# Patient Record
Sex: MALE | Race: WHITE | URBAN - METROPOLITAN AREA
[De-identification: names, ages, dates, MRNs, and addresses within clinical notes are randomized per-mention and may not be internally consistent; named-entity substitution may affect disease eponyms.]

---

## 2017-02-20 ENCOUNTER — EMERGENCY (EMERGENCY)
Facility: HOSPITAL | Age: 82
LOS: 1 days | Discharge: HOME | End: 2017-02-20
Attending: EMERGENCY MEDICINE | Admitting: EMERGENCY MEDICINE
Payer: MEDICARE

## 2017-02-20 VITALS
SYSTOLIC BLOOD PRESSURE: 138 MMHG | DIASTOLIC BLOOD PRESSURE: 84 MMHG | TEMPERATURE: 98 F | OXYGEN SATURATION: 98 % | HEART RATE: 66 BPM | RESPIRATION RATE: 18 BRPM

## 2017-02-20 VITALS
HEIGHT: 66 IN | DIASTOLIC BLOOD PRESSURE: 82 MMHG | WEIGHT: 197.98 LBS | SYSTOLIC BLOOD PRESSURE: 144 MMHG | HEART RATE: 64 BPM | OXYGEN SATURATION: 97 % | TEMPERATURE: 97 F | RESPIRATION RATE: 16 BRPM

## 2017-02-20 DIAGNOSIS — S89.91XA UNSPECIFIED INJURY OF RIGHT LOWER LEG, INITIAL ENCOUNTER: ICD-10-CM

## 2017-02-20 DIAGNOSIS — S01.81XA LACERATION WITHOUT FOREIGN BODY OF OTHER PART OF HEAD, INITIAL ENCOUNTER: ICD-10-CM
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
## 2017-02-20 DIAGNOSIS — I50.9 HEART FAILURE, UNSPECIFIED: ICD-10-CM

## 2017-02-20 DIAGNOSIS — S09.90XA UNSPECIFIED INJURY OF HEAD, INITIAL ENCOUNTER: ICD-10-CM
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
## 2017-02-20 DIAGNOSIS — Z88.5 ALLERGY STATUS TO NARCOTIC AGENT: ICD-10-CM

## 2017-02-20 DIAGNOSIS — Z87.891 PERSONAL HISTORY OF NICOTINE DEPENDENCE: ICD-10-CM

## 2017-02-20 DIAGNOSIS — Z79.02 LONG TERM (CURRENT) USE OF ANTITHROMBOTICS/ANTIPLATELETS: ICD-10-CM

## 2017-02-20 DIAGNOSIS — I10 ESSENTIAL (PRIMARY) HYPERTENSION: ICD-10-CM

## 2017-02-20 PROCEDURE — 99284 EMERGENCY DEPT VISIT MOD MDM: CPT | Mod: 25

## 2017-02-20 PROCEDURE — 70450 CT HEAD/BRAIN W/O DYE: CPT | Mod: 26

## 2017-02-20 PROCEDURE — 73590 X-RAY EXAM OF LOWER LEG: CPT | Mod: 26,RT

## 2017-02-20 PROCEDURE — 12013 RPR F/E/E/N/L/M 2.6-5.0 CM: CPT

## 2017-02-20 PROCEDURE — 70450 CT HEAD/BRAIN W/O DYE: CPT

## 2017-02-20 PROCEDURE — 99284 EMERGENCY DEPT VISIT MOD MDM: CPT

## 2017-02-20 PROCEDURE — 73590 X-RAY EXAM OF LOWER LEG: CPT

## 2017-02-20 RX ORDER — ACETAMINOPHEN 500 MG
650 TABLET ORAL ONCE
Qty: 0 | Refills: 0 | Status: COMPLETED | OUTPATIENT
Start: 2017-02-20 | End: 2017-02-20

## 2017-02-20 RX ADMIN — Medication 650 MILLIGRAM(S): at 16:23

## 2017-02-20 RX ADMIN — Medication 650 MILLIGRAM(S): at 15:53

## 2017-02-20 NOTE — ED ADULT NURSE NOTE - OBJECTIVE STATEMENT
Pt presents to the ED  missed two steps fell left above the eyebrow laceration and right shin area swelling, mild bruising noted, pt currently on Plavix, denies LOC, nausea.

## 2017-02-20 NOTE — ED PROVIDER NOTE - OBJECTIVE STATEMENT
pt c/o tineo, forehead laceration and right leg pain s/p trip and fall on step. no loc, d/n/v, weakness, numbness, neck or back pain, arm pain, cp, sob, abd pain, visual or speech changes  pmd - herbetr

## 2017-02-20 NOTE — ED PROVIDER NOTE - CARE PLAN
Principal Discharge DX:	Injury of head, initial encounter  Secondary Diagnosis:	Forehead laceration, initial encounter  Secondary Diagnosis:	Leg injury, right, initial encounter

## 2017-02-20 NOTE — ED PROVIDER NOTE - CHPI ED SYMPTOMS NEG
no nausea/no dizziness/no weakness/no change in level of consciousness/no loss of consciousness/no vomiting/no syncope

## 2017-02-20 NOTE — ED PROVIDER NOTE - PROGRESS NOTE DETAILS
Reevaluated patient at bedside.  Patient feeling well, ambulatory without assistance.  Discussed the results of all diagnostic testing in ED and copies of all reports given.   An opportunity to ask questions was given.  Discussed the importance of prompt, close medical follow-up.  Patient will return with any changes, concerns or persistent / worsening symptoms.  Understanding of all instructions verbalized.

## 2017-02-20 NOTE — ED PROVIDER NOTE - EYES, MLM
Clear bilaterally, pupils equal, round and reactive to light. Clear bilaterally, pupils equal, round and reactive to light. left eye ptosis (chronic per pt and family)

## 2017-02-25 ENCOUNTER — EMERGENCY (EMERGENCY)
Facility: HOSPITAL | Age: 82
LOS: 1 days | Discharge: HOME | End: 2017-02-25
Attending: EMERGENCY MEDICINE | Admitting: EMERGENCY MEDICINE
Payer: MEDICARE

## 2017-02-25 VITALS
HEIGHT: 66 IN | WEIGHT: 197.98 LBS | TEMPERATURE: 98 F | HEART RATE: 70 BPM | RESPIRATION RATE: 14 BRPM | SYSTOLIC BLOOD PRESSURE: 161 MMHG | OXYGEN SATURATION: 95 % | DIASTOLIC BLOOD PRESSURE: 87 MMHG

## 2017-02-25 VITALS
RESPIRATION RATE: 16 BRPM | HEART RATE: 59 BPM | SYSTOLIC BLOOD PRESSURE: 136 MMHG | OXYGEN SATURATION: 95 % | TEMPERATURE: 98 F | DIASTOLIC BLOOD PRESSURE: 78 MMHG

## 2017-02-25 DIAGNOSIS — Z98.890 OTHER SPECIFIED POSTPROCEDURAL STATES: Chronic | ICD-10-CM

## 2017-02-25 DIAGNOSIS — I10 ESSENTIAL (PRIMARY) HYPERTENSION: ICD-10-CM

## 2017-02-25 DIAGNOSIS — Z48.02 ENCOUNTER FOR REMOVAL OF SUTURES: ICD-10-CM

## 2017-02-25 DIAGNOSIS — Z88.5 ALLERGY STATUS TO NARCOTIC AGENT: ICD-10-CM

## 2017-02-25 DIAGNOSIS — I50.9 HEART FAILURE, UNSPECIFIED: ICD-10-CM

## 2017-02-25 DIAGNOSIS — Z79.02 LONG TERM (CURRENT) USE OF ANTITHROMBOTICS/ANTIPLATELETS: ICD-10-CM

## 2017-02-25 PROCEDURE — G0463: CPT

## 2017-02-25 NOTE — ED ADULT NURSE NOTE - OBJECTIVE STATEMENT
pt arrived for suture removal on left side of face. 8 sutures removed. no redness or swelling noted. no odor or drainage from the site. perrl. no visual changes. denies fever.

## 2017-02-25 NOTE — ED PROVIDER NOTE - OBJECTIVE STATEMENT
80 y/o M presents for suture removal after a left forehead laceration repair 6 days ago.  pt denies signs of infection, headache, discharge, redness, pain.

## 2017-02-25 NOTE — ED PROVIDER NOTE - CHPI ED SYMPTOMS NEG
no bleeding/no purulent drainage/no fever/no inflammation/no chills/no redness/no red streaks/no bleeding at site/no pain/no drainage

## 2017-02-25 NOTE — ED ADULT NURSE NOTE - CHPI ED SYMPTOMS NEG
no purulent drainage/no bleeding/no fever/no inflammation/no chills/no redness/no red streaks/no bleeding at site/no drainage/no pain

## 2021-01-01 ENCOUNTER — APPOINTMENT (OUTPATIENT)
Dept: ELECTROPHYSIOLOGY | Facility: CLINIC | Age: 86
End: 2021-01-01
Payer: MEDICARE

## 2021-01-01 VITALS
WEIGHT: 179 LBS | HEART RATE: 117 BPM | DIASTOLIC BLOOD PRESSURE: 62 MMHG | SYSTOLIC BLOOD PRESSURE: 130 MMHG | BODY MASS INDEX: 28.09 KG/M2 | HEIGHT: 67 IN

## 2021-01-01 DIAGNOSIS — I48.91 UNSPECIFIED ATRIAL FIBRILLATION: ICD-10-CM

## 2021-01-01 DIAGNOSIS — E03.9 HYPOTHYROIDISM, UNSPECIFIED: ICD-10-CM

## 2021-01-01 DIAGNOSIS — I73.9 PERIPHERAL VASCULAR DISEASE, UNSPECIFIED: ICD-10-CM

## 2021-01-01 DIAGNOSIS — Z78.9 OTHER SPECIFIED HEALTH STATUS: ICD-10-CM

## 2021-01-01 DIAGNOSIS — I50.9 HEART FAILURE, UNSPECIFIED: ICD-10-CM

## 2021-01-01 DIAGNOSIS — E78.5 HYPERLIPIDEMIA, UNSPECIFIED: ICD-10-CM

## 2021-01-01 DIAGNOSIS — Z86.79 PERSONAL HISTORY OF OTHER DISEASES OF THE CIRCULATORY SYSTEM: ICD-10-CM

## 2021-01-01 DIAGNOSIS — Z82.49 FAMILY HISTORY OF ISCHEMIC HEART DISEASE AND OTHER DISEASES OF THE CIRCULATORY SYSTEM: ICD-10-CM

## 2021-01-01 DIAGNOSIS — Z87.19 PERSONAL HISTORY OF OTHER DISEASES OF THE DIGESTIVE SYSTEM: ICD-10-CM

## 2021-01-01 PROCEDURE — 93000 ELECTROCARDIOGRAM COMPLETE: CPT

## 2021-01-01 PROCEDURE — 99205 OFFICE O/P NEW HI 60 MIN: CPT

## 2021-01-01 RX ORDER — TORSEMIDE 100 MG/1
100 TABLET ORAL DAILY
Refills: 0 | Status: ACTIVE | COMMUNITY
Start: 2021-01-01

## 2021-01-01 RX ORDER — DILTIAZEM HYDROCHLORIDE 120 MG/1
120 CAPSULE, COATED, EXTENDED RELEASE ORAL
Refills: 0 | Status: ACTIVE | COMMUNITY
Start: 2021-01-01

## 2021-01-01 RX ORDER — CLOPIDOGREL BISULFATE 75 MG/1
75 TABLET, FILM COATED ORAL
Qty: 30 | Refills: 3 | Status: ACTIVE | COMMUNITY
Start: 2021-01-01

## 2021-01-01 RX ORDER — ALBUTEROL SULFATE 90 UG/1
108 (90 BASE) AEROSOL, METERED RESPIRATORY (INHALATION) EVERY 4 HOURS
Refills: 0 | Status: ACTIVE | COMMUNITY
Start: 2021-01-01

## 2021-01-01 RX ORDER — LEVOTHYROXINE SODIUM 0.11 MG/1
112 TABLET ORAL DAILY
Refills: 0 | Status: ACTIVE | COMMUNITY
Start: 2021-01-01

## 2021-01-01 RX ORDER — LOVASTATIN 40 MG/1
40 TABLET ORAL
Refills: 0 | Status: ACTIVE | COMMUNITY
Start: 2021-01-01

## 2021-01-01 RX ORDER — ALBUTEROL SULFATE 90 UG/1
108 (90 BASE) INHALANT RESPIRATORY (INHALATION) EVERY 4 HOURS
Refills: 0 | Status: ACTIVE | COMMUNITY
Start: 2021-01-01

## 2021-01-01 RX ORDER — PANTOPRAZOLE 40 MG/1
40 TABLET, DELAYED RELEASE ORAL DAILY
Qty: 30 | Refills: 0 | Status: ACTIVE | COMMUNITY
Start: 2021-01-01

## 2021-01-01 RX ORDER — APIXABAN 2.5 MG/1
2.5 TABLET, FILM COATED ORAL
Qty: 60 | Refills: 5 | Status: ACTIVE | COMMUNITY
Start: 2021-01-01

## 2021-12-01 PROBLEM — Z00.00 ENCOUNTER FOR PREVENTIVE HEALTH EXAMINATION: Status: ACTIVE | Noted: 2021-01-01

## 2021-12-01 PROBLEM — I10 ESSENTIAL (PRIMARY) HYPERTENSION: Chronic | Status: ACTIVE | Noted: 2017-02-20

## 2021-12-06 PROBLEM — I73.9 PVD (PERIPHERAL VASCULAR DISEASE): Status: ACTIVE | Noted: 2021-01-01

## 2021-12-06 PROBLEM — Z82.49 FAMILY HISTORY OF HEART DISEASE: Status: ACTIVE | Noted: 2021-01-01

## 2021-12-06 PROBLEM — E03.9 HYPOTHYROID: Status: ACTIVE | Noted: 2021-01-01

## 2021-12-06 PROBLEM — Z86.79 HISTORY OF VENOUS DISEASE: Status: RESOLVED | Noted: 2021-01-01 | Resolved: 2021-01-01

## 2021-12-06 PROBLEM — I50.9 CHF (CONGESTIVE HEART FAILURE): Status: ACTIVE | Noted: 2021-01-01

## 2021-12-06 PROBLEM — E78.5 HYPERLIPIDEMIA: Status: ACTIVE | Noted: 2021-01-01

## 2021-12-06 PROBLEM — Z87.19 HISTORY OF GASTROESOPHAGEAL REFLUX (GERD): Status: RESOLVED | Noted: 2021-01-01 | Resolved: 2021-01-01

## 2021-12-08 PROBLEM — Z78.9 NON-SMOKER: Status: ACTIVE | Noted: 2021-01-01

## 2021-12-08 PROBLEM — I48.91 ATRIAL FIBRILLATION WITH RVR: Status: ACTIVE | Noted: 2021-01-01

## 2021-12-08 NOTE — REVIEW OF SYSTEMS
[Fever] : no fever [Chills] : no chills [Feeling Fatigued] : feeling fatigued [SOB] : shortness of breath [Dyspnea on exertion] : dyspnea during exertion [Chest Discomfort] : no chest discomfort [Lower Ext Edema] : no extremity edema [Leg Claudication] : no intermittent leg claudication [Palpitations] : no palpitations [Orthopnea] : no orthopnea [Syncope] : no syncope [Dizziness] : no dizziness [Convulsions] : no convulsions [Confusion] : no confusion was observed [Easy Bleeding] : a tendency for easy bleeding [Negative] : Integumentary [de-identified] : bleeding noted during dialysis only

## 2021-12-08 NOTE — DISCUSSION/SUMMARY
[FreeTextEntry1] : 86 year old gentleman with history of CHF, COPD, PAD with prior LE revascularization and claudication, hypothyroidism, ESRD on HD (since 9/2021), aortic stenosis, presenting for evaluation of persistent atrial fibrillation. He now has atrial fibrillation with rapid rates, and does not ongoing symptoms of dyspnea. However, his symptoms are multifactorial, given concomitant ESRD on HD, COPD, and severe AS. He may require TAVR for his aortic stenosis, however, this has been felt difficult to assess given his AF and RVR. We discussed AF and management strategies in detail. He will certainly need better rate control. After discussion with Dr. Lopez, will plan SHU/DCCV to restore sinus rhythm. This will allow reassessment of the severity of his AS and associated symptoms. If the AS is not severe and symptomatic, and his symptoms do improve with rhythm control, will plan long-term rhythm control strategies as needed.  \par \par -SHU/DCCV. SHU can also assess AV stenosis.  \par \par -increase Cardizem for now to 240mg qd.  \par \par -after DCCV will consider initiation of antiarrhythmic medication, though will avoid amiodarone/sotalol given COPD and ESRD. Perhaps Multaq will be best option.  \par \par -continue anticoagulation. Will need to continue Eliquis after DCCV for at least 1 month without interruption \par \par -further workup of AV disease pending above.  \par \par -EP followup after above, t/c further AF management pending course.

## 2021-12-08 NOTE — REASON FOR VISIT
[Arrhythmia/ECG Abnorrmalities] : arrhythmia/ECG abnormalities [Family Member] : family member [FreeTextEntry3] : Dr Lopez

## 2021-12-08 NOTE — CARDIOLOGY SUMMARY
[de-identified] : 12/8/21 atrial fibrillation, RBBB, average rate 117 bpm [de-identified] : Stress 2016 mild-mod abnormal with inferior and posterolateral ischemia with infarct, nml LVEF  [de-identified] : TTE 9/4/21 LVEF 61%, LA 4.6cm, trace AI, severe AS (mean gradient 33mHg, WAQAS 0.84cm2), RVSP 30 \par \par

## 2021-12-08 NOTE — HISTORY OF PRESENT ILLNESS
[FreeTextEntry1] : 86 year old gentleman with history of CHF, COPD, PAD with prior LE revascularization and claudication, hypothyroidism, ESRD on HD (since 9/2021), aortic stenosis, presenting for evaluation of persistent atrial fibrillation.  \par \par He has been noted to have atrial fibrillation for the last several months. In 9/2021 he was hospitalized at [facility] for progressive dyspnea and CHF. At that time he was started on dialysis, and treated for AF with rapid rates with rate control.  \par \par On ECG today, he is in AF with average rate 117 bpm with RBBB.  \par \par Since hospital discharge he has been feeling much better, but continues to have dyspnea with ambulation (<1 flight of stairs). He denies palpitation, chest pain, lightheadedness or recent syncope.  \par \par Recent TTE revealed severe AS (WAQAS 0.84, mean gradient 33) and preserved LV function, but the AS and symptoms were felt to be difficult to assess given his AF.  \par \par HE has been on anticoagulation with Eliquis 2.5mg bid + Plavix, and rate control with Cardizem 120mg qd. He previously was on beta blockade but this was changed due to his hx of COPD.  \par \par Current meds include Eliquis 2.5mg bid, Plavix 75, Cardizem 120, losartan 40, torsemide

## 2021-12-08 NOTE — PHYSICAL EXAM
[Well Developed] : well developed [Well Nourished] : well nourished [No Acute Distress] : no acute distress [Normal Conjunctiva] : normal conjunctiva [Normal Venous Pressure] : normal venous pressure [Clear Lung Fields] : clear lung fields [Good Air Entry] : good air entry [Soft] : abdomen soft [No Edema] : no edema [No Rash] : no rash [Moves all extremities] : moves all extremities [No Focal Deficits] : no focal deficits [Normal Speech] : normal speech [Alert and Oriented] : alert and oriented [de-identified] : irregularly irregular, rapid. 3/6 CHARLA, intact S2.

## 2022-01-01 ENCOUNTER — TRANSCRIPTION ENCOUNTER (OUTPATIENT)
Age: 87
End: 2022-01-01

## 2022-01-01 ENCOUNTER — INPATIENT (INPATIENT)
Facility: HOSPITAL | Age: 87
LOS: 0 days | Discharge: HOME | DRG: 306 | End: 2022-07-21
Attending: STUDENT IN AN ORGANIZED HEALTH CARE EDUCATION/TRAINING PROGRAM | Admitting: STUDENT IN AN ORGANIZED HEALTH CARE EDUCATION/TRAINING PROGRAM
Payer: MEDICARE

## 2022-01-01 ENCOUNTER — INPATIENT (INPATIENT)
Facility: HOSPITAL | Age: 87
LOS: 15 days | Discharge: NURSING, REHAB OR LONG-TERM CARE | End: 2022-05-21
Attending: STUDENT IN AN ORGANIZED HEALTH CARE EDUCATION/TRAINING PROGRAM | Admitting: STUDENT IN AN ORGANIZED HEALTH CARE EDUCATION/TRAINING PROGRAM
Payer: MEDICARE

## 2022-01-01 ENCOUNTER — INPATIENT (INPATIENT)
Facility: HOSPITAL | Age: 87
LOS: 3 days | DRG: 291 | End: 2022-10-17
Attending: HOSPITALIST | Admitting: INTERNAL MEDICINE
Payer: MEDICARE

## 2022-01-01 ENCOUNTER — INPATIENT (INPATIENT)
Facility: HOSPITAL | Age: 87
LOS: 3 days | Discharge: ANOTHER HOSPITAL | DRG: 291 | End: 2022-08-15
Attending: INTERNAL MEDICINE | Admitting: INTERNAL MEDICINE
Payer: MEDICARE

## 2022-01-01 ENCOUNTER — OUTPATIENT (OUTPATIENT)
Dept: OUTPATIENT SERVICES | Facility: HOSPITAL | Age: 87
LOS: 1 days | End: 2022-01-01
Payer: MEDICARE

## 2022-01-01 VITALS
OXYGEN SATURATION: 100 % | DIASTOLIC BLOOD PRESSURE: 55 MMHG | HEART RATE: 69 BPM | SYSTOLIC BLOOD PRESSURE: 91 MMHG | RESPIRATION RATE: 16 BRPM

## 2022-01-01 VITALS
SYSTOLIC BLOOD PRESSURE: 125 MMHG | HEART RATE: 67 BPM | OXYGEN SATURATION: 96 % | DIASTOLIC BLOOD PRESSURE: 61 MMHG | RESPIRATION RATE: 18 BRPM | TEMPERATURE: 98 F

## 2022-01-01 VITALS
WEIGHT: 179.02 LBS | DIASTOLIC BLOOD PRESSURE: 88 MMHG | SYSTOLIC BLOOD PRESSURE: 125 MMHG | OXYGEN SATURATION: 96 % | HEART RATE: 82 BPM | HEIGHT: 66 IN | TEMPERATURE: 98 F | RESPIRATION RATE: 26 BRPM

## 2022-01-01 VITALS
SYSTOLIC BLOOD PRESSURE: 127 MMHG | OXYGEN SATURATION: 97 % | DIASTOLIC BLOOD PRESSURE: 61 MMHG | HEART RATE: 70 BPM | RESPIRATION RATE: 18 BRPM | TEMPERATURE: 98 F

## 2022-01-01 VITALS
RESPIRATION RATE: 17 BRPM | TEMPERATURE: 98 F | OXYGEN SATURATION: 97 % | SYSTOLIC BLOOD PRESSURE: 118 MMHG | HEART RATE: 99 BPM | DIASTOLIC BLOOD PRESSURE: 64 MMHG

## 2022-01-01 VITALS
WEIGHT: 167.99 LBS | RESPIRATION RATE: 18 BRPM | OXYGEN SATURATION: 98 % | TEMPERATURE: 98 F | SYSTOLIC BLOOD PRESSURE: 92 MMHG | HEART RATE: 72 BPM | DIASTOLIC BLOOD PRESSURE: 68 MMHG | HEIGHT: 66 IN

## 2022-01-01 VITALS
DIASTOLIC BLOOD PRESSURE: 70 MMHG | SYSTOLIC BLOOD PRESSURE: 132 MMHG | OXYGEN SATURATION: 97 % | RESPIRATION RATE: 16 BRPM | HEART RATE: 80 BPM

## 2022-01-01 VITALS
RESPIRATION RATE: 20 BRPM | DIASTOLIC BLOOD PRESSURE: 82 MMHG | HEIGHT: 66 IN | SYSTOLIC BLOOD PRESSURE: 112 MMHG | HEART RATE: 117 BPM | OXYGEN SATURATION: 100 %

## 2022-01-01 VITALS — TEMPERATURE: 97 F

## 2022-01-01 VITALS — SYSTOLIC BLOOD PRESSURE: 71 MMHG | DIASTOLIC BLOOD PRESSURE: 26 MMHG

## 2022-01-01 DIAGNOSIS — N18.6 END STAGE RENAL DISEASE: ICD-10-CM

## 2022-01-01 DIAGNOSIS — J44.9 CHRONIC OBSTRUCTIVE PULMONARY DISEASE, UNSPECIFIED: ICD-10-CM

## 2022-01-01 DIAGNOSIS — K92.2 GASTROINTESTINAL HEMORRHAGE, UNSPECIFIED: ICD-10-CM

## 2022-01-01 DIAGNOSIS — Z98.890 OTHER SPECIFIED POSTPROCEDURAL STATES: Chronic | ICD-10-CM

## 2022-01-01 DIAGNOSIS — E78.5 HYPERLIPIDEMIA, UNSPECIFIED: ICD-10-CM

## 2022-01-01 DIAGNOSIS — D69.6 THROMBOCYTOPENIA, UNSPECIFIED: ICD-10-CM

## 2022-01-01 DIAGNOSIS — I48.91 UNSPECIFIED ATRIAL FIBRILLATION: ICD-10-CM

## 2022-01-01 DIAGNOSIS — I35.0 NONRHEUMATIC AORTIC (VALVE) STENOSIS: ICD-10-CM

## 2022-01-01 DIAGNOSIS — E03.9 HYPOTHYROIDISM, UNSPECIFIED: ICD-10-CM

## 2022-01-01 DIAGNOSIS — R78.81 BACTEREMIA: ICD-10-CM

## 2022-01-01 DIAGNOSIS — N17.9 ACUTE KIDNEY FAILURE, UNSPECIFIED: ICD-10-CM

## 2022-01-01 DIAGNOSIS — Z95.0 PRESENCE OF CARDIAC PACEMAKER: Chronic | ICD-10-CM

## 2022-01-01 DIAGNOSIS — I48.20 CHRONIC ATRIAL FIBRILLATION, UNSPECIFIED: ICD-10-CM

## 2022-01-01 DIAGNOSIS — I95.9 HYPOTENSION, UNSPECIFIED: ICD-10-CM

## 2022-01-01 DIAGNOSIS — R77.8 OTHER SPECIFIED ABNORMALITIES OF PLASMA PROTEINS: ICD-10-CM

## 2022-01-01 DIAGNOSIS — Z29.9 ENCOUNTER FOR PROPHYLACTIC MEASURES, UNSPECIFIED: ICD-10-CM

## 2022-01-01 DIAGNOSIS — R94.31 ABNORMAL ELECTROCARDIOGRAM [ECG] [EKG]: ICD-10-CM

## 2022-01-01 DIAGNOSIS — D63.8 ANEMIA IN OTHER CHRONIC DISEASES CLASSIFIED ELSEWHERE: ICD-10-CM

## 2022-01-01 DIAGNOSIS — E87.1 HYPO-OSMOLALITY AND HYPONATREMIA: ICD-10-CM

## 2022-01-01 DIAGNOSIS — J90 PLEURAL EFFUSION, NOT ELSEWHERE CLASSIFIED: ICD-10-CM

## 2022-01-01 DIAGNOSIS — I50.32 CHRONIC DIASTOLIC (CONGESTIVE) HEART FAILURE: ICD-10-CM

## 2022-01-01 DIAGNOSIS — I25.10 ATHEROSCLEROTIC HEART DISEASE OF NATIVE CORONARY ARTERY WITHOUT ANGINA PECTORIS: ICD-10-CM

## 2022-01-01 DIAGNOSIS — I50.9 HEART FAILURE, UNSPECIFIED: ICD-10-CM

## 2022-01-01 DIAGNOSIS — D64.9 ANEMIA, UNSPECIFIED: ICD-10-CM

## 2022-01-01 DIAGNOSIS — U07.1 COVID-19: ICD-10-CM

## 2022-01-01 DIAGNOSIS — J96.01 ACUTE RESPIRATORY FAILURE WITH HYPOXIA: ICD-10-CM

## 2022-01-01 LAB
ABO RH CONFIRMATION: SIGNIFICANT CHANGE UP
ALBUMIN FLD-MCNC: 1.1 G/DL — SIGNIFICANT CHANGE UP
ALBUMIN SERPL ELPH-MCNC: 2.3 G/DL — LOW (ref 3.3–5)
ALBUMIN SERPL ELPH-MCNC: 2.5 G/DL — LOW (ref 3.3–5)
ALBUMIN SERPL ELPH-MCNC: 2.6 G/DL — LOW (ref 3.3–5)
ALBUMIN SERPL ELPH-MCNC: 2.7 G/DL — LOW (ref 3.3–5)
ALBUMIN SERPL ELPH-MCNC: 2.7 G/DL — LOW (ref 3.3–5)
ALBUMIN SERPL ELPH-MCNC: 2.8 G/DL — LOW (ref 3.3–5)
ALBUMIN SERPL ELPH-MCNC: 2.8 G/DL — LOW (ref 3.3–5)
ALBUMIN SERPL ELPH-MCNC: 2.9 G/DL — LOW (ref 3.3–5)
ALBUMIN SERPL ELPH-MCNC: 3 G/DL — LOW (ref 3.3–5)
ALBUMIN SERPL ELPH-MCNC: 3 G/DL — LOW (ref 3.3–5)
ALBUMIN SERPL ELPH-MCNC: 3.2 G/DL — LOW (ref 3.3–5)
ALBUMIN SERPL ELPH-MCNC: SIGNIFICANT CHANGE UP G/DL (ref 3.3–5)
ALP SERPL-CCNC: 104 U/L — SIGNIFICANT CHANGE UP (ref 40–120)
ALP SERPL-CCNC: 110 U/L — SIGNIFICANT CHANGE UP (ref 40–120)
ALP SERPL-CCNC: 112 U/L — SIGNIFICANT CHANGE UP (ref 40–120)
ALP SERPL-CCNC: 114 U/L — SIGNIFICANT CHANGE UP (ref 40–120)
ALP SERPL-CCNC: 116 U/L — SIGNIFICANT CHANGE UP (ref 40–120)
ALP SERPL-CCNC: 120 U/L — SIGNIFICANT CHANGE UP (ref 40–120)
ALP SERPL-CCNC: 121 U/L — HIGH (ref 40–120)
ALP SERPL-CCNC: 122 U/L — HIGH (ref 40–120)
ALP SERPL-CCNC: 134 U/L — HIGH (ref 40–120)
ALP SERPL-CCNC: 145 U/L — HIGH (ref 40–120)
ALP SERPL-CCNC: 157 U/L — HIGH (ref 40–120)
ALP SERPL-CCNC: 173 U/L — HIGH (ref 40–120)
ALP SERPL-CCNC: 205 U/L — HIGH (ref 40–120)
ALP SERPL-CCNC: 243 U/L — HIGH (ref 40–120)
ALP SERPL-CCNC: 312 U/L — HIGH (ref 40–120)
ALP SERPL-CCNC: SIGNIFICANT CHANGE UP U/L (ref 40–120)
ALT FLD-CCNC: 13 U/L — SIGNIFICANT CHANGE UP (ref 4–41)
ALT FLD-CCNC: 14 U/L — SIGNIFICANT CHANGE UP (ref 4–41)
ALT FLD-CCNC: 15 U/L — SIGNIFICANT CHANGE UP (ref 4–41)
ALT FLD-CCNC: 16 U/L — SIGNIFICANT CHANGE UP (ref 4–41)
ALT FLD-CCNC: 210 U/L — HIGH (ref 12–78)
ALT FLD-CCNC: 28 U/L — SIGNIFICANT CHANGE UP (ref 12–78)
ALT FLD-CCNC: 33 U/L — SIGNIFICANT CHANGE UP (ref 12–78)
ALT FLD-CCNC: 36 U/L — SIGNIFICANT CHANGE UP (ref 12–78)
ALT FLD-CCNC: 38 U/L — SIGNIFICANT CHANGE UP (ref 12–78)
ALT FLD-CCNC: 42 U/L — SIGNIFICANT CHANGE UP (ref 12–78)
ALT FLD-CCNC: 45 U/L — SIGNIFICANT CHANGE UP (ref 12–78)
ALT FLD-CCNC: SIGNIFICANT CHANGE UP U/L (ref 4–41)
ANION GAP SERPL CALC-SCNC: 10 MMOL/L — SIGNIFICANT CHANGE UP (ref 5–17)
ANION GAP SERPL CALC-SCNC: 13 MMOL/L — SIGNIFICANT CHANGE UP (ref 5–17)
ANION GAP SERPL CALC-SCNC: 13 MMOL/L — SIGNIFICANT CHANGE UP (ref 5–17)
ANION GAP SERPL CALC-SCNC: 14 MMOL/L — SIGNIFICANT CHANGE UP (ref 7–14)
ANION GAP SERPL CALC-SCNC: 15 MMOL/L — HIGH (ref 7–14)
ANION GAP SERPL CALC-SCNC: 16 MMOL/L — HIGH (ref 7–14)
ANION GAP SERPL CALC-SCNC: 17 MMOL/L — HIGH (ref 7–14)
ANION GAP SERPL CALC-SCNC: 19 MMOL/L — HIGH (ref 5–17)
ANION GAP SERPL CALC-SCNC: 19 MMOL/L — HIGH (ref 7–14)
ANION GAP SERPL CALC-SCNC: 19 MMOL/L — HIGH (ref 7–14)
ANION GAP SERPL CALC-SCNC: 20 MMOL/L — HIGH (ref 7–14)
ANION GAP SERPL CALC-SCNC: 20 MMOL/L — HIGH (ref 7–14)
ANION GAP SERPL CALC-SCNC: 21 MMOL/L — HIGH (ref 7–14)
ANION GAP SERPL CALC-SCNC: 3 MMOL/L — LOW (ref 5–17)
ANION GAP SERPL CALC-SCNC: 7 MMOL/L — SIGNIFICANT CHANGE UP (ref 5–17)
ANION GAP SERPL CALC-SCNC: 8 MMOL/L — SIGNIFICANT CHANGE UP (ref 5–17)
ANION GAP SERPL CALC-SCNC: 9 MMOL/L — SIGNIFICANT CHANGE UP (ref 5–17)
ANISOCYTOSIS BLD QL: SIGNIFICANT CHANGE UP
APTT BLD: 24.8 SEC — LOW (ref 27–36.3)
APTT BLD: 31.2 SEC — SIGNIFICANT CHANGE UP (ref 27.5–35.5)
APTT BLD: 33 SEC — SIGNIFICANT CHANGE UP (ref 27–36.3)
APTT BLD: 35.5 SEC — SIGNIFICANT CHANGE UP (ref 27–36.3)
AST SERPL-CCNC: 15 U/L — SIGNIFICANT CHANGE UP (ref 4–40)
AST SERPL-CCNC: 16 U/L — SIGNIFICANT CHANGE UP (ref 4–40)
AST SERPL-CCNC: 17 U/L — SIGNIFICANT CHANGE UP (ref 4–40)
AST SERPL-CCNC: 18 U/L — SIGNIFICANT CHANGE UP (ref 4–40)
AST SERPL-CCNC: 19 U/L — SIGNIFICANT CHANGE UP (ref 4–40)
AST SERPL-CCNC: 20 U/L — SIGNIFICANT CHANGE UP (ref 4–40)
AST SERPL-CCNC: 27 U/L — SIGNIFICANT CHANGE UP (ref 15–37)
AST SERPL-CCNC: 29 U/L — SIGNIFICANT CHANGE UP (ref 15–37)
AST SERPL-CCNC: 292 U/L — HIGH (ref 15–37)
AST SERPL-CCNC: 32 U/L — SIGNIFICANT CHANGE UP (ref 15–37)
AST SERPL-CCNC: 32 U/L — SIGNIFICANT CHANGE UP (ref 15–37)
AST SERPL-CCNC: 41 U/L — HIGH (ref 15–37)
AST SERPL-CCNC: 70 U/L — HIGH (ref 15–37)
AST SERPL-CCNC: SIGNIFICANT CHANGE UP U/L (ref 4–40)
B PERT DNA SPEC QL NAA+PROBE: SIGNIFICANT CHANGE UP
B PERT IGG+IGM PNL SER: CLEAR — SIGNIFICANT CHANGE UP
B PERT+PARAPERT DNA PNL SPEC NAA+PROBE: SIGNIFICANT CHANGE UP
BASE EXCESS BLDV CALC-SCNC: 4.3 MMOL/L — HIGH (ref -2–3)
BASOPHILS # BLD AUTO: 0 K/UL — SIGNIFICANT CHANGE UP (ref 0–0.2)
BASOPHILS # BLD AUTO: 0.02 K/UL — SIGNIFICANT CHANGE UP (ref 0–0.2)
BASOPHILS # BLD AUTO: 0.02 K/UL — SIGNIFICANT CHANGE UP (ref 0–0.2)
BASOPHILS # BLD AUTO: 0.03 K/UL — SIGNIFICANT CHANGE UP (ref 0–0.2)
BASOPHILS # BLD AUTO: 0.04 K/UL — SIGNIFICANT CHANGE UP (ref 0–0.2)
BASOPHILS # BLD AUTO: 0.04 K/UL — SIGNIFICANT CHANGE UP (ref 0–0.2)
BASOPHILS # BLD AUTO: 0.05 K/UL — SIGNIFICANT CHANGE UP (ref 0–0.2)
BASOPHILS # BLD AUTO: 0.06 K/UL — SIGNIFICANT CHANGE UP (ref 0–0.2)
BASOPHILS # BLD AUTO: 0.06 K/UL — SIGNIFICANT CHANGE UP (ref 0–0.2)
BASOPHILS # BLD AUTO: 0.07 K/UL — SIGNIFICANT CHANGE UP (ref 0–0.2)
BASOPHILS # BLD AUTO: 0.08 K/UL — SIGNIFICANT CHANGE UP (ref 0–0.2)
BASOPHILS # BLD AUTO: 0.09 K/UL — SIGNIFICANT CHANGE UP (ref 0–0.2)
BASOPHILS # BLD AUTO: 0.14 K/UL — SIGNIFICANT CHANGE UP (ref 0–0.2)
BASOPHILS NFR BLD AUTO: 0 % — SIGNIFICANT CHANGE UP (ref 0–2)
BASOPHILS NFR BLD AUTO: 0.2 % — SIGNIFICANT CHANGE UP (ref 0–2)
BASOPHILS NFR BLD AUTO: 0.2 % — SIGNIFICANT CHANGE UP (ref 0–2)
BASOPHILS NFR BLD AUTO: 0.3 % — SIGNIFICANT CHANGE UP (ref 0–2)
BASOPHILS NFR BLD AUTO: 0.7 % — SIGNIFICANT CHANGE UP (ref 0–2)
BASOPHILS NFR BLD AUTO: 0.8 % — SIGNIFICANT CHANGE UP (ref 0–2)
BASOPHILS NFR BLD AUTO: 0.9 % — SIGNIFICANT CHANGE UP (ref 0–2)
BASOPHILS NFR BLD AUTO: 1 % — SIGNIFICANT CHANGE UP (ref 0–2)
BASOPHILS NFR BLD AUTO: 1 % — SIGNIFICANT CHANGE UP (ref 0–2)
BASOPHILS NFR BLD AUTO: 1.1 % — SIGNIFICANT CHANGE UP (ref 0–2)
BASOPHILS NFR BLD AUTO: 1.2 % — SIGNIFICANT CHANGE UP (ref 0–2)
BASOPHILS NFR BLD AUTO: 1.5 % — SIGNIFICANT CHANGE UP (ref 0–2)
BASOPHILS NFR BLD AUTO: 1.5 % — SIGNIFICANT CHANGE UP (ref 0–2)
BASOPHILS NFR BLD AUTO: 1.6 % — SIGNIFICANT CHANGE UP (ref 0–2)
BASOPHILS NFR BLD AUTO: 4.3 % — HIGH (ref 0–2)
BILIRUB SERPL-MCNC: 0.2 MG/DL — SIGNIFICANT CHANGE UP (ref 0.2–1.2)
BILIRUB SERPL-MCNC: 0.3 MG/DL — SIGNIFICANT CHANGE UP (ref 0.2–1.2)
BILIRUB SERPL-MCNC: 0.4 MG/DL — SIGNIFICANT CHANGE UP (ref 0.2–1.2)
BILIRUB SERPL-MCNC: 0.6 MG/DL — SIGNIFICANT CHANGE UP (ref 0.2–1.2)
BILIRUB SERPL-MCNC: 0.8 MG/DL — SIGNIFICANT CHANGE UP (ref 0.2–1.2)
BILIRUB SERPL-MCNC: 0.9 MG/DL — SIGNIFICANT CHANGE UP (ref 0.2–1.2)
BILIRUB SERPL-MCNC: 1.1 MG/DL — SIGNIFICANT CHANGE UP (ref 0.2–1.2)
BILIRUB SERPL-MCNC: 3.1 MG/DL — HIGH (ref 0.2–1.2)
BILIRUB SERPL-MCNC: SIGNIFICANT CHANGE UP MG/DL (ref 0.2–1.2)
BLD GP AB SCN SERPL QL: NEGATIVE — SIGNIFICANT CHANGE UP
BLD GP AB SCN SERPL QL: NEGATIVE — SIGNIFICANT CHANGE UP
BLOOD GAS VENOUS COMPREHENSIVE RESULT: SIGNIFICANT CHANGE UP
BLOOD GAS VENOUS COMPREHENSIVE RESULT: SIGNIFICANT CHANGE UP
BORDETELLA PARAPERTUSSIS (RAPRVP): SIGNIFICANT CHANGE UP
BUN SERPL-MCNC: 19 MG/DL — SIGNIFICANT CHANGE UP (ref 7–23)
BUN SERPL-MCNC: 23 MG/DL — SIGNIFICANT CHANGE UP (ref 7–23)
BUN SERPL-MCNC: 25 MG/DL — HIGH (ref 7–23)
BUN SERPL-MCNC: 26 MG/DL — HIGH (ref 7–23)
BUN SERPL-MCNC: 27 MG/DL — HIGH (ref 7–23)
BUN SERPL-MCNC: 28 MG/DL — HIGH (ref 7–23)
BUN SERPL-MCNC: 29 MG/DL — HIGH (ref 7–23)
BUN SERPL-MCNC: 30 MG/DL — HIGH (ref 7–23)
BUN SERPL-MCNC: 32 MG/DL — HIGH (ref 7–23)
BUN SERPL-MCNC: 33 MG/DL — HIGH (ref 7–23)
BUN SERPL-MCNC: 35 MG/DL — HIGH (ref 7–23)
BUN SERPL-MCNC: 35 MG/DL — HIGH (ref 7–23)
BUN SERPL-MCNC: 36 MG/DL — HIGH (ref 7–23)
BUN SERPL-MCNC: 37.5 MG/DL — HIGH (ref 8–20)
BUN SERPL-MCNC: 38 MG/DL — HIGH (ref 7–23)
BUN SERPL-MCNC: 39 MG/DL — HIGH (ref 7–23)
BUN SERPL-MCNC: 40 MG/DL — HIGH (ref 7–23)
BUN SERPL-MCNC: 42 MG/DL — HIGH (ref 7–23)
BUN SERPL-MCNC: 43 MG/DL — HIGH (ref 7–23)
BUN SERPL-MCNC: 43 MG/DL — HIGH (ref 7–23)
BUN SERPL-MCNC: 44 MG/DL — HIGH (ref 7–23)
BUN SERPL-MCNC: 47 MG/DL — HIGH (ref 7–23)
BUN SERPL-MCNC: 48 MG/DL — HIGH (ref 7–23)
BUN SERPL-MCNC: 51 MG/DL — HIGH (ref 7–23)
BUN SERPL-MCNC: 58 MG/DL — HIGH (ref 7–23)
BUN SERPL-MCNC: 66 MG/DL — HIGH (ref 7–23)
BUN SERPL-MCNC: 68 MG/DL — HIGH (ref 7–23)
BUN SERPL-MCNC: 73 MG/DL — HIGH (ref 7–23)
BUN SERPL-MCNC: SIGNIFICANT CHANGE UP MG/DL (ref 7–23)
C PNEUM DNA SPEC QL NAA+PROBE: SIGNIFICANT CHANGE UP
CA-I SERPL-SCNC: 1.19 MMOL/L — SIGNIFICANT CHANGE UP (ref 1.15–1.33)
CALCIUM SERPL-MCNC: 8.3 MG/DL — LOW (ref 8.5–10.1)
CALCIUM SERPL-MCNC: 8.5 MG/DL — SIGNIFICANT CHANGE UP (ref 8.5–10.1)
CALCIUM SERPL-MCNC: 8.7 MG/DL — SIGNIFICANT CHANGE UP (ref 8.5–10.1)
CALCIUM SERPL-MCNC: 8.7 MG/DL — SIGNIFICANT CHANGE UP (ref 8.5–10.1)
CALCIUM SERPL-MCNC: 8.8 MG/DL — SIGNIFICANT CHANGE UP (ref 8.4–10.5)
CALCIUM SERPL-MCNC: 8.8 MG/DL — SIGNIFICANT CHANGE UP (ref 8.5–10.1)
CALCIUM SERPL-MCNC: 8.9 MG/DL — SIGNIFICANT CHANGE UP (ref 8.4–10.5)
CALCIUM SERPL-MCNC: 8.9 MG/DL — SIGNIFICANT CHANGE UP (ref 8.4–10.5)
CALCIUM SERPL-MCNC: 9 MG/DL — SIGNIFICANT CHANGE UP (ref 8.4–10.5)
CALCIUM SERPL-MCNC: 9 MG/DL — SIGNIFICANT CHANGE UP (ref 8.4–10.5)
CALCIUM SERPL-MCNC: 9 MG/DL — SIGNIFICANT CHANGE UP (ref 8.5–10.1)
CALCIUM SERPL-MCNC: 9.1 MG/DL — SIGNIFICANT CHANGE UP (ref 8.4–10.5)
CALCIUM SERPL-MCNC: 9.1 MG/DL — SIGNIFICANT CHANGE UP (ref 8.5–10.1)
CALCIUM SERPL-MCNC: 9.2 MG/DL — SIGNIFICANT CHANGE UP (ref 8.4–10.5)
CALCIUM SERPL-MCNC: 9.2 MG/DL — SIGNIFICANT CHANGE UP (ref 8.4–10.5)
CALCIUM SERPL-MCNC: 9.3 MG/DL — SIGNIFICANT CHANGE UP (ref 8.4–10.5)
CALCIUM SERPL-MCNC: 9.4 MG/DL — SIGNIFICANT CHANGE UP (ref 8.4–10.5)
CALCIUM SERPL-MCNC: 9.4 MG/DL — SIGNIFICANT CHANGE UP (ref 8.6–10.2)
CALCIUM SERPL-MCNC: 9.5 MG/DL — SIGNIFICANT CHANGE UP (ref 8.4–10.5)
CALCIUM SERPL-MCNC: 9.5 MG/DL — SIGNIFICANT CHANGE UP (ref 8.5–10.1)
CALCIUM SERPL-MCNC: 9.6 MG/DL — SIGNIFICANT CHANGE UP (ref 8.5–10.1)
CALCIUM SERPL-MCNC: 9.7 MG/DL — SIGNIFICANT CHANGE UP (ref 8.5–10.1)
CALCIUM SERPL-MCNC: SIGNIFICANT CHANGE UP MG/DL (ref 8.4–10.5)
CHLORIDE BLDV-SCNC: 98 MMOL/L — SIGNIFICANT CHANGE UP (ref 96–108)
CHLORIDE SERPL-SCNC: 89 MMOL/L — LOW (ref 96–108)
CHLORIDE SERPL-SCNC: 89 MMOL/L — LOW (ref 98–107)
CHLORIDE SERPL-SCNC: 91 MMOL/L — LOW (ref 96–108)
CHLORIDE SERPL-SCNC: 91 MMOL/L — LOW (ref 98–107)
CHLORIDE SERPL-SCNC: 92 MMOL/L — LOW (ref 98–107)
CHLORIDE SERPL-SCNC: 93 MMOL/L — LOW (ref 98–107)
CHLORIDE SERPL-SCNC: 94 MMOL/L — LOW (ref 96–108)
CHLORIDE SERPL-SCNC: 94 MMOL/L — LOW (ref 98–107)
CHLORIDE SERPL-SCNC: 94 MMOL/L — LOW (ref 98–107)
CHLORIDE SERPL-SCNC: 95 MMOL/L — LOW (ref 96–108)
CHLORIDE SERPL-SCNC: 95 MMOL/L — LOW (ref 98–107)
CHLORIDE SERPL-SCNC: 96 MMOL/L — LOW (ref 98–107)
CHLORIDE SERPL-SCNC: 96 MMOL/L — SIGNIFICANT CHANGE UP (ref 96–108)
CHLORIDE SERPL-SCNC: 97 MMOL/L — LOW (ref 98–107)
CHLORIDE SERPL-SCNC: 97 MMOL/L — LOW (ref 98–107)
CHLORIDE SERPL-SCNC: 97 MMOL/L — SIGNIFICANT CHANGE UP (ref 96–108)
CHLORIDE SERPL-SCNC: 97 MMOL/L — SIGNIFICANT CHANGE UP (ref 96–108)
CHLORIDE SERPL-SCNC: 98 MMOL/L — SIGNIFICANT CHANGE UP (ref 98–107)
CK MB BLD-MCNC: 16.9 % — HIGH (ref 0–3.5)
CK MB CFR SERPL CALC: 4.9 NG/ML — HIGH (ref 0–3.6)
CK MB CFR SERPL CALC: 6.3 NG/ML — HIGH (ref 0–3.6)
CK SERPL-CCNC: 29 U/L — SIGNIFICANT CHANGE UP (ref 26–308)
CO2 BLDV-SCNC: 31.8 MMOL/L — HIGH (ref 22–26)
CO2 SERPL-SCNC: 19 MMOL/L — LOW (ref 22–31)
CO2 SERPL-SCNC: 22 MMOL/L — SIGNIFICANT CHANGE UP (ref 22–31)
CO2 SERPL-SCNC: 23 MMOL/L — SIGNIFICANT CHANGE UP (ref 22–31)
CO2 SERPL-SCNC: 24 MMOL/L — SIGNIFICANT CHANGE UP (ref 22–31)
CO2 SERPL-SCNC: 25 MMOL/L — SIGNIFICANT CHANGE UP (ref 22–29)
CO2 SERPL-SCNC: 25 MMOL/L — SIGNIFICANT CHANGE UP (ref 22–31)
CO2 SERPL-SCNC: 26 MMOL/L — SIGNIFICANT CHANGE UP (ref 22–31)
CO2 SERPL-SCNC: 26 MMOL/L — SIGNIFICANT CHANGE UP (ref 22–31)
CO2 SERPL-SCNC: 27 MMOL/L — SIGNIFICANT CHANGE UP (ref 22–31)
CO2 SERPL-SCNC: 27 MMOL/L — SIGNIFICANT CHANGE UP (ref 22–31)
CO2 SERPL-SCNC: 28 MMOL/L — SIGNIFICANT CHANGE UP (ref 22–31)
CO2 SERPL-SCNC: 29 MMOL/L — SIGNIFICANT CHANGE UP (ref 22–31)
CO2 SERPL-SCNC: 30 MMOL/L — SIGNIFICANT CHANGE UP (ref 22–31)
CO2 SERPL-SCNC: 31 MMOL/L — SIGNIFICANT CHANGE UP (ref 22–31)
CO2 SERPL-SCNC: 31 MMOL/L — SIGNIFICANT CHANGE UP (ref 22–31)
CO2 SERPL-SCNC: 33 MMOL/L — HIGH (ref 22–31)
CO2 SERPL-SCNC: 36 MMOL/L — HIGH (ref 22–31)
CO2 SERPL-SCNC: SIGNIFICANT CHANGE UP MMOL/L (ref 22–31)
COLOR FLD: YELLOW — SIGNIFICANT CHANGE UP
CREAT SERPL-MCNC: 2.28 MG/DL — HIGH (ref 0.5–1.3)
CREAT SERPL-MCNC: 2.6 MG/DL — HIGH (ref 0.5–1.3)
CREAT SERPL-MCNC: 3.1 MG/DL — HIGH (ref 0.5–1.3)
CREAT SERPL-MCNC: 3.3 MG/DL — HIGH (ref 0.5–1.3)
CREAT SERPL-MCNC: 3.39 MG/DL — HIGH (ref 0.5–1.3)
CREAT SERPL-MCNC: 3.71 MG/DL — HIGH (ref 0.5–1.3)
CREAT SERPL-MCNC: 3.85 MG/DL — HIGH (ref 0.5–1.3)
CREAT SERPL-MCNC: 4.09 MG/DL — HIGH (ref 0.5–1.3)
CREAT SERPL-MCNC: 4.2 MG/DL — HIGH (ref 0.5–1.3)
CREAT SERPL-MCNC: 4.74 MG/DL — HIGH (ref 0.5–1.3)
CREAT SERPL-MCNC: 4.74 MG/DL — HIGH (ref 0.5–1.3)
CREAT SERPL-MCNC: 4.76 MG/DL — HIGH (ref 0.5–1.3)
CREAT SERPL-MCNC: 4.89 MG/DL — HIGH (ref 0.5–1.3)
CREAT SERPL-MCNC: 4.9 MG/DL — HIGH (ref 0.5–1.3)
CREAT SERPL-MCNC: 5.12 MG/DL — HIGH (ref 0.5–1.3)
CREAT SERPL-MCNC: 5.23 MG/DL — HIGH (ref 0.5–1.3)
CREAT SERPL-MCNC: 5.3 MG/DL — HIGH (ref 0.5–1.3)
CREAT SERPL-MCNC: 5.4 MG/DL — HIGH (ref 0.5–1.3)
CREAT SERPL-MCNC: 5.82 MG/DL — HIGH (ref 0.5–1.3)
CREAT SERPL-MCNC: 5.86 MG/DL — HIGH (ref 0.5–1.3)
CREAT SERPL-MCNC: 5.95 MG/DL — HIGH (ref 0.5–1.3)
CREAT SERPL-MCNC: 6.02 MG/DL — HIGH (ref 0.5–1.3)
CREAT SERPL-MCNC: 6.15 MG/DL — HIGH (ref 0.5–1.3)
CREAT SERPL-MCNC: 6.2 MG/DL — HIGH (ref 0.5–1.3)
CREAT SERPL-MCNC: 6.31 MG/DL — HIGH (ref 0.5–1.3)
CREAT SERPL-MCNC: 6.67 MG/DL — HIGH (ref 0.5–1.3)
CREAT SERPL-MCNC: 6.68 MG/DL — HIGH (ref 0.5–1.3)
CREAT SERPL-MCNC: 6.81 MG/DL — HIGH (ref 0.5–1.3)
CREAT SERPL-MCNC: SIGNIFICANT CHANGE UP MG/DL (ref 0.5–1.3)
CRP SERPL-MCNC: 8 MG/L — HIGH
CULTURE RESULTS: SIGNIFICANT CHANGE UP
DACRYOCYTES BLD QL SMEAR: SLIGHT — SIGNIFICANT CHANGE UP
DIALYSIS INSTRUMENT RESULT - HEPATITIS B SURFACE ANTIGEN: NEGATIVE — SIGNIFICANT CHANGE UP
EGFR: 10 ML/MIN/1.73M2 — LOW
EGFR: 11 ML/MIN/1.73M2 — LOW
EGFR: 13 ML/MIN/1.73M2 — LOW
EGFR: 14 ML/MIN/1.73M2 — LOW
EGFR: 14 ML/MIN/1.73M2 — LOW
EGFR: 15 ML/MIN/1.73M2 — LOW
EGFR: 17 ML/MIN/1.73M2 — LOW
EGFR: 17 ML/MIN/1.73M2 — LOW
EGFR: 19 ML/MIN/1.73M2 — LOW
EGFR: 23 ML/MIN/1.73M2 — LOW
EGFR: 27 ML/MIN/1.73M2 — LOW
EGFR: 7 ML/MIN/1.73M2 — LOW
EGFR: 8 ML/MIN/1.73M2 — LOW
EGFR: 9 ML/MIN/1.73M2 — LOW
EOSINOPHIL # BLD AUTO: 0 K/UL — SIGNIFICANT CHANGE UP (ref 0–0.5)
EOSINOPHIL # BLD AUTO: 0.02 K/UL — SIGNIFICANT CHANGE UP (ref 0–0.5)
EOSINOPHIL # BLD AUTO: 0.03 K/UL — SIGNIFICANT CHANGE UP (ref 0–0.5)
EOSINOPHIL # BLD AUTO: 0.03 K/UL — SIGNIFICANT CHANGE UP (ref 0–0.5)
EOSINOPHIL # BLD AUTO: 0.04 K/UL — SIGNIFICANT CHANGE UP (ref 0–0.5)
EOSINOPHIL # BLD AUTO: 0.07 K/UL — SIGNIFICANT CHANGE UP (ref 0–0.5)
EOSINOPHIL # BLD AUTO: 0.1 K/UL — SIGNIFICANT CHANGE UP (ref 0–0.5)
EOSINOPHIL # BLD AUTO: 0.11 K/UL — SIGNIFICANT CHANGE UP (ref 0–0.5)
EOSINOPHIL # BLD AUTO: 0.13 K/UL — SIGNIFICANT CHANGE UP (ref 0–0.5)
EOSINOPHIL # BLD AUTO: 0.15 K/UL — SIGNIFICANT CHANGE UP (ref 0–0.5)
EOSINOPHIL # BLD AUTO: 0.17 K/UL — SIGNIFICANT CHANGE UP (ref 0–0.5)
EOSINOPHIL # BLD AUTO: 0.2 K/UL — SIGNIFICANT CHANGE UP (ref 0–0.5)
EOSINOPHIL # BLD AUTO: 0.2 K/UL — SIGNIFICANT CHANGE UP (ref 0–0.5)
EOSINOPHIL # BLD AUTO: 0.21 K/UL — SIGNIFICANT CHANGE UP (ref 0–0.5)
EOSINOPHIL # BLD AUTO: 0.21 K/UL — SIGNIFICANT CHANGE UP (ref 0–0.5)
EOSINOPHIL # BLD AUTO: 0.22 K/UL — SIGNIFICANT CHANGE UP (ref 0–0.5)
EOSINOPHIL # BLD AUTO: 0.22 K/UL — SIGNIFICANT CHANGE UP (ref 0–0.5)
EOSINOPHIL # BLD AUTO: 0.25 K/UL — SIGNIFICANT CHANGE UP (ref 0–0.5)
EOSINOPHIL # BLD AUTO: 0.25 K/UL — SIGNIFICANT CHANGE UP (ref 0–0.5)
EOSINOPHIL # BLD AUTO: 0.26 K/UL — SIGNIFICANT CHANGE UP (ref 0–0.5)
EOSINOPHIL NFR BLD AUTO: 0 % — SIGNIFICANT CHANGE UP (ref 0–6)
EOSINOPHIL NFR BLD AUTO: 0.3 % — SIGNIFICANT CHANGE UP (ref 0–6)
EOSINOPHIL NFR BLD AUTO: 0.7 % — SIGNIFICANT CHANGE UP (ref 0–6)
EOSINOPHIL NFR BLD AUTO: 0.8 % — SIGNIFICANT CHANGE UP (ref 0–6)
EOSINOPHIL NFR BLD AUTO: 2.3 % — SIGNIFICANT CHANGE UP (ref 0–6)
EOSINOPHIL NFR BLD AUTO: 2.4 % — SIGNIFICANT CHANGE UP (ref 0–6)
EOSINOPHIL NFR BLD AUTO: 2.6 % — SIGNIFICANT CHANGE UP (ref 0–6)
EOSINOPHIL NFR BLD AUTO: 2.7 % — SIGNIFICANT CHANGE UP (ref 0–6)
EOSINOPHIL NFR BLD AUTO: 3 % — SIGNIFICANT CHANGE UP (ref 0–6)
EOSINOPHIL NFR BLD AUTO: 3.3 % — SIGNIFICANT CHANGE UP (ref 0–6)
EOSINOPHIL NFR BLD AUTO: 3.6 % — SIGNIFICANT CHANGE UP (ref 0–6)
EOSINOPHIL NFR BLD AUTO: 3.6 % — SIGNIFICANT CHANGE UP (ref 0–6)
EOSINOPHIL NFR BLD AUTO: 4.2 % — SIGNIFICANT CHANGE UP (ref 0–6)
EOSINOPHIL NFR BLD AUTO: 4.4 % — SIGNIFICANT CHANGE UP (ref 0–6)
EOSINOPHIL NFR BLD AUTO: 4.5 % — SIGNIFICANT CHANGE UP (ref 0–6)
EOSINOPHIL NFR BLD AUTO: 4.6 % — SIGNIFICANT CHANGE UP (ref 0–6)
EOSINOPHIL NFR BLD AUTO: 6 % — SIGNIFICANT CHANGE UP (ref 0–6)
ERYTHROCYTE [SEDIMENTATION RATE] IN BLOOD: 52 MM/HR — HIGH (ref 0–20)
FERRITIN SERPL-MCNC: 386 NG/ML — SIGNIFICANT CHANGE UP (ref 30–400)
FLUAV SUBTYP SPEC NAA+PROBE: SIGNIFICANT CHANGE UP
FLUBV RNA SPEC QL NAA+PROBE: SIGNIFICANT CHANGE UP
FLUID INTAKE SUBSTANCE CLASS: SIGNIFICANT CHANGE UP
FOLATE SERPL-MCNC: 6.3 NG/ML — SIGNIFICANT CHANGE UP
GAS PNL BLDV: 133 MMOL/L — LOW (ref 136–145)
GAS PNL BLDV: SIGNIFICANT CHANGE UP
GLUCOSE BLDC GLUCOMTR-MCNC: 112 MG/DL — HIGH (ref 70–99)
GLUCOSE BLDC GLUCOMTR-MCNC: 142 MG/DL — HIGH (ref 70–99)
GLUCOSE BLDC GLUCOMTR-MCNC: 58 MG/DL — LOW (ref 70–99)
GLUCOSE BLDV-MCNC: 113 MG/DL — HIGH (ref 70–99)
GLUCOSE FLD-MCNC: 95 MG/DL — SIGNIFICANT CHANGE UP
GLUCOSE SERPL-MCNC: 109 MG/DL — HIGH (ref 70–99)
GLUCOSE SERPL-MCNC: 111 MG/DL — HIGH (ref 70–99)
GLUCOSE SERPL-MCNC: 54 MG/DL — CRITICAL LOW (ref 70–99)
GLUCOSE SERPL-MCNC: 68 MG/DL — LOW (ref 70–99)
GLUCOSE SERPL-MCNC: 70 MG/DL — SIGNIFICANT CHANGE UP (ref 70–99)
GLUCOSE SERPL-MCNC: 74 MG/DL — SIGNIFICANT CHANGE UP (ref 70–99)
GLUCOSE SERPL-MCNC: 74 MG/DL — SIGNIFICANT CHANGE UP (ref 70–99)
GLUCOSE SERPL-MCNC: 76 MG/DL — SIGNIFICANT CHANGE UP (ref 70–99)
GLUCOSE SERPL-MCNC: 82 MG/DL — SIGNIFICANT CHANGE UP (ref 70–99)
GLUCOSE SERPL-MCNC: 83 MG/DL — SIGNIFICANT CHANGE UP (ref 70–99)
GLUCOSE SERPL-MCNC: 84 MG/DL — SIGNIFICANT CHANGE UP (ref 70–99)
GLUCOSE SERPL-MCNC: 85 MG/DL — SIGNIFICANT CHANGE UP (ref 70–99)
GLUCOSE SERPL-MCNC: 85 MG/DL — SIGNIFICANT CHANGE UP (ref 70–99)
GLUCOSE SERPL-MCNC: 86 MG/DL — SIGNIFICANT CHANGE UP (ref 70–99)
GLUCOSE SERPL-MCNC: 87 MG/DL — SIGNIFICANT CHANGE UP (ref 70–99)
GLUCOSE SERPL-MCNC: 89 MG/DL — SIGNIFICANT CHANGE UP (ref 70–99)
GLUCOSE SERPL-MCNC: 89 MG/DL — SIGNIFICANT CHANGE UP (ref 70–99)
GLUCOSE SERPL-MCNC: 90 MG/DL — SIGNIFICANT CHANGE UP (ref 70–99)
GLUCOSE SERPL-MCNC: 90 MG/DL — SIGNIFICANT CHANGE UP (ref 70–99)
GLUCOSE SERPL-MCNC: 91 MG/DL — SIGNIFICANT CHANGE UP (ref 70–99)
GLUCOSE SERPL-MCNC: 95 MG/DL — SIGNIFICANT CHANGE UP (ref 70–99)
GLUCOSE SERPL-MCNC: 95 MG/DL — SIGNIFICANT CHANGE UP (ref 70–99)
GLUCOSE SERPL-MCNC: 96 MG/DL — SIGNIFICANT CHANGE UP (ref 70–99)
GLUCOSE SERPL-MCNC: 97 MG/DL — SIGNIFICANT CHANGE UP (ref 70–99)
GLUCOSE SERPL-MCNC: 99 MG/DL — SIGNIFICANT CHANGE UP (ref 70–99)
GLUCOSE SERPL-MCNC: SIGNIFICANT CHANGE UP MG/DL (ref 70–99)
GRAM STN FLD: SIGNIFICANT CHANGE UP
HADV DNA SPEC QL NAA+PROBE: SIGNIFICANT CHANGE UP
HAV IGM SER-ACNC: SIGNIFICANT CHANGE UP
HAV IGM SER-ACNC: SIGNIFICANT CHANGE UP
HBV CORE AB SER-ACNC: SIGNIFICANT CHANGE UP
HBV CORE IGM SER-ACNC: SIGNIFICANT CHANGE UP
HBV CORE IGM SER-ACNC: SIGNIFICANT CHANGE UP
HBV SURFACE AB SER-ACNC: <3 MIU/ML — LOW
HBV SURFACE AG SER-ACNC: SIGNIFICANT CHANGE UP
HCO3 BLDV-SCNC: 30 MMOL/L — HIGH (ref 22–29)
HCOV 229E RNA SPEC QL NAA+PROBE: SIGNIFICANT CHANGE UP
HCOV HKU1 RNA SPEC QL NAA+PROBE: SIGNIFICANT CHANGE UP
HCOV NL63 RNA SPEC QL NAA+PROBE: SIGNIFICANT CHANGE UP
HCOV OC43 RNA SPEC QL NAA+PROBE: SIGNIFICANT CHANGE UP
HCT VFR BLD CALC: 18.9 % — CRITICAL LOW (ref 39–50)
HCT VFR BLD CALC: 19.2 % — CRITICAL LOW (ref 39–50)
HCT VFR BLD CALC: 19.9 % — CRITICAL LOW (ref 39–50)
HCT VFR BLD CALC: 20.7 % — CRITICAL LOW (ref 39–50)
HCT VFR BLD CALC: 20.9 % — CRITICAL LOW (ref 39–50)
HCT VFR BLD CALC: 21.8 % — LOW (ref 39–50)
HCT VFR BLD CALC: 22.4 % — LOW (ref 39–50)
HCT VFR BLD CALC: 22.4 % — LOW (ref 39–50)
HCT VFR BLD CALC: 26.2 % — LOW (ref 39–50)
HCT VFR BLD CALC: 26.5 % — LOW (ref 39–50)
HCT VFR BLD CALC: 27 % — LOW (ref 39–50)
HCT VFR BLD CALC: 27.7 % — LOW (ref 39–50)
HCT VFR BLD CALC: 27.9 % — LOW (ref 39–50)
HCT VFR BLD CALC: 27.9 % — LOW (ref 39–50)
HCT VFR BLD CALC: 28.4 % — LOW (ref 39–50)
HCT VFR BLD CALC: 28.5 % — LOW (ref 39–50)
HCT VFR BLD CALC: 28.7 % — LOW (ref 39–50)
HCT VFR BLD CALC: 28.9 % — LOW (ref 39–50)
HCT VFR BLD CALC: 28.9 % — LOW (ref 39–50)
HCT VFR BLD CALC: 29.1 % — LOW (ref 39–50)
HCT VFR BLD CALC: 29.2 % — LOW (ref 39–50)
HCT VFR BLD CALC: 29.3 % — LOW (ref 39–50)
HCT VFR BLD CALC: 29.9 % — LOW (ref 39–50)
HCT VFR BLD CALC: 30 % — LOW (ref 39–50)
HCT VFR BLD CALC: 30.3 % — LOW (ref 39–50)
HCT VFR BLD CALC: 30.4 % — LOW (ref 39–50)
HCT VFR BLD CALC: 30.7 % — LOW (ref 39–50)
HCT VFR BLD CALC: 31 % — LOW (ref 39–50)
HCT VFR BLD CALC: 31 % — LOW (ref 39–50)
HCT VFR BLD CALC: 31.2 % — LOW (ref 39–50)
HCT VFR BLD CALC: 31.5 % — LOW (ref 39–50)
HCT VFR BLD CALC: 31.7 % — LOW (ref 39–50)
HCT VFR BLD CALC: 31.7 % — LOW (ref 39–50)
HCT VFR BLD CALC: 31.8 % — LOW (ref 39–50)
HCT VFR BLD CALC: 31.9 % — LOW (ref 39–50)
HCT VFR BLD CALC: 32.6 % — LOW (ref 39–50)
HCT VFR BLD CALC: 33.1 % — LOW (ref 39–50)
HCT VFR BLD CALC: 34.2 % — LOW (ref 39–50)
HCT VFR BLDA CALC: 29 % — LOW (ref 39–51)
HCV AB S/CO SERPL IA: 0.11 S/CO — SIGNIFICANT CHANGE UP (ref 0–0.99)
HCV AB S/CO SERPL IA: 0.12 S/CO — SIGNIFICANT CHANGE UP (ref 0–0.99)
HCV AB S/CO SERPL IA: 0.18 S/CO — SIGNIFICANT CHANGE UP (ref 0–0.99)
HCV AB SERPL-IMP: SIGNIFICANT CHANGE UP
HGB BLD CALC-MCNC: 9.8 G/DL — LOW (ref 13–17)
HGB BLD-MCNC: 10.1 G/DL — LOW (ref 13–17)
HGB BLD-MCNC: 10.3 G/DL — LOW (ref 13–17)
HGB BLD-MCNC: 10.5 G/DL — LOW (ref 13–17)
HGB BLD-MCNC: 10.5 G/DL — LOW (ref 13–17)
HGB BLD-MCNC: 10.6 G/DL — LOW (ref 13–17)
HGB BLD-MCNC: 10.8 G/DL — LOW (ref 13–17)
HGB BLD-MCNC: 6 G/DL — CRITICAL LOW (ref 13–17)
HGB BLD-MCNC: 6 G/DL — CRITICAL LOW (ref 13–17)
HGB BLD-MCNC: 6.2 G/DL — CRITICAL LOW (ref 13–17)
HGB BLD-MCNC: 6.7 G/DL — CRITICAL LOW (ref 13–17)
HGB BLD-MCNC: 6.8 G/DL — CRITICAL LOW (ref 13–17)
HGB BLD-MCNC: 6.9 G/DL — CRITICAL LOW (ref 13–17)
HGB BLD-MCNC: 7.1 G/DL — LOW (ref 13–17)
HGB BLD-MCNC: 7.3 G/DL — LOW (ref 13–17)
HGB BLD-MCNC: 8.1 G/DL — LOW (ref 13–17)
HGB BLD-MCNC: 8.3 G/DL — LOW (ref 13–17)
HGB BLD-MCNC: 8.5 G/DL — LOW (ref 13–17)
HGB BLD-MCNC: 8.7 G/DL — LOW (ref 13–17)
HGB BLD-MCNC: 9 G/DL — LOW (ref 13–17)
HGB BLD-MCNC: 9.2 G/DL — LOW (ref 13–17)
HGB BLD-MCNC: 9.2 G/DL — LOW (ref 13–17)
HGB BLD-MCNC: 9.4 G/DL — LOW (ref 13–17)
HGB BLD-MCNC: 9.5 G/DL — LOW (ref 13–17)
HGB BLD-MCNC: 9.6 G/DL — LOW (ref 13–17)
HGB BLD-MCNC: 9.7 G/DL — LOW (ref 13–17)
HGB BLD-MCNC: 9.7 G/DL — LOW (ref 13–17)
HGB BLD-MCNC: 9.8 G/DL — LOW (ref 13–17)
HGB BLD-MCNC: 9.8 G/DL — LOW (ref 13–17)
HGB BLD-MCNC: 9.9 G/DL — LOW (ref 13–17)
HMPV RNA SPEC QL NAA+PROBE: SIGNIFICANT CHANGE UP
HPIV1 RNA SPEC QL NAA+PROBE: SIGNIFICANT CHANGE UP
HPIV2 RNA SPEC QL NAA+PROBE: SIGNIFICANT CHANGE UP
HPIV3 RNA SPEC QL NAA+PROBE: SIGNIFICANT CHANGE UP
HPIV4 RNA SPEC QL NAA+PROBE: SIGNIFICANT CHANGE UP
IANC: 1.91 K/UL — SIGNIFICANT CHANGE UP (ref 1.8–7.4)
IANC: 2.04 K/UL — SIGNIFICANT CHANGE UP (ref 1.8–7.4)
IANC: 2.04 K/UL — SIGNIFICANT CHANGE UP (ref 1.8–7.4)
IANC: 3.09 K/UL — SIGNIFICANT CHANGE UP (ref 1.8–7.4)
IANC: 3.26 K/UL — SIGNIFICANT CHANGE UP (ref 1.8–7.4)
IANC: 3.91 K/UL — SIGNIFICANT CHANGE UP (ref 1.8–7.4)
IANC: 3.92 K/UL — SIGNIFICANT CHANGE UP (ref 1.8–7.4)
IANC: 4.19 K/UL — SIGNIFICANT CHANGE UP (ref 1.8–7.4)
IANC: 4.23 K/UL — SIGNIFICANT CHANGE UP (ref 1.8–7.4)
IANC: 4.31 K/UL — SIGNIFICANT CHANGE UP (ref 1.8–7.4)
IANC: 4.75 K/UL — SIGNIFICANT CHANGE UP (ref 1.8–7.4)
IANC: 4.9 K/UL — SIGNIFICANT CHANGE UP (ref 1.8–7.4)
IANC: 4.9 K/UL — SIGNIFICANT CHANGE UP (ref 1.8–7.4)
IANC: 5.13 K/UL — SIGNIFICANT CHANGE UP (ref 1.8–7.4)
IANC: 5.22 K/UL — SIGNIFICANT CHANGE UP (ref 1.8–7.4)
IANC: 5.42 K/UL — SIGNIFICANT CHANGE UP (ref 1.8–7.4)
IANC: 5.74 K/UL — SIGNIFICANT CHANGE UP (ref 1.8–7.4)
IMM GRANULOCYTES NFR BLD AUTO: 0.6 % — SIGNIFICANT CHANGE UP (ref 0–1.5)
IMM GRANULOCYTES NFR BLD AUTO: 0.9 % — SIGNIFICANT CHANGE UP (ref 0–1.5)
IMM GRANULOCYTES NFR BLD AUTO: 0.9 % — SIGNIFICANT CHANGE UP (ref 0–1.5)
IMM GRANULOCYTES NFR BLD AUTO: 1 % — SIGNIFICANT CHANGE UP (ref 0–1.5)
IMM GRANULOCYTES NFR BLD AUTO: 1.1 % — SIGNIFICANT CHANGE UP (ref 0–1.5)
IMM GRANULOCYTES NFR BLD AUTO: 1.2 % — SIGNIFICANT CHANGE UP (ref 0–1.5)
IMM GRANULOCYTES NFR BLD AUTO: 1.4 % — SIGNIFICANT CHANGE UP (ref 0–1.5)
IMM GRANULOCYTES NFR BLD AUTO: 1.4 % — SIGNIFICANT CHANGE UP (ref 0–1.5)
IMM GRANULOCYTES NFR BLD AUTO: 1.6 % — HIGH (ref 0–1.5)
IMM GRANULOCYTES NFR BLD AUTO: 1.8 % — HIGH (ref 0–1.5)
IMM GRANULOCYTES NFR BLD AUTO: 2 % — HIGH (ref 0–1.5)
IMM GRANULOCYTES NFR BLD AUTO: 2 % — HIGH (ref 0–1.5)
IMM GRANULOCYTES NFR BLD AUTO: 2.2 % — HIGH (ref 0–1.5)
IMM GRANULOCYTES NFR BLD AUTO: 2.2 % — HIGH (ref 0–1.5)
IMM GRANULOCYTES NFR BLD AUTO: 2.4 % — HIGH (ref 0–1.5)
IMM GRANULOCYTES NFR BLD AUTO: 2.5 % — HIGH (ref 0–1.5)
IMM GRANULOCYTES NFR BLD AUTO: 2.5 % — HIGH (ref 0–1.5)
IMM GRANULOCYTES NFR BLD AUTO: 2.6 % — HIGH (ref 0–1.5)
IMM GRANULOCYTES NFR BLD AUTO: 3.9 % — HIGH (ref 0–1.5)
INR BLD: 1.09 RATIO — SIGNIFICANT CHANGE UP (ref 0.88–1.16)
INR BLD: 1.17 RATIO — HIGH (ref 0.88–1.16)
INR BLD: 1.42 RATIO — HIGH (ref 0.88–1.16)
INR BLD: 1.48 RATIO — HIGH (ref 0.88–1.16)
INR BLD: 1.54 RATIO — HIGH (ref 0.88–1.16)
IRON SATN MFR SERPL: 116 UG/DL — SIGNIFICANT CHANGE UP (ref 45–165)
IRON SATN MFR SERPL: 37 % — SIGNIFICANT CHANGE UP (ref 16–55)
LACTATE BLDV-MCNC: 1.8 MMOL/L — SIGNIFICANT CHANGE UP (ref 0.5–2)
LACTATE BLDV-MCNC: 2.1 MMOL/L — HIGH (ref 0.5–2)
LACTATE SERPL-SCNC: 1.6 MMOL/L — SIGNIFICANT CHANGE UP (ref 0.7–2)
LACTATE SERPL-SCNC: 2.2 MMOL/L — HIGH (ref 0.7–2)
LACTATE SERPL-SCNC: 2.3 MMOL/L — HIGH (ref 0.7–2)
LACTATE SERPL-SCNC: 2.5 MMOL/L — HIGH (ref 0.7–2)
LACTATE SERPL-SCNC: 4.5 MMOL/L — CRITICAL HIGH (ref 0.7–2)
LDH SERPL L TO P-CCNC: 337 U/L — HIGH (ref 84–241)
LDH SERPL L TO P-CCNC: 72 U/L — SIGNIFICANT CHANGE UP
LYMPHOCYTES # BLD AUTO: 0.38 K/UL — LOW (ref 1–3.3)
LYMPHOCYTES # BLD AUTO: 0.44 K/UL — LOW (ref 1–3.3)
LYMPHOCYTES # BLD AUTO: 0.45 K/UL — LOW (ref 1–3.3)
LYMPHOCYTES # BLD AUTO: 0.46 K/UL — LOW (ref 1–3.3)
LYMPHOCYTES # BLD AUTO: 0.49 K/UL — LOW (ref 1–3.3)
LYMPHOCYTES # BLD AUTO: 0.5 K/UL — LOW (ref 1–3.3)
LYMPHOCYTES # BLD AUTO: 0.51 K/UL — LOW (ref 1–3.3)
LYMPHOCYTES # BLD AUTO: 0.51 K/UL — LOW (ref 1–3.3)
LYMPHOCYTES # BLD AUTO: 0.52 K/UL — LOW (ref 1–3.3)
LYMPHOCYTES # BLD AUTO: 0.55 K/UL — LOW (ref 1–3.3)
LYMPHOCYTES # BLD AUTO: 0.58 K/UL — LOW (ref 1–3.3)
LYMPHOCYTES # BLD AUTO: 0.6 K/UL — LOW (ref 1–3.3)
LYMPHOCYTES # BLD AUTO: 0.62 K/UL — LOW (ref 1–3.3)
LYMPHOCYTES # BLD AUTO: 0.62 K/UL — LOW (ref 1–3.3)
LYMPHOCYTES # BLD AUTO: 0.64 K/UL — LOW (ref 1–3.3)
LYMPHOCYTES # BLD AUTO: 0.65 K/UL — LOW (ref 1–3.3)
LYMPHOCYTES # BLD AUTO: 0.67 K/UL — LOW (ref 1–3.3)
LYMPHOCYTES # BLD AUTO: 0.68 K/UL — LOW (ref 1–3.3)
LYMPHOCYTES # BLD AUTO: 0.7 K/UL — LOW (ref 1–3.3)
LYMPHOCYTES # BLD AUTO: 10 % — LOW (ref 13–44)
LYMPHOCYTES # BLD AUTO: 12.3 % — LOW (ref 13–44)
LYMPHOCYTES # BLD AUTO: 13.9 % — SIGNIFICANT CHANGE UP (ref 13–44)
LYMPHOCYTES # BLD AUTO: 17.2 % — SIGNIFICANT CHANGE UP (ref 13–44)
LYMPHOCYTES # BLD AUTO: 18.3 % — SIGNIFICANT CHANGE UP (ref 13–44)
LYMPHOCYTES # BLD AUTO: 20.5 % — SIGNIFICANT CHANGE UP (ref 13–44)
LYMPHOCYTES # BLD AUTO: 4.5 % — LOW (ref 13–44)
LYMPHOCYTES # BLD AUTO: 4.5 % — LOW (ref 13–44)
LYMPHOCYTES # BLD AUTO: 5 % — LOW (ref 13–44)
LYMPHOCYTES # BLD AUTO: 5.9 % — LOW (ref 13–44)
LYMPHOCYTES # BLD AUTO: 6.4 % — LOW (ref 13–44)
LYMPHOCYTES # BLD AUTO: 7 % — LOW (ref 13–44)
LYMPHOCYTES # BLD AUTO: 7.8 % — LOW (ref 13–44)
LYMPHOCYTES # BLD AUTO: 8.2 % — LOW (ref 13–44)
LYMPHOCYTES # BLD AUTO: 8.2 % — LOW (ref 13–44)
LYMPHOCYTES # BLD AUTO: 8.3 % — LOW (ref 13–44)
LYMPHOCYTES # BLD AUTO: 8.6 % — LOW (ref 13–44)
LYMPHOCYTES # BLD AUTO: 9 % — LOW (ref 13–44)
LYMPHOCYTES # BLD AUTO: 9.1 % — LOW (ref 13–44)
LYMPHOCYTES # BLD AUTO: 9.2 % — LOW (ref 13–44)
LYMPHOCYTES # BLD AUTO: 9.4 % — LOW (ref 13–44)
LYMPHOCYTES # FLD: 15 % — SIGNIFICANT CHANGE UP
M PNEUMO DNA SPEC QL NAA+PROBE: SIGNIFICANT CHANGE UP
MACROCYTES BLD QL: SIGNIFICANT CHANGE UP
MAGNESIUM SERPL-MCNC: 2.2 MG/DL — SIGNIFICANT CHANGE UP (ref 1.6–2.6)
MAGNESIUM SERPL-MCNC: 2.3 MG/DL — SIGNIFICANT CHANGE UP (ref 1.6–2.6)
MAGNESIUM SERPL-MCNC: 2.4 MG/DL — SIGNIFICANT CHANGE UP (ref 1.6–2.6)
MAGNESIUM SERPL-MCNC: 2.5 MG/DL — SIGNIFICANT CHANGE UP (ref 1.6–2.6)
MAGNESIUM SERPL-MCNC: 2.6 MG/DL — SIGNIFICANT CHANGE UP (ref 1.6–2.6)
MAGNESIUM SERPL-MCNC: 2.7 MG/DL — HIGH (ref 1.6–2.6)
MANUAL SMEAR VERIFICATION: YES — SIGNIFICANT CHANGE UP
MCHC RBC-ENTMCNC: 29.6 GM/DL — LOW (ref 32–36)
MCHC RBC-ENTMCNC: 29.7 GM/DL — LOW (ref 32–36)
MCHC RBC-ENTMCNC: 30.8 GM/DL — LOW (ref 32–36)
MCHC RBC-ENTMCNC: 30.9 GM/DL — LOW (ref 32–36)
MCHC RBC-ENTMCNC: 31 GM/DL — LOW (ref 32–36)
MCHC RBC-ENTMCNC: 31 GM/DL — LOW (ref 32–36)
MCHC RBC-ENTMCNC: 31.2 GM/DL — LOW (ref 32–36)
MCHC RBC-ENTMCNC: 31.3 GM/DL — LOW (ref 32–36)
MCHC RBC-ENTMCNC: 31.3 GM/DL — LOW (ref 32–36)
MCHC RBC-ENTMCNC: 31.3 PG — SIGNIFICANT CHANGE UP (ref 27–34)
MCHC RBC-ENTMCNC: 31.4 GM/DL — LOW (ref 32–36)
MCHC RBC-ENTMCNC: 31.5 PG — SIGNIFICANT CHANGE UP (ref 27–34)
MCHC RBC-ENTMCNC: 31.6 GM/DL — LOW (ref 32–36)
MCHC RBC-ENTMCNC: 31.7 GM/DL — LOW (ref 32–36)
MCHC RBC-ENTMCNC: 31.9 GM/DL — LOW (ref 32–36)
MCHC RBC-ENTMCNC: 32 GM/DL — SIGNIFICANT CHANGE UP (ref 32–36)
MCHC RBC-ENTMCNC: 32.1 GM/DL — SIGNIFICANT CHANGE UP (ref 32–36)
MCHC RBC-ENTMCNC: 32.1 GM/DL — SIGNIFICANT CHANGE UP (ref 32–36)
MCHC RBC-ENTMCNC: 32.2 GM/DL — SIGNIFICANT CHANGE UP (ref 32–36)
MCHC RBC-ENTMCNC: 32.2 GM/DL — SIGNIFICANT CHANGE UP (ref 32–36)
MCHC RBC-ENTMCNC: 32.3 GM/DL — SIGNIFICANT CHANGE UP (ref 32–36)
MCHC RBC-ENTMCNC: 32.3 PG — SIGNIFICANT CHANGE UP (ref 27–34)
MCHC RBC-ENTMCNC: 32.4 GM/DL — SIGNIFICANT CHANGE UP (ref 32–36)
MCHC RBC-ENTMCNC: 32.4 GM/DL — SIGNIFICANT CHANGE UP (ref 32–36)
MCHC RBC-ENTMCNC: 32.5 GM/DL — SIGNIFICANT CHANGE UP (ref 32–36)
MCHC RBC-ENTMCNC: 32.6 GM/DL — SIGNIFICANT CHANGE UP (ref 32–36)
MCHC RBC-ENTMCNC: 32.6 GM/DL — SIGNIFICANT CHANGE UP (ref 32–36)
MCHC RBC-ENTMCNC: 32.8 PG — SIGNIFICANT CHANGE UP (ref 27–34)
MCHC RBC-ENTMCNC: 32.8 PG — SIGNIFICANT CHANGE UP (ref 27–34)
MCHC RBC-ENTMCNC: 32.9 GM/DL — SIGNIFICANT CHANGE UP (ref 32–36)
MCHC RBC-ENTMCNC: 32.9 PG — SIGNIFICANT CHANGE UP (ref 27–34)
MCHC RBC-ENTMCNC: 32.9 PG — SIGNIFICANT CHANGE UP (ref 27–34)
MCHC RBC-ENTMCNC: 33 PG — SIGNIFICANT CHANGE UP (ref 27–34)
MCHC RBC-ENTMCNC: 33 PG — SIGNIFICANT CHANGE UP (ref 27–34)
MCHC RBC-ENTMCNC: 33.1 GM/DL — SIGNIFICANT CHANGE UP (ref 32–36)
MCHC RBC-ENTMCNC: 33.2 GM/DL — SIGNIFICANT CHANGE UP (ref 32–36)
MCHC RBC-ENTMCNC: 33.2 GM/DL — SIGNIFICANT CHANGE UP (ref 32–36)
MCHC RBC-ENTMCNC: 33.2 PG — SIGNIFICANT CHANGE UP (ref 27–34)
MCHC RBC-ENTMCNC: 33.3 GM/DL — SIGNIFICANT CHANGE UP (ref 32–36)
MCHC RBC-ENTMCNC: 33.3 PG — SIGNIFICANT CHANGE UP (ref 27–34)
MCHC RBC-ENTMCNC: 33.4 PG — SIGNIFICANT CHANGE UP (ref 27–34)
MCHC RBC-ENTMCNC: 33.5 GM/DL — SIGNIFICANT CHANGE UP (ref 32–36)
MCHC RBC-ENTMCNC: 33.6 GM/DL — SIGNIFICANT CHANGE UP (ref 32–36)
MCHC RBC-ENTMCNC: 33.6 GM/DL — SIGNIFICANT CHANGE UP (ref 32–36)
MCHC RBC-ENTMCNC: 33.6 PG — SIGNIFICANT CHANGE UP (ref 27–34)
MCHC RBC-ENTMCNC: 33.7 GM/DL — SIGNIFICANT CHANGE UP (ref 32–36)
MCHC RBC-ENTMCNC: 33.7 PG — SIGNIFICANT CHANGE UP (ref 27–34)
MCHC RBC-ENTMCNC: 33.7 PG — SIGNIFICANT CHANGE UP (ref 27–34)
MCHC RBC-ENTMCNC: 33.8 GM/DL — SIGNIFICANT CHANGE UP (ref 32–36)
MCHC RBC-ENTMCNC: 33.8 PG — SIGNIFICANT CHANGE UP (ref 27–34)
MCHC RBC-ENTMCNC: 33.9 PG — SIGNIFICANT CHANGE UP (ref 27–34)
MCHC RBC-ENTMCNC: 33.9 PG — SIGNIFICANT CHANGE UP (ref 27–34)
MCHC RBC-ENTMCNC: 34.2 GM/DL — SIGNIFICANT CHANGE UP (ref 32–36)
MCHC RBC-ENTMCNC: 34.2 PG — HIGH (ref 27–34)
MCHC RBC-ENTMCNC: 35.1 PG — HIGH (ref 27–34)
MCHC RBC-ENTMCNC: 35.1 PG — HIGH (ref 27–34)
MCHC RBC-ENTMCNC: 35.3 PG — HIGH (ref 27–34)
MCHC RBC-ENTMCNC: 36 PG — HIGH (ref 27–34)
MCHC RBC-ENTMCNC: 36.1 PG — HIGH (ref 27–34)
MCHC RBC-ENTMCNC: 36.6 PG — HIGH (ref 27–34)
MCHC RBC-ENTMCNC: 37.1 PG — HIGH (ref 27–34)
MCHC RBC-ENTMCNC: 37.7 PG — HIGH (ref 27–34)
MCHC RBC-ENTMCNC: 37.8 PG — HIGH (ref 27–34)
MCHC RBC-ENTMCNC: 38.2 PG — HIGH (ref 27–34)
MCV RBC AUTO: 100 FL — SIGNIFICANT CHANGE UP (ref 80–100)
MCV RBC AUTO: 100.3 FL — HIGH (ref 80–100)
MCV RBC AUTO: 100.7 FL — HIGH (ref 80–100)
MCV RBC AUTO: 100.7 FL — HIGH (ref 80–100)
MCV RBC AUTO: 101.3 FL — HIGH (ref 80–100)
MCV RBC AUTO: 102.7 FL — HIGH (ref 80–100)
MCV RBC AUTO: 104.1 FL — HIGH (ref 80–100)
MCV RBC AUTO: 104.2 FL — HIGH (ref 80–100)
MCV RBC AUTO: 104.2 FL — HIGH (ref 80–100)
MCV RBC AUTO: 104.3 FL — HIGH (ref 80–100)
MCV RBC AUTO: 104.6 FL — HIGH (ref 80–100)
MCV RBC AUTO: 104.7 FL — HIGH (ref 80–100)
MCV RBC AUTO: 105.1 FL — HIGH (ref 80–100)
MCV RBC AUTO: 105.3 FL — HIGH (ref 80–100)
MCV RBC AUTO: 106.2 FL — HIGH (ref 80–100)
MCV RBC AUTO: 106.3 FL — HIGH (ref 80–100)
MCV RBC AUTO: 107.5 FL — HIGH (ref 80–100)
MCV RBC AUTO: 107.5 FL — HIGH (ref 80–100)
MCV RBC AUTO: 107.6 FL — HIGH (ref 80–100)
MCV RBC AUTO: 108.4 FL — HIGH (ref 80–100)
MCV RBC AUTO: 108.7 FL — HIGH (ref 80–100)
MCV RBC AUTO: 109.3 FL — HIGH (ref 80–100)
MCV RBC AUTO: 109.5 FL — HIGH (ref 80–100)
MCV RBC AUTO: 111.4 FL — HIGH (ref 80–100)
MCV RBC AUTO: 114.8 FL — HIGH (ref 80–100)
MCV RBC AUTO: 115.2 FL — HIGH (ref 80–100)
MCV RBC AUTO: 115.7 FL — HIGH (ref 80–100)
MCV RBC AUTO: 115.7 FL — HIGH (ref 80–100)
MCV RBC AUTO: 116.1 FL — HIGH (ref 80–100)
MCV RBC AUTO: 117.4 FL — HIGH (ref 80–100)
MCV RBC AUTO: 119.1 FL — HIGH (ref 80–100)
MCV RBC AUTO: 98.1 FL — SIGNIFICANT CHANGE UP (ref 80–100)
MCV RBC AUTO: 98.6 FL — SIGNIFICANT CHANGE UP (ref 80–100)
MCV RBC AUTO: 98.6 FL — SIGNIFICANT CHANGE UP (ref 80–100)
MCV RBC AUTO: 98.7 FL — SIGNIFICANT CHANGE UP (ref 80–100)
MCV RBC AUTO: 99 FL — SIGNIFICANT CHANGE UP (ref 80–100)
MCV RBC AUTO: 99.6 FL — SIGNIFICANT CHANGE UP (ref 80–100)
MCV RBC AUTO: 99.7 FL — SIGNIFICANT CHANGE UP (ref 80–100)
MICROCYTES BLD QL: SLIGHT — SIGNIFICANT CHANGE UP
MONOCYTES # BLD AUTO: 0.34 K/UL — SIGNIFICANT CHANGE UP (ref 0–0.9)
MONOCYTES # BLD AUTO: 0.49 K/UL — SIGNIFICANT CHANGE UP (ref 0–0.9)
MONOCYTES # BLD AUTO: 0.58 K/UL — SIGNIFICANT CHANGE UP (ref 0–0.9)
MONOCYTES # BLD AUTO: 0.6 K/UL — SIGNIFICANT CHANGE UP (ref 0–0.9)
MONOCYTES # BLD AUTO: 0.64 K/UL — SIGNIFICANT CHANGE UP (ref 0–0.9)
MONOCYTES # BLD AUTO: 0.65 K/UL — SIGNIFICANT CHANGE UP (ref 0–0.9)
MONOCYTES # BLD AUTO: 0.66 K/UL — SIGNIFICANT CHANGE UP (ref 0–0.9)
MONOCYTES # BLD AUTO: 0.67 K/UL — SIGNIFICANT CHANGE UP (ref 0–0.9)
MONOCYTES # BLD AUTO: 0.68 K/UL — SIGNIFICANT CHANGE UP (ref 0–0.9)
MONOCYTES # BLD AUTO: 0.69 K/UL — SIGNIFICANT CHANGE UP (ref 0–0.9)
MONOCYTES # BLD AUTO: 0.7 K/UL — SIGNIFICANT CHANGE UP (ref 0–0.9)
MONOCYTES # BLD AUTO: 0.77 K/UL — SIGNIFICANT CHANGE UP (ref 0–0.9)
MONOCYTES # BLD AUTO: 0.8 K/UL — SIGNIFICANT CHANGE UP (ref 0–0.9)
MONOCYTES # BLD AUTO: 0.81 K/UL — SIGNIFICANT CHANGE UP (ref 0–0.9)
MONOCYTES # BLD AUTO: 0.81 K/UL — SIGNIFICANT CHANGE UP (ref 0–0.9)
MONOCYTES # BLD AUTO: 0.82 K/UL — SIGNIFICANT CHANGE UP (ref 0–0.9)
MONOCYTES # BLD AUTO: 0.89 K/UL — SIGNIFICANT CHANGE UP (ref 0–0.9)
MONOCYTES # BLD AUTO: 0.98 K/UL — HIGH (ref 0–0.9)
MONOCYTES # BLD AUTO: 0.98 K/UL — HIGH (ref 0–0.9)
MONOCYTES # BLD AUTO: 1 K/UL — HIGH (ref 0–0.9)
MONOCYTES # BLD AUTO: 1.02 K/UL — HIGH (ref 0–0.9)
MONOCYTES # BLD AUTO: 1.09 K/UL — HIGH (ref 0–0.9)
MONOCYTES # BLD AUTO: 1.11 K/UL — HIGH (ref 0–0.9)
MONOCYTES # BLD AUTO: 1.17 K/UL — HIGH (ref 0–0.9)
MONOCYTES # BLD AUTO: 1.28 K/UL — HIGH (ref 0–0.9)
MONOCYTES NFR BLD AUTO: 10.1 % — SIGNIFICANT CHANGE UP (ref 2–14)
MONOCYTES NFR BLD AUTO: 10.2 % — SIGNIFICANT CHANGE UP (ref 2–14)
MONOCYTES NFR BLD AUTO: 10.6 % — SIGNIFICANT CHANGE UP (ref 2–14)
MONOCYTES NFR BLD AUTO: 10.7 % — SIGNIFICANT CHANGE UP (ref 2–14)
MONOCYTES NFR BLD AUTO: 10.9 % — SIGNIFICANT CHANGE UP (ref 2–14)
MONOCYTES NFR BLD AUTO: 11 % — SIGNIFICANT CHANGE UP (ref 2–14)
MONOCYTES NFR BLD AUTO: 11.4 % — SIGNIFICANT CHANGE UP (ref 2–14)
MONOCYTES NFR BLD AUTO: 11.5 % — SIGNIFICANT CHANGE UP (ref 2–14)
MONOCYTES NFR BLD AUTO: 11.5 % — SIGNIFICANT CHANGE UP (ref 2–14)
MONOCYTES NFR BLD AUTO: 11.8 % — SIGNIFICANT CHANGE UP (ref 2–14)
MONOCYTES NFR BLD AUTO: 12.2 % — SIGNIFICANT CHANGE UP (ref 2–14)
MONOCYTES NFR BLD AUTO: 13.5 % — SIGNIFICANT CHANGE UP (ref 2–14)
MONOCYTES NFR BLD AUTO: 13.6 % — SIGNIFICANT CHANGE UP (ref 2–14)
MONOCYTES NFR BLD AUTO: 14.1 % — HIGH (ref 2–14)
MONOCYTES NFR BLD AUTO: 14.2 % — HIGH (ref 2–14)
MONOCYTES NFR BLD AUTO: 14.5 % — HIGH (ref 2–14)
MONOCYTES NFR BLD AUTO: 14.8 % — HIGH (ref 2–14)
MONOCYTES NFR BLD AUTO: 17.2 % — HIGH (ref 2–14)
MONOCYTES NFR BLD AUTO: 18.1 % — HIGH (ref 2–14)
MONOCYTES NFR BLD AUTO: 18.9 % — HIGH (ref 2–14)
MONOCYTES NFR BLD AUTO: 7 % — SIGNIFICANT CHANGE UP (ref 2–14)
MONOCYTES NFR BLD AUTO: 8 % — SIGNIFICANT CHANGE UP (ref 2–14)
MONOCYTES NFR BLD AUTO: 9.6 % — SIGNIFICANT CHANGE UP (ref 2–14)
MONOS+MACROS # FLD: 77 % — SIGNIFICANT CHANGE UP
MRSA PCR RESULT.: SIGNIFICANT CHANGE UP
MRSA PCR RESULT.: SIGNIFICANT CHANGE UP
NEUTROPHILS # BLD AUTO: 1.91 K/UL — SIGNIFICANT CHANGE UP (ref 1.8–7.4)
NEUTROPHILS # BLD AUTO: 1.96 K/UL — SIGNIFICANT CHANGE UP (ref 1.8–7.4)
NEUTROPHILS # BLD AUTO: 2.04 K/UL — SIGNIFICANT CHANGE UP (ref 1.8–7.4)
NEUTROPHILS # BLD AUTO: 3.09 K/UL — SIGNIFICANT CHANGE UP (ref 1.8–7.4)
NEUTROPHILS # BLD AUTO: 3.26 K/UL — SIGNIFICANT CHANGE UP (ref 1.8–7.4)
NEUTROPHILS # BLD AUTO: 3.91 K/UL — SIGNIFICANT CHANGE UP (ref 1.8–7.4)
NEUTROPHILS # BLD AUTO: 3.92 K/UL — SIGNIFICANT CHANGE UP (ref 1.8–7.4)
NEUTROPHILS # BLD AUTO: 4.05 K/UL — SIGNIFICANT CHANGE UP (ref 1.8–7.4)
NEUTROPHILS # BLD AUTO: 4.19 K/UL — SIGNIFICANT CHANGE UP (ref 1.8–7.4)
NEUTROPHILS # BLD AUTO: 4.23 K/UL — SIGNIFICANT CHANGE UP (ref 1.8–7.4)
NEUTROPHILS # BLD AUTO: 4.31 K/UL — SIGNIFICANT CHANGE UP (ref 1.8–7.4)
NEUTROPHILS # BLD AUTO: 4.54 K/UL — SIGNIFICANT CHANGE UP (ref 1.8–7.4)
NEUTROPHILS # BLD AUTO: 4.75 K/UL — SIGNIFICANT CHANGE UP (ref 1.8–7.4)
NEUTROPHILS # BLD AUTO: 4.9 K/UL — SIGNIFICANT CHANGE UP (ref 1.8–7.4)
NEUTROPHILS # BLD AUTO: 4.9 K/UL — SIGNIFICANT CHANGE UP (ref 1.8–7.4)
NEUTROPHILS # BLD AUTO: 5.13 K/UL — SIGNIFICANT CHANGE UP (ref 1.8–7.4)
NEUTROPHILS # BLD AUTO: 5.22 K/UL — SIGNIFICANT CHANGE UP (ref 1.8–7.4)
NEUTROPHILS # BLD AUTO: 5.42 K/UL — SIGNIFICANT CHANGE UP (ref 1.8–7.4)
NEUTROPHILS # BLD AUTO: 5.74 K/UL — SIGNIFICANT CHANGE UP (ref 1.8–7.4)
NEUTROPHILS # BLD AUTO: 6.58 K/UL — SIGNIFICANT CHANGE UP (ref 1.8–7.4)
NEUTROPHILS # BLD AUTO: 6.84 K/UL — SIGNIFICANT CHANGE UP (ref 1.8–7.4)
NEUTROPHILS # BLD AUTO: 8.32 K/UL — HIGH (ref 1.8–7.4)
NEUTROPHILS # BLD AUTO: 8.33 K/UL — HIGH (ref 1.8–7.4)
NEUTROPHILS # BLD AUTO: 8.85 K/UL — HIGH (ref 1.8–7.4)
NEUTROPHILS # BLD AUTO: 9.65 K/UL — HIGH (ref 1.8–7.4)
NEUTROPHILS NFR BLD AUTO: 56.3 % — SIGNIFICANT CHANGE UP (ref 43–77)
NEUTROPHILS NFR BLD AUTO: 59 % — SIGNIFICANT CHANGE UP (ref 43–77)
NEUTROPHILS NFR BLD AUTO: 60.5 % — SIGNIFICANT CHANGE UP (ref 43–77)
NEUTROPHILS NFR BLD AUTO: 62.8 % — SIGNIFICANT CHANGE UP (ref 43–77)
NEUTROPHILS NFR BLD AUTO: 69.2 % — SIGNIFICANT CHANGE UP (ref 43–77)
NEUTROPHILS NFR BLD AUTO: 70.5 % — SIGNIFICANT CHANGE UP (ref 43–77)
NEUTROPHILS NFR BLD AUTO: 70.5 % — SIGNIFICANT CHANGE UP (ref 43–77)
NEUTROPHILS NFR BLD AUTO: 70.9 % — SIGNIFICANT CHANGE UP (ref 43–77)
NEUTROPHILS NFR BLD AUTO: 70.9 % — SIGNIFICANT CHANGE UP (ref 43–77)
NEUTROPHILS NFR BLD AUTO: 71.2 % — SIGNIFICANT CHANGE UP (ref 43–77)
NEUTROPHILS NFR BLD AUTO: 71.7 % — SIGNIFICANT CHANGE UP (ref 43–77)
NEUTROPHILS NFR BLD AUTO: 72.6 % — SIGNIFICANT CHANGE UP (ref 43–77)
NEUTROPHILS NFR BLD AUTO: 73.8 % — SIGNIFICANT CHANGE UP (ref 43–77)
NEUTROPHILS NFR BLD AUTO: 74 % — SIGNIFICANT CHANGE UP (ref 43–77)
NEUTROPHILS NFR BLD AUTO: 74.2 % — SIGNIFICANT CHANGE UP (ref 43–77)
NEUTROPHILS NFR BLD AUTO: 74.7 % — SIGNIFICANT CHANGE UP (ref 43–77)
NEUTROPHILS NFR BLD AUTO: 74.9 % — SIGNIFICANT CHANGE UP (ref 43–77)
NEUTROPHILS NFR BLD AUTO: 76 % — SIGNIFICANT CHANGE UP (ref 43–77)
NEUTROPHILS NFR BLD AUTO: 76.8 % — SIGNIFICANT CHANGE UP (ref 43–77)
NEUTROPHILS NFR BLD AUTO: 79.4 % — HIGH (ref 43–77)
NEUTROPHILS NFR BLD AUTO: 81.6 % — HIGH (ref 43–77)
NEUTROPHILS NFR BLD AUTO: 81.7 % — HIGH (ref 43–77)
NEUTROPHILS NFR BLD AUTO: 83 % — HIGH (ref 43–77)
NEUTROPHILS NFR BLD AUTO: 87 % — HIGH (ref 43–77)
NEUTROPHILS NFR BLD AUTO: 88 % — HIGH (ref 43–77)
NEUTROPHILS-BODY FLUID: 8 % — SIGNIFICANT CHANGE UP
NRBC # BLD: 0 /100 WBCS — SIGNIFICANT CHANGE UP
NRBC # BLD: 1 /100 WBCS — HIGH (ref 0–0)
NRBC # BLD: 1 /100 WBCS — SIGNIFICANT CHANGE UP
NRBC # BLD: 2 /100 WBCS — HIGH (ref 0–0)
NRBC # BLD: 2 /100 WBCS — HIGH (ref 0–0)
NRBC # BLD: 2 /100 WBCS — SIGNIFICANT CHANGE UP
NRBC # BLD: 3 /100 WBCS — HIGH (ref 0–0)
NRBC # BLD: 3 /100 WBCS — HIGH (ref 0–0)
NRBC # BLD: 3 /100 WBCS — SIGNIFICANT CHANGE UP
NRBC # BLD: 4 /100 WBCS — HIGH (ref 0–0)
NRBC # BLD: 5 /100 WBCS — HIGH (ref 0–0)
NRBC # BLD: SIGNIFICANT CHANGE UP /100 WBCS (ref 0–0)
NRBC # FLD: 0 K/UL — SIGNIFICANT CHANGE UP
NRBC # FLD: 0.02 K/UL — HIGH
NRBC # FLD: 0.03 K/UL — HIGH
NRBC # FLD: 0.04 K/UL — HIGH
NRBC # FLD: 0.05 K/UL — HIGH
NRBC # FLD: 0.08 K/UL — HIGH
NRBC # FLD: 0.08 K/UL — HIGH
NRBC # FLD: 0.09 K/UL — HIGH
NRBC # FLD: 0.09 K/UL — HIGH
NRBC # FLD: 0.1 K/UL — HIGH
NRBC # FLD: 0.11 K/UL — HIGH
NRBC # FLD: 0.14 K/UL — HIGH
NT-PROBNP SERPL-SCNC: HIGH PG/ML (ref 0–450)
PCO2 BLDV: 51 MMHG — SIGNIFICANT CHANGE UP (ref 42–55)
PH BLDV: 7.38 — SIGNIFICANT CHANGE UP (ref 7.32–7.43)
PH FLD: 7.6 — SIGNIFICANT CHANGE UP
PHOSPHATE SERPL-MCNC: 2.8 MG/DL — SIGNIFICANT CHANGE UP (ref 2.5–4.5)
PHOSPHATE SERPL-MCNC: 4 MG/DL — SIGNIFICANT CHANGE UP (ref 2.5–4.5)
PHOSPHATE SERPL-MCNC: 4.1 MG/DL — SIGNIFICANT CHANGE UP (ref 2.5–4.5)
PHOSPHATE SERPL-MCNC: 4.6 MG/DL — HIGH (ref 2.5–4.5)
PHOSPHATE SERPL-MCNC: 4.7 MG/DL — HIGH (ref 2.5–4.5)
PHOSPHATE SERPL-MCNC: 4.8 MG/DL — HIGH (ref 2.5–4.5)
PHOSPHATE SERPL-MCNC: 4.8 MG/DL — HIGH (ref 2.5–4.5)
PHOSPHATE SERPL-MCNC: 5.2 MG/DL — HIGH (ref 2.5–4.5)
PHOSPHATE SERPL-MCNC: 5.3 MG/DL — HIGH (ref 2.5–4.5)
PHOSPHATE SERPL-MCNC: 5.3 MG/DL — HIGH (ref 2.5–4.5)
PHOSPHATE SERPL-MCNC: 5.4 MG/DL — HIGH (ref 2.5–4.5)
PHOSPHATE SERPL-MCNC: 5.4 MG/DL — HIGH (ref 2.5–4.5)
PHOSPHATE SERPL-MCNC: 5.7 MG/DL — HIGH (ref 2.5–4.5)
PHOSPHATE SERPL-MCNC: 5.9 MG/DL — HIGH (ref 2.5–4.5)
PHOSPHATE SERPL-MCNC: 5.9 MG/DL — HIGH (ref 2.5–4.5)
PHOSPHATE SERPL-MCNC: 6.1 MG/DL — HIGH (ref 2.5–4.5)
PHOSPHATE SERPL-MCNC: 6.1 MG/DL — HIGH (ref 2.5–4.5)
PHOSPHATE SERPL-MCNC: 6.2 MG/DL — HIGH (ref 2.5–4.5)
PHOSPHATE SERPL-MCNC: 6.4 MG/DL — HIGH (ref 2.5–4.5)
PHOSPHATE SERPL-MCNC: 6.6 MG/DL — HIGH (ref 2.5–4.5)
PHOSPHATE SERPL-MCNC: 6.9 MG/DL — HIGH (ref 2.5–4.5)
PLAT MORPH BLD: NORMAL — SIGNIFICANT CHANGE UP
PLATELET # BLD AUTO: 108 K/UL — LOW (ref 150–400)
PLATELET # BLD AUTO: 109 K/UL — LOW (ref 150–400)
PLATELET # BLD AUTO: 109 K/UL — LOW (ref 150–400)
PLATELET # BLD AUTO: 115 K/UL — LOW (ref 150–400)
PLATELET # BLD AUTO: 116 K/UL — LOW (ref 150–400)
PLATELET # BLD AUTO: 118 K/UL — LOW (ref 150–400)
PLATELET # BLD AUTO: 121 K/UL — LOW (ref 150–400)
PLATELET # BLD AUTO: 122 K/UL — LOW (ref 150–400)
PLATELET # BLD AUTO: 124 K/UL — LOW (ref 150–400)
PLATELET # BLD AUTO: 126 K/UL — LOW (ref 150–400)
PLATELET # BLD AUTO: 127 K/UL — LOW (ref 150–400)
PLATELET # BLD AUTO: 128 K/UL — LOW (ref 150–400)
PLATELET # BLD AUTO: 128 K/UL — LOW (ref 150–400)
PLATELET # BLD AUTO: 129 K/UL — LOW (ref 150–400)
PLATELET # BLD AUTO: 130 K/UL — LOW (ref 150–400)
PLATELET # BLD AUTO: 138 K/UL — LOW (ref 150–400)
PLATELET # BLD AUTO: 140 K/UL — LOW (ref 150–400)
PLATELET # BLD AUTO: 140 K/UL — LOW (ref 150–400)
PLATELET # BLD AUTO: 143 K/UL — LOW (ref 150–400)
PLATELET # BLD AUTO: 145 K/UL — LOW (ref 150–400)
PLATELET # BLD AUTO: 145 K/UL — LOW (ref 150–400)
PLATELET # BLD AUTO: 146 K/UL — LOW (ref 150–400)
PLATELET # BLD AUTO: 147 K/UL — LOW (ref 150–400)
PLATELET # BLD AUTO: 147 K/UL — LOW (ref 150–400)
PLATELET # BLD AUTO: 149 K/UL — LOW (ref 150–400)
PLATELET # BLD AUTO: 150 K/UL — SIGNIFICANT CHANGE UP (ref 150–400)
PLATELET # BLD AUTO: 154 K/UL — SIGNIFICANT CHANGE UP (ref 150–400)
PLATELET # BLD AUTO: 160 K/UL — SIGNIFICANT CHANGE UP (ref 150–400)
PLATELET # BLD AUTO: 168 K/UL — SIGNIFICANT CHANGE UP (ref 150–400)
PLATELET # BLD AUTO: 170 K/UL — SIGNIFICANT CHANGE UP (ref 150–400)
PLATELET # BLD AUTO: 174 K/UL — SIGNIFICANT CHANGE UP (ref 150–400)
PLATELET # BLD AUTO: 174 K/UL — SIGNIFICANT CHANGE UP (ref 150–400)
PLATELET # BLD AUTO: 175 K/UL — SIGNIFICANT CHANGE UP (ref 150–400)
PLATELET # BLD AUTO: 180 K/UL — SIGNIFICANT CHANGE UP (ref 150–400)
PLATELET # BLD AUTO: 184 K/UL — SIGNIFICANT CHANGE UP (ref 150–400)
PLATELET # BLD AUTO: 216 K/UL — SIGNIFICANT CHANGE UP (ref 150–400)
PO2 BLDV: 47 MMHG — SIGNIFICANT CHANGE UP
POIKILOCYTOSIS BLD QL AUTO: SLIGHT — SIGNIFICANT CHANGE UP
POLYCHROMASIA BLD QL SMEAR: SLIGHT — SIGNIFICANT CHANGE UP
POTASSIUM BLDV-SCNC: 3.5 MMOL/L — SIGNIFICANT CHANGE UP (ref 3.5–5.1)
POTASSIUM SERPL-MCNC: 3.4 MMOL/L — LOW (ref 3.5–5.3)
POTASSIUM SERPL-MCNC: 3.5 MMOL/L — SIGNIFICANT CHANGE UP (ref 3.5–5.3)
POTASSIUM SERPL-MCNC: 3.7 MMOL/L — SIGNIFICANT CHANGE UP (ref 3.5–5.3)
POTASSIUM SERPL-MCNC: 3.7 MMOL/L — SIGNIFICANT CHANGE UP (ref 3.5–5.3)
POTASSIUM SERPL-MCNC: 3.9 MMOL/L — SIGNIFICANT CHANGE UP (ref 3.5–5.3)
POTASSIUM SERPL-MCNC: 4 MMOL/L — SIGNIFICANT CHANGE UP (ref 3.5–5.3)
POTASSIUM SERPL-MCNC: 4.1 MMOL/L — SIGNIFICANT CHANGE UP (ref 3.5–5.3)
POTASSIUM SERPL-MCNC: 4.2 MMOL/L — SIGNIFICANT CHANGE UP (ref 3.5–5.3)
POTASSIUM SERPL-MCNC: 4.3 MMOL/L — SIGNIFICANT CHANGE UP (ref 3.5–5.3)
POTASSIUM SERPL-MCNC: 4.5 MMOL/L — SIGNIFICANT CHANGE UP (ref 3.5–5.3)
POTASSIUM SERPL-MCNC: 4.5 MMOL/L — SIGNIFICANT CHANGE UP (ref 3.5–5.3)
POTASSIUM SERPL-MCNC: 4.6 MMOL/L — SIGNIFICANT CHANGE UP (ref 3.5–5.3)
POTASSIUM SERPL-MCNC: 4.6 MMOL/L — SIGNIFICANT CHANGE UP (ref 3.5–5.3)
POTASSIUM SERPL-MCNC: 4.9 MMOL/L — SIGNIFICANT CHANGE UP (ref 3.5–5.3)
POTASSIUM SERPL-MCNC: 5 MMOL/L — SIGNIFICANT CHANGE UP (ref 3.5–5.3)
POTASSIUM SERPL-MCNC: 5.1 MMOL/L — SIGNIFICANT CHANGE UP (ref 3.5–5.3)
POTASSIUM SERPL-MCNC: 5.1 MMOL/L — SIGNIFICANT CHANGE UP (ref 3.5–5.3)
POTASSIUM SERPL-MCNC: 5.4 MMOL/L — HIGH (ref 3.5–5.3)
POTASSIUM SERPL-MCNC: 5.5 MMOL/L — HIGH (ref 3.5–5.3)
POTASSIUM SERPL-SCNC: 3.4 MMOL/L — LOW (ref 3.5–5.3)
POTASSIUM SERPL-SCNC: 3.5 MMOL/L — SIGNIFICANT CHANGE UP (ref 3.5–5.3)
POTASSIUM SERPL-SCNC: 3.7 MMOL/L — SIGNIFICANT CHANGE UP (ref 3.5–5.3)
POTASSIUM SERPL-SCNC: 3.7 MMOL/L — SIGNIFICANT CHANGE UP (ref 3.5–5.3)
POTASSIUM SERPL-SCNC: 3.9 MMOL/L — SIGNIFICANT CHANGE UP (ref 3.5–5.3)
POTASSIUM SERPL-SCNC: 4 MMOL/L — SIGNIFICANT CHANGE UP (ref 3.5–5.3)
POTASSIUM SERPL-SCNC: 4.1 MMOL/L — SIGNIFICANT CHANGE UP (ref 3.5–5.3)
POTASSIUM SERPL-SCNC: 4.2 MMOL/L — SIGNIFICANT CHANGE UP (ref 3.5–5.3)
POTASSIUM SERPL-SCNC: 4.3 MMOL/L — SIGNIFICANT CHANGE UP (ref 3.5–5.3)
POTASSIUM SERPL-SCNC: 4.5 MMOL/L — SIGNIFICANT CHANGE UP (ref 3.5–5.3)
POTASSIUM SERPL-SCNC: 4.5 MMOL/L — SIGNIFICANT CHANGE UP (ref 3.5–5.3)
POTASSIUM SERPL-SCNC: 4.6 MMOL/L — SIGNIFICANT CHANGE UP (ref 3.5–5.3)
POTASSIUM SERPL-SCNC: 4.6 MMOL/L — SIGNIFICANT CHANGE UP (ref 3.5–5.3)
POTASSIUM SERPL-SCNC: 4.9 MMOL/L — SIGNIFICANT CHANGE UP (ref 3.5–5.3)
POTASSIUM SERPL-SCNC: 5 MMOL/L — SIGNIFICANT CHANGE UP (ref 3.5–5.3)
POTASSIUM SERPL-SCNC: 5.1 MMOL/L — SIGNIFICANT CHANGE UP (ref 3.5–5.3)
POTASSIUM SERPL-SCNC: 5.1 MMOL/L — SIGNIFICANT CHANGE UP (ref 3.5–5.3)
POTASSIUM SERPL-SCNC: 5.4 MMOL/L — HIGH (ref 3.5–5.3)
POTASSIUM SERPL-SCNC: 5.5 MMOL/L — HIGH (ref 3.5–5.3)
PROCALCITONIN SERPL-MCNC: 0.41 NG/ML — HIGH
PROT FLD-MCNC: 2.2 G/DL — SIGNIFICANT CHANGE UP
PROT SERPL-MCNC: 5.3 G/DL — LOW (ref 6–8.3)
PROT SERPL-MCNC: 5.4 G/DL — LOW (ref 6–8.3)
PROT SERPL-MCNC: 5.5 G/DL — LOW (ref 6–8.3)
PROT SERPL-MCNC: 5.6 G/DL — LOW (ref 6–8.3)
PROT SERPL-MCNC: 5.7 G/DL — LOW (ref 6–8.3)
PROT SERPL-MCNC: 5.8 G/DL — LOW (ref 6–8.3)
PROT SERPL-MCNC: 5.8 G/DL — LOW (ref 6–8.3)
PROT SERPL-MCNC: 5.9 G/DL — LOW (ref 6–8.3)
PROT SERPL-MCNC: 6 G/DL — SIGNIFICANT CHANGE UP (ref 6–8.3)
PROT SERPL-MCNC: 6 GM/DL — SIGNIFICANT CHANGE UP (ref 6–8.3)
PROT SERPL-MCNC: 6.1 GM/DL — SIGNIFICANT CHANGE UP (ref 6–8.3)
PROT SERPL-MCNC: 6.2 G/DL — SIGNIFICANT CHANGE UP (ref 6–8.3)
PROT SERPL-MCNC: 6.3 G/DL — SIGNIFICANT CHANGE UP (ref 6–8.3)
PROT SERPL-MCNC: 6.5 G/DL — SIGNIFICANT CHANGE UP (ref 6–8.3)
PROT SERPL-MCNC: 6.7 GM/DL — SIGNIFICANT CHANGE UP (ref 6–8.3)
PROT SERPL-MCNC: SIGNIFICANT CHANGE UP G/DL (ref 6–8.3)
PROTHROM AB SERPL-ACNC: 12.7 SEC — SIGNIFICANT CHANGE UP (ref 10.5–13.4)
PROTHROM AB SERPL-ACNC: 13.6 SEC — HIGH (ref 10.5–13.4)
PROTHROM AB SERPL-ACNC: 16.7 SEC — HIGH (ref 10.5–13.4)
PROTHROM AB SERPL-ACNC: 17.4 SEC — HIGH (ref 10.5–13.4)
PROTHROM AB SERPL-ACNC: 18 SEC — HIGH (ref 10.5–13.4)
RAPID RVP RESULT: DETECTED
RAPID RVP RESULT: SIGNIFICANT CHANGE UP
RBC # BLD: 1.64 M/UL — LOW (ref 4.2–5.8)
RBC # BLD: 1.66 M/UL — LOW (ref 4.2–5.8)
RBC # BLD: 1.72 M/UL — LOW (ref 4.2–5.8)
RBC # BLD: 1.78 M/UL — LOW (ref 4.2–5.8)
RBC # BLD: 1.83 M/UL — LOW (ref 4.2–5.8)
RBC # BLD: 1.91 M/UL — LOW (ref 4.2–5.8)
RBC # BLD: 1.93 M/UL — LOW (ref 4.2–5.8)
RBC # BLD: 2.01 M/UL — LOW (ref 4.2–5.8)
RBC # BLD: 2.41 M/UL — LOW (ref 4.2–5.8)
RBC # BLD: 2.42 M/UL — LOW (ref 4.2–5.8)
RBC # BLD: 2.59 M/UL — LOW (ref 4.2–5.8)
RBC # BLD: 2.64 M/UL — LOW (ref 4.2–5.8)
RBC # BLD: 2.65 M/UL — LOW (ref 4.2–5.8)
RBC # BLD: 2.77 M/UL — LOW (ref 4.2–5.8)
RBC # BLD: 2.79 M/UL — LOW (ref 4.2–5.8)
RBC # BLD: 2.8 M/UL — LOW (ref 4.2–5.8)
RBC # BLD: 2.82 M/UL — LOW (ref 4.2–5.8)
RBC # BLD: 2.85 M/UL — LOW (ref 4.2–5.8)
RBC # BLD: 2.86 M/UL — LOW (ref 4.2–5.8)
RBC # BLD: 2.87 M/UL — LOW (ref 4.2–5.8)
RBC # BLD: 2.88 M/UL — LOW (ref 4.2–5.8)
RBC # BLD: 2.89 M/UL — LOW (ref 4.2–5.8)
RBC # BLD: 2.92 M/UL — LOW (ref 4.2–5.8)
RBC # BLD: 2.92 M/UL — LOW (ref 4.2–5.8)
RBC # BLD: 2.93 M/UL — LOW (ref 4.2–5.8)
RBC # BLD: 2.93 M/UL — LOW (ref 4.2–5.8)
RBC # BLD: 2.95 M/UL — LOW (ref 4.2–5.8)
RBC # BLD: 2.97 M/UL — LOW (ref 4.2–5.8)
RBC # BLD: 2.97 M/UL — LOW (ref 4.2–5.8)
RBC # BLD: 3.03 M/UL — LOW (ref 4.2–5.8)
RBC # BLD: 3.04 M/UL — LOW (ref 4.2–5.8)
RBC # BLD: 3.06 M/UL — LOW (ref 4.2–5.8)
RBC # BLD: 3.13 M/UL — LOW (ref 4.2–5.8)
RBC # BLD: 3.16 M/UL — LOW (ref 4.2–5.8)
RBC # BLD: 3.2 M/UL — LOW (ref 4.2–5.8)
RBC # BLD: 3.21 M/UL — LOW (ref 4.2–5.8)
RBC # FLD: 14.7 % — HIGH (ref 10.3–14.5)
RBC # FLD: 14.7 % — HIGH (ref 10.3–14.5)
RBC # FLD: 18.6 % — HIGH (ref 10.3–14.5)
RBC # FLD: 18.6 % — HIGH (ref 10.3–14.5)
RBC # FLD: 18.7 % — HIGH (ref 10.3–14.5)
RBC # FLD: 18.8 % — HIGH (ref 10.3–14.5)
RBC # FLD: 18.9 % — HIGH (ref 10.3–14.5)
RBC # FLD: 19.1 % — HIGH (ref 10.3–14.5)
RBC # FLD: 20.2 % — HIGH (ref 10.3–14.5)
RBC # FLD: 20.4 % — HIGH (ref 10.3–14.5)
RBC # FLD: 20.6 % — HIGH (ref 10.3–14.5)
RBC # FLD: 21.1 % — HIGH (ref 10.3–14.5)
RBC # FLD: 21.2 % — HIGH (ref 10.3–14.5)
RBC # FLD: 21.3 % — HIGH (ref 10.3–14.5)
RBC # FLD: 21.6 % — HIGH (ref 10.3–14.5)
RBC # FLD: 21.7 % — HIGH (ref 10.3–14.5)
RBC # FLD: 21.8 % — HIGH (ref 10.3–14.5)
RBC # FLD: 21.8 % — HIGH (ref 10.3–14.5)
RBC # FLD: 22 % — HIGH (ref 10.3–14.5)
RBC # FLD: 22.2 % — HIGH (ref 10.3–14.5)
RBC # FLD: 22.4 % — HIGH (ref 10.3–14.5)
RBC # FLD: 22.7 % — HIGH (ref 10.3–14.5)
RBC # FLD: 23 % — HIGH (ref 10.3–14.5)
RBC # FLD: 23 % — HIGH (ref 10.3–14.5)
RBC # FLD: 23.1 % — HIGH (ref 10.3–14.5)
RBC # FLD: 23.2 % — HIGH (ref 10.3–14.5)
RBC # FLD: 23.2 % — HIGH (ref 10.3–14.5)
RBC # FLD: 23.4 % — HIGH (ref 10.3–14.5)
RBC # FLD: 23.5 % — HIGH (ref 10.3–14.5)
RBC # FLD: 23.6 % — HIGH (ref 10.3–14.5)
RBC BLD AUTO: ABNORMAL
RCV VOL RI: 76 /UL — HIGH (ref 0–0)
RH IG SCN BLD-IMP: POSITIVE — SIGNIFICANT CHANGE UP
RSV RNA SPEC QL NAA+PROBE: SIGNIFICANT CHANGE UP
RV+EV RNA SPEC QL NAA+PROBE: SIGNIFICANT CHANGE UP
S AUREUS DNA NOSE QL NAA+PROBE: SIGNIFICANT CHANGE UP
S AUREUS DNA NOSE QL NAA+PROBE: SIGNIFICANT CHANGE UP
SAO2 % BLDV: 80.3 % — SIGNIFICANT CHANGE UP
SARS-COV-2 RNA SPEC QL NAA+PROBE: DETECTED
SARS-COV-2 RNA SPEC QL NAA+PROBE: DETECTED
SARS-COV-2 RNA SPEC QL NAA+PROBE: SIGNIFICANT CHANGE UP
SARS-COV-2 RNA SPEC QL NAA+PROBE: SIGNIFICANT CHANGE UP
SODIUM SERPL-SCNC: 130 MMOL/L — LOW (ref 135–145)
SODIUM SERPL-SCNC: 130 MMOL/L — LOW (ref 135–145)
SODIUM SERPL-SCNC: 131 MMOL/L — LOW (ref 135–145)
SODIUM SERPL-SCNC: 131 MMOL/L — LOW (ref 135–145)
SODIUM SERPL-SCNC: 132 MMOL/L — LOW (ref 135–145)
SODIUM SERPL-SCNC: 133 MMOL/L — LOW (ref 135–145)
SODIUM SERPL-SCNC: 134 MMOL/L — LOW (ref 135–145)
SODIUM SERPL-SCNC: 135 MMOL/L — SIGNIFICANT CHANGE UP (ref 135–145)
SODIUM SERPL-SCNC: 136 MMOL/L — SIGNIFICANT CHANGE UP (ref 135–145)
SODIUM SERPL-SCNC: 137 MMOL/L — SIGNIFICANT CHANGE UP (ref 135–145)
SODIUM SERPL-SCNC: 137 MMOL/L — SIGNIFICANT CHANGE UP (ref 135–145)
SODIUM SERPL-SCNC: 139 MMOL/L — SIGNIFICANT CHANGE UP (ref 135–145)
SPECIMEN SOURCE: SIGNIFICANT CHANGE UP
T3 SERPL-MCNC: 39 NG/DL — LOW (ref 80–200)
T4 AB SER-ACNC: 3.6 UG/DL — LOW (ref 4.6–12)
TIBC SERPL-MCNC: 315 UG/DL — SIGNIFICANT CHANGE UP (ref 220–430)
TOTAL NUCLEATED CELL COUNT, BODY FLUID: 69 /UL — SIGNIFICANT CHANGE UP
TRANSFERRIN SERPL-MCNC: 248 MG/DL — SIGNIFICANT CHANGE UP (ref 200–360)
TROPONIN I, HIGH SENSITIVITY RESULT: 118.03 NG/L — HIGH
TROPONIN I, HIGH SENSITIVITY RESULT: 407.4 NG/L — HIGH
TROPONIN I, HIGH SENSITIVITY RESULT: 424.7 NG/L — HIGH
TROPONIN I, HIGH SENSITIVITY RESULT: 50.3 NG/L — SIGNIFICANT CHANGE UP
TROPONIN I, HIGH SENSITIVITY RESULT: 545.3 NG/L — HIGH
TROPONIN I, HIGH SENSITIVITY RESULT: 895.5 NG/L — HIGH
TROPONIN T, HIGH SENSITIVITY RESULT: 120 NG/L — CRITICAL HIGH
TSH SERPL-MCNC: 10.16 UIU/ML — HIGH (ref 0.27–4.2)
TSH SERPL-MCNC: 13.7 UU/ML — HIGH (ref 0.34–4.82)
TSH SERPL-MCNC: 37.4 UIU/ML — HIGH (ref 0.36–3.74)
TUBE TYPE: SIGNIFICANT CHANGE UP
UIBC SERPL-MCNC: 199 UG/DL — SIGNIFICANT CHANGE UP (ref 110–370)
VIT B12 SERPL-MCNC: 600 PG/ML — SIGNIFICANT CHANGE UP (ref 232–1245)
WBC # BLD: 10.06 K/UL — SIGNIFICANT CHANGE UP (ref 3.8–10.5)
WBC # BLD: 10.19 K/UL — SIGNIFICANT CHANGE UP (ref 3.8–10.5)
WBC # BLD: 10.19 K/UL — SIGNIFICANT CHANGE UP (ref 3.8–10.5)
WBC # BLD: 11.41 K/UL — HIGH (ref 3.8–10.5)
WBC # BLD: 11.63 K/UL — HIGH (ref 3.8–10.5)
WBC # BLD: 12.18 K/UL — HIGH (ref 3.8–10.5)
WBC # BLD: 3.32 K/UL — LOW (ref 3.8–10.5)
WBC # BLD: 3.37 K/UL — LOW (ref 3.8–10.5)
WBC # BLD: 3.39 K/UL — LOW (ref 3.8–10.5)
WBC # BLD: 4.62 K/UL — SIGNIFICANT CHANGE UP (ref 3.8–10.5)
WBC # BLD: 4.92 K/UL — SIGNIFICANT CHANGE UP (ref 3.8–10.5)
WBC # BLD: 4.98 K/UL — SIGNIFICANT CHANGE UP (ref 3.8–10.5)
WBC # BLD: 5.53 K/UL — SIGNIFICANT CHANGE UP (ref 3.8–10.5)
WBC # BLD: 5.65 K/UL — SIGNIFICANT CHANGE UP (ref 3.8–10.5)
WBC # BLD: 5.66 K/UL — SIGNIFICANT CHANGE UP (ref 3.8–10.5)
WBC # BLD: 5.68 K/UL — SIGNIFICANT CHANGE UP (ref 3.8–10.5)
WBC # BLD: 5.69 K/UL — SIGNIFICANT CHANGE UP (ref 3.8–10.5)
WBC # BLD: 5.98 K/UL — SIGNIFICANT CHANGE UP (ref 3.8–10.5)
WBC # BLD: 6 K/UL — SIGNIFICANT CHANGE UP (ref 3.8–10.5)
WBC # BLD: 6.19 K/UL — SIGNIFICANT CHANGE UP (ref 3.8–10.5)
WBC # BLD: 6.21 K/UL — SIGNIFICANT CHANGE UP (ref 3.8–10.5)
WBC # BLD: 6.41 K/UL — SIGNIFICANT CHANGE UP (ref 3.8–10.5)
WBC # BLD: 6.5 K/UL — SIGNIFICANT CHANGE UP (ref 3.8–10.5)
WBC # BLD: 6.54 K/UL — SIGNIFICANT CHANGE UP (ref 3.8–10.5)
WBC # BLD: 6.54 K/UL — SIGNIFICANT CHANGE UP (ref 3.8–10.5)
WBC # BLD: 6.57 K/UL — SIGNIFICANT CHANGE UP (ref 3.8–10.5)
WBC # BLD: 6.7 K/UL — SIGNIFICANT CHANGE UP (ref 3.8–10.5)
WBC # BLD: 6.74 K/UL — SIGNIFICANT CHANGE UP (ref 3.8–10.5)
WBC # BLD: 6.83 K/UL — SIGNIFICANT CHANGE UP (ref 3.8–10.5)
WBC # BLD: 6.91 K/UL — SIGNIFICANT CHANGE UP (ref 3.8–10.5)
WBC # BLD: 6.95 K/UL — SIGNIFICANT CHANGE UP (ref 3.8–10.5)
WBC # BLD: 7.05 K/UL — SIGNIFICANT CHANGE UP (ref 3.8–10.5)
WBC # BLD: 7.06 K/UL — SIGNIFICANT CHANGE UP (ref 3.8–10.5)
WBC # BLD: 7.56 K/UL — SIGNIFICANT CHANGE UP (ref 3.8–10.5)
WBC # BLD: 7.91 K/UL — SIGNIFICANT CHANGE UP (ref 3.8–10.5)
WBC # BLD: 8.62 K/UL — SIGNIFICANT CHANGE UP (ref 3.8–10.5)
WBC # FLD AUTO: 10.06 K/UL — SIGNIFICANT CHANGE UP (ref 3.8–10.5)
WBC # FLD AUTO: 10.19 K/UL — SIGNIFICANT CHANGE UP (ref 3.8–10.5)
WBC # FLD AUTO: 10.19 K/UL — SIGNIFICANT CHANGE UP (ref 3.8–10.5)
WBC # FLD AUTO: 11.41 K/UL — HIGH (ref 3.8–10.5)
WBC # FLD AUTO: 11.63 K/UL — HIGH (ref 3.8–10.5)
WBC # FLD AUTO: 12.18 K/UL — HIGH (ref 3.8–10.5)
WBC # FLD AUTO: 3.32 K/UL — LOW (ref 3.8–10.5)
WBC # FLD AUTO: 3.37 K/UL — LOW (ref 3.8–10.5)
WBC # FLD AUTO: 3.39 K/UL — LOW (ref 3.8–10.5)
WBC # FLD AUTO: 4.62 K/UL — SIGNIFICANT CHANGE UP (ref 3.8–10.5)
WBC # FLD AUTO: 4.92 K/UL — SIGNIFICANT CHANGE UP (ref 3.8–10.5)
WBC # FLD AUTO: 4.98 K/UL — SIGNIFICANT CHANGE UP (ref 3.8–10.5)
WBC # FLD AUTO: 5.53 K/UL — SIGNIFICANT CHANGE UP (ref 3.8–10.5)
WBC # FLD AUTO: 5.65 K/UL — SIGNIFICANT CHANGE UP (ref 3.8–10.5)
WBC # FLD AUTO: 5.66 K/UL — SIGNIFICANT CHANGE UP (ref 3.8–10.5)
WBC # FLD AUTO: 5.68 K/UL — SIGNIFICANT CHANGE UP (ref 3.8–10.5)
WBC # FLD AUTO: 5.69 K/UL — SIGNIFICANT CHANGE UP (ref 3.8–10.5)
WBC # FLD AUTO: 5.98 K/UL — SIGNIFICANT CHANGE UP (ref 3.8–10.5)
WBC # FLD AUTO: 6 K/UL — SIGNIFICANT CHANGE UP (ref 3.8–10.5)
WBC # FLD AUTO: 6.19 K/UL — SIGNIFICANT CHANGE UP (ref 3.8–10.5)
WBC # FLD AUTO: 6.21 K/UL — SIGNIFICANT CHANGE UP (ref 3.8–10.5)
WBC # FLD AUTO: 6.41 K/UL — SIGNIFICANT CHANGE UP (ref 3.8–10.5)
WBC # FLD AUTO: 6.5 K/UL — SIGNIFICANT CHANGE UP (ref 3.8–10.5)
WBC # FLD AUTO: 6.54 K/UL — SIGNIFICANT CHANGE UP (ref 3.8–10.5)
WBC # FLD AUTO: 6.54 K/UL — SIGNIFICANT CHANGE UP (ref 3.8–10.5)
WBC # FLD AUTO: 6.57 K/UL — SIGNIFICANT CHANGE UP (ref 3.8–10.5)
WBC # FLD AUTO: 6.7 K/UL — SIGNIFICANT CHANGE UP (ref 3.8–10.5)
WBC # FLD AUTO: 6.74 K/UL — SIGNIFICANT CHANGE UP (ref 3.8–10.5)
WBC # FLD AUTO: 6.83 K/UL — SIGNIFICANT CHANGE UP (ref 3.8–10.5)
WBC # FLD AUTO: 6.91 K/UL — SIGNIFICANT CHANGE UP (ref 3.8–10.5)
WBC # FLD AUTO: 6.95 K/UL — SIGNIFICANT CHANGE UP (ref 3.8–10.5)
WBC # FLD AUTO: 7.05 K/UL — SIGNIFICANT CHANGE UP (ref 3.8–10.5)
WBC # FLD AUTO: 7.06 K/UL — SIGNIFICANT CHANGE UP (ref 3.8–10.5)
WBC # FLD AUTO: 7.56 K/UL — SIGNIFICANT CHANGE UP (ref 3.8–10.5)
WBC # FLD AUTO: 7.91 K/UL — SIGNIFICANT CHANGE UP (ref 3.8–10.5)
WBC # FLD AUTO: 8.62 K/UL — SIGNIFICANT CHANGE UP (ref 3.8–10.5)

## 2022-01-01 PROCEDURE — 90935 HEMODIALYSIS ONE EVALUATION: CPT

## 2022-01-01 PROCEDURE — 93312 ECHO TRANSESOPHAGEAL: CPT

## 2022-01-01 PROCEDURE — 99232 SBSQ HOSP IP/OBS MODERATE 35: CPT

## 2022-01-01 PROCEDURE — 99222 1ST HOSP IP/OBS MODERATE 55: CPT

## 2022-01-01 PROCEDURE — 84145 PROCALCITONIN (PCT): CPT

## 2022-01-01 PROCEDURE — 83735 ASSAY OF MAGNESIUM: CPT

## 2022-01-01 PROCEDURE — 71045 X-RAY EXAM CHEST 1 VIEW: CPT

## 2022-01-01 PROCEDURE — 99233 SBSQ HOSP IP/OBS HIGH 50: CPT | Mod: GC

## 2022-01-01 PROCEDURE — 93005 ELECTROCARDIOGRAM TRACING: CPT

## 2022-01-01 PROCEDURE — 85027 COMPLETE CBC AUTOMATED: CPT

## 2022-01-01 PROCEDURE — 93308 TTE F-UP OR LMTD: CPT | Mod: 26

## 2022-01-01 PROCEDURE — P9045: CPT

## 2022-01-01 PROCEDURE — 71045 X-RAY EXAM CHEST 1 VIEW: CPT | Mod: 26,77

## 2022-01-01 PROCEDURE — 84100 ASSAY OF PHOSPHORUS: CPT

## 2022-01-01 PROCEDURE — 84443 ASSAY THYROID STIM HORMONE: CPT

## 2022-01-01 PROCEDURE — 80053 COMPREHEN METABOLIC PANEL: CPT

## 2022-01-01 PROCEDURE — 83605 ASSAY OF LACTIC ACID: CPT

## 2022-01-01 PROCEDURE — 93320 DOPPLER ECHO COMPLETE: CPT

## 2022-01-01 PROCEDURE — 84466 ASSAY OF TRANSFERRIN: CPT

## 2022-01-01 PROCEDURE — 82962 GLUCOSE BLOOD TEST: CPT

## 2022-01-01 PROCEDURE — 87070 CULTURE OTHR SPECIMN AEROBIC: CPT

## 2022-01-01 PROCEDURE — 99497 ADVNCD CARE PLAN 30 MIN: CPT | Mod: 25

## 2022-01-01 PROCEDURE — 71045 X-RAY EXAM CHEST 1 VIEW: CPT | Mod: 26

## 2022-01-01 PROCEDURE — 85025 COMPLETE CBC W/AUTO DIFF WBC: CPT

## 2022-01-01 PROCEDURE — 99233 SBSQ HOSP IP/OBS HIGH 50: CPT

## 2022-01-01 PROCEDURE — 93320 DOPPLER ECHO COMPLETE: CPT | Mod: 26

## 2022-01-01 PROCEDURE — 87641 MR-STAPH DNA AMP PROBE: CPT

## 2022-01-01 PROCEDURE — 82550 ASSAY OF CK (CPK): CPT

## 2022-01-01 PROCEDURE — 85610 PROTHROMBIN TIME: CPT

## 2022-01-01 PROCEDURE — 84484 ASSAY OF TROPONIN QUANT: CPT

## 2022-01-01 PROCEDURE — U0003: CPT

## 2022-01-01 PROCEDURE — 93325 DOPPLER ECHO COLOR FLOW MAPG: CPT

## 2022-01-01 PROCEDURE — 99223 1ST HOSP IP/OBS HIGH 75: CPT | Mod: GC

## 2022-01-01 PROCEDURE — 99291 CRITICAL CARE FIRST HOUR: CPT

## 2022-01-01 PROCEDURE — 83550 IRON BINDING TEST: CPT

## 2022-01-01 PROCEDURE — 36415 COLL VENOUS BLD VENIPUNCTURE: CPT

## 2022-01-01 PROCEDURE — 86850 RBC ANTIBODY SCREEN: CPT

## 2022-01-01 PROCEDURE — 93010 ELECTROCARDIOGRAM REPORT: CPT

## 2022-01-01 PROCEDURE — 92960 CARDIOVERSION ELECTRIC EXT: CPT

## 2022-01-01 PROCEDURE — 71250 CT THORAX DX C-: CPT | Mod: 26

## 2022-01-01 PROCEDURE — 87040 BLOOD CULTURE FOR BACTERIA: CPT

## 2022-01-01 PROCEDURE — 86923 COMPATIBILITY TEST ELECTRIC: CPT

## 2022-01-01 PROCEDURE — 80048 BASIC METABOLIC PNL TOTAL CA: CPT

## 2022-01-01 PROCEDURE — 99261: CPT

## 2022-01-01 PROCEDURE — 84436 ASSAY OF TOTAL THYROXINE: CPT

## 2022-01-01 PROCEDURE — 82945 GLUCOSE OTHER FLUID: CPT

## 2022-01-01 PROCEDURE — 93306 TTE W/DOPPLER COMPLETE: CPT

## 2022-01-01 PROCEDURE — 99285 EMERGENCY DEPT VISIT HI MDM: CPT

## 2022-01-01 PROCEDURE — 99232 SBSQ HOSP IP/OBS MODERATE 35: CPT | Mod: GC

## 2022-01-01 PROCEDURE — 97162 PT EVAL MOD COMPLEX 30 MIN: CPT

## 2022-01-01 PROCEDURE — 76604 US EXAM CHEST: CPT | Mod: 26

## 2022-01-01 PROCEDURE — 99239 HOSP IP/OBS DSCHRG MGMT >30: CPT

## 2022-01-01 PROCEDURE — 99223 1ST HOSP IP/OBS HIGH 75: CPT

## 2022-01-01 PROCEDURE — 94640 AIRWAY INHALATION TREATMENT: CPT

## 2022-01-01 PROCEDURE — P9016: CPT

## 2022-01-01 PROCEDURE — 84480 ASSAY TRIIODOTHYRONINE (T3): CPT

## 2022-01-01 PROCEDURE — 86900 BLOOD TYPING SEROLOGIC ABO: CPT

## 2022-01-01 PROCEDURE — 0225U NFCT DS DNA&RNA 21 SARSCOV2: CPT

## 2022-01-01 PROCEDURE — 93312 ECHO TRANSESOPHAGEAL: CPT | Mod: 26

## 2022-01-01 PROCEDURE — 99223 1ST HOSP IP/OBS HIGH 75: CPT | Mod: GC,AI

## 2022-01-01 PROCEDURE — 93325 DOPPLER ECHO COLOR FLOW MAPG: CPT | Mod: 26

## 2022-01-01 PROCEDURE — 86140 C-REACTIVE PROTEIN: CPT

## 2022-01-01 PROCEDURE — 82728 ASSAY OF FERRITIN: CPT

## 2022-01-01 PROCEDURE — 71250 CT THORAX DX C-: CPT

## 2022-01-01 PROCEDURE — 85730 THROMBOPLASTIN TIME PARTIAL: CPT

## 2022-01-01 PROCEDURE — 99223 1ST HOSP IP/OBS HIGH 75: CPT | Mod: AI

## 2022-01-01 PROCEDURE — 82607 VITAMIN B-12: CPT

## 2022-01-01 PROCEDURE — 82553 CREATINE MB FRACTION: CPT

## 2022-01-01 PROCEDURE — 83880 ASSAY OF NATRIURETIC PEPTIDE: CPT

## 2022-01-01 PROCEDURE — 86901 BLOOD TYPING SEROLOGIC RH(D): CPT

## 2022-01-01 PROCEDURE — 89051 BODY FLUID CELL COUNT: CPT

## 2022-01-01 PROCEDURE — 82042 OTHER SOURCE ALBUMIN QUAN EA: CPT

## 2022-01-01 PROCEDURE — 83540 ASSAY OF IRON: CPT

## 2022-01-01 PROCEDURE — 93306 TTE W/DOPPLER COMPLETE: CPT | Mod: 26

## 2022-01-01 PROCEDURE — 87640 STAPH A DNA AMP PROBE: CPT

## 2022-01-01 PROCEDURE — 87075 CULTR BACTERIA EXCEPT BLOOD: CPT

## 2022-01-01 PROCEDURE — 99285 EMERGENCY DEPT VISIT HI MDM: CPT | Mod: CS

## 2022-01-01 PROCEDURE — 83986 ASSAY PH BODY FLUID NOS: CPT

## 2022-01-01 PROCEDURE — 99291 CRITICAL CARE FIRST HOUR: CPT | Mod: CS

## 2022-01-01 PROCEDURE — 74174 CTA ABD&PLVS W/CONTRAST: CPT | Mod: 26

## 2022-01-01 PROCEDURE — 82272 OCCULT BLD FECES 1-3 TESTS: CPT

## 2022-01-01 PROCEDURE — 96374 THER/PROPH/DIAG INJ IV PUSH: CPT

## 2022-01-01 PROCEDURE — C9113: CPT

## 2022-01-01 PROCEDURE — 83615 LACTATE (LD) (LDH) ENZYME: CPT

## 2022-01-01 PROCEDURE — 84157 ASSAY OF PROTEIN OTHER: CPT

## 2022-01-01 PROCEDURE — P9047: CPT

## 2022-01-01 PROCEDURE — 12345: CPT | Mod: NC,GC

## 2022-01-01 PROCEDURE — 82746 ASSAY OF FOLIC ACID SERUM: CPT

## 2022-01-01 PROCEDURE — 84132 ASSAY OF SERUM POTASSIUM: CPT

## 2022-01-01 PROCEDURE — 99291 CRITICAL CARE FIRST HOUR: CPT | Mod: 25

## 2022-01-01 PROCEDURE — 36430 TRANSFUSION BLD/BLD COMPNT: CPT

## 2022-01-01 PROCEDURE — 85652 RBC SED RATE AUTOMATED: CPT

## 2022-01-01 PROCEDURE — U0005: CPT

## 2022-01-01 RX ORDER — METOPROLOL TARTRATE 50 MG
5 TABLET ORAL EVERY 6 HOURS
Refills: 0 | Status: DISCONTINUED | OUTPATIENT
Start: 2022-01-01 | End: 2022-01-01

## 2022-01-01 RX ORDER — FLUTICASONE PROPIONATE AND SALMETEROL 50; 250 UG/1; UG/1
1 POWDER ORAL; RESPIRATORY (INHALATION)
Qty: 0 | Refills: 0 | DISCHARGE

## 2022-01-01 RX ORDER — PANTOPRAZOLE SODIUM 20 MG/1
40 TABLET, DELAYED RELEASE ORAL
Refills: 0 | Status: DISCONTINUED | OUTPATIENT
Start: 2022-01-01 | End: 2022-01-01

## 2022-01-01 RX ORDER — SEVELAMER CARBONATE 2400 MG/1
2 POWDER, FOR SUSPENSION ORAL
Qty: 0 | Refills: 0 | DISCHARGE

## 2022-01-01 RX ORDER — METOPROLOL TARTRATE 50 MG
5 TABLET ORAL
Refills: 0 | Status: DISCONTINUED | OUTPATIENT
Start: 2022-01-01 | End: 2022-01-01

## 2022-01-01 RX ORDER — LANOLIN ALCOHOL/MO/W.PET/CERES
3 CREAM (GRAM) TOPICAL AT BEDTIME
Refills: 0 | Status: DISCONTINUED | OUTPATIENT
Start: 2022-01-01 | End: 2022-01-01

## 2022-01-01 RX ORDER — FOLIC ACID 0.8 MG
1 TABLET ORAL
Qty: 0 | Refills: 0 | DISCHARGE

## 2022-01-01 RX ORDER — PANTOPRAZOLE SODIUM 20 MG/1
1 TABLET, DELAYED RELEASE ORAL
Qty: 0 | Refills: 0 | DISCHARGE

## 2022-01-01 RX ORDER — ALBUMIN HUMAN 25 %
50 VIAL (ML) INTRAVENOUS
Refills: 0 | Status: DISCONTINUED | OUTPATIENT
Start: 2022-01-01 | End: 2022-01-01

## 2022-01-01 RX ORDER — LIDOCAINE AND PRILOCAINE CREAM 25; 25 MG/G; MG/G
1 CREAM TOPICAL
Qty: 0 | Refills: 0 | DISCHARGE
Start: 2022-01-01

## 2022-01-01 RX ORDER — ERYTHROPOIETIN 10000 [IU]/ML
10000 INJECTION, SOLUTION INTRAVENOUS; SUBCUTANEOUS
Refills: 0 | Status: DISCONTINUED | OUTPATIENT
Start: 2022-01-01 | End: 2022-01-01

## 2022-01-01 RX ORDER — DEXTROSE 50 % IN WATER 50 %
50 SYRINGE (ML) INTRAVENOUS ONCE
Refills: 0 | Status: COMPLETED | OUTPATIENT
Start: 2022-01-01 | End: 2022-01-01

## 2022-01-01 RX ORDER — AMIODARONE HYDROCHLORIDE 400 MG/1
1 TABLET ORAL
Qty: 900 | Refills: 0 | Status: DISCONTINUED | OUTPATIENT
Start: 2022-01-01 | End: 2022-01-01

## 2022-01-01 RX ORDER — LIDOCAINE 4 G/100G
1 CREAM TOPICAL DAILY
Refills: 0 | Status: COMPLETED | OUTPATIENT
Start: 2022-01-01 | End: 2022-01-01

## 2022-01-01 RX ORDER — MIDODRINE HYDROCHLORIDE 2.5 MG/1
10 TABLET ORAL
Refills: 0 | Status: DISCONTINUED | OUTPATIENT
Start: 2022-01-01 | End: 2022-01-01

## 2022-01-01 RX ORDER — CHLORHEXIDINE GLUCONATE 213 G/1000ML
1 SOLUTION TOPICAL DAILY
Refills: 0 | Status: DISCONTINUED | OUTPATIENT
Start: 2022-01-01 | End: 2022-01-01

## 2022-01-01 RX ORDER — PANTOPRAZOLE SODIUM 20 MG/1
40 TABLET, DELAYED RELEASE ORAL DAILY
Refills: 0 | Status: DISCONTINUED | OUTPATIENT
Start: 2022-01-01 | End: 2022-01-01

## 2022-01-01 RX ORDER — FLUTICASONE FUROATE AND VILANTEROL TRIFENATATE 100; 25 UG/1; UG/1
1 POWDER RESPIRATORY (INHALATION)
Qty: 0 | Refills: 0 | DISCHARGE

## 2022-01-01 RX ORDER — METOPROLOL TARTRATE 50 MG
0 TABLET ORAL
Qty: 0 | Refills: 0 | DISCHARGE

## 2022-01-01 RX ORDER — PHENYLEPHRINE HYDROCHLORIDE 10 MG/ML
0 INJECTION INTRAVENOUS
Qty: 0 | Refills: 0 | DISCHARGE
Start: 2022-01-01

## 2022-01-01 RX ORDER — APIXABAN 2.5 MG/1
2.5 TABLET, FILM COATED ORAL EVERY 12 HOURS
Refills: 0 | Status: DISCONTINUED | OUTPATIENT
Start: 2022-01-01 | End: 2022-01-01

## 2022-01-01 RX ORDER — METOPROLOL TARTRATE 50 MG
25 TABLET ORAL
Refills: 0 | Status: DISCONTINUED | OUTPATIENT
Start: 2022-01-01 | End: 2022-01-01

## 2022-01-01 RX ORDER — PHENYLEPHRINE HYDROCHLORIDE 10 MG/ML
0.71 INJECTION INTRAVENOUS
Qty: 0 | Refills: 0 | DISCHARGE

## 2022-01-01 RX ORDER — MIDODRINE HYDROCHLORIDE 2.5 MG/1
5 TABLET ORAL THREE TIMES A DAY
Refills: 0 | Status: DISCONTINUED | OUTPATIENT
Start: 2022-01-01 | End: 2022-01-01

## 2022-01-01 RX ORDER — ALBUTEROL 90 UG/1
2 AEROSOL, METERED ORAL
Qty: 0 | Refills: 0 | DISCHARGE

## 2022-01-01 RX ORDER — METOPROLOL TARTRATE 50 MG
2.5 TABLET ORAL
Refills: 0 | Status: DISCONTINUED | OUTPATIENT
Start: 2022-01-01 | End: 2022-01-01

## 2022-01-01 RX ORDER — BUDESONIDE AND FORMOTEROL FUMARATE DIHYDRATE 160; 4.5 UG/1; UG/1
2 AEROSOL RESPIRATORY (INHALATION)
Refills: 0 | Status: DISCONTINUED | OUTPATIENT
Start: 2022-01-01 | End: 2022-01-01

## 2022-01-01 RX ORDER — CHLORHEXIDINE GLUCONATE 213 G/1000ML
1 SOLUTION TOPICAL
Refills: 0 | Status: DISCONTINUED | OUTPATIENT
Start: 2022-01-01 | End: 2022-01-01

## 2022-01-01 RX ORDER — ASPIRIN/CALCIUM CARB/MAGNESIUM 324 MG
81 TABLET ORAL DAILY
Refills: 0 | Status: DISCONTINUED | OUTPATIENT
Start: 2022-01-01 | End: 2022-01-01

## 2022-01-01 RX ORDER — CLOPIDOGREL BISULFATE 75 MG/1
1 TABLET, FILM COATED ORAL
Qty: 0 | Refills: 0 | DISCHARGE

## 2022-01-01 RX ORDER — IPRATROPIUM/ALBUTEROL SULFATE 18-103MCG
3 AEROSOL WITH ADAPTER (GRAM) INHALATION EVERY 6 HOURS
Refills: 0 | Status: DISCONTINUED | OUTPATIENT
Start: 2022-01-01 | End: 2022-01-01

## 2022-01-01 RX ORDER — MIDODRINE HYDROCHLORIDE 2.5 MG/1
1 TABLET ORAL
Qty: 0 | Refills: 0 | DISCHARGE
Start: 2022-01-01

## 2022-01-01 RX ORDER — DILTIAZEM HCL 120 MG
30 CAPSULE, EXT RELEASE 24 HR ORAL EVERY 6 HOURS
Refills: 0 | Status: DISCONTINUED | OUTPATIENT
Start: 2022-01-01 | End: 2022-01-01

## 2022-01-01 RX ORDER — ATORVASTATIN CALCIUM 80 MG/1
10 TABLET, FILM COATED ORAL AT BEDTIME
Refills: 0 | Status: DISCONTINUED | OUTPATIENT
Start: 2022-01-01 | End: 2022-01-01

## 2022-01-01 RX ORDER — AMPICILLIN TRIHYDRATE 250 MG
2 CAPSULE ORAL EVERY 12 HOURS
Refills: 0 | Status: DISCONTINUED | OUTPATIENT
Start: 2022-01-01 | End: 2022-01-01

## 2022-01-01 RX ORDER — SEVELAMER CARBONATE 2400 MG/1
1600 POWDER, FOR SUSPENSION ORAL THREE TIMES A DAY
Refills: 0 | Status: DISCONTINUED | OUTPATIENT
Start: 2022-01-01 | End: 2022-01-01

## 2022-01-01 RX ORDER — DILTIAZEM HCL 120 MG
1 CAPSULE, EXT RELEASE 24 HR ORAL
Qty: 0 | Refills: 0 | DISCHARGE
Start: 2022-01-01

## 2022-01-01 RX ORDER — METOPROLOL TARTRATE 50 MG
25 TABLET ORAL DAILY
Refills: 0 | Status: DISCONTINUED | OUTPATIENT
Start: 2022-01-01 | End: 2022-01-01

## 2022-01-01 RX ORDER — ASPIRIN/CALCIUM CARB/MAGNESIUM 324 MG
324 TABLET ORAL ONCE
Refills: 0 | Status: COMPLETED | OUTPATIENT
Start: 2022-01-01 | End: 2022-01-01

## 2022-01-01 RX ORDER — POLYETHYLENE GLYCOL 3350 17 G/17G
1 POWDER, FOR SOLUTION ORAL
Qty: 0 | Refills: 0 | DISCHARGE

## 2022-01-01 RX ORDER — ACETAMINOPHEN 500 MG
650 TABLET ORAL EVERY 6 HOURS
Refills: 0 | Status: DISCONTINUED | OUTPATIENT
Start: 2022-01-01 | End: 2022-01-01

## 2022-01-01 RX ORDER — DILTIAZEM HCL 120 MG
120 CAPSULE, EXT RELEASE 24 HR ORAL DAILY
Refills: 0 | Status: DISCONTINUED | OUTPATIENT
Start: 2022-01-01 | End: 2022-01-01

## 2022-01-01 RX ORDER — PANTOPRAZOLE SODIUM 20 MG/1
40 TABLET, DELAYED RELEASE ORAL
Qty: 0 | Refills: 0 | DISCHARGE
Start: 2022-01-01

## 2022-01-01 RX ORDER — APIXABAN 2.5 MG/1
2.5 TABLET, FILM COATED ORAL
Refills: 0 | Status: DISCONTINUED | OUTPATIENT
Start: 2022-01-01 | End: 2022-01-01

## 2022-01-01 RX ORDER — FUROSEMIDE 40 MG
1 TABLET ORAL
Qty: 0 | Refills: 0 | DISCHARGE

## 2022-01-01 RX ORDER — AMIODARONE HYDROCHLORIDE 400 MG/1
1 TABLET ORAL
Qty: 0 | Refills: 0 | DISCHARGE
Start: 2022-01-01

## 2022-01-01 RX ORDER — FOLIC ACID 0.8 MG
1 TABLET ORAL DAILY
Refills: 0 | Status: DISCONTINUED | OUTPATIENT
Start: 2022-01-01 | End: 2022-01-01

## 2022-01-01 RX ORDER — CEFTRIAXONE 500 MG/1
2 INJECTION, POWDER, FOR SOLUTION INTRAMUSCULAR; INTRAVENOUS
Qty: 28 | Refills: 0
Start: 2022-01-01 | End: 2022-01-01

## 2022-01-01 RX ORDER — AMIODARONE HYDROCHLORIDE 400 MG/1
150 TABLET ORAL ONCE
Refills: 0 | Status: COMPLETED | OUTPATIENT
Start: 2022-01-01 | End: 2022-01-01

## 2022-01-01 RX ORDER — LOVASTATIN 20 MG
1 TABLET ORAL
Qty: 0 | Refills: 0 | DISCHARGE

## 2022-01-01 RX ORDER — ASCORBIC ACID 60 MG
500 TABLET,CHEWABLE ORAL DAILY
Refills: 0 | Status: DISCONTINUED | OUTPATIENT
Start: 2022-01-01 | End: 2022-01-01

## 2022-01-01 RX ORDER — LANOLIN ALCOHOL/MO/W.PET/CERES
1 CREAM (GRAM) TOPICAL
Qty: 0 | Refills: 0 | DISCHARGE
Start: 2022-01-01

## 2022-01-01 RX ORDER — PROTHROMBIN COMPLEX CONCENTRATE (HUMAN) 25.5; 16.5; 24; 22; 22; 26 [IU]/ML; [IU]/ML; [IU]/ML; [IU]/ML; [IU]/ML; [IU]/ML
1500 POWDER, FOR SOLUTION INTRAVENOUS ONCE
Refills: 0 | Status: DISCONTINUED | OUTPATIENT
Start: 2022-01-01 | End: 2022-01-01

## 2022-01-01 RX ORDER — AMPICILLIN TRIHYDRATE 250 MG
2 CAPSULE ORAL
Qty: 0 | Refills: 0 | DISCHARGE
Start: 2022-01-01

## 2022-01-01 RX ORDER — SEVELAMER CARBONATE 2400 MG/1
1600 POWDER, FOR SUSPENSION ORAL
Refills: 0 | Status: DISCONTINUED | OUTPATIENT
Start: 2022-01-01 | End: 2022-01-01

## 2022-01-01 RX ORDER — MIDODRINE HYDROCHLORIDE 2.5 MG/1
1 TABLET ORAL
Qty: 0 | Refills: 0 | DISCHARGE

## 2022-01-01 RX ORDER — LEVOTHYROXINE SODIUM 125 MCG
1 TABLET ORAL
Qty: 0 | Refills: 0 | DISCHARGE

## 2022-01-01 RX ORDER — ALBUTEROL 90 UG/1
2 AEROSOL, METERED ORAL EVERY 6 HOURS
Refills: 0 | Status: DISCONTINUED | OUTPATIENT
Start: 2022-01-01 | End: 2022-01-01

## 2022-01-01 RX ORDER — LEVOTHYROXINE SODIUM 125 MCG
1 TABLET ORAL
Qty: 0 | Refills: 0 | DISCHARGE
Start: 2022-01-01

## 2022-01-01 RX ORDER — DILTIAZEM HCL 120 MG
60 CAPSULE, EXT RELEASE 24 HR ORAL DAILY
Refills: 0 | Status: DISCONTINUED | OUTPATIENT
Start: 2022-01-01 | End: 2022-01-01

## 2022-01-01 RX ORDER — LEVOTHYROXINE SODIUM 125 MCG
112 TABLET ORAL DAILY
Refills: 0 | Status: DISCONTINUED | OUTPATIENT
Start: 2022-01-01 | End: 2022-01-01

## 2022-01-01 RX ORDER — DESMOPRESSIN ACETATE 0.1 MG/1
20 TABLET ORAL ONCE
Refills: 0 | Status: DISCONTINUED | OUTPATIENT
Start: 2022-01-01 | End: 2022-01-01

## 2022-01-01 RX ORDER — ALBUMIN HUMAN 25 %
250 VIAL (ML) INTRAVENOUS ONCE
Refills: 0 | Status: COMPLETED | OUTPATIENT
Start: 2022-01-01 | End: 2022-01-01

## 2022-01-01 RX ORDER — ACETAMINOPHEN 500 MG
1 TABLET ORAL
Qty: 0 | Refills: 0 | DISCHARGE

## 2022-01-01 RX ORDER — HEPARIN SODIUM 5000 [USP'U]/ML
5000 INJECTION INTRAVENOUS; SUBCUTANEOUS EVERY 12 HOURS
Refills: 0 | Status: DISCONTINUED | OUTPATIENT
Start: 2022-01-01 | End: 2022-01-01

## 2022-01-01 RX ORDER — MORPHINE SULFATE 50 MG/1
2 CAPSULE, EXTENDED RELEASE ORAL
Refills: 0 | Status: DISCONTINUED | OUTPATIENT
Start: 2022-01-01 | End: 2022-01-01

## 2022-01-01 RX ORDER — SODIUM CHLORIDE 9 MG/ML
500 INJECTION INTRAMUSCULAR; INTRAVENOUS; SUBCUTANEOUS ONCE
Refills: 0 | Status: COMPLETED | OUTPATIENT
Start: 2022-01-01 | End: 2022-01-01

## 2022-01-01 RX ORDER — FUROSEMIDE 40 MG
20 TABLET ORAL ONCE
Refills: 0 | Status: COMPLETED | OUTPATIENT
Start: 2022-01-01 | End: 2022-01-01

## 2022-01-01 RX ORDER — LIDOCAINE AND PRILOCAINE CREAM 25; 25 MG/G; MG/G
1 CREAM TOPICAL ONCE
Refills: 0 | Status: COMPLETED | OUTPATIENT
Start: 2022-01-01 | End: 2022-01-01

## 2022-01-01 RX ORDER — LEVOTHYROXINE SODIUM 125 MCG
84 TABLET ORAL AT BEDTIME
Refills: 0 | Status: DISCONTINUED | OUTPATIENT
Start: 2022-01-01 | End: 2022-01-01

## 2022-01-01 RX ORDER — MOMETASONE FUROATE 220 UG/1
0 INHALANT RESPIRATORY (INHALATION)
Qty: 0 | Refills: 0 | DISCHARGE
Start: 2022-01-01

## 2022-01-01 RX ORDER — LEVOTHYROXINE SODIUM 125 MCG
125 TABLET ORAL DAILY
Refills: 0 | Status: DISCONTINUED | OUTPATIENT
Start: 2022-01-01 | End: 2022-01-01

## 2022-01-01 RX ORDER — DILTIAZEM HCL 120 MG
1 CAPSULE, EXT RELEASE 24 HR ORAL
Qty: 0 | Refills: 0 | DISCHARGE

## 2022-01-01 RX ORDER — ZINC OXIDE 200 MG/G
1 OINTMENT TOPICAL
Qty: 0 | Refills: 0 | DISCHARGE

## 2022-01-01 RX ORDER — METOPROLOL TARTRATE 50 MG
1 TABLET ORAL
Qty: 0 | Refills: 0 | DISCHARGE
Start: 2022-01-01

## 2022-01-01 RX ORDER — ERGOCALCIFEROL 1.25 MG/1
50000 CAPSULE ORAL
Refills: 0 | Status: DISCONTINUED | OUTPATIENT
Start: 2022-01-01 | End: 2022-01-01

## 2022-01-01 RX ORDER — PROTHROMBIN COMPLEX CONCENTRATE (HUMAN) 25.5; 16.5; 24; 22; 22; 26 [IU]/ML; [IU]/ML; [IU]/ML; [IU]/ML; [IU]/ML; [IU]/ML
3000 POWDER, FOR SOLUTION INTRAVENOUS ONCE
Refills: 0 | Status: COMPLETED | OUTPATIENT
Start: 2022-01-01 | End: 2022-01-01

## 2022-01-01 RX ORDER — SEVELAMER CARBONATE 2400 MG/1
800 POWDER, FOR SUSPENSION ORAL
Refills: 0 | Status: DISCONTINUED | OUTPATIENT
Start: 2022-01-01 | End: 2022-01-01

## 2022-01-01 RX ORDER — ZINC OXIDE 200 MG/G
1 OINTMENT TOPICAL
Qty: 0 | Refills: 0 | DISCHARGE
Start: 2022-01-01

## 2022-01-01 RX ORDER — FLUTICASONE PROPIONATE 220 MCG
1 AEROSOL WITH ADAPTER (GRAM) INHALATION
Qty: 0 | Refills: 0 | DISCHARGE

## 2022-01-01 RX ORDER — CEFTRIAXONE 500 MG/1
2 INJECTION, POWDER, FOR SOLUTION INTRAMUSCULAR; INTRAVENOUS
Qty: 0 | Refills: 0 | DISCHARGE

## 2022-01-01 RX ORDER — SEVELAMER CARBONATE 2400 MG/1
2 POWDER, FOR SUSPENSION ORAL
Qty: 0 | Refills: 0 | DISCHARGE
Start: 2022-01-01

## 2022-01-01 RX ORDER — ACETAMINOPHEN 500 MG
2 TABLET ORAL
Qty: 0 | Refills: 0 | DISCHARGE

## 2022-01-01 RX ORDER — ZINC OXIDE 200 MG/G
1 OINTMENT TOPICAL DAILY
Refills: 0 | Status: DISCONTINUED | OUTPATIENT
Start: 2022-01-01 | End: 2022-01-01

## 2022-01-01 RX ORDER — DIGOXIN 250 MCG
125 TABLET ORAL ONCE
Refills: 0 | Status: COMPLETED | OUTPATIENT
Start: 2022-01-01 | End: 2022-01-01

## 2022-01-01 RX ORDER — NYSTATIN CREAM 100000 [USP'U]/G
1 CREAM TOPICAL ONCE
Refills: 0 | Status: COMPLETED | OUTPATIENT
Start: 2022-01-01 | End: 2022-01-01

## 2022-01-01 RX ORDER — ERGOCALCIFEROL 1.25 MG/1
1250 CAPSULE ORAL
Qty: 0 | Refills: 0 | DISCHARGE

## 2022-01-01 RX ORDER — ZINC SULFATE TAB 220 MG (50 MG ZINC EQUIVALENT) 220 (50 ZN) MG
1 TAB ORAL
Qty: 0 | Refills: 0 | DISCHARGE

## 2022-01-01 RX ORDER — CEFTRIAXONE 500 MG/1
2000 INJECTION, POWDER, FOR SOLUTION INTRAMUSCULAR; INTRAVENOUS EVERY 12 HOURS
Refills: 0 | Status: DISCONTINUED | OUTPATIENT
Start: 2022-01-01 | End: 2022-01-01

## 2022-01-01 RX ORDER — ONDANSETRON 8 MG/1
4 TABLET, FILM COATED ORAL EVERY 8 HOURS
Refills: 0 | Status: DISCONTINUED | OUTPATIENT
Start: 2022-01-01 | End: 2022-01-01

## 2022-01-01 RX ORDER — HEPARIN SODIUM 5000 [USP'U]/ML
0 INJECTION INTRAVENOUS; SUBCUTANEOUS
Qty: 0 | Refills: 0 | DISCHARGE
Start: 2022-01-01

## 2022-01-01 RX ORDER — SODIUM CHLORIDE 9 MG/ML
4 INJECTION INTRAMUSCULAR; INTRAVENOUS; SUBCUTANEOUS EVERY 12 HOURS
Refills: 0 | Status: DISCONTINUED | OUTPATIENT
Start: 2022-01-01 | End: 2022-01-01

## 2022-01-01 RX ORDER — METOPROLOL TARTRATE 50 MG
12.5 TABLET ORAL
Refills: 0 | Status: DISCONTINUED | OUTPATIENT
Start: 2022-01-01 | End: 2022-01-01

## 2022-01-01 RX ORDER — SENNA PLUS 8.6 MG/1
2 TABLET ORAL AT BEDTIME
Refills: 0 | Status: DISCONTINUED | OUTPATIENT
Start: 2022-01-01 | End: 2022-01-01

## 2022-01-01 RX ORDER — ATORVASTATIN CALCIUM 80 MG/1
10 TABLET, FILM COATED ORAL DAILY
Refills: 0 | Status: DISCONTINUED | OUTPATIENT
Start: 2022-01-01 | End: 2022-01-01

## 2022-01-01 RX ORDER — DIGOXIN 250 MCG
1 TABLET ORAL
Qty: 0 | Refills: 0 | DISCHARGE
Start: 2022-01-01

## 2022-01-01 RX ORDER — ALBUTEROL 90 UG/1
0 AEROSOL, METERED ORAL
Qty: 0 | Refills: 0 | DISCHARGE

## 2022-01-01 RX ORDER — FUROSEMIDE 40 MG
120 TABLET ORAL ONCE
Refills: 0 | Status: COMPLETED | OUTPATIENT
Start: 2022-01-01 | End: 2022-01-01

## 2022-01-01 RX ORDER — ASCORBIC ACID 60 MG
1 TABLET,CHEWABLE ORAL
Qty: 0 | Refills: 0 | DISCHARGE

## 2022-01-01 RX ORDER — LIDOCAINE 4 G/100G
1 CREAM TOPICAL
Qty: 0 | Refills: 0 | DISCHARGE

## 2022-01-01 RX ORDER — MIDODRINE HYDROCHLORIDE 2.5 MG/1
10 TABLET ORAL THREE TIMES A DAY
Refills: 0 | Status: DISCONTINUED | OUTPATIENT
Start: 2022-01-01 | End: 2022-01-01

## 2022-01-01 RX ORDER — AMPICILLIN TRIHYDRATE 250 MG
2 CAPSULE ORAL
Qty: 0 | Refills: 0 | DISCHARGE

## 2022-01-01 RX ORDER — LIDOCAINE AND PRILOCAINE CREAM 25; 25 MG/G; MG/G
1 CREAM TOPICAL
Refills: 0 | Status: DISCONTINUED | OUTPATIENT
Start: 2022-01-01 | End: 2022-01-01

## 2022-01-01 RX ORDER — ACETAMINOPHEN 500 MG
2 TABLET ORAL
Qty: 0 | Refills: 0 | DISCHARGE
Start: 2022-01-01

## 2022-01-01 RX ORDER — ZINC SULFATE TAB 220 MG (50 MG ZINC EQUIVALENT) 220 (50 ZN) MG
220 TAB ORAL DAILY
Refills: 0 | Status: DISCONTINUED | OUTPATIENT
Start: 2022-01-01 | End: 2022-01-01

## 2022-01-01 RX ORDER — PHENYLEPHRINE HYDROCHLORIDE 10 MG/ML
0.05 INJECTION INTRAVENOUS
Qty: 160 | Refills: 0 | Status: DISCONTINUED | OUTPATIENT
Start: 2022-01-01 | End: 2022-01-01

## 2022-01-01 RX ORDER — APIXABAN 2.5 MG/1
1 TABLET, FILM COATED ORAL
Qty: 0 | Refills: 0 | DISCHARGE

## 2022-01-01 RX ORDER — MOMETASONE FUROATE 220 UG/1
1 INHALANT RESPIRATORY (INHALATION)
Refills: 0 | Status: DISCONTINUED | OUTPATIENT
Start: 2022-01-01 | End: 2022-01-01

## 2022-01-01 RX ORDER — DILTIAZEM HCL 120 MG
10 CAPSULE, EXT RELEASE 24 HR ORAL ONCE
Refills: 0 | Status: COMPLETED | OUTPATIENT
Start: 2022-01-01 | End: 2022-01-01

## 2022-01-01 RX ORDER — AMIODARONE HYDROCHLORIDE 400 MG/1
0.5 TABLET ORAL
Qty: 900 | Refills: 0 | Status: DISCONTINUED | OUTPATIENT
Start: 2022-01-01 | End: 2022-01-01

## 2022-01-01 RX ORDER — ATORVASTATIN CALCIUM 80 MG/1
1 TABLET, FILM COATED ORAL
Qty: 0 | Refills: 0 | DISCHARGE

## 2022-01-01 RX ORDER — SENNA PLUS 8.6 MG/1
2 TABLET ORAL
Qty: 0 | Refills: 0 | DISCHARGE

## 2022-01-01 RX ORDER — BUDESONIDE AND FORMOTEROL FUMARATE DIHYDRATE 160; 4.5 UG/1; UG/1
2 AEROSOL RESPIRATORY (INHALATION)
Qty: 0 | Refills: 0 | DISCHARGE
Start: 2022-01-01

## 2022-01-01 RX ORDER — LEVOTHYROXINE SODIUM 125 MCG
75 TABLET ORAL AT BEDTIME
Refills: 0 | Status: DISCONTINUED | OUTPATIENT
Start: 2022-01-01 | End: 2022-01-01

## 2022-01-01 RX ORDER — AMPICILLIN SODIUM AND SULBACTAM SODIUM 250; 125 MG/ML; MG/ML
2 INJECTION, POWDER, FOR SUSPENSION INTRAMUSCULAR; INTRAVENOUS
Qty: 0 | Refills: 0 | DISCHARGE

## 2022-01-01 RX ADMIN — Medication 100 MILLIGRAM(S): at 05:43

## 2022-01-01 RX ADMIN — Medication 112 MICROGRAM(S): at 06:10

## 2022-01-01 RX ADMIN — CEFTRIAXONE 100 MILLIGRAM(S): 500 INJECTION, POWDER, FOR SOLUTION INTRAMUSCULAR; INTRAVENOUS at 05:09

## 2022-01-01 RX ADMIN — SEVELAMER CARBONATE 1600 MILLIGRAM(S): 2400 POWDER, FOR SUSPENSION ORAL at 12:19

## 2022-01-01 RX ADMIN — CHLORHEXIDINE GLUCONATE 1 APPLICATION(S): 213 SOLUTION TOPICAL at 11:17

## 2022-01-01 RX ADMIN — ATORVASTATIN CALCIUM 10 MILLIGRAM(S): 80 TABLET, FILM COATED ORAL at 21:22

## 2022-01-01 RX ADMIN — Medication 100 MILLIGRAM(S): at 05:31

## 2022-01-01 RX ADMIN — APIXABAN 2.5 MILLIGRAM(S): 2.5 TABLET, FILM COATED ORAL at 05:04

## 2022-01-01 RX ADMIN — ALBUTEROL 2 PUFF(S): 90 AEROSOL, METERED ORAL at 17:15

## 2022-01-01 RX ADMIN — Medication 84 MICROGRAM(S): at 21:40

## 2022-01-01 RX ADMIN — ATORVASTATIN CALCIUM 10 MILLIGRAM(S): 80 TABLET, FILM COATED ORAL at 22:40

## 2022-01-01 RX ADMIN — LIDOCAINE AND PRILOCAINE CREAM 1 APPLICATION(S): 25; 25 CREAM TOPICAL at 05:33

## 2022-01-01 RX ADMIN — MIDODRINE HYDROCHLORIDE 10 MILLIGRAM(S): 2.5 TABLET ORAL at 17:12

## 2022-01-01 RX ADMIN — ZINC OXIDE 1 APPLICATION(S): 200 OINTMENT TOPICAL at 12:03

## 2022-01-01 RX ADMIN — CEFTRIAXONE 100 MILLIGRAM(S): 500 INJECTION, POWDER, FOR SOLUTION INTRAMUSCULAR; INTRAVENOUS at 21:47

## 2022-01-01 RX ADMIN — Medication 120 MILLIGRAM(S): at 08:51

## 2022-01-01 RX ADMIN — Medication 84 MICROGRAM(S): at 22:25

## 2022-01-01 RX ADMIN — APIXABAN 2.5 MILLIGRAM(S): 2.5 TABLET, FILM COATED ORAL at 05:14

## 2022-01-01 RX ADMIN — Medication 30 MILLIGRAM(S): at 10:40

## 2022-01-01 RX ADMIN — SEVELAMER CARBONATE 1600 MILLIGRAM(S): 2400 POWDER, FOR SUSPENSION ORAL at 12:30

## 2022-01-01 RX ADMIN — ATORVASTATIN CALCIUM 10 MILLIGRAM(S): 80 TABLET, FILM COATED ORAL at 21:47

## 2022-01-01 RX ADMIN — CEFTRIAXONE 100 MILLIGRAM(S): 500 INJECTION, POWDER, FOR SOLUTION INTRAMUSCULAR; INTRAVENOUS at 16:45

## 2022-01-01 RX ADMIN — CEFTRIAXONE 100 MILLIGRAM(S): 500 INJECTION, POWDER, FOR SOLUTION INTRAMUSCULAR; INTRAVENOUS at 17:55

## 2022-01-01 RX ADMIN — SEVELAMER CARBONATE 1600 MILLIGRAM(S): 2400 POWDER, FOR SUSPENSION ORAL at 08:33

## 2022-01-01 RX ADMIN — ALBUTEROL 2 PUFF(S): 90 AEROSOL, METERED ORAL at 05:58

## 2022-01-01 RX ADMIN — APIXABAN 2.5 MILLIGRAM(S): 2.5 TABLET, FILM COATED ORAL at 18:00

## 2022-01-01 RX ADMIN — ZINC SULFATE TAB 220 MG (50 MG ZINC EQUIVALENT) 220 MILLIGRAM(S): 220 (50 ZN) TAB at 12:46

## 2022-01-01 RX ADMIN — Medication 500 MILLIGRAM(S): at 12:25

## 2022-01-01 RX ADMIN — APIXABAN 2.5 MILLIGRAM(S): 2.5 TABLET, FILM COATED ORAL at 05:01

## 2022-01-01 RX ADMIN — ATORVASTATIN CALCIUM 10 MILLIGRAM(S): 80 TABLET, FILM COATED ORAL at 22:13

## 2022-01-01 RX ADMIN — MORPHINE SULFATE 2 MILLIGRAM(S): 50 CAPSULE, EXTENDED RELEASE ORAL at 14:15

## 2022-01-01 RX ADMIN — BUDESONIDE AND FORMOTEROL FUMARATE DIHYDRATE 2 PUFF(S): 160; 4.5 AEROSOL RESPIRATORY (INHALATION) at 19:57

## 2022-01-01 RX ADMIN — CEFTRIAXONE 100 MILLIGRAM(S): 500 INJECTION, POWDER, FOR SOLUTION INTRAMUSCULAR; INTRAVENOUS at 06:21

## 2022-01-01 RX ADMIN — SEVELAMER CARBONATE 1600 MILLIGRAM(S): 2400 POWDER, FOR SUSPENSION ORAL at 12:03

## 2022-01-01 RX ADMIN — ZINC OXIDE 1 APPLICATION(S): 200 OINTMENT TOPICAL at 08:59

## 2022-01-01 RX ADMIN — SEVELAMER CARBONATE 1600 MILLIGRAM(S): 2400 POWDER, FOR SUSPENSION ORAL at 20:32

## 2022-01-01 RX ADMIN — MIDODRINE HYDROCHLORIDE 5 MILLIGRAM(S): 2.5 TABLET ORAL at 05:30

## 2022-01-01 RX ADMIN — Medication 100 MILLIGRAM(S): at 05:35

## 2022-01-01 RX ADMIN — LIDOCAINE AND PRILOCAINE CREAM 1 APPLICATION(S): 25; 25 CREAM TOPICAL at 14:58

## 2022-01-01 RX ADMIN — APIXABAN 2.5 MILLIGRAM(S): 2.5 TABLET, FILM COATED ORAL at 17:48

## 2022-01-01 RX ADMIN — Medication 500 MILLIGRAM(S): at 11:35

## 2022-01-01 RX ADMIN — MORPHINE SULFATE 2 MILLIGRAM(S): 50 CAPSULE, EXTENDED RELEASE ORAL at 00:39

## 2022-01-01 RX ADMIN — Medication 216 GRAM(S): at 17:24

## 2022-01-01 RX ADMIN — Medication 216 GRAM(S): at 17:15

## 2022-01-01 RX ADMIN — SEVELAMER CARBONATE 1600 MILLIGRAM(S): 2400 POWDER, FOR SUSPENSION ORAL at 17:30

## 2022-01-01 RX ADMIN — MORPHINE SULFATE 2 MILLIGRAM(S): 50 CAPSULE, EXTENDED RELEASE ORAL at 14:30

## 2022-01-01 RX ADMIN — ZINC OXIDE 1 APPLICATION(S): 200 OINTMENT TOPICAL at 11:32

## 2022-01-01 RX ADMIN — ZINC OXIDE 1 APPLICATION(S): 200 OINTMENT TOPICAL at 08:33

## 2022-01-01 RX ADMIN — SEVELAMER CARBONATE 1600 MILLIGRAM(S): 2400 POWDER, FOR SUSPENSION ORAL at 12:39

## 2022-01-01 RX ADMIN — Medication 100 MILLIGRAM(S): at 05:32

## 2022-01-01 RX ADMIN — SEVELAMER CARBONATE 1600 MILLIGRAM(S): 2400 POWDER, FOR SUSPENSION ORAL at 12:05

## 2022-01-01 RX ADMIN — SEVELAMER CARBONATE 1600 MILLIGRAM(S): 2400 POWDER, FOR SUSPENSION ORAL at 09:02

## 2022-01-01 RX ADMIN — Medication 100 MILLIGRAM(S): at 04:32

## 2022-01-01 RX ADMIN — Medication 25 MILLIGRAM(S): at 18:05

## 2022-01-01 RX ADMIN — SEVELAMER CARBONATE 1600 MILLIGRAM(S): 2400 POWDER, FOR SUSPENSION ORAL at 17:54

## 2022-01-01 RX ADMIN — Medication 650 MILLIGRAM(S): at 13:46

## 2022-01-01 RX ADMIN — APIXABAN 2.5 MILLIGRAM(S): 2.5 TABLET, FILM COATED ORAL at 05:28

## 2022-01-01 RX ADMIN — APIXABAN 2.5 MILLIGRAM(S): 2.5 TABLET, FILM COATED ORAL at 18:08

## 2022-01-01 RX ADMIN — MIDODRINE HYDROCHLORIDE 5 MILLIGRAM(S): 2.5 TABLET ORAL at 18:50

## 2022-01-01 RX ADMIN — APIXABAN 2.5 MILLIGRAM(S): 2.5 TABLET, FILM COATED ORAL at 17:22

## 2022-01-01 RX ADMIN — ATORVASTATIN CALCIUM 10 MILLIGRAM(S): 80 TABLET, FILM COATED ORAL at 21:37

## 2022-01-01 RX ADMIN — Medication 10 MILLIGRAM(S): at 01:31

## 2022-01-01 RX ADMIN — PANTOPRAZOLE SODIUM 40 MILLIGRAM(S): 20 TABLET, DELAYED RELEASE ORAL at 11:19

## 2022-01-01 RX ADMIN — MORPHINE SULFATE 2 MILLIGRAM(S): 50 CAPSULE, EXTENDED RELEASE ORAL at 23:39

## 2022-01-01 RX ADMIN — CEFTRIAXONE 100 MILLIGRAM(S): 500 INJECTION, POWDER, FOR SOLUTION INTRAMUSCULAR; INTRAVENOUS at 10:44

## 2022-01-01 RX ADMIN — APIXABAN 2.5 MILLIGRAM(S): 2.5 TABLET, FILM COATED ORAL at 05:44

## 2022-01-01 RX ADMIN — SEVELAMER CARBONATE 1600 MILLIGRAM(S): 2400 POWDER, FOR SUSPENSION ORAL at 17:47

## 2022-01-01 RX ADMIN — CHLORHEXIDINE GLUCONATE 1 APPLICATION(S): 213 SOLUTION TOPICAL at 12:58

## 2022-01-01 RX ADMIN — BUDESONIDE AND FORMOTEROL FUMARATE DIHYDRATE 2 PUFF(S): 160; 4.5 AEROSOL RESPIRATORY (INHALATION) at 06:28

## 2022-01-01 RX ADMIN — Medication 3 MILLILITER(S): at 22:19

## 2022-01-01 RX ADMIN — Medication 1 MILLIGRAM(S): at 08:33

## 2022-01-01 RX ADMIN — Medication 3 MILLIGRAM(S): at 21:23

## 2022-01-01 RX ADMIN — Medication 500 MILLIGRAM(S): at 11:29

## 2022-01-01 RX ADMIN — MIDODRINE HYDROCHLORIDE 5 MILLIGRAM(S): 2.5 TABLET ORAL at 12:22

## 2022-01-01 RX ADMIN — Medication 25 MILLIGRAM(S): at 05:32

## 2022-01-01 RX ADMIN — Medication 100 MILLIGRAM(S): at 05:14

## 2022-01-01 RX ADMIN — Medication 1 MILLIGRAM(S): at 11:19

## 2022-01-01 RX ADMIN — Medication 216 GRAM(S): at 05:35

## 2022-01-01 RX ADMIN — Medication 216 GRAM(S): at 17:45

## 2022-01-01 RX ADMIN — PANTOPRAZOLE SODIUM 40 MILLIGRAM(S): 20 TABLET, DELAYED RELEASE ORAL at 12:31

## 2022-01-01 RX ADMIN — ATORVASTATIN CALCIUM 10 MILLIGRAM(S): 80 TABLET, FILM COATED ORAL at 21:26

## 2022-01-01 RX ADMIN — Medication 84 MICROGRAM(S): at 22:40

## 2022-01-01 RX ADMIN — Medication 500 MILLIGRAM(S): at 12:46

## 2022-01-01 RX ADMIN — PROTHROMBIN COMPLEX CONCENTRATE (HUMAN) 400 INTERNATIONAL UNIT(S): 25.5; 16.5; 24; 22; 22; 26 POWDER, FOR SOLUTION INTRAVENOUS at 09:35

## 2022-01-01 RX ADMIN — Medication 125 MILLILITER(S): at 04:12

## 2022-01-01 RX ADMIN — Medication 3 MILLILITER(S): at 04:16

## 2022-01-01 RX ADMIN — ZINC OXIDE 1 APPLICATION(S): 200 OINTMENT TOPICAL at 08:28

## 2022-01-01 RX ADMIN — BUDESONIDE AND FORMOTEROL FUMARATE DIHYDRATE 2 PUFF(S): 160; 4.5 AEROSOL RESPIRATORY (INHALATION) at 08:33

## 2022-01-01 RX ADMIN — Medication 120 MILLIGRAM(S): at 05:27

## 2022-01-01 RX ADMIN — BUDESONIDE AND FORMOTEROL FUMARATE DIHYDRATE 2 PUFF(S): 160; 4.5 AEROSOL RESPIRATORY (INHALATION) at 22:28

## 2022-01-01 RX ADMIN — MIDODRINE HYDROCHLORIDE 5 MILLIGRAM(S): 2.5 TABLET ORAL at 15:33

## 2022-01-01 RX ADMIN — Medication 216 GRAM(S): at 05:53

## 2022-01-01 RX ADMIN — MIDODRINE HYDROCHLORIDE 10 MILLIGRAM(S): 2.5 TABLET ORAL at 09:49

## 2022-01-01 RX ADMIN — CEFTRIAXONE 100 MILLIGRAM(S): 500 INJECTION, POWDER, FOR SOLUTION INTRAMUSCULAR; INTRAVENOUS at 17:23

## 2022-01-01 RX ADMIN — Medication 1 TABLET(S): at 11:18

## 2022-01-01 RX ADMIN — PANTOPRAZOLE SODIUM 40 MILLIGRAM(S): 20 TABLET, DELAYED RELEASE ORAL at 11:29

## 2022-01-01 RX ADMIN — CEFTRIAXONE 100 MILLIGRAM(S): 500 INJECTION, POWDER, FOR SOLUTION INTRAMUSCULAR; INTRAVENOUS at 17:15

## 2022-01-01 RX ADMIN — BUDESONIDE AND FORMOTEROL FUMARATE DIHYDRATE 2 PUFF(S): 160; 4.5 AEROSOL RESPIRATORY (INHALATION) at 22:39

## 2022-01-01 RX ADMIN — CEFTRIAXONE 100 MILLIGRAM(S): 500 INJECTION, POWDER, FOR SOLUTION INTRAMUSCULAR; INTRAVENOUS at 04:27

## 2022-01-01 RX ADMIN — BUDESONIDE AND FORMOTEROL FUMARATE DIHYDRATE 2 PUFF(S): 160; 4.5 AEROSOL RESPIRATORY (INHALATION) at 21:41

## 2022-01-01 RX ADMIN — Medication 84 MICROGRAM(S): at 22:59

## 2022-01-01 RX ADMIN — BUDESONIDE AND FORMOTEROL FUMARATE DIHYDRATE 2 PUFF(S): 160; 4.5 AEROSOL RESPIRATORY (INHALATION) at 08:58

## 2022-01-01 RX ADMIN — Medication 216 GRAM(S): at 08:45

## 2022-01-01 RX ADMIN — MIDODRINE HYDROCHLORIDE 5 MILLIGRAM(S): 2.5 TABLET ORAL at 18:07

## 2022-01-01 RX ADMIN — CEFTRIAXONE 100 MILLIGRAM(S): 500 INJECTION, POWDER, FOR SOLUTION INTRAMUSCULAR; INTRAVENOUS at 23:04

## 2022-01-01 RX ADMIN — SEVELAMER CARBONATE 1600 MILLIGRAM(S): 2400 POWDER, FOR SUSPENSION ORAL at 13:00

## 2022-01-01 RX ADMIN — Medication 25 MILLIGRAM(S): at 05:36

## 2022-01-01 RX ADMIN — SEVELAMER CARBONATE 1600 MILLIGRAM(S): 2400 POWDER, FOR SUSPENSION ORAL at 18:08

## 2022-01-01 RX ADMIN — Medication 650 MILLIGRAM(S): at 21:23

## 2022-01-01 RX ADMIN — BUDESONIDE AND FORMOTEROL FUMARATE DIHYDRATE 2 PUFF(S): 160; 4.5 AEROSOL RESPIRATORY (INHALATION) at 21:32

## 2022-01-01 RX ADMIN — CHLORHEXIDINE GLUCONATE 1 APPLICATION(S): 213 SOLUTION TOPICAL at 10:43

## 2022-01-01 RX ADMIN — ATORVASTATIN CALCIUM 10 MILLIGRAM(S): 80 TABLET, FILM COATED ORAL at 21:56

## 2022-01-01 RX ADMIN — Medication 216 GRAM(S): at 11:15

## 2022-01-01 RX ADMIN — APIXABAN 2.5 MILLIGRAM(S): 2.5 TABLET, FILM COATED ORAL at 09:48

## 2022-01-01 RX ADMIN — Medication 100 MILLIGRAM(S): at 05:30

## 2022-01-01 RX ADMIN — Medication 100 MILLIGRAM(S): at 06:10

## 2022-01-01 RX ADMIN — ALBUTEROL 2 PUFF(S): 90 AEROSOL, METERED ORAL at 12:38

## 2022-01-01 RX ADMIN — BUDESONIDE AND FORMOTEROL FUMARATE DIHYDRATE 2 PUFF(S): 160; 4.5 AEROSOL RESPIRATORY (INHALATION) at 21:37

## 2022-01-01 RX ADMIN — CEFTRIAXONE 100 MILLIGRAM(S): 500 INJECTION, POWDER, FOR SOLUTION INTRAMUSCULAR; INTRAVENOUS at 05:53

## 2022-01-01 RX ADMIN — SEVELAMER CARBONATE 1600 MILLIGRAM(S): 2400 POWDER, FOR SUSPENSION ORAL at 16:40

## 2022-01-01 RX ADMIN — MORPHINE SULFATE 2 MILLIGRAM(S): 50 CAPSULE, EXTENDED RELEASE ORAL at 06:49

## 2022-01-01 RX ADMIN — APIXABAN 2.5 MILLIGRAM(S): 2.5 TABLET, FILM COATED ORAL at 17:30

## 2022-01-01 RX ADMIN — PANTOPRAZOLE SODIUM 40 MILLIGRAM(S): 20 TABLET, DELAYED RELEASE ORAL at 05:05

## 2022-01-01 RX ADMIN — Medication 124 MILLIGRAM(S): at 04:20

## 2022-01-01 RX ADMIN — SEVELAMER CARBONATE 1600 MILLIGRAM(S): 2400 POWDER, FOR SUSPENSION ORAL at 18:50

## 2022-01-01 RX ADMIN — Medication 216 GRAM(S): at 22:22

## 2022-01-01 RX ADMIN — Medication 1 MILLIGRAM(S): at 12:02

## 2022-01-01 RX ADMIN — CHLORHEXIDINE GLUCONATE 1 APPLICATION(S): 213 SOLUTION TOPICAL at 13:03

## 2022-01-01 RX ADMIN — SEVELAMER CARBONATE 1600 MILLIGRAM(S): 2400 POWDER, FOR SUSPENSION ORAL at 15:32

## 2022-01-01 RX ADMIN — APIXABAN 2.5 MILLIGRAM(S): 2.5 TABLET, FILM COATED ORAL at 05:43

## 2022-01-01 RX ADMIN — Medication 500 MILLIGRAM(S): at 11:19

## 2022-01-01 RX ADMIN — ZINC OXIDE 1 APPLICATION(S): 200 OINTMENT TOPICAL at 11:29

## 2022-01-01 RX ADMIN — ERYTHROPOIETIN 10000 UNIT(S): 10000 INJECTION, SOLUTION INTRAVENOUS; SUBCUTANEOUS at 08:07

## 2022-01-01 RX ADMIN — AMIODARONE HYDROCHLORIDE 33.3 MG/MIN: 400 TABLET ORAL at 13:17

## 2022-01-01 RX ADMIN — SODIUM CHLORIDE 500 MILLILITER(S): 9 INJECTION INTRAMUSCULAR; INTRAVENOUS; SUBCUTANEOUS at 01:54

## 2022-01-01 RX ADMIN — BUDESONIDE AND FORMOTEROL FUMARATE DIHYDRATE 2 PUFF(S): 160; 4.5 AEROSOL RESPIRATORY (INHALATION) at 08:00

## 2022-01-01 RX ADMIN — Medication 216 GRAM(S): at 06:42

## 2022-01-01 RX ADMIN — APIXABAN 2.5 MILLIGRAM(S): 2.5 TABLET, FILM COATED ORAL at 05:31

## 2022-01-01 RX ADMIN — Medication 1 MILLIGRAM(S): at 11:35

## 2022-01-01 RX ADMIN — ZINC OXIDE 1 APPLICATION(S): 200 OINTMENT TOPICAL at 12:45

## 2022-01-01 RX ADMIN — APIXABAN 2.5 MILLIGRAM(S): 2.5 TABLET, FILM COATED ORAL at 18:50

## 2022-01-01 RX ADMIN — Medication 84 MICROGRAM(S): at 22:12

## 2022-01-01 RX ADMIN — Medication 216 GRAM(S): at 05:31

## 2022-01-01 RX ADMIN — CEFTRIAXONE 100 MILLIGRAM(S): 500 INJECTION, POWDER, FOR SOLUTION INTRAMUSCULAR; INTRAVENOUS at 17:30

## 2022-01-01 RX ADMIN — Medication 216 GRAM(S): at 05:27

## 2022-01-01 RX ADMIN — ATORVASTATIN CALCIUM 10 MILLIGRAM(S): 80 TABLET, FILM COATED ORAL at 21:53

## 2022-01-01 RX ADMIN — CEFTRIAXONE 100 MILLIGRAM(S): 500 INJECTION, POWDER, FOR SOLUTION INTRAMUSCULAR; INTRAVENOUS at 05:04

## 2022-01-01 RX ADMIN — Medication 650 MILLIGRAM(S): at 06:37

## 2022-01-01 RX ADMIN — CEFTRIAXONE 100 MILLIGRAM(S): 500 INJECTION, POWDER, FOR SOLUTION INTRAMUSCULAR; INTRAVENOUS at 17:19

## 2022-01-01 RX ADMIN — ATORVASTATIN CALCIUM 10 MILLIGRAM(S): 80 TABLET, FILM COATED ORAL at 21:34

## 2022-01-01 RX ADMIN — MIDODRINE HYDROCHLORIDE 5 MILLIGRAM(S): 2.5 TABLET ORAL at 22:04

## 2022-01-01 RX ADMIN — Medication 100 MILLIGRAM(S): at 17:47

## 2022-01-01 RX ADMIN — CHLORHEXIDINE GLUCONATE 1 APPLICATION(S): 213 SOLUTION TOPICAL at 08:39

## 2022-01-01 RX ADMIN — APIXABAN 2.5 MILLIGRAM(S): 2.5 TABLET, FILM COATED ORAL at 18:06

## 2022-01-01 RX ADMIN — Medication 200 MILLILITER(S): at 18:13

## 2022-01-01 RX ADMIN — Medication 25 MILLIGRAM(S): at 05:42

## 2022-01-01 RX ADMIN — Medication 650 MILLIGRAM(S): at 22:46

## 2022-01-01 RX ADMIN — ZINC SULFATE TAB 220 MG (50 MG ZINC EQUIVALENT) 220 MILLIGRAM(S): 220 (50 ZN) TAB at 12:31

## 2022-01-01 RX ADMIN — MIDODRINE HYDROCHLORIDE 10 MILLIGRAM(S): 2.5 TABLET ORAL at 21:23

## 2022-01-01 RX ADMIN — BUDESONIDE AND FORMOTEROL FUMARATE DIHYDRATE 2 PUFF(S): 160; 4.5 AEROSOL RESPIRATORY (INHALATION) at 11:15

## 2022-01-01 RX ADMIN — BUDESONIDE AND FORMOTEROL FUMARATE DIHYDRATE 2 PUFF(S): 160; 4.5 AEROSOL RESPIRATORY (INHALATION) at 09:43

## 2022-01-01 RX ADMIN — MIDODRINE HYDROCHLORIDE 10 MILLIGRAM(S): 2.5 TABLET ORAL at 03:58

## 2022-01-01 RX ADMIN — Medication 12.5 MILLIGRAM(S): at 05:31

## 2022-01-01 RX ADMIN — BUDESONIDE AND FORMOTEROL FUMARATE DIHYDRATE 2 PUFF(S): 160; 4.5 AEROSOL RESPIRATORY (INHALATION) at 21:25

## 2022-01-01 RX ADMIN — Medication 84 MICROGRAM(S): at 21:53

## 2022-01-01 RX ADMIN — AMIODARONE HYDROCHLORIDE 618 MILLIGRAM(S): 400 TABLET ORAL at 12:37

## 2022-01-01 RX ADMIN — Medication 216 GRAM(S): at 05:39

## 2022-01-01 RX ADMIN — LIDOCAINE AND PRILOCAINE CREAM 1 APPLICATION(S): 25; 25 CREAM TOPICAL at 09:22

## 2022-01-01 RX ADMIN — SEVELAMER CARBONATE 1600 MILLIGRAM(S): 2400 POWDER, FOR SUSPENSION ORAL at 11:17

## 2022-01-01 RX ADMIN — Medication 216 GRAM(S): at 20:31

## 2022-01-01 RX ADMIN — MIDODRINE HYDROCHLORIDE 5 MILLIGRAM(S): 2.5 TABLET ORAL at 06:11

## 2022-01-01 RX ADMIN — SEVELAMER CARBONATE 1600 MILLIGRAM(S): 2400 POWDER, FOR SUSPENSION ORAL at 18:07

## 2022-01-01 RX ADMIN — Medication 81 MILLIGRAM(S): at 09:47

## 2022-01-01 RX ADMIN — ERYTHROPOIETIN 10000 UNIT(S): 10000 INJECTION, SOLUTION INTRAVENOUS; SUBCUTANEOUS at 18:56

## 2022-01-01 RX ADMIN — Medication 120 MILLIGRAM(S): at 10:44

## 2022-01-01 RX ADMIN — BUDESONIDE AND FORMOTEROL FUMARATE DIHYDRATE 2 PUFF(S): 160; 4.5 AEROSOL RESPIRATORY (INHALATION) at 21:53

## 2022-01-01 RX ADMIN — ERYTHROPOIETIN 10000 UNIT(S): 10000 INJECTION, SOLUTION INTRAVENOUS; SUBCUTANEOUS at 16:30

## 2022-01-01 RX ADMIN — ZINC SULFATE TAB 220 MG (50 MG ZINC EQUIVALENT) 220 MILLIGRAM(S): 220 (50 ZN) TAB at 11:29

## 2022-01-01 RX ADMIN — Medication 1 TABLET(S): at 15:32

## 2022-01-01 RX ADMIN — Medication 216 GRAM(S): at 06:12

## 2022-01-01 RX ADMIN — CEFTRIAXONE 100 MILLIGRAM(S): 500 INJECTION, POWDER, FOR SOLUTION INTRAMUSCULAR; INTRAVENOUS at 05:27

## 2022-01-01 RX ADMIN — Medication 500 MILLIGRAM(S): at 11:14

## 2022-01-01 RX ADMIN — Medication 1 MILLIGRAM(S): at 11:30

## 2022-01-01 RX ADMIN — Medication 75 MICROGRAM(S): at 21:45

## 2022-01-01 RX ADMIN — Medication 25 MILLIGRAM(S): at 20:40

## 2022-01-01 RX ADMIN — ATORVASTATIN CALCIUM 10 MILLIGRAM(S): 80 TABLET, FILM COATED ORAL at 09:48

## 2022-01-01 RX ADMIN — ZINC SULFATE TAB 220 MG (50 MG ZINC EQUIVALENT) 220 MILLIGRAM(S): 220 (50 ZN) TAB at 11:35

## 2022-01-01 RX ADMIN — BUDESONIDE AND FORMOTEROL FUMARATE DIHYDRATE 2 PUFF(S): 160; 4.5 AEROSOL RESPIRATORY (INHALATION) at 22:23

## 2022-01-01 RX ADMIN — ERYTHROPOIETIN 10000 UNIT(S): 10000 INJECTION, SOLUTION INTRAVENOUS; SUBCUTANEOUS at 17:19

## 2022-01-01 RX ADMIN — CEFTRIAXONE 100 MILLIGRAM(S): 500 INJECTION, POWDER, FOR SOLUTION INTRAMUSCULAR; INTRAVENOUS at 21:32

## 2022-01-01 RX ADMIN — MIDODRINE HYDROCHLORIDE 5 MILLIGRAM(S): 2.5 TABLET ORAL at 05:49

## 2022-01-01 RX ADMIN — BUDESONIDE AND FORMOTEROL FUMARATE DIHYDRATE 2 PUFF(S): 160; 4.5 AEROSOL RESPIRATORY (INHALATION) at 09:21

## 2022-01-01 RX ADMIN — SEVELAMER CARBONATE 1600 MILLIGRAM(S): 2400 POWDER, FOR SUSPENSION ORAL at 16:46

## 2022-01-01 RX ADMIN — MIDODRINE HYDROCHLORIDE 10 MILLIGRAM(S): 2.5 TABLET ORAL at 16:28

## 2022-01-01 RX ADMIN — Medication 500 MILLIGRAM(S): at 12:52

## 2022-01-01 RX ADMIN — CEFTRIAXONE 100 MILLIGRAM(S): 500 INJECTION, POWDER, FOR SOLUTION INTRAMUSCULAR; INTRAVENOUS at 08:46

## 2022-01-01 RX ADMIN — CEFTRIAXONE 100 MILLIGRAM(S): 500 INJECTION, POWDER, FOR SOLUTION INTRAMUSCULAR; INTRAVENOUS at 05:39

## 2022-01-01 RX ADMIN — SEVELAMER CARBONATE 1600 MILLIGRAM(S): 2400 POWDER, FOR SUSPENSION ORAL at 17:22

## 2022-01-01 RX ADMIN — APIXABAN 2.5 MILLIGRAM(S): 2.5 TABLET, FILM COATED ORAL at 21:26

## 2022-01-01 RX ADMIN — Medication 1 MILLIGRAM(S): at 12:21

## 2022-01-01 RX ADMIN — Medication 216 GRAM(S): at 10:40

## 2022-01-01 RX ADMIN — ZINC SULFATE TAB 220 MG (50 MG ZINC EQUIVALENT) 220 MILLIGRAM(S): 220 (50 ZN) TAB at 12:52

## 2022-01-01 RX ADMIN — Medication 216 GRAM(S): at 05:34

## 2022-01-01 RX ADMIN — SEVELAMER CARBONATE 1600 MILLIGRAM(S): 2400 POWDER, FOR SUSPENSION ORAL at 12:22

## 2022-01-01 RX ADMIN — Medication 112 MICROGRAM(S): at 05:49

## 2022-01-01 RX ADMIN — Medication 25 MILLIGRAM(S): at 21:26

## 2022-01-01 RX ADMIN — CHLORHEXIDINE GLUCONATE 1 APPLICATION(S): 213 SOLUTION TOPICAL at 08:37

## 2022-01-01 RX ADMIN — MIDODRINE HYDROCHLORIDE 5 MILLIGRAM(S): 2.5 TABLET ORAL at 11:22

## 2022-01-01 RX ADMIN — Medication 1 MILLIGRAM(S): at 15:32

## 2022-01-01 RX ADMIN — Medication 3 MILLILITER(S): at 09:00

## 2022-01-01 RX ADMIN — Medication 1 MILLIGRAM(S): at 08:28

## 2022-01-01 RX ADMIN — PANTOPRAZOLE SODIUM 40 MILLIGRAM(S): 20 TABLET, DELAYED RELEASE ORAL at 17:19

## 2022-01-01 RX ADMIN — Medication 120 MILLIGRAM(S): at 17:32

## 2022-01-01 RX ADMIN — Medication 25 MILLIGRAM(S): at 05:28

## 2022-01-01 RX ADMIN — SEVELAMER CARBONATE 800 MILLIGRAM(S): 2400 POWDER, FOR SUSPENSION ORAL at 09:48

## 2022-01-01 RX ADMIN — Medication 1 MILLIGRAM(S): at 11:18

## 2022-01-01 RX ADMIN — Medication 216 GRAM(S): at 05:09

## 2022-01-01 RX ADMIN — CEFTRIAXONE 100 MILLIGRAM(S): 500 INJECTION, POWDER, FOR SOLUTION INTRAMUSCULAR; INTRAVENOUS at 06:13

## 2022-01-01 RX ADMIN — BUDESONIDE AND FORMOTEROL FUMARATE DIHYDRATE 2 PUFF(S): 160; 4.5 AEROSOL RESPIRATORY (INHALATION) at 20:09

## 2022-01-01 RX ADMIN — Medication 84 MICROGRAM(S): at 21:44

## 2022-01-01 RX ADMIN — ERYTHROPOIETIN 10000 UNIT(S): 10000 INJECTION, SOLUTION INTRAVENOUS; SUBCUTANEOUS at 10:25

## 2022-01-01 RX ADMIN — CHLORHEXIDINE GLUCONATE 1 APPLICATION(S): 213 SOLUTION TOPICAL at 11:29

## 2022-01-01 RX ADMIN — SEVELAMER CARBONATE 1600 MILLIGRAM(S): 2400 POWDER, FOR SUSPENSION ORAL at 12:41

## 2022-01-01 RX ADMIN — Medication 3 MILLILITER(S): at 15:46

## 2022-01-01 RX ADMIN — ZINC SULFATE TAB 220 MG (50 MG ZINC EQUIVALENT) 220 MILLIGRAM(S): 220 (50 ZN) TAB at 11:14

## 2022-01-01 RX ADMIN — Medication 216 GRAM(S): at 17:31

## 2022-01-01 RX ADMIN — ZINC OXIDE 1 APPLICATION(S): 200 OINTMENT TOPICAL at 12:25

## 2022-01-01 RX ADMIN — SEVELAMER CARBONATE 1600 MILLIGRAM(S): 2400 POWDER, FOR SUSPENSION ORAL at 11:41

## 2022-01-01 RX ADMIN — SODIUM CHLORIDE 4 MILLILITER(S): 9 INJECTION INTRAMUSCULAR; INTRAVENOUS; SUBCUTANEOUS at 10:10

## 2022-01-01 RX ADMIN — CHLORHEXIDINE GLUCONATE 1 APPLICATION(S): 213 SOLUTION TOPICAL at 12:27

## 2022-01-01 RX ADMIN — Medication 84 MICROGRAM(S): at 22:53

## 2022-01-01 RX ADMIN — Medication 216 GRAM(S): at 18:50

## 2022-01-01 RX ADMIN — SEVELAMER CARBONATE 800 MILLIGRAM(S): 2400 POWDER, FOR SUSPENSION ORAL at 12:34

## 2022-01-01 RX ADMIN — Medication 1 MILLIGRAM(S): at 08:58

## 2022-01-01 RX ADMIN — Medication 216 GRAM(S): at 05:41

## 2022-01-01 RX ADMIN — Medication 650 MILLIGRAM(S): at 07:45

## 2022-01-01 RX ADMIN — ZINC SULFATE TAB 220 MG (50 MG ZINC EQUIVALENT) 220 MILLIGRAM(S): 220 (50 ZN) TAB at 08:34

## 2022-01-01 RX ADMIN — ZINC SULFATE TAB 220 MG (50 MG ZINC EQUIVALENT) 220 MILLIGRAM(S): 220 (50 ZN) TAB at 12:58

## 2022-01-01 RX ADMIN — ZINC OXIDE 1 APPLICATION(S): 200 OINTMENT TOPICAL at 10:46

## 2022-01-01 RX ADMIN — SEVELAMER CARBONATE 1600 MILLIGRAM(S): 2400 POWDER, FOR SUSPENSION ORAL at 12:25

## 2022-01-01 RX ADMIN — Medication 500 MILLIGRAM(S): at 12:31

## 2022-01-01 RX ADMIN — Medication 650 MILLIGRAM(S): at 18:06

## 2022-01-01 RX ADMIN — CEFTRIAXONE 100 MILLIGRAM(S): 500 INJECTION, POWDER, FOR SOLUTION INTRAMUSCULAR; INTRAVENOUS at 20:29

## 2022-01-01 RX ADMIN — ZINC SULFATE TAB 220 MG (50 MG ZINC EQUIVALENT) 220 MILLIGRAM(S): 220 (50 ZN) TAB at 08:27

## 2022-01-01 RX ADMIN — CEFTRIAXONE 100 MILLIGRAM(S): 500 INJECTION, POWDER, FOR SOLUTION INTRAMUSCULAR; INTRAVENOUS at 17:22

## 2022-01-01 RX ADMIN — Medication 216 GRAM(S): at 17:59

## 2022-01-01 RX ADMIN — Medication 25 MILLIGRAM(S): at 17:30

## 2022-01-01 RX ADMIN — SODIUM CHLORIDE 4 MILLILITER(S): 9 INJECTION INTRAMUSCULAR; INTRAVENOUS; SUBCUTANEOUS at 19:22

## 2022-01-01 RX ADMIN — Medication 1 MILLIGRAM(S): at 12:59

## 2022-01-01 RX ADMIN — Medication 216 GRAM(S): at 16:45

## 2022-01-01 RX ADMIN — CHLORHEXIDINE GLUCONATE 1 APPLICATION(S): 213 SOLUTION TOPICAL at 12:03

## 2022-01-01 RX ADMIN — Medication 50 MILLILITER(S): at 09:45

## 2022-01-01 RX ADMIN — SEVELAMER CARBONATE 1600 MILLIGRAM(S): 2400 POWDER, FOR SUSPENSION ORAL at 18:52

## 2022-01-01 RX ADMIN — SODIUM CHLORIDE 4 MILLILITER(S): 9 INJECTION INTRAMUSCULAR; INTRAVENOUS; SUBCUTANEOUS at 09:24

## 2022-01-01 RX ADMIN — Medication 20 MILLIGRAM(S): at 10:15

## 2022-01-01 RX ADMIN — SEVELAMER CARBONATE 1600 MILLIGRAM(S): 2400 POWDER, FOR SUSPENSION ORAL at 11:39

## 2022-01-01 RX ADMIN — LIDOCAINE 1 APPLICATION(S): 4 CREAM TOPICAL at 16:27

## 2022-01-01 RX ADMIN — ZINC SULFATE TAB 220 MG (50 MG ZINC EQUIVALENT) 220 MILLIGRAM(S): 220 (50 ZN) TAB at 12:25

## 2022-01-01 RX ADMIN — Medication 84 MICROGRAM(S): at 22:22

## 2022-01-01 RX ADMIN — Medication 216 GRAM(S): at 05:42

## 2022-01-01 RX ADMIN — Medication 200 MILLILITER(S): at 17:04

## 2022-01-01 RX ADMIN — APIXABAN 2.5 MILLIGRAM(S): 2.5 TABLET, FILM COATED ORAL at 23:46

## 2022-01-01 RX ADMIN — Medication 500 MILLIGRAM(S): at 12:02

## 2022-01-01 RX ADMIN — APIXABAN 2.5 MILLIGRAM(S): 2.5 TABLET, FILM COATED ORAL at 05:46

## 2022-01-01 RX ADMIN — SODIUM CHLORIDE 4 MILLILITER(S): 9 INJECTION INTRAMUSCULAR; INTRAVENOUS; SUBCUTANEOUS at 10:33

## 2022-01-01 RX ADMIN — ATORVASTATIN CALCIUM 10 MILLIGRAM(S): 80 TABLET, FILM COATED ORAL at 21:40

## 2022-01-01 RX ADMIN — PANTOPRAZOLE SODIUM 40 MILLIGRAM(S): 20 TABLET, DELAYED RELEASE ORAL at 06:26

## 2022-01-01 RX ADMIN — Medication 84 MICROGRAM(S): at 22:39

## 2022-01-01 RX ADMIN — HEPARIN SODIUM 5000 UNIT(S): 5000 INJECTION INTRAVENOUS; SUBCUTANEOUS at 05:05

## 2022-01-01 RX ADMIN — Medication 124 MILLIGRAM(S): at 19:51

## 2022-01-01 RX ADMIN — CHLORHEXIDINE GLUCONATE 1 APPLICATION(S): 213 SOLUTION TOPICAL at 08:13

## 2022-01-01 RX ADMIN — LIDOCAINE AND PRILOCAINE CREAM 1 APPLICATION(S): 25; 25 CREAM TOPICAL at 09:00

## 2022-01-01 RX ADMIN — SEVELAMER CARBONATE 1600 MILLIGRAM(S): 2400 POWDER, FOR SUSPENSION ORAL at 08:58

## 2022-01-01 RX ADMIN — ZINC SULFATE TAB 220 MG (50 MG ZINC EQUIVALENT) 220 MILLIGRAM(S): 220 (50 ZN) TAB at 08:58

## 2022-01-01 RX ADMIN — Medication 25 MILLIGRAM(S): at 10:43

## 2022-01-01 RX ADMIN — ATORVASTATIN CALCIUM 10 MILLIGRAM(S): 80 TABLET, FILM COATED ORAL at 21:33

## 2022-01-01 RX ADMIN — SEVELAMER CARBONATE 1600 MILLIGRAM(S): 2400 POWDER, FOR SUSPENSION ORAL at 08:57

## 2022-01-01 RX ADMIN — ZINC SULFATE TAB 220 MG (50 MG ZINC EQUIVALENT) 220 MILLIGRAM(S): 220 (50 ZN) TAB at 12:02

## 2022-01-01 RX ADMIN — Medication 1 TABLET(S): at 09:48

## 2022-01-01 RX ADMIN — CHLORHEXIDINE GLUCONATE 1 APPLICATION(S): 213 SOLUTION TOPICAL at 08:59

## 2022-01-01 RX ADMIN — APIXABAN 2.5 MILLIGRAM(S): 2.5 TABLET, FILM COATED ORAL at 05:36

## 2022-01-01 RX ADMIN — CEFTRIAXONE 100 MILLIGRAM(S): 500 INJECTION, POWDER, FOR SOLUTION INTRAMUSCULAR; INTRAVENOUS at 18:21

## 2022-01-01 RX ADMIN — SEVELAMER CARBONATE 1600 MILLIGRAM(S): 2400 POWDER, FOR SUSPENSION ORAL at 09:21

## 2022-01-01 RX ADMIN — Medication 500 MILLIGRAM(S): at 08:58

## 2022-01-01 RX ADMIN — Medication 112 MICROGRAM(S): at 11:14

## 2022-01-01 RX ADMIN — ZINC OXIDE 1 APPLICATION(S): 200 OINTMENT TOPICAL at 13:00

## 2022-01-01 RX ADMIN — Medication 84 MICROGRAM(S): at 21:45

## 2022-01-01 RX ADMIN — CHLORHEXIDINE GLUCONATE 1 APPLICATION(S): 213 SOLUTION TOPICAL at 06:15

## 2022-01-01 RX ADMIN — Medication 25 MILLIGRAM(S): at 16:46

## 2022-01-01 RX ADMIN — ZINC OXIDE 1 APPLICATION(S): 200 OINTMENT TOPICAL at 11:35

## 2022-01-01 RX ADMIN — ZINC OXIDE 1 APPLICATION(S): 200 OINTMENT TOPICAL at 12:57

## 2022-01-01 RX ADMIN — Medication 500 MILLIGRAM(S): at 12:58

## 2022-01-01 RX ADMIN — APIXABAN 2.5 MILLIGRAM(S): 2.5 TABLET, FILM COATED ORAL at 16:41

## 2022-01-01 RX ADMIN — Medication 1 MILLIGRAM(S): at 13:00

## 2022-01-01 RX ADMIN — MIDODRINE HYDROCHLORIDE 5 MILLIGRAM(S): 2.5 TABLET ORAL at 11:18

## 2022-01-01 RX ADMIN — CEFTRIAXONE 100 MILLIGRAM(S): 500 INJECTION, POWDER, FOR SOLUTION INTRAMUSCULAR; INTRAVENOUS at 17:31

## 2022-01-01 RX ADMIN — CEFTRIAXONE 100 MILLIGRAM(S): 500 INJECTION, POWDER, FOR SOLUTION INTRAMUSCULAR; INTRAVENOUS at 17:59

## 2022-01-01 RX ADMIN — Medication 216 GRAM(S): at 20:30

## 2022-01-01 RX ADMIN — SEVELAMER CARBONATE 1600 MILLIGRAM(S): 2400 POWDER, FOR SUSPENSION ORAL at 12:44

## 2022-01-01 RX ADMIN — ATORVASTATIN CALCIUM 10 MILLIGRAM(S): 80 TABLET, FILM COATED ORAL at 22:18

## 2022-01-01 RX ADMIN — PANTOPRAZOLE SODIUM 40 MILLIGRAM(S): 20 TABLET, DELAYED RELEASE ORAL at 08:58

## 2022-01-01 RX ADMIN — SEVELAMER CARBONATE 1600 MILLIGRAM(S): 2400 POWDER, FOR SUSPENSION ORAL at 08:00

## 2022-01-01 RX ADMIN — SEVELAMER CARBONATE 1600 MILLIGRAM(S): 2400 POWDER, FOR SUSPENSION ORAL at 17:59

## 2022-01-01 RX ADMIN — Medication 1 MILLIGRAM(S): at 12:51

## 2022-01-01 RX ADMIN — PANTOPRAZOLE SODIUM 40 MILLIGRAM(S): 20 TABLET, DELAYED RELEASE ORAL at 05:04

## 2022-01-01 RX ADMIN — Medication 100 MILLIGRAM(S): at 05:42

## 2022-01-01 RX ADMIN — SEVELAMER CARBONATE 1600 MILLIGRAM(S): 2400 POWDER, FOR SUSPENSION ORAL at 17:29

## 2022-01-01 RX ADMIN — CEFTRIAXONE 100 MILLIGRAM(S): 500 INJECTION, POWDER, FOR SOLUTION INTRAMUSCULAR; INTRAVENOUS at 05:42

## 2022-01-01 RX ADMIN — MIDODRINE HYDROCHLORIDE 10 MILLIGRAM(S): 2.5 TABLET ORAL at 05:18

## 2022-01-01 RX ADMIN — ERYTHROPOIETIN 10000 UNIT(S): 10000 INJECTION, SOLUTION INTRAVENOUS; SUBCUTANEOUS at 11:56

## 2022-01-01 RX ADMIN — ALBUTEROL 2 PUFF(S): 90 AEROSOL, METERED ORAL at 09:40

## 2022-01-01 RX ADMIN — BUDESONIDE AND FORMOTEROL FUMARATE DIHYDRATE 2 PUFF(S): 160; 4.5 AEROSOL RESPIRATORY (INHALATION) at 08:28

## 2022-01-01 RX ADMIN — Medication 12.5 MILLIGRAM(S): at 05:43

## 2022-01-01 RX ADMIN — Medication 12.5 MILLIGRAM(S): at 17:30

## 2022-01-01 RX ADMIN — Medication 1 MILLIGRAM(S): at 12:31

## 2022-01-01 RX ADMIN — SEVELAMER CARBONATE 1600 MILLIGRAM(S): 2400 POWDER, FOR SUSPENSION ORAL at 08:46

## 2022-01-01 RX ADMIN — Medication 500 MILLIGRAM(S): at 11:30

## 2022-01-01 RX ADMIN — CHLORHEXIDINE GLUCONATE 1 APPLICATION(S): 213 SOLUTION TOPICAL at 12:39

## 2022-01-01 RX ADMIN — Medication 25 MILLIGRAM(S): at 17:52

## 2022-01-01 RX ADMIN — APIXABAN 2.5 MILLIGRAM(S): 2.5 TABLET, FILM COATED ORAL at 05:30

## 2022-01-01 RX ADMIN — SEVELAMER CARBONATE 800 MILLIGRAM(S): 2400 POWDER, FOR SUSPENSION ORAL at 08:15

## 2022-01-01 RX ADMIN — ZINC OXIDE 1 APPLICATION(S): 200 OINTMENT TOPICAL at 11:18

## 2022-01-01 RX ADMIN — Medication 500 MILLIGRAM(S): at 08:28

## 2022-01-01 RX ADMIN — BUDESONIDE AND FORMOTEROL FUMARATE DIHYDRATE 2 PUFF(S): 160; 4.5 AEROSOL RESPIRATORY (INHALATION) at 23:30

## 2022-01-01 RX ADMIN — PANTOPRAZOLE SODIUM 40 MILLIGRAM(S): 20 TABLET, DELAYED RELEASE ORAL at 05:17

## 2022-01-01 RX ADMIN — Medication 500 MILLIGRAM(S): at 08:34

## 2022-01-01 RX ADMIN — Medication 1 TABLET(S): at 11:21

## 2022-01-01 RX ADMIN — ZINC SULFATE TAB 220 MG (50 MG ZINC EQUIVALENT) 220 MILLIGRAM(S): 220 (50 ZN) TAB at 11:30

## 2022-01-01 RX ADMIN — APIXABAN 2.5 MILLIGRAM(S): 2.5 TABLET, FILM COATED ORAL at 05:32

## 2022-01-01 RX ADMIN — Medication 216 GRAM(S): at 18:06

## 2022-01-01 RX ADMIN — BUDESONIDE AND FORMOTEROL FUMARATE DIHYDRATE 2 PUFF(S): 160; 4.5 AEROSOL RESPIRATORY (INHALATION) at 09:53

## 2022-01-01 RX ADMIN — Medication 1 MILLIGRAM(S): at 12:47

## 2022-01-01 RX ADMIN — SEVELAMER CARBONATE 1600 MILLIGRAM(S): 2400 POWDER, FOR SUSPENSION ORAL at 08:28

## 2022-01-01 RX ADMIN — BUDESONIDE AND FORMOTEROL FUMARATE DIHYDRATE 2 PUFF(S): 160; 4.5 AEROSOL RESPIRATORY (INHALATION) at 08:46

## 2022-01-01 RX ADMIN — Medication 84 MICROGRAM(S): at 22:33

## 2022-01-01 RX ADMIN — Medication 100 MILLIGRAM(S): at 11:15

## 2022-01-01 RX ADMIN — SEVELAMER CARBONATE 1600 MILLIGRAM(S): 2400 POWDER, FOR SUSPENSION ORAL at 08:43

## 2022-01-01 RX ADMIN — BUDESONIDE AND FORMOTEROL FUMARATE DIHYDRATE 2 PUFF(S): 160; 4.5 AEROSOL RESPIRATORY (INHALATION) at 08:57

## 2022-01-01 RX ADMIN — Medication 112 MICROGRAM(S): at 05:17

## 2022-01-01 RX ADMIN — Medication 100 MILLIGRAM(S): at 10:43

## 2022-01-01 RX ADMIN — Medication 5 MILLIGRAM(S): at 05:30

## 2022-01-01 RX ADMIN — Medication 120 MILLIGRAM(S): at 13:44

## 2022-01-01 RX ADMIN — CEFTRIAXONE 100 MILLIGRAM(S): 500 INJECTION, POWDER, FOR SOLUTION INTRAMUSCULAR; INTRAVENOUS at 12:05

## 2022-01-01 RX ADMIN — ZINC OXIDE 1 APPLICATION(S): 200 OINTMENT TOPICAL at 13:53

## 2022-01-01 RX ADMIN — Medication 1 MILLIGRAM(S): at 11:29

## 2022-01-01 RX ADMIN — APIXABAN 2.5 MILLIGRAM(S): 2.5 TABLET, FILM COATED ORAL at 17:46

## 2022-01-01 RX ADMIN — PANTOPRAZOLE SODIUM 40 MILLIGRAM(S): 20 TABLET, DELAYED RELEASE ORAL at 12:46

## 2022-01-01 RX ADMIN — CEFTRIAXONE 100 MILLIGRAM(S): 500 INJECTION, POWDER, FOR SOLUTION INTRAMUSCULAR; INTRAVENOUS at 05:46

## 2022-01-01 RX ADMIN — NYSTATIN CREAM 1 APPLICATION(S): 100000 CREAM TOPICAL at 16:40

## 2022-01-01 RX ADMIN — Medication 1 MILLIGRAM(S): at 11:14

## 2022-01-01 RX ADMIN — ATORVASTATIN CALCIUM 10 MILLIGRAM(S): 80 TABLET, FILM COATED ORAL at 21:45

## 2022-01-01 RX ADMIN — Medication 1 TABLET(S): at 12:21

## 2022-01-01 RX ADMIN — PHENYLEPHRINE HYDROCHLORIDE 0.71 MICROGRAM(S)/KG/MIN: 10 INJECTION INTRAVENOUS at 06:23

## 2022-01-01 RX ADMIN — Medication 112 MICROGRAM(S): at 06:26

## 2022-01-01 RX ADMIN — SEVELAMER CARBONATE 1600 MILLIGRAM(S): 2400 POWDER, FOR SUSPENSION ORAL at 07:34

## 2022-01-01 RX ADMIN — Medication 1 MILLIGRAM(S): at 12:25

## 2022-01-01 RX ADMIN — LIDOCAINE AND PRILOCAINE CREAM 1 APPLICATION(S): 25; 25 CREAM TOPICAL at 05:42

## 2022-01-01 RX ADMIN — Medication 3 MILLIGRAM(S): at 21:22

## 2022-01-01 RX ADMIN — Medication 12.5 MILLIGRAM(S): at 18:00

## 2022-01-01 RX ADMIN — SEVELAMER CARBONATE 1600 MILLIGRAM(S): 2400 POWDER, FOR SUSPENSION ORAL at 11:13

## 2022-01-01 RX ADMIN — ATORVASTATIN CALCIUM 10 MILLIGRAM(S): 80 TABLET, FILM COATED ORAL at 22:22

## 2022-01-01 RX ADMIN — SEVELAMER CARBONATE 1600 MILLIGRAM(S): 2400 POWDER, FOR SUSPENSION ORAL at 09:52

## 2022-01-01 RX ADMIN — Medication 112 MICROGRAM(S): at 05:14

## 2022-01-01 RX ADMIN — SEVELAMER CARBONATE 1600 MILLIGRAM(S): 2400 POWDER, FOR SUSPENSION ORAL at 11:36

## 2022-01-01 RX ADMIN — APIXABAN 2.5 MILLIGRAM(S): 2.5 TABLET, FILM COATED ORAL at 06:10

## 2022-01-01 RX ADMIN — Medication 100 MILLIGRAM(S): at 05:47

## 2022-01-01 RX ADMIN — Medication 100 MILLIGRAM(S): at 06:37

## 2022-01-01 RX ADMIN — APIXABAN 2.5 MILLIGRAM(S): 2.5 TABLET, FILM COATED ORAL at 20:32

## 2022-01-01 RX ADMIN — SEVELAMER CARBONATE 1600 MILLIGRAM(S): 2400 POWDER, FOR SUSPENSION ORAL at 13:22

## 2022-01-01 RX ADMIN — Medication 120 MILLIGRAM(S): at 11:14

## 2022-01-01 RX ADMIN — ZINC OXIDE 1 APPLICATION(S): 200 OINTMENT TOPICAL at 11:15

## 2022-01-01 RX ADMIN — Medication 100 MILLIGRAM(S): at 18:50

## 2022-01-01 RX ADMIN — BUDESONIDE AND FORMOTEROL FUMARATE DIHYDRATE 2 PUFF(S): 160; 4.5 AEROSOL RESPIRATORY (INHALATION) at 22:13

## 2022-01-01 RX ADMIN — ATORVASTATIN CALCIUM 10 MILLIGRAM(S): 80 TABLET, FILM COATED ORAL at 21:00

## 2022-01-01 RX ADMIN — Medication 50 MILLILITER(S): at 05:43

## 2022-01-01 RX ADMIN — ERYTHROPOIETIN 10000 UNIT(S): 10000 INJECTION, SOLUTION INTRAVENOUS; SUBCUTANEOUS at 07:48

## 2022-01-01 RX ADMIN — APIXABAN 2.5 MILLIGRAM(S): 2.5 TABLET, FILM COATED ORAL at 16:47

## 2022-01-01 RX ADMIN — Medication 25 MILLIGRAM(S): at 05:47

## 2022-01-01 RX ADMIN — BUDESONIDE AND FORMOTEROL FUMARATE DIHYDRATE 2 PUFF(S): 160; 4.5 AEROSOL RESPIRATORY (INHALATION) at 17:49

## 2022-01-01 RX ADMIN — SEVELAMER CARBONATE 1600 MILLIGRAM(S): 2400 POWDER, FOR SUSPENSION ORAL at 18:05

## 2022-01-01 RX ADMIN — Medication 25 MILLIGRAM(S): at 16:41

## 2022-01-01 RX ADMIN — Medication 84 MICROGRAM(S): at 21:34

## 2022-01-01 RX ADMIN — MIDODRINE HYDROCHLORIDE 10 MILLIGRAM(S): 2.5 TABLET ORAL at 09:33

## 2022-01-01 RX ADMIN — SODIUM CHLORIDE 4 MILLILITER(S): 9 INJECTION INTRAMUSCULAR; INTRAVENOUS; SUBCUTANEOUS at 21:44

## 2022-01-01 RX ADMIN — CEFTRIAXONE 100 MILLIGRAM(S): 500 INJECTION, POWDER, FOR SOLUTION INTRAMUSCULAR; INTRAVENOUS at 04:57

## 2022-01-01 RX ADMIN — MIDODRINE HYDROCHLORIDE 5 MILLIGRAM(S): 2.5 TABLET ORAL at 05:14

## 2022-01-01 RX ADMIN — Medication 216 GRAM(S): at 17:19

## 2022-01-01 RX ADMIN — MIDODRINE HYDROCHLORIDE 5 MILLIGRAM(S): 2.5 TABLET ORAL at 17:47

## 2022-01-01 RX ADMIN — BUDESONIDE AND FORMOTEROL FUMARATE DIHYDRATE 2 PUFF(S): 160; 4.5 AEROSOL RESPIRATORY (INHALATION) at 11:34

## 2022-01-01 RX ADMIN — Medication 125 MICROGRAM(S): at 11:14

## 2022-01-01 RX ADMIN — CHLORHEXIDINE GLUCONATE 1 APPLICATION(S): 213 SOLUTION TOPICAL at 13:33

## 2022-01-01 RX ADMIN — CEFTRIAXONE 100 MILLIGRAM(S): 500 INJECTION, POWDER, FOR SOLUTION INTRAMUSCULAR; INTRAVENOUS at 20:31

## 2022-01-01 RX ADMIN — Medication 216 GRAM(S): at 06:00

## 2022-01-01 RX ADMIN — CHLORHEXIDINE GLUCONATE 1 APPLICATION(S): 213 SOLUTION TOPICAL at 11:39

## 2022-01-01 RX ADMIN — ZINC SULFATE TAB 220 MG (50 MG ZINC EQUIVALENT) 220 MILLIGRAM(S): 220 (50 ZN) TAB at 11:19

## 2022-01-01 RX ADMIN — MORPHINE SULFATE 2 MILLIGRAM(S): 50 CAPSULE, EXTENDED RELEASE ORAL at 00:00

## 2022-01-01 RX ADMIN — LIDOCAINE AND PRILOCAINE CREAM 1 APPLICATION(S): 25; 25 CREAM TOPICAL at 08:32

## 2022-01-01 RX ADMIN — SEVELAMER CARBONATE 1600 MILLIGRAM(S): 2400 POWDER, FOR SUSPENSION ORAL at 17:46

## 2022-01-01 RX ADMIN — Medication 120 MILLIGRAM(S): at 05:43

## 2022-01-01 RX ADMIN — ATORVASTATIN CALCIUM 10 MILLIGRAM(S): 80 TABLET, FILM COATED ORAL at 21:38

## 2022-01-01 RX ADMIN — CEFTRIAXONE 100 MILLIGRAM(S): 500 INJECTION, POWDER, FOR SOLUTION INTRAMUSCULAR; INTRAVENOUS at 05:34

## 2022-01-01 RX ADMIN — Medication 216 GRAM(S): at 21:25

## 2022-01-01 RX ADMIN — ERYTHROPOIETIN 10000 UNIT(S): 10000 INJECTION, SOLUTION INTRAVENOUS; SUBCUTANEOUS at 07:40

## 2022-01-01 RX ADMIN — Medication 500 MILLIGRAM(S): at 13:00

## 2022-01-01 RX ADMIN — ALBUTEROL 2 PUFF(S): 90 AEROSOL, METERED ORAL at 22:40

## 2022-01-01 RX ADMIN — Medication 25 MILLIGRAM(S): at 05:31

## 2022-01-01 RX ADMIN — CHLORHEXIDINE GLUCONATE 1 APPLICATION(S): 213 SOLUTION TOPICAL at 11:30

## 2022-01-01 RX ADMIN — SENNA PLUS 2 TABLET(S): 8.6 TABLET ORAL at 21:33

## 2022-01-01 RX ADMIN — Medication 120 MILLIGRAM(S): at 05:47

## 2022-01-01 RX ADMIN — Medication 112 MICROGRAM(S): at 05:30

## 2022-01-01 RX ADMIN — Medication 1 MILLIGRAM(S): at 11:22

## 2022-01-01 RX ADMIN — ZINC SULFATE TAB 220 MG (50 MG ZINC EQUIVALENT) 220 MILLIGRAM(S): 220 (50 ZN) TAB at 13:00

## 2022-01-01 RX ADMIN — SEVELAMER CARBONATE 1600 MILLIGRAM(S): 2400 POWDER, FOR SUSPENSION ORAL at 10:45

## 2022-01-01 RX ADMIN — ATORVASTATIN CALCIUM 10 MILLIGRAM(S): 80 TABLET, FILM COATED ORAL at 22:28

## 2022-01-01 RX ADMIN — ZINC SULFATE TAB 220 MG (50 MG ZINC EQUIVALENT) 220 MILLIGRAM(S): 220 (50 ZN) TAB at 10:45

## 2022-01-01 RX ADMIN — Medication 216 GRAM(S): at 11:30

## 2022-01-01 RX ADMIN — Medication 25 MILLIGRAM(S): at 17:23

## 2022-01-01 RX ADMIN — BUDESONIDE AND FORMOTEROL FUMARATE DIHYDRATE 2 PUFF(S): 160; 4.5 AEROSOL RESPIRATORY (INHALATION) at 08:42

## 2022-01-01 RX ADMIN — APIXABAN 2.5 MILLIGRAM(S): 2.5 TABLET, FILM COATED ORAL at 05:49

## 2022-01-01 RX ADMIN — BUDESONIDE AND FORMOTEROL FUMARATE DIHYDRATE 2 PUFF(S): 160; 4.5 AEROSOL RESPIRATORY (INHALATION) at 07:34

## 2022-01-01 RX ADMIN — Medication 500 MILLIGRAM(S): at 10:45

## 2022-01-01 RX ADMIN — SEVELAMER CARBONATE 800 MILLIGRAM(S): 2400 POWDER, FOR SUSPENSION ORAL at 16:39

## 2022-01-01 RX ADMIN — ALBUTEROL 2 PUFF(S): 90 AEROSOL, METERED ORAL at 17:33

## 2022-01-01 RX ADMIN — ZINC OXIDE 1 APPLICATION(S): 200 OINTMENT TOPICAL at 12:34

## 2022-01-01 RX ADMIN — CEFTRIAXONE 100 MILLIGRAM(S): 500 INJECTION, POWDER, FOR SOLUTION INTRAMUSCULAR; INTRAVENOUS at 05:07

## 2022-01-01 RX ADMIN — Medication 1 MILLIGRAM(S): at 10:44

## 2022-01-01 RX ADMIN — SEVELAMER CARBONATE 1600 MILLIGRAM(S): 2400 POWDER, FOR SUSPENSION ORAL at 12:46

## 2022-01-01 RX ADMIN — CHLORHEXIDINE GLUCONATE 1 APPLICATION(S): 213 SOLUTION TOPICAL at 10:45

## 2022-01-24 NOTE — DISCHARGE NOTE PROVIDER - NSDCCPTREATMENT_GEN_ALL_CORE_FT
PRINCIPAL PROCEDURE  Procedure: Transesophageal echocardiography (SHU) without cardioversion  Findings and Treatment: [facility] will call you to schedule a 2-4 week follow up, sooner if needed.  Please call [phone] if you don't hear from   -Take each dose of your anticoagulation medication (blood thinner) exactly as prescribed.   - Do not drive, operate heavy machinery or make important decisions for 24 hours following the procedure.  - You may resume all other activities the day after the procedure.  Call your doctor if:   - your rapid heart rhythm returns.  - you have any questions or concerns regarding the procedure.  If you experience increased difficulty breathing or chest pain, or if you faint or have dizzy spells, please seek immediate medical attention.         PRINCIPAL PROCEDURE  Procedure: Echocardiography, transesophageal, with cardioversion  Findings and Treatment: [facility] will call you to schedule a 2-4 week follow up, sooner if needed.  Please call [phone] if you don't hear from   -Take each dose of your anticoagulation medication (blood thinner) exactly as prescribed.   - Do not drive, operate heavy machinery or make important decisions for 24 hours following the procedure.  - You may resume all other activities the day after the procedure.  Call your doctor if:   - your rapid heart rhythm returns.  - you have any questions or concerns regarding the procedure.  If you experience increased difficulty breathing or chest pain, or if you faint or have dizzy spells, please seek immediate medical attention.

## 2022-01-24 NOTE — H&P PST ADULT - ASSESSMENT
86 year old gentleman with history of CHF/HFpEF, COPD, PAD with prior LE revascularization and claudication, hypothyroidism, ESRD on HD (since 9/2021), aortic stenosis (severe) and persistent atrial fibrillation maintained on Eliquis. He now has atrial fibrillation with rapid rates, and does not ongoing symptoms of dyspnea. However, his symptoms are multifactorial, given concomitant ESRD on HD, COPD, and severe AS. He may require TAVR for his aortic stenosis, however, this has been felt difficult to assess given his AF and RVR. Pt presents today 1/24/22 for SHU/DCCV. This will allow reassessment of the severity of his AS and associated symptoms.     - confirmed npo >8 hours  - COVID PCR negative 1/21/22  -after DCCV will consider initiation of antiarrhythmic medication, though will avoid amiodarone/sotalol given COPD and ESRD. Perhaps Multaq will be best option.   -continue anticoagulation. Will need to continue Eliquis after DCCV for at least 1 month without interruption  86 year old gentleman with history of CHF/HFpEF, COPD, PAD with prior LE revascularization and claudication, hypothyroidism, ESRD on HD (since 9/2021), aortic stenosis (severe) and persistent atrial fibrillation maintained on Eliquis. He now has atrial fibrillation with rapid rates, and does not ongoing symptoms of dyspnea. However, his symptoms are multifactorial, given concomitant ESRD on HD, COPD, and severe AS. He may require TAVR for his aortic stenosis, however, this has been felt difficult to assess given his AF and RVR. Pt presents today 1/24/22 for SHU/DCCV. This will allow reassessment of the severity of his AS and associated symptoms.     - confirmed npo >8 hours  - last dose of Eliquis this morning 1/24/22  - COVID PCR negative 1/21/22  -after DCCV will consider initiation of antiarrhythmic medication, though will avoid amiodarone/sotalol given COPD and ESRD. Perhaps Multaq will be best option.   -continue anticoagulation. Will need to continue Eliquis after DCCV for at least 1 month without interruption  86 year old gentleman with history of CHF/HFpEF, COPD, PAD with prior LE revascularization and claudication, hypothyroidism, ESRD on HD (since 9/2021), aortic stenosis (severe) and persistent atrial fibrillation maintained on Eliquis. He now has atrial fibrillation with rapid rates, and does not ongoing symptoms of dyspnea. However, his symptoms are multifactorial, given concomitant ESRD on HD, COPD, and severe AS. He may require TAVR for his aortic stenosis, however, this has been felt difficult to assess given his AF and RVR. Pt presents today 1/24/22 for SHU/DCCV. This will allow reassessment of the severity of his AS and associated symptoms.     - call placed to [facility], no prior cardiac cath, last stress test 2016 >results requested  - confirmed npo >8 hours  - last dose of Eliquis this morning 1/24/22  - COVID PCR negative 1/21/22  -after DCCV will consider initiation of antiarrhythmic medication, though will avoid amiodarone/sotalol given COPD and ESRD. Perhaps Multaq will be best option.   -continue anticoagulation. Will need to continue Eliquis after DCCV for at least 1 month without interruption

## 2022-01-24 NOTE — DISCHARGE NOTE PROVIDER - HOSPITAL COURSE
86 year old gentleman with history of CHF/HFpEF, COPD, PAD b/l with prior RLE revascularization (PCI) and claudication, hypothyroidism, ESRD on HD (T/TH,Sat)/still makes urine (since 9/2021), aortic stenosis (severe) and persistent atrial fibrillation maintained on Eliquis. He now has atrial fibrillation with rapid rates, and worsening  symptoms of dyspnea. However, his symptoms are multifactorial, given concomitant ESRD on HD, COPD, and severe AS. He may require TAVR for his aortic stenosis, however, this has been felt difficult to assess given his AF and RVR. Pt presented today 1/24/22 for and is now status post SHU negative for MARLENI thrombus and uncomplicated DCCV with restoration of sinus rhythm. Stable for discharge home

## 2022-01-24 NOTE — H&P PST ADULT - NSANTHOSAYNRD_GEN_A_CORE
No. VIVIEN screening performed.  STOP BANG Legend: 0-2 = LOW Risk; 3-4 = INTERMEDIATE Risk; 5-8 = HIGH Risk

## 2022-01-24 NOTE — DISCHARGE NOTE NURSING/CASE MANAGEMENT/SOCIAL WORK - NSDCPEFALRISK_GEN_ALL_CORE
For information on Fall & Injury Prevention, visit: [email or web address] OR  [email or web address] OR  https://www.cdc.gov/steadi/patient.html

## 2022-01-24 NOTE — ASU PATIENT PROFILE, ADULT - FALL HARM RISK - UNIVERSAL INTERVENTIONS
Bed in lowest position, wheels locked, appropriate side rails in place/Call bell, personal items and telephone in reach/Instruct patient to call for assistance before getting out of bed or chair/Non-slip footwear when patient is out of bed/[place] to call system/Physically safe environment - no spills, clutter or unnecessary equipment/Purposeful Proactive Rounding/Room/bathroom lighting operational, light cord in reach

## 2022-01-24 NOTE — H&P PST ADULT - NSICDXPASTMEDICALHX_GEN_ALL_CORE_FT
PAST MEDICAL HISTORY:  Aortic stenosis ? severe    Atrial fibrillation     CHF (congestive heart failure) HFpEF    History of COPD     HTN (hypertension)      PAST MEDICAL HISTORY:  Aortic stenosis ? severe    Atrial fibrillation     CHF (congestive heart failure) HFpEF    History of COPD     HTN (hypertension)     PAD (peripheral artery disease) s/p RLE stenting

## 2022-01-24 NOTE — DISCHARGE NOTE PROVIDER - NSDCMRMEDTOKEN_GEN_ALL_CORE_FT
Lasix 20 mg oral tablet: 1 tab(s) orally once a day  levothyroxine 125 mcg (0.125 mg) oral tablet: 1 tab(s) orally once a day  lovastatin 40 mg oral tablet: 1 tab(s) orally once a day  metoprolol succinate 25 mg oral tablet, extended release: tab(s) orally once a day  pantoprazole 40 mg oral delayed release tablet: 1 tab(s) orally once a day  Plavix 75 mg oral tablet: 1 tab(s) orally once a day   albuterol 90 mcg/inh inhalation aerosol: inhaled every 4 days, As Needed  Cardizem  mg/24 hours oral capsule, extended release: 1 cap(s) orally once a day  Eliquis 2.5 mg oral tablet: 1 tab(s) orally 2 times a day  ergocalciferol: 1250 microgram(s) orally once a week  levothyroxine 125 mcg (0.125 mg) oral tablet: 1 tab(s) orally once a day  lovastatin 40 mg oral tablet: 1 tab(s) orally once a day  Plavix 75 mg oral tablet: 1 tab(s) orally once a day  sevelamer carbonate 800 mg oral tablet: 2 tab(s) orally 3 times a day  torsemide 100 mg oral tablet: 1 tab(s) orally once a day

## 2022-01-24 NOTE — H&P PST ADULT - HISTORY OF PRESENT ILLNESS
86 year old gentleman with history of CHF/HFpEF, COPD, PAD with prior LE revascularization and claudication, hypothyroidism, ESRD on HD (since 2021), aortic stenosis (severe) and persistent atrial fibrillation maintained on Eliquis. He now has atrial fibrillation with rapid rates, and does not ongoing symptoms of dyspnea. However, his symptoms are multifactorial, given concomitant ESRD on HD, COPD, and severe AS. He may require TAVR for his aortic stenosis, however, this has been felt difficult to assess given his AF and RVR. Pt presents today 22 for SHU/DCCV. This will allow reassessment of the severity of his AS and associated symptoms.       EC21 atrial fibrillation, RBBB, average rate 117 bpm   Stress Test: Stress  mild-mod abnormal with inferior and posterolateral ischemia with infarct, nml LVEF   Echo: TTE 21 LVEF 61%, LA 4.6cm, trace AI, severe AS (mean gradient 33mHg, WAQAS 0.84cm2), RVSP 30    86 year old gentleman with history of CHF/HFpEF, COPD, PAD b/l with prior RLE revascularization (PCI) and claudication, hypothyroidism, ESRD on HD (since 2021), aortic stenosis (severe) and persistent atrial fibrillation maintained on Eliquis. He now has atrial fibrillation with rapid rates, and does not ongoing symptoms of dyspnea. However, his symptoms are multifactorial, given concomitant ESRD on HD, COPD, and severe AS. He may require TAVR for his aortic stenosis, however, this has been felt difficult to assess given his AF and RVR. Pt presents today 22 for SHU/DCCV. This will allow reassessment of the severity of his AS and associated symptoms.       EC21 atrial fibrillation, RBBB, average rate 117 bpm   Stress Test: Stress  mild-mod abnormal with inferior and posterolateral ischemia with infarct, nml LVEF   Echo: TTE 21 LVEF 61%, LA 4.6cm, trace AI, severe AS (mean gradient 33mHg, WAQAS 0.84cm2), RVSP 30    86 year old gentleman with history of CHF/HFpEF, COPD, PAD b/l with prior RLE revascularization (PCI) and claudication, hypothyroidism, ESRD on HD (,Sat)/still makes urine (since 2021), aortic stenosis (severe) and persistent atrial fibrillation maintained on Eliquis. He now has atrial fibrillation with rapid rates, and does not ongoing symptoms of dyspnea. However, his symptoms are multifactorial, given concomitant ESRD on HD, COPD, and severe AS. He may require TAVR for his aortic stenosis, however, this has been felt difficult to assess given his AF and RVR. Pt presents today 22 for SHU/DCCV. This will allow reassessment of the severity of his AS and associated symptoms.       EC21 atrial fibrillation, RBBB, average rate 117 bpm   Stress Test: Stress  mild-mod abnormal with inferior and posterolateral ischemia with infarct, nml LVEF   Echo: TTE 21 LVEF 61%, LA 4.6cm, trace AI, severe AS (mean gradient 33mHg, WAQAS 0.84cm2), RVSP 30

## 2022-01-24 NOTE — DISCHARGE NOTE PROVIDER - CARE PROVIDER_API CALL
Vijay Lopez)  Cardiovascular Disease; Internal Medicine  [facility], [address], [address]  [place] [address]  Phone: [phone]  Fax: [phone]  Follow Up Time:     Fortino Isaac)  Cardiology; Internal Medicine  [address], [address]  [place] [address]  Phone: [phone]  Fax: [phone]  Follow Up Time:

## 2022-01-24 NOTE — DISCHARGE NOTE NURSING/CASE MANAGEMENT/SOCIAL WORK - PATIENT PORTAL LINK FT
You can access the FollowMyHealth Patient Portal offered by [facility] by registering at the following website: [email or web address]. By joining [facility]’s FollowMyHealth portal, you will also be able to view your health information using other applications (apps) compatible with our system.

## 2022-05-05 NOTE — ED PROVIDER NOTE - OBJECTIVE STATEMENT
85 yo M with pmh of HTN, CAD, ESRD on HD (Tu,Thurs,Sat), Afib on eliquis presenting from [place]. 87 yo M with pmh of HTN, CAD, ESRD on HD (Tu,Thurs,Sat), Afib on eliquis presenting from [facility] for concern of GI bleed. Nurses at rehab saw bright red blood in feces. Patient himself, feels mildly tired with mild sob however was recently diagnosed with COVID in [place]. Patient was initially in [facility] for enterococcus bacteremia and currently has picc line getting antibiotics and was discharged to [facility]. Due for dialysis today. 87 yo M with pmh of HTN, CAD, ESRD on HD (Tu,Thurs,Sat), Afib on eliquis presenting from [place] for concern of GI bleed. Nurses at rehab saw bright red blood in feces. Patient feels mildly tired with mild sob however was recently diagnosed with COVID in [place]. Patient was initially in [facility] for enterococcus bacteremia and currently has picc line getting antibiotics and was discharged to [place]. Due for dialysis today.

## 2022-05-05 NOTE — ED ADULT NURSE NOTE - OBJECTIVE STATEMENT
Pt received AOx4, non ambulatory at the moment w. pmhx of HTN, CAD, Afib, ESRD (Tues, Thurs, Sat/ last completed session on Tuesday, AV fistula noted to Left upper arm), recent COVID+, bacteremia on IV ABX (PICC line noted to [facility]) sent from [facility] for GI bleed. Dark colored blood noted on patient's diaper which pt states started this morning. Pt endorsing SOB, which pt states has been ongoing "for a while". Breathing mildly labored, currently on 3L of NC, saturating ~95%. Denies CP, NV, chills, fever, cough, abd pain at the moment. Bruising noted to right heel area and ankle area. Denies recent falls or trauma. Placed on cardiac monitor, reading afib rate controlled.

## 2022-05-05 NOTE — ED ADULT TRIAGE NOTE - O2 FLOW (L/MIN)
Pt. got dizzy while OOB with PT Deshaun. "looked bad" according to PT. Pt. placed back in bed and feels better now. Pt's heart rate is dropping to 35 while pt sleeping and 40 while pt is awake. BP maintained. 2

## 2022-05-05 NOTE — ED PROVIDER NOTE - CLINICAL SUMMARY MEDICAL DECISION MAKING FREE TEXT BOX
Joseph Frankel PGY3: Patient with concern for GI bleed. VSS. Patient looks well and is non toxic appearing. PE as above. Patient is having bright red blood per rectum and has blood in feces so GI bleed is likely. Will obtain labs and reassess closely. Patient c complex PMHx presenting from [facility] with concern for GI bleed/BRBPR. Is tachy, irreg irreg . BP reassuring . No active hemorrhage at this time but does have e/o rectal bleeding. Patient looks well and is non toxic appearing.  Consider GI bleed and anemia likely, but would consider cardiac etiologies or metabolic derangements as well. Send labs, consider AC reversal if unstable or massive hemorrhage, likely admit.

## 2022-05-05 NOTE — ED PROVIDER NOTE - ATTENDING CONTRIBUTION TO CARE
Attending Attestation: Dr. Mccray  I have personally performed a history and physical examination of the patient and discussed management with the resident as well as the patient.  I reviewed the resident's note and agree with the documented findings and plan of care.  I have authored and modified critical sections of the Provider Note, including but not limited to HPI, Physical Exam and MDM. Patient c complex PMHx presenting from [place] with concern for GI bleed/BRBPR. Is tachy, irreg irreg . BP reassuring . No active hemorrhage at this time but does have e/o rectal bleeding. Patient looks well and is non toxic appearing.  Consider GI bleed and anemia likely, but would consider cardiac etiologies or metabolic derangements as well. Send labs, consider AC reversal if unstable or massive hemorrhage, likely admit.     Upon my evaluation, this patient had a high probability of imminent or life-threatening deterioration due to concern for anemia, GI bleeding which required my direct attention, intervention, and personal management.  The patient has a  medical condition that impairs one or more vital organ systems.  Frequent personal assessment and adjustment of medical interventions was performed.       I have personally provided 38 minutes of critical care time exclusive of time spent on separately billable procedures. Time includes review of laboratory data, radiology results, discussion with consultants, patient and family; monitoring for potential decompensation, as well as time spent retrieving data and reviewing the chart and documenting the visit. Interventions were performed as documented above.

## 2022-05-05 NOTE — ED PROVIDER NOTE - PROGRESS NOTE DETAILS
Joseph Frankel PGY3: Called that patient has hemogolobin of 6. Patient with blood mixed stool upon defecating. Vitals continue to be stable. Consented patient for blood. As not life threatening and INR less than 2.5 will not reverse AC. Spoke with renal to get patient dialiased tonight in order to give patient more PRBCs in an effort not to overload patient. Will continue to reassess closely. Joseph Frankel PGY3: Spoke with patient's nephrologist. He recommends giving lasix and giving blood slowly and will dialize first thing tomorrow morning. Patients repeat HandH is 6 before blood. Continues to be vitally stable and is mentating well.     Nephrologist ([facility]): [phone] Joseph Frankel PGY3: Called that patient has hemogolobin of 6. Patient with blood mixed stool upon defecating. Vitals continue to be stable. Consented patient for blood. As not life threatening and INR less than 2.5 will not reverse AC. Paged renal to get patient dialiased tonight in order to give patient more PRBCs in an effort not to overload patient. Joseph Frankel PGY3: Called to bedside by RN as patient is  having another large BM of blood. Patient continues to be stable. However as he is actively bleeding, MICU consulted and will see patient.

## 2022-05-05 NOTE — ED PROVIDER NOTE - PHYSICAL EXAMINATION
Gen: Alert and oriented. Lying comfortably in bed. Answering questions appropriately  HEENT: extra occular movements intact, no nasal discharge, mucous membranes moist  CV: Tachycardic, irregular rhythm, +S1/S2, no murmurs/rubs/gallops,   Resp: Mildly tachypneic to 20-23 but not in resp distress. Clear to ausculation bilaterally, no wheezes/rhonchi/rales  GI: Abdomen soft non-distended, non tender to palpation, no masses  MSK: Picc line in right arm, No open wounds, no bruising, no LE edema, Homans sign negative bl  Neuro: A&Ox4, following commands, moving all four extremities spontaneously  Psych: appropriate mood

## 2022-05-05 NOTE — ED PROVIDER NOTE - CHIEF COMPLAINT
The patient is a 86y Male complaining of  The patient is a 86y Male complaining of bright red blood per rectum.

## 2022-05-05 NOTE — ED PROVIDER NOTE - NS ED ROS FT
Gen: Denies fever, chills, + generalized weakness  CV: Denies chest pain, palpitations  HEENT: Denies neck pain, headache, vision changes  Skin: Denies rash, erythema, color changes  Resp: Endorses mild SOB, cough  Endo: Denies sensitivity to heat, cold, increased urination  GI: Denies constipation, nausea, vomiting, diarrhea  Msk: Denies back pain, LE swelling, extremity pain  : Denies dysuria, increased frequency  Neuro: Denies LOC, focal weakness, numbness, tingling  Psych: Denies hx of psych, SI, HI

## 2022-05-05 NOTE — ED ADULT NURSE NOTE - INTERVENTIONS DEFINITIONS
[place] to call system/Call bell, personal items and telephone within reach/Instruct patient to call for assistance/Room bathroom lighting operational/Non-slip footwear when patient is off stretcher/Physically safe environment: no spills, clutter or unnecessary equipment/Provide visual cue, wrist band, yellow gown, etc./Monitor gait and stability/Monitor for mental status changes and reorient to person, place, and time

## 2022-05-06 NOTE — PROGRESS NOTE ADULT - SUBJECTIVE AND OBJECTIVE BOX
Patient is a 86y old  Male who presents with a chief complaint of GI bleed (06 May 2022 09:48)      INTERVAL HPI/OVERNIGHT EVENTS: Patient admitted to MICU with brisk lower GI bleed, HD for fluid overload and ability to transfuse without worsening fluid overload.     SUBJECTIVE: Patient seen and examined at bedside.       VITAL SIGNS:  ICU Vital Signs Last 24 Hrs  T(C): 35.6 (06 May 2022 10:01), Max: 36.7 (05 May 2022 19:06)  T(F): 96.1 (06 May 2022 10:01), Max: 98 (05 May 2022 19:06)  HR: 65 (06 May 2022 10:01) (65 - 109)  BP: 114/45 (06 May 2022 10:01) (108/49 - 135/66)  BP(mean): 62 (06 May 2022 10:00) (62 - 69)  ABP: --  ABP(mean): --  RR: 19 (06 May 2022 10:01) (14 - 26)  SpO2: 95% (06 May 2022 10:00) (95% - 100%)      Plateau pressure:   P/F ratio:     05-05 @ 07:01  -  05-06 @ 07:00  --------------------------------------------------------  IN: 300 mL / OUT: 0 mL / NET: 300 mL      CAPILLARY BLOOD GLUCOSE          PHYSICAL EXAM:  GENERAL: NAD, lying in bed comfortably  HEAD:  Atraumatic, Normocephalic  EYES: EOMI, PERRLA, conjunctiva and sclera clear  ENT: Moist mucous membranes  NECK: Supple, No JVD  CHEST/LUNG: decreased breath sounds at the bases  HEART: irregular rate and rhythm; No murmurs, rubs, or gallops  ABDOMEN: Bowel sounds present; Soft, Nontender, Nondistended   EXTREMITIES:  2+ Peripheral Pulses, brisk capillary refill. No clubbing, cyanosis, or edema  NERVOUS SYSTEM:  Alert & Oriented X3, speech clear. No deficits   MSK: FROM all 4 extremities, full and equal strength  SKIN: No rashes or lesions, thrill present in LUE      MEDICATIONS:  MEDICATIONS  (STANDING):  ampicillin  IVPB 2 Gram(s) IV Intermittent every 12 hours  ascorbic acid 500 milliGRAM(s) Oral daily  atorvastatin 10 milliGRAM(s) Oral at bedtime  cefTRIAXone   IVPB 2000 milliGRAM(s) IV Intermittent every 12 hours  chlorhexidine 4% Liquid 1 Application(s) Topical <User Schedule>  epoetin ruthann-epbx (RETACRIT) Injectable 72324 Unit(s) IV Push <User Schedule>  folic acid 1 milliGRAM(s) Oral daily  levothyroxine 112 MICROGram(s) Oral daily  lidocaine/prilocaine Cream 1 Application(s) Topical <User Schedule>  pantoprazole  Injectable 40 milliGRAM(s) IV Push two times a day  sevelamer carbonate 1600 milliGRAM(s) Oral three times a day with meals  torsemide 100 milliGRAM(s) Oral daily  zinc oxide 20% Ointment 1 Application(s) Topical daily  zinc sulfate 220 milliGRAM(s) Oral daily    MEDICATIONS  (PRN):  acetaminophen     Tablet .. 650 milliGRAM(s) Oral every 6 hours PRN Temp greater or equal to 38C (100.4F), Mild Pain (1 - 3)  ALBUTerol    90 MICROgram(s) HFA Inhaler 2 Puff(s) Inhalation every 6 hours PRN Bronchospasm      ALLERGIES:  Allergies    No Known Allergies    Intolerances    codeine (Other (Mild to Mod))      LABS:                        6.7    3.37  )-----------( 108      ( 06 May 2022 06:57 )             20.7     05-05    136  |  96<L>  |  39<H>  ----------------------------<  111<H>  4.0   |  23  |  5.82<H>    Ca    8.9      05 May 2022 20:16  Phos  6.1     05-05  Mg     2.30     05-05    TPro  5.6<L>  /  Alb  2.8<L>  /  TBili  0.2  /  DBili  x   /  AST  18  /  ALT  15  /  AlkPhos  122<H>  05-05    PT/INR - ( 05 May 2022 20:16 )   PT: 18.0 sec;   INR: 1.54 ratio         PTT - ( 05 May 2022 20:16 )  PTT:35.5 sec      RADIOLOGY & ADDITIONAL TESTS: Reviewed.

## 2022-05-06 NOTE — CONSULT NOTE ADULT - ATTENDING COMMENTS
Hematochezia likely 2/2 diverticular bleed with active bleed noted in sigmoid colon on CTA.   HD stable, though pressures soft earlier in course.   Has not yet responded appropriately to transfusions, most recent Hgb 6.7.   Awaiting 3hr dialysis session.   Patient not prepped for colonoscopy at this time. Given ongoing overt bleeding, CTA findings, and inappropriate response to transfusions, would recommend IR evaluation for possible embolization.   Discussed with MICU team.

## 2022-05-06 NOTE — H&P ADULT - ATTENDING COMMENTS
87 y/o M pmh HTN, CAD on plavix, ESRD on HD (Tu, Thurs, Sat), Afib on eliquis recent admission at [facility] ffor enterococcus bacteremia possible endocarditis d/c to [place] for long term antibiotics vis picc line c/b COVID  presenting from [place] with bright red blood per rectum, acute blood loss anemia in the setting of a likely 2/2 brisk lower GI bleed.   pt missed HD today 2/2 GIB, will need urgent HD in am, renal aware  lasix 120mg per renal  PRBCs, serial CBC, NPO  CTA of a/p  consider ddavp  hold plavix and eliquis   ABx ceftriaxone and ampicillin for recent enterococcus bacteremia   dvt ppx scds given gib  full code

## 2022-05-06 NOTE — CONSULT NOTE ADULT - SUBJECTIVE AND OBJECTIVE BOX
Vascular & Interventional Radiology Brief Consult Note    PI: 86y Male with ESRD on HD admitted with GI bleed, CAD on Plavix, AF on Eliquis Incidental COVID+. IR consulted for possible embolization.     Allergies:   Medications (Abx/Cardiac/Anticoagulation/Blood Products)  ampicillin  IVPB: 216 mL/Hr IV Intermittent (05-06 @ 06:42)  cefTRIAXone   IVPB: 100 mL/Hr IV Intermittent (05-06 @ 05:04)  furosemide   IVPB: 124 mL/Hr IV Intermittent (05-06 @ 04:20)  prothrombin complex concentrate IVPB (KCENTRA): 400 mL/Hr IV Intermittent (05-06 @ 09:35)    Data:    79.8  T(C): 36.1  HR: 72  BP: 122/50  RR: 18  SpO2: 96%    -WBC 3.37 / HgB 6.7 / Hct 20.7 / Plt 108  -Na 136 / Cl 96 / BUN 39 / Glucose 111  -K 4.0 / CO2 23 / Cr 5.82  -ALT 15 / Alk Phos 122 / T.Bili 0.2  -INR1.54    Imaging: CT with small contrast extravasation from the sigmoid colon. Scattered diverticula.     Assessment/Plan:   86y Male with lower GI bleed from sigmoid colon likely from diverticula. Bleeding typically resolves without the need for intervention once anticoagulation is reversed. Patient with INR of 1.5 overnight.   - no plan for intervention at this time  - recommend resuscitation with PRBC/plasma/PLT   - repeat INR with next blood draw to ensure adequate reversal   - continue PRBC as needed  - GI following  - recommendations discussed with primary team

## 2022-05-06 NOTE — CONSULT NOTE ADULT - ASSESSMENT
86 year old male with history of HTN, CAD on Plavix, AF on Eliquis, ESRD on HD TTS, hypothyroidism, and aortic stenosis who presents with BRBPR.    # Hematochezia  # CAD on Plavix  # AF on Eliquis  # ESRD on HD TTS  # Recent enterococcus bacteremia  # Acute hypoxic respiratory failure  # +COVID  # Aortic stenosis  Recent admission at [facility] on 4/14/2022 for +COVID, enterococcus bacteremia with ? endocarditis per documentation, discharged to [facility] on 4/30/2022 with PICC for ABx.  On 5/5/2022, patient developed bloody BMs.  Last dose(s) of Plavix and Eliquis were on the morning of 5/5/2022.  Active hematochezia in rectal pouch on exam.  CTA positive for active bleed in sigmoid colon with diverticulosis.  In light of this information, clinical presentation consistent with lower GI bleeding, likely from diverticular source, however the differential diagnosis for LGIB, angioectasias, malignancy, colitis, hemorrhoids, or SCAD [segmental colitis associated with diverticulosis].    Recommendations:  -trend vitals, CBC, and monitor for clinical signs of bleeding  -maintain active type and screen  -transfusion goal to maintain hemoglobin >/= 7.0 and platelets >/= 50  -continue to hold Plavix and Eliquis if able  -patient not optimized for colonoscopy given degree of anemia, lack of colon prep, acute hypoxic respiratory failure, and requirement for hemodialysis; given this information in the setting of positive CTA in sigmoid, would recommend IR consultation for embolization for findings most compatible with sigmoid diverticular bleeding    Recommendations incomplete until finalized by attending signature/attestation to note.    Charlie Aguilar, PGY-4  GI/Hepatology Fellow    MONDAY-FRIDAY 8AM-5PM:  Pager# [phone] ([facility]) or [phone] ([facility])    NON-URGENT CONSULTS:  Please email [email or web address] OR [email or web address]  AT NIGHT AND ON WEEKENDS:  Contact on-call GI fellow via answering service ([phone]) from 5pm-8am and on weekends/holidays   86 year old male with history of HTN, CAD on Plavix, AF on Eliquis, ESRD on HD TTS, hypothyroidism, and aortic stenosis who presents with BRBPR, found to have CTA+ with active bleed in sigmoid colon.     # Hematochezia  # CAD on Plavix  # AF on Eliquis  # ESRD on HD TTS  # Recent enterococcus bacteremia  # Acute hypoxic respiratory failure  # +COVID  # Aortic stenosis    On 5/5/2022, patient developed bloody BMs.  Last dose(s) of Plavix and Eliquis were on the morning of 5/5/2022.  Active hematochezia in rectal pouch on exam.  CTA positive for active bleed in sigmoid colon with diverticulosis.  In light of this information, clinical presentation consistent with lower GI bleeding, likely from diverticular source; much less likely from angioectasias, malignancy, colitis, hemorrhoids given CTA findings.    Recommendations:  -trend vitals, CBC, and monitor for clinical signs of bleeding  -maintain active type and screen  -NPO for now  -transfusion goal to maintain hemoglobin >/= 7.0 and platelets >/= 50  -continue to hold Plavix and Eliquis if able  -patient not optimized for colonoscopy given degree of anemia, lack of colon prep, acute hypoxic respiratory failure, and requirement for hemodialysis; given this information in the setting of positive CTA in sigmoid, would recommend IR consultation for embolization for findings most compatible with sigmoid diverticular bleeding    Recommendations incomplete until finalized by attending signature/attestation to note.    Charlie Aguilar, PGY-4  GI/Hepatology Fellow    MONDAY-FRIDAY 8AM-5PM:  Pager# [phone] ([facility]) or [phone] ([facility])    NON-URGENT CONSULTS:  Please email [email or web address]  AT NIGHT AND ON WEEKENDS:  Contact on-call GI fellow via answering service ([phone]) from 5pm-8am and on weekends/holidays

## 2022-05-06 NOTE — H&P ADULT - ASSESSMENT
87 y/o M pmh HTN, CAD< ESRD on HD (Tu, Thurs, Sat), Afibb on eliquis presenting from [place] with bright red blood per rectum, likely 2/2 brisk lower GI bleed.     Neuro   AA03, Responds to commands  No active issues    Cardio    #Aortic Stenosis  #Hx Congestive Heart Failure  Obtain records from [facility], was worked up and planned for TAVR at [facility]   f/u Echo  c/w Torsemide      #Afibb, chronic  rate controlled  hold Diltiazem i/s/o Normotension while actively bleeding    #CAD  c/w Aspirin 81mg  c/w Atorvastatin 10mg  c/w Plavix 75mg        Pulm   #Acute Hypoxic Respiratory failure  i/s/o worsening acute blood loss anemia, w/t bilateral pleural effusions, and COVID '  c/w Nasal cannula, titrate to Sp02>88- 93%     #Bilateral pleural effusions   CXR with interstitial vascular opacitification, and bilat pleural effusions  likely V overloaded i/s/o missed dialysis   f/u Nephrology for Volume removal per HD in the am    #Hx COPD   c/w Wixela (Fluticaosone/ Salmeterol) 1 pu 2x/day Q12   c/w Albuterol HFA inhaler prn       GI   #Lower GI Bleed   f/u CTA Abdomen, Pelvis  consult IR, if CTA with localization of extravasated bleed  consult GI   Keep NPO   pantoprazole 40mg BID  c/w Hold all NSAID's, DVT prophylaxis, bowel regimen i/s/o active bleed      /Renal   #ESRD, HD Tues, Thurs, Sat  Last HD missed Thurs 5/5 i/s/o active bleed  Nephrology consulted  Hemodialysis in the am  BMP Q12, replete lytes prn  Strict I's/O's  c/w Renvela 1600mg TID    Heme  #Macrocytic Anemia   #Acute Blood Loss Anemia   MCV elevated, Baseline Hgb around 8 per daughter  f/u serum B12 and thiamine   CBC's Q12        #Aplastic Anemia   Likely i/s/o Enterococcus bacteremia and now, lower GI bleed    Endocirine   #Hypothyroidism   Repeat TSH  c/w Levothyroxine 125mcg daily     ID   #Enterococcus Bacteremia   diagnosed, treated at [facility] in 4/2022 w/ unclear source (Per son, GI source was not considered, colonoscopy was deferred fo f/u outpatient)   c/w IV Ceftriaxone 2gm BI, stop after 32 days  c/w IV Ampicillin 2gm BID, stop after 32 days     #COVID   s/p Monocloal antibodies, bebetelovimab while at [facility]  Hold Dexamethasone i/s/o prior treatment course at [facility], [facility], and respiratory symptoms better explained by pulmonary edema, pleural effusions   ctm    DVT Prophylaxis- hold Anticoagulation i/s/o active bleed. Start SCD's 85 y/o M pmh HTN, CAD< ESRD on HD (Tu, Thurs, Sat), Afibb on eliquis presenting from [place] with bright red blood per rectum, likely 2/2 brisk lower GI bleed.     Neuro   AA03, Responds to commands  No active issues    Cardio    #Aortic Stenosis  #Hx Congestive Heart Failure  Obtain records from [facility], was worked up and planned for TAVR at [facility]   f/u Echo  c/w Torsemide 100mg daily     #Afib, chronic  rate controlled  hold Diltiazem i/s/o Normotension while actively bleeding    #CAD  c/w Atorvastatin 10mg  hold Plavix 75mg        Pulm   #Acute Hypoxic Respiratory failure  i/s/o worsening acute blood loss anemia, w/t bilateral pleural effusions, and COVID '  c/w Nasal cannula, titrate to Sp02>88- 93%   Consider dex if pt continues to be hypoxic after fluid removal via dialysis    #Bilateral pleural effusions   CXR with interstitial vascular opacification and bilat pleural effusions  likely V overloaded i/s/o missed dialysis   f/u Nephrology for Volume removal per HD in the am    #Hx COPD   c/w Albuterol HFA inhaler prn       GI   #Lower GI Bleed   f/u CTA Abdomen, Pelvis  consult IR, if CTA with localization of extravasated bleed  consult GI   Keep NPO   pantoprazole 40mg BID  c/w Hold all NSAID's, DVT prophylaxis, bowel regimen i/s/o active bleed  Transfuse to Hg >8    /Renal   #ESRD, HD Tues, Thurs, Sat  Last HD missed Thurs 5/5 i/s/o active bleed  Nephrology consulted  Hemodialysis in the am  BMP Q12, replete lytes prn  Strict I's/O's  c/w Renvela 1600mg TID    Heme  #Macrocytic Anemia   #Acute Blood Loss Anemia   MCV elevated, Baseline Hgb around 8 per daughter  f/u serum B12 and thiamine   CBC's Q12        #Aplastic Anemia   Likely i/s/o Enterococcus bacteremia and now, lower GI bleed    Endocrine   #Hypothyroidism   Repeat TSH  c/w Levothyroxine 112mcg daily     ID   #Enterococcus Bacteremia   diagnosed, treated at [facility] in 4/2022 w/ unclear source (Per son, GI source was not considered, colonoscopy was deferred fo f/u outpatient)   c/w IV Ceftriaxone 2gm BI, stop after 32 days  c/w IV Ampicillin 2gm BID, stop after 32 days   f/u repeat cultures    #COVID   s/p Monocloal antibodies, bebetelovimab while at [facility]  Hold Dexamethasone i/s/o prior treatment course at [facility], [facility], and respiratory symptoms better explained by pulmonary edema, pleural effusions   ctm    DVT Prophylaxis- hold Anticoagulation i/s/o active bleed. Start SCD's 85 y/o M pmh HTN, CAD< ESRD on HD (Tu, Thurs, Sat), Afibb on eliquis presenting from [place] with bright red blood per rectum, likely 2/2 brisk lower GI bleed.     Neuro   AA03, Responds to commands  No active issues    Cardio    #Aortic Stenosis  #Hx Congestive Heart Failure  Obtain records from [facility], was worked up and planned for TAVR at [facility]   f/u Echo  c/w Torsemide 100mg daily     #Afib, chronic  rate controlled  hold Diltiazem i/s/o Normotension while actively bleeding  hold Eliquis    #CAD  c/w Atorvastatin 10mg  hold Plavix 75mg        Pulm   #Acute Hypoxic Respiratory failure  i/s/o worsening acute blood loss anemia, w/t bilateral pleural effusions, and COVID '  c/w Nasal cannula, titrate to Sp02>88- 93%   Consider dex if pt continues to be hypoxic after fluid removal via dialysis    #Bilateral pleural effusions   CXR with interstitial vascular opacification and bilat pleural effusions  likely V overloaded i/s/o missed dialysis   f/u Nephrology for Volume removal per HD in the am    #Hx COPD   c/w Albuterol HFA inhaler prn       GI   #Lower GI Bleed   f/u CTA Abdomen, Pelvis  consult IR, if CTA with localization of extravasated bleed  consult GI   Keep NPO   pantoprazole 40mg BID  c/w Hold all NSAID's, DVT prophylaxis, bowel regimen i/s/o active bleed  Transfuse to Hg >8    /Renal   #ESRD, HD Tues, Thurs, Sat  Last HD missed Thurs 5/5 i/s/o active bleed  Nephrology consulted  Hemodialysis in the am  BMP Q12, replete lytes prn  Strict I's/O's  c/w Renvela 1600mg TID    Heme  #Macrocytic Anemia   #Acute Blood Loss Anemia   MCV elevated, Baseline Hgb around 8 per daughter  f/u serum B12 and thiamine   CBC's Q12  transfuse if Hgb<7        #Aplastic Anemia   Likelyb bone marrow supression i/s/o Enterococcus bacteremia and now, lower GI bleed    Endocrine   #Hypothyroidism   Repeat TSH  c/w Levothyroxine 112mcg daily     ID   #Enterococcus Bacteremia   diagnosed, treated at [facility] in 4/2022 w/ unclear source (Per son, GI source was not considered, colonoscopy was deferred fo f/u outpatient)   c/w IV Ceftriaxone 2gm BI, stop after 32 days  c/w IV Ampicillin 2gm BID, stop after 32 days   f/u repeat cultures    #COVID   s/p Monocloal antibodies, bebetelovimab while at [place]  Hold Dexamethasone i/s/o prior treatment course at [facility], [facility], and respiratory symptoms better explained by pulmonary edema, pleural effusions   ctm    DVT Prophylaxis- hold Anticoagulation i/s/o active bleed. Start SCD's 85 y/o M pmh HTN, CAD< ESRD on HD (Tu, Thurs, Sat), Afibb on eliquis presenting from [place] with bright red blood per rectum, likely 2/2 brisk lower GI bleed.     Neuro   AA03, Responds to commands  No active issues    Cardio    #Aortic Stenosis  #Hx Congestive Heart Failure  Obtain records from [facility], was worked up and planned for TAVR at [facility]   f/u Echo  c/w Torsemide 100mg daily     #Afib, chronic  rate controlled  hold Diltiazem i/s/o Normotension while actively bleeding  hold Eliquis    #CAD  c/w Atorvastatin 10mg  hold Plavix 75mg        Pulm   #Acute Hypoxic Respiratory failure  i/s/o worsening acute blood loss anemia, w/t bilateral pleural effusions, and COVID '  c/w Nasal cannula, titrate to Sp02>88- 93%   Consider dex if pt continues to be hypoxic after fluid removal via dialysis    #Bilateral pleural effusions   CXR with interstitial vascular opacification and bilat pleural effusions  likely V overloaded i/s/o missed dialysis   f/u Nephrology for Volume removal per HD in the am    #Hx COPD   c/w Albuterol HFA inhaler prn       GI   #Lower GI Bleed   f/u CTA Abdomen, Pelvis  consult IR, if CTA with localization of extravasated bleed  consult GI   Keep NPO   pantoprazole 40mg BID  c/w Hold all NSAID's, DVT prophylaxis, bowel regimen i/s/o active bleed  Transfuse to Hg >8    /Renal   #ESRD, HD Tues, Thurs, Sat  Last HD missed Thurs 5/5 i/s/o active bleed  Nephrology consulted  Hemodialysis in the am  BMP Q12, replete lytes prn  Strict I's/O's  c/w Renvela 1600mg TID    Heme  #Macrocytic Anemia   #Acute Blood Loss Anemia   MCV elevated, Baseline Hgb around 8 per daughter  f/u serum B12 and thiamine   CBC's Q12  transfuse if Hgb<7        #Pancytopenia  Likely bone marrow supression i/s/o Enterococcus bacteremia and now, lower GI bleed    Endocrine   #Hypothyroidism   Repeat TSH  c/w Levothyroxine 112mcg daily     ID   #Enterococcus Bacteremia   diagnosed, treated at [facility] in 4/2022 w/ unclear source (Per son, GI source was not considered, colonoscopy was deferred fo f/u outpatient)   c/w IV Ceftriaxone 2gm BI, stop after 32 days  c/w IV Ampicillin 2gm BID, stop after 32 days   f/u repeat cultures    #COVID   s/p Monocloal antibodies, bebetelovimab while at [place]  Hold Dexamethasone i/s/o prior treatment course at [facility], [facility], and respiratory symptoms better explained by pulmonary edema, pleural effusions   ctm    DVT Prophylaxis- hold Anticoagulation i/s/o active bleed. Start SCD's

## 2022-05-06 NOTE — H&P ADULT - NSHPPHYSICALEXAM_GEN_ALL_CORE
Vital Signs (24 Hrs):  T(C): 35.9 (05-06-22 @ 00:08), Max: 36.7 (05-05-22 @ 19:06)  HR: 81 (05-06-22 @ 01:44) (81 - 99)  BP: 135/66 (05-06-22 @ 01:44) (118/64 - 135/66)  RR: 20 (05-06-22 @ 01:44) (17 - 20)  SpO2: 99% (05-06-22 @ 01:44) (95% - 99%)  Wt(kg): --  PHYSICAL EXAM:  GENERAL: NAD, lying in bed comfortably  HEAD:  Atraumatic, Normocephalic  EYES: EOMI, PERRLA, conjunctiva and sclera clear  ENT: Moist mucous membranes  NECK: Supple, No JVD  CHEST/LUNG: decreased breath sounds at the bases  HEART: irregular rate and rhythm; No murmurs, rubs, or gallops  ABDOMEN: Bowel sounds present; Soft, Nontender, Nondistended   EXTREMITIES:  2+ Peripheral Pulses, brisk capillary refill. No clubbing, cyanosis, or edema  NERVOUS SYSTEM:  Alert & Oriented X3, speech clear. No deficits   MSK: FROM all 4 extremities, full and equal strength  SKIN: No rashes or lesions Vital Signs (24 Hrs):  T(C): 35.9 (05-06-22 @ 00:08), Max: 36.7 (05-05-22 @ 19:06)  HR: 81 (05-06-22 @ 01:44) (81 - 99)  BP: 135/66 (05-06-22 @ 01:44) (118/64 - 135/66)  RR: 20 (05-06-22 @ 01:44) (17 - 20)  SpO2: 99% (05-06-22 @ 01:44) (95% - 99%)  Wt(kg): --  PHYSICAL EXAM:  GENERAL: NAD, lying in bed comfortably  HEAD:  Atraumatic, Normocephalic  EYES: EOMI, PERRLA, conjunctiva and sclera clear  ENT: Moist mucous membranes  NECK: Supple, No JVD  CHEST/LUNG: decreased breath sounds at the bases  HEART: irregular rate and rhythm; No murmurs, rubs, or gallops  ABDOMEN: Bowel sounds present; Soft, Nontender, Nondistended   EXTREMITIES:  2+ Peripheral Pulses, brisk capillary refill. No clubbing, cyanosis, or edema  NERVOUS SYSTEM:  Alert & Oriented X3, speech clear. No deficits   MSK: FROM all 4 extremities, full and equal strength  SKIN: No rashes or lesions, thrill present in LUE

## 2022-05-06 NOTE — PROGRESS NOTE ADULT - ASSESSMENT
87 y/o M pmh HTN, CAD< ESRD on HD (Tu, Thurs, Sat), Afib on eliquis presenting from [place] with bright red blood per rectum, likely 2/2 brisk lower GI bleed, CT abdomen c/f sigmoid diverticular bleed.     Neuro   AA03, Responds to commands  No active issues    Cardio    #Aortic Stenosis  #Hx Congestive Heart Failure  Obtain records from [facility], was worked up and planned for TAVR at [facility]   f/u Echo  c/w Torsemide 100mg daily     #Afib, chronic  rate controlled  hold Diltiazem i/s/o Normotension while actively bleeding  hold Eliquis    #CAD  c/w Atorvastatin 10mg  hold Plavix 75mg        Pulm   #Acute Hypoxic Respiratory failure  i/s/o worsening acute blood loss anemia, w/t bilateral pleural effusions, and COVID '  c/w Nasal cannula, titrate to Sp02>88- 93%   Consider dex if pt continues to be hypoxic after fluid removal via dialysis    #Bilateral pleural effusions   CXR with interstitial vascular opacification and bilat pleural effusions  likely V overloaded i/s/o missed dialysis   Patient undergoing HD 5/6 for fluid removal       #Hx COPD   c/w Albuterol HFA inhaler prn       GI   #Lower GI Bleed   CTA Abdomen, Pelvis with brisk sigmoid colon bleed  IR consulted, want to f/u coags after Kcentra, trend CBC after blood transfusions, if does not appropriately respond, will consider intervention   GI consulted, deferring to IR  Keep NPO   pantoprazole 40mg BID  c/w Hold all NSAID's, DVT prophylaxis, bowel regimen i/s/o active bleed  Transfuse to Hg >8    /Renal   #ESRD, HD Tues, Thurs, Sat  Last HD missed Thurs 5/5 i/s/o active bleed  Nephrology consulted  Hemodialysis 5/6  BMP Q12, replete lytes prn  Strict I's/O's  c/w Renvela 1600mg TID    Heme  #Macrocytic Anemia   #Acute Blood Loss Anemia   MCV elevated, Baseline Hgb around 8 per daughter  f/u serum B12 and thiamine   CBC's Q12  transfuse if Hgb<7        #Pancytopenia  Likely bone marrow supression i/s/o Enterococcus bacteremia and now, lower GI bleed    Endocrine   #Hypothyroidism   Repeat TSH  c/w Levothyroxine 112mcg daily     ID   #Enterococcus Bacteremia   diagnosed, treated at [facility] in 4/2022 w/ unclear source (Per son, GI source was not considered, colonoscopy was deferred fo f/u outpatient)   c/w IV Ceftriaxone 2gm BI, stop after 32 days  c/w IV Ampicillin 2gm BID, stop after 32 days   f/u repeat cultures  -TTE to evaluate for endocarditis?     #COVID   s/p Monocloal antibodies, bebetelovimab while at [place]  Hold Dexamethasone i/s/o prior treatment course at [facility], [facility], and respiratory symptoms better explained by pulmonary edema, pleural effusions   ctm    DVT Prophylaxis- hold Anticoagulation i/s/o active bleed. Start SCD's   87 y/o M pmh HTN, CAD< ESRD on HD (Tu, Thurs, Sat), Afib on eliquis presenting from [place] with bright red blood per rectum, likely 2/2 brisk lower GI bleed, CT abdomen c/f sigmoid diverticular bleed.     Neuro   AA03, Responds to commands  No active issues    Cardio    #Aortic Stenosis  #Hx Congestive Heart Failure  Obtain records from [facility], was worked up and planned for TAVR at [facility]   f/u Echo  c/w Torsemide 100mg daily     #Afib, chronic  rate controlled  hold Diltiazem i/s/o Normotension while actively bleeding  hold Eliquis    #CAD  c/w Atorvastatin 10mg  hold Plavix 75mg        Pulm   #Acute Hypoxic Respiratory failure  i/s/o worsening acute blood loss anemia, w/t bilateral pleural effusions, and COVID '  c/w Nasal cannula, titrate to Sp02>88- 93%   Consider dex if pt continues to be hypoxic after fluid removal via dialysis    #Bilateral pleural effusions   CXR with interstitial vascular opacification and bilat pleural effusions  likely V overloaded i/s/o missed dialysis   Patient undergoing HD 5/6 for fluid removal       #Hx COPD   c/w Albuterol HFA inhaler prn       GI   #Lower GI Bleed   CTA Abdomen, Pelvis with brisk sigmoid colon bleed  IR consulted, want to f/u coags after Kcentra, trend CBC after blood transfusions, if does not appropriately respond, will consider intervention   GI consulted, deferring to IR  Keep NPO   pantoprazole 40mg BID  c/w Hold all NSAID's, DVT prophylaxis, bowel regimen i/s/o active bleed  Transfuse to Hg >8    /Renal   #ESRD, HD Tues, Thurs, Sat  Last HD missed Thurs 5/5 i/s/o active bleed  Nephrology consulted  Hemodialysis 5/6  BMP Q12, replete lytes prn  Strict I's/O's  c/w Renvela 1600mg TID    Heme  #Macrocytic Anemia   #Acute Blood Loss Anemia   MCV elevated, Baseline Hgb around 8 per daughter  f/u serum B12 and thiamine   CBC's Q12  transfuse if Hgb<7        #Pancytopenia  Likely bone marrow supression i/s/o Enterococcus bacteremia and now, lower GI bleed    Endocrine   #Hypothyroidism   Repeat TSH  c/w Levothyroxine 112mcg daily     ID   #Enterococcus Bacteremia   diagnosed, treated at [facility] in 4/2022 w/ unclear source (Per son, GI source was not considered, colonoscopy was deferred fo f/u outpatient)   c/w IV Ceftriaxone 2gm BI, stop after 32 days  c/w IV Ampicillin 2gm BID, stop after 32 days   f/u repeat cultures  -TTE to evaluate for endocarditis?     #COVID   s/p Monocloal antibodies, bebetelovimab while at [place]  Hold Dexamethasone i/s/o prior treatment course at [facility], [facility], and respiratory symptoms better explained by pulmonary edema, pleural effusions   ctm    DVT Prophylaxis- hold Anticoagulation i/s/o active bleed. Start SCD's    5/6: Updated patient's daughter Francoise Del Toro over the phone

## 2022-05-06 NOTE — CONSULT NOTE ADULT - SUBJECTIVE AND OBJECTIVE BOX
HPI: Mr. Del Toro is an 86 year-old man with history of multiple medical issues including hypertension, coronary artery disease, aortic stenosis, atrial fibrillation on Eliquis, and end stage renal disease. He presented overnight from [facility] with hematochezia for 1-2 days. He complained as well of fatigue and shortness of breath. He has been receiving hemodialysis of late at [facility] on Tuesdays, Thursdays, and Saturdays. He was last dialyzed Tuesday 5/3/22; he missed dialysis yesterday. Of note, he was recently admitted at [facility] (4/14/22) with enterococcal bacteremia; admission was complicated by COVID positivity.    I see that he was found to be COVID-positive in the ER. I see that he was ordered IV DDAVP, as well as Ampicillin and Rocephin IV. I see that he received Lasix 120mg iv x 1.      PAST MEDICAL & SURGICAL HISTORY:  HLD (hyperlipidemia)  HTN  ESRD on dialysis  CAD (coronary artery disease)  Afib  Aortic stenosis  Hypothyroidism    Allergies  codeine (Other (Mild to Mod))    SOCIAL HISTORY:  Denies ETOh,Smoking,     FAMILY HISTORY:  No pertinent family history in first degree relatives    REVIEW OF SYSTEMS:  CONSTITUTIONAL: (+)fatigue  EYES/ENT: No visual changes;  No vertigo or throat pain   NECK: No pain or stiffness  RESPIRATORY: No cough, wheezing, hemoptysis; (+)shortness of breath  CARDIOVASCULAR: No chest pain or palpitations  GASTROINTESTINAL: (+)hematochezia  GENITOURINARY: No dysuria, frequency or hematuria  NEUROLOGICAL: No numbness or weakness  SKIN: No itching, burning, rashes, or lesions   All other review of systems is negative unless indicated above.    VITAL:  T(C): , Max: 36.7 (05-05-22 @ 19:06)  T(F): , Max: 98 (05-05-22 @ 19:06)  HR: 74 (05-06-22 @ 07:00)  BP: 109/48 (05-06-22 @ 07:00)  BP(mean): 63 (05-06-22 @ 07:00)  RR: 14 (05-06-22 @ 07:00)  SpO2: 100% (05-06-22 @ 07:00)    PHYSICAL EXAM:  Constitutional: NAD, Alert  HEENT: NCAT, MMM  Neck: Supple, No JVD  Respiratory: CTA-b/l  Cardiovascular: RRR s1s2, no m/r/g  Gastrointestinal: BS+, soft, NT/ND  Extremities: No peripheral edema b/l  Neurological: no focal deficits; strength grossly intact  Back: no CVAT b/l  Skin: No rashes, no nevi  Access:    LABS:                        6.7    3.37  )-----------( 108      ( 06 May 2022 06:57 )             20.7     Na(136)/K(4.0)/Cl(96)/HCO3(23)/BUN(39)/Cr(5.82)Glu(111)/Ca(8.9)/Mg(--)/PO4(--)    05-05 @ 20:16      IMAGING:  < from: CT Angio Abdomen and Pelvis w/ IV Cont (05.06.22 @ 03:42) >  INTERPRETATION:  Active extravasation of contrast within the sigmoid   colon (4:80) consistent with active GI bleed  Moderate right and small left pleural effusions with adjacent atelectasis  Cholelithiasis and sludge within the gallbladder  Diverticulosis of the sigmoid colon  Small umbilical hernia containing a small portion of nonobstructed bowel  Trace presacral free fluid  Extensive atherosclerotic changes  Anasarca  Findings were discussed with provider James.  Follow up official report.      ASSESSMENT:  (1)Renal - ESRD - HD TTS   (2)Anemia - in setting of GIB - indicated for PRBCs with HD today  (3)CV - hypervolemia/pleural effusions - warranting aggressive UF with HD today    RECOMMEND:  (1)HD today - 3hours - 3kg UF as able; 2U PRBCs with HD; Retacrit with HD  (2)DDAVP as ordered  (3)Meds for GFR <10/HD  (4)F/U GI input  Thank you for involving [facility] in this patient's care.    With warm regards,    Jonathan Cardona MD   [facility]  Office: [phone]  Cell: [phone]               HPI: Mr. Del Toro is an 86 year-old man with history of multiple medical issues including hypertension, coronary artery disease, aortic stenosis, atrial fibrillation on Eliquis, and end stage renal disease. He presented overnight from [facility] with hematochezia for 1-2 days. He complained as well of fatigue and shortness of breath. He has been receiving hemodialysis of late at [facility] on Tuesdays, Thursdays, and Saturdays. He was last dialyzed Tuesday 5/3/22; he missed dialysis yesterday. Of note, he was recently admitted at [facility] (4/14/22) with enterococcal bacteremia; admission was complicated by COVID positivity.    I see that he was found to be COVID-positive in the ER. I see that he was ordered IV DDAVP, as well as Ampicillin and Rocephin IV. I see that he received Lasix 120mg iv x 1. He is receiving 1 unit of PRBCs now.    He shares that he has been dialysis-dependent since 9/2021. He undergoes chronic dialysis in [place]; he cannot recall the name of his outside nephrologist. His access is a left arm AV graft.    PAST MEDICAL & SURGICAL HISTORY:  HLD (hyperlipidemia)  HTN  ESRD on dialysis  CAD (coronary artery disease)  Afib  Aortic stenosis  Hypothyroidism    Allergies  codeine (Other (Mild to Mod))    SOCIAL HISTORY:  Denies ETOh,Smoking,     FAMILY HISTORY:  No pertinent family history in first degree relatives    REVIEW OF SYSTEMS:  CONSTITUTIONAL: (+)fatigue  EYES/ENT: No visual changes;  No vertigo or throat pain   NECK: No pain or stiffness  RESPIRATORY: No cough, wheezing, hemoptysis; (+)shortness of breath  CARDIOVASCULAR: No chest pain or palpitations  GASTROINTESTINAL: (+)hematochezia  GENITOURINARY: No dysuria, frequency or hematuria  NEUROLOGICAL: No numbness or weakness  SKIN: No itching, burning, rashes, or lesions   All other review of systems is negative unless indicated above.    VITAL:  T(C): , Max: 36.7 (05-05-22 @ 19:06)  T(F): , Max: 98 (05-05-22 @ 19:06)  HR: 74 (05-06-22 @ 07:00)  BP: 109/48 (05-06-22 @ 07:00)  BP(mean): 63 (05-06-22 @ 07:00)  RR: 14 (05-06-22 @ 07:00)  SpO2: 100% (05-06-22 @ 07:00)    PHYSICAL EXAM:  Constitutional: NAD, Alert  HEENT: NCAT, MMM  Neck: Supple, No JVD  Respiratory: decreased BS b/l bases  Cardiovascular: irreg s1s2  Gastrointestinal: BS+, soft, NT/ND  Extremities: No peripheral edema b/l  Neurological: reduced generalized strength  Back: no CVAT b/l  Skin: No rashes, no nevi  Access: LUE AVG (+)thrill    LABS:                        6.7    3.37  )-----------( 108      ( 06 May 2022 06:57 )             20.7     Na(136)/K(4.0)/Cl(96)/HCO3(23)/BUN(39)/Cr(5.82)Glu(111)/Ca(8.9)/Mg(--)/PO4(--)    05-05 @ 20:16      IMAGING:  < from: CT Angio Abdomen and Pelvis w/ IV Cont (05.06.22 @ 03:42) >  INTERPRETATION:  Active extravasation of contrast within the sigmoid   colon (4:80) consistent with active GI bleed  Moderate right and small left pleural effusions with adjacent atelectasis  Cholelithiasis and sludge within the gallbladder  Diverticulosis of the sigmoid colon  Small umbilical hernia containing a small portion of nonobstructed bowel  Trace presacral free fluid  Extensive atherosclerotic changes  Anasarca  Findings were discussed with provider James.  Follow up official report.      ASSESSMENT:  (1)Renal - ESRD - HD TTS   (2)Anemia - in setting of GIB - indicated for PRBCs with HD today  (3)CV - hypervolemia/pleural effusions - warranting aggressive UF with HD today    RECOMMEND:  (1)HD today - 3hours - 3kg UF as able; 1U PRBCs with HD; Retacrit with HD; EMLA pre-HD  (2)DDAVP as ordered  (3)Meds for GFR <10/HD  (4)F/U GI/IR input   Thank you for involving [facility] in this patient's care.    With warm regards,    Jonathan Cardona MD   [facility]  Office: [phone]  Cell: [phone]

## 2022-05-06 NOTE — H&P ADULT - NSHPREVIEWOFSYSTEMS_GEN_ALL_CORE
REVIEW OF SYSTEMS:  CONSTITUTIONAL: No weakness, fevers or chills  EYES: No visual changes;  No vertigo   ENT: No throat pain, no runny nose   NECK: No pain or stiffness  RESPIRATORY: +mild sob, +cough  CARDIOVASCULAR: No chest pain or palpitations  GASTROINTESTINAL: +LGIB  GENITOURINARY: No dysuria, frequency or hematuria  NEUROLOGICAL: No numbness or weakness  SKIN: No itching, rashes  PSYCH: No mood changes, suicidal ideation

## 2022-05-06 NOTE — PATIENT PROFILE ADULT - FALL HARM RISK - HARM RISK INTERVENTIONS

## 2022-05-06 NOTE — CONSULT NOTE ADULT - SUBJECTIVE AND OBJECTIVE BOX
HPI:  ANA KONG is a 86 year old male with history of HTN, CAD on Plavix, AF on Eliquis, ESRD on HD TTS, hypothyroidism, and aortic stenosis who presents with BRBPR.    Patient had recent admission at [facility] on 4/14/2022 for +COVID, enterococcus bacteremia with ? endocarditis per documentation, discharged to [facility] on 4/30/2022 with PICC for ABx.  On 5/5/2022, patient developed bloody BMs.  Last dose(s) of Plavix and Eliquis were on the morning of 5/5/2022.  Patient was sent to [facility] ED for further evaluation.  Otherwise, patient denies weight loss, dysphagia, odynophagia, abdominal pain, nausea, vomiting, diarrhea, melena, hematemesis.    ROS:   General:  No fevers, chills, night sweats, fatigue  Eyes:  Good vision, no reported pain  ENT:  No sore throat, pain, runny nose  CV:  No pain, palpitations  Pulm:  No dyspnea, cough  GI:  See HPI, otherwise negative  :  No  incontinence, nocturia  Muscle:  No pain, weakness  Neuro:  No memory problems  Psych:  No insomnia, mood problems, depression  Endocrine:  No polyuria, polydipsia, cold/heat intolerance  Heme:  No petechiae, ecchymosis, easy bruisability  Skin:  No rash    PMHX/PSHX:    No pertinent past medical history    ESRD on dialysis    CAD (coronary artery disease)    Afib    HLD (hyperlipidemia)    Mild HTN    Aortic stenosis    No significant past surgical history      Allergies:  codeine (Other (Mild to Mod))  No Known Allergies      Home Medications: reviewed  Hospital Medications:  acetaminophen     Tablet .. 650 milliGRAM(s) Oral every 6 hours PRN  ALBUTerol    90 MICROgram(s) HFA Inhaler 2 Puff(s) Inhalation every 6 hours PRN  ampicillin  IVPB 2 Gram(s) IV Intermittent every 12 hours  ascorbic acid 500 milliGRAM(s) Oral daily  atorvastatin 10 milliGRAM(s) Oral at bedtime  cefTRIAXone   IVPB 2000 milliGRAM(s) IV Intermittent every 12 hours  chlorhexidine 4% Liquid 1 Application(s) Topical <User Schedule>  epoetin ruthann-epbx (RETACRIT) Injectable 96514 Unit(s) IV Push <User Schedule>  folic acid 1 milliGRAM(s) Oral daily  levothyroxine 112 MICROGram(s) Oral daily  lidocaine/prilocaine Cream 1 Application(s) Topical <User Schedule>  pantoprazole  Injectable 40 milliGRAM(s) IV Push two times a day  sevelamer carbonate 1600 milliGRAM(s) Oral three times a day with meals  torsemide 100 milliGRAM(s) Oral daily  zinc oxide 20% Ointment 1 Application(s) Topical daily  zinc sulfate 220 milliGRAM(s) Oral daily      Social History:   Tobacco: denies  Alcohol: denies  Recreational drugs: denies    Family history:    No pertinent family history in first degree relatives      Denies family history of colon cancer/polyps, stomach cancer/polyps, pancreatic cancer/masses, liver cancer/disease, ovarian cancer and endometrial cancer.    PHYSICAL EXAM:   Vital Signs:  Vital Signs Last 24 Hrs  T(C): 36.1 (06 May 2022 08:00), Max: 36.7 (05 May 2022 19:06)  T(F): 97 (06 May 2022 08:00), Max: 98 (05 May 2022 19:06)  HR: 72 (06 May 2022 09:00) (72 - 109)  BP: 122/50 (06 May 2022 09:00) (108/49 - 135/66)  BP(mean): 68 (06 May 2022 09:00) (63 - 69)  RR: 18 (06 May 2022 09:00) (14 - 26)  SpO2: 96% (06 May 2022 09:00) (95% - 100%)  Daily     Daily     GENERAL: no acute distress  NEURO: alert  HEENT: NCAT, no conjunctival pallor appreciated  CHEST: no respiratory distress, no accessory muscle use  CARDIAC: regular rate, +S1/S2  ABDOMEN: soft, nondistended, nontender, no rebound or guarding  RECTAL: blood in rectal pouch  EXTREMITIES: warm, well perfused  SKIN: no lesions noted    LABS: reviewed                        6.7    3.37  )-----------( 108      ( 06 May 2022 06:57 )             20.7     05-05    136  |  96<L>  |  39<H>  ----------------------------<  111<H>  4.0   |  23  |  5.82<H>    Ca    8.9      05 May 2022 20:16    TPro  5.6<L>  /  Alb  2.8<L>  /  TBili  0.2  /  DBili  x   /  AST  18  /  ALT  15  /  AlkPhos  122<H>  05-05    LIVER FUNCTIONS - ( 05 May 2022 20:16 )  Alb: 2.8 g/dL / Pro: 5.6 g/dL / ALK PHOS: 122 U/L / ALT: 15 U/L / AST: 18 U/L / GGT: x               Diagnostic Studies: see sunrise for full report         HPI:  ANA KONG is a 86 year old male with history of HTN, CAD on Plavix, AF on Eliquis, ESRD on HD TTS, hypothyroidism, and aortic stenosis who presents with BRBPR.    Patient had recent admission at [facility] on 4/14/2022 for +COVID, enterococcus bacteremia with ? endocarditis per documentation, discharged to [facility] on 4/30/2022 with PICC for ABx.  On 5/5/2022, patient developed bloody BMs.  Last dose(s) of Plavix and Eliquis were on the morning of 5/5/2022.  Patient was sent to [facility] ED for further evaluation.  Otherwise, patient denies weight loss, dysphagia, odynophagia, abdominal pain, nausea, vomiting, diarrhea, melena, hematemesis.    ROS:   14-point ROS reviewed and negative except as per HPI above    PMHX/PSHX:    No pertinent past medical history    ESRD on dialysis    CAD (coronary artery disease)    Afib    HLD (hyperlipidemia)    Mild HTN    Aortic stenosis    No significant past surgical history      Allergies:  codeine (Other (Mild to Mod))  No Known Allergies      Home Medications: reviewed  Hospital Medications:  acetaminophen     Tablet .. 650 milliGRAM(s) Oral every 6 hours PRN  ALBUTerol    90 MICROgram(s) HFA Inhaler 2 Puff(s) Inhalation every 6 hours PRN  ampicillin  IVPB 2 Gram(s) IV Intermittent every 12 hours  ascorbic acid 500 milliGRAM(s) Oral daily  atorvastatin 10 milliGRAM(s) Oral at bedtime  cefTRIAXone   IVPB 2000 milliGRAM(s) IV Intermittent every 12 hours  chlorhexidine 4% Liquid 1 Application(s) Topical <User Schedule>  epoetin ruthann-epbx (RETACRIT) Injectable 41281 Unit(s) IV Push <User Schedule>  folic acid 1 milliGRAM(s) Oral daily  levothyroxine 112 MICROGram(s) Oral daily  lidocaine/prilocaine Cream 1 Application(s) Topical <User Schedule>  pantoprazole  Injectable 40 milliGRAM(s) IV Push two times a day  sevelamer carbonate 1600 milliGRAM(s) Oral three times a day with meals  torsemide 100 milliGRAM(s) Oral daily  zinc oxide 20% Ointment 1 Application(s) Topical daily  zinc sulfate 220 milliGRAM(s) Oral daily      Social History:   Tobacco: denies  Alcohol: denies  Recreational drugs: denies    Family history:    No pertinent family history in first degree relatives      Denies family history of colon cancer/polyps, stomach cancer/polyps, pancreatic cancer/masses, liver cancer/disease, ovarian cancer and endometrial cancer.    PHYSICAL EXAM:   Vital Signs:  Vital Signs Last 24 Hrs  T(C): 36.1 (06 May 2022 08:00), Max: 36.7 (05 May 2022 19:06)  T(F): 97 (06 May 2022 08:00), Max: 98 (05 May 2022 19:06)  HR: 72 (06 May 2022 09:00) (72 - 109)  BP: 122/50 (06 May 2022 09:00) (108/49 - 135/66)  BP(mean): 68 (06 May 2022 09:00) (63 - 69)  RR: 18 (06 May 2022 09:00) (14 - 26)  SpO2: 96% (06 May 2022 09:00) (95% - 100%)  Daily     Daily     GENERAL: no acute distress  NEURO: alert  HEENT: NCAT, no conjunctival pallor appreciated  CHEST: no respiratory distress, no accessory muscle use  CARDIAC: regular rate, +S1/S2  ABDOMEN: soft, nondistended, nontender, no rebound or guarding  RECTAL: blood in rectal pouch  EXTREMITIES: warm, well perfused  SKIN: no lesions noted    LABS: reviewed                        6.7    3.37  )-----------( 108      ( 06 May 2022 06:57 )             20.7     05-05    136  |  96<L>  |  39<H>  ----------------------------<  111<H>  4.0   |  23  |  5.82<H>    Ca    8.9      05 May 2022 20:16    TPro  5.6<L>  /  Alb  2.8<L>  /  TBili  0.2  /  DBili  x   /  AST  18  /  ALT  15  /  AlkPhos  122<H>  05-05    LIVER FUNCTIONS - ( 05 May 2022 20:16 )  Alb: 2.8 g/dL / Pro: 5.6 g/dL / ALK PHOS: 122 U/L / ALT: 15 U/L / AST: 18 U/L / GGT: x               Diagnostic Studies: see sunrise for full report

## 2022-05-06 NOTE — ED ADULT NURSE REASSESSMENT NOTE - NS ED NURSE REASSESS COMMENT FT1
Pt a&ox4, a fib on cardiac monitor, denies CP, SOB, or dyspnea at this time. Speaking in clear and coherent sentences, respirations are even and unlabored, no signs of respiratory distress.

## 2022-05-06 NOTE — H&P ADULT - NSICDXPASTMEDICALHX_GEN_ALL_CORE_FT
PAST MEDICAL HISTORY:  Afib     CAD (coronary artery disease)     ESRD on dialysis     HLD (hyperlipidemia)     Mild HTN      PAST MEDICAL HISTORY:  Afib     Aortic stenosis     CAD (coronary artery disease)     ESRD on dialysis     HLD (hyperlipidemia)     Mild HTN

## 2022-05-06 NOTE — CHART NOTE - NSCHARTNOTEFT_GEN_A_CORE
: MD Truong and DO Akbar    INDICATION: Hypoxic Respiratory Failure    PROCEDURE:  [x] LIMITED ECHO  [x] LIMITED CHEST  [ ] LIMITED RETROPERITONEAL  [ ] LIMITED ABDOMINAL  [ ] LIMITED DVT  [ ] NEEDLE GUIDANCE VASCULAR  [ ] NEEDLE GUIDANCE THORACENTESIS  [ ] NEEDLE GUIDANCE PARACENTESIS  [ ] NEEDLE GUIDANCE PERICARDIOCENTESIS  [ ] OTHER    FINDINGS:  Thoracic: B-lines anteriorly bilaterally. Bilateral pleural effusions, moderate-large on the R, small on the L  Cardiac: Grossly normal LV systolic function. LV>RV. No pericardial effusion. IVC over 2cm.    INTERPRETATION:  B-lines anteriorly bilaterally with bilateral pleural effusions, R>L. Grossly normal goal directed echocardiography with large IVC.    Images stored in [facility] : MD Truong and DO Akbar    INDICATION: Hypoxic Respiratory Failure    PROCEDURE:  [x] LIMITED ECHO  [x] LIMITED CHEST  [ ] LIMITED RETROPERITONEAL  [ ] LIMITED ABDOMINAL  [ ] LIMITED DVT  [ ] NEEDLE GUIDANCE VASCULAR  [ ] NEEDLE GUIDANCE THORACENTESIS  [ ] NEEDLE GUIDANCE PARACENTESIS  [ ] NEEDLE GUIDANCE PERICARDIOCENTESIS  [ ] OTHER    FINDINGS:  Thoracic: B-lines anteriorly bilaterally. Bilateral pleural effusions, moderate-large on the R, small on the L  Cardiac: Grossly normal LV systolic function. LV>RV. No pericardial effusion. IVC over 2cm.    INTERPRETATION:  B-lines anteriorly bilaterally with bilateral pleural effusions, R>L. Grossly normal goal directed echocardiography with large IVC.    Images uploaded on [employer]    Attending Attestation:  I was present during the key portions of the procedure and immediately available during the entire procedure.  Maldonado Webber DO  Attending  Pulmonary & Critical Care Medicine

## 2022-05-06 NOTE — ED ADULT NURSE REASSESSMENT NOTE - NS ED NURSE REASSESS COMMENT FT1
Pt a&ox4, a fib on the cardiac monitor, denies CP, increased SOB or dyspnea at this time. Pt changed and turned, large dark bloody stool noted. Stage 2 pressure ulcer noted to left sacral region. Respirations are even and unlabored, no signs of respiratory distress.

## 2022-05-06 NOTE — H&P ADULT - HISTORY OF PRESENT ILLNESS
85 yo M with pmh of HTN, CAD, ESRD on HD (Tu,Thurs,Sat), Afib on eliquis presenting from [place] for concern of GI bleed. Nurses at rehab saw bright red blood in feces. Patient himself, feels mildly tired with mild sob however was recently diagnosed with COVID in [place]. Patient was initially in [facility] for enterococcus bacteremia and currently has picc line getting antibiotics and was discharged to [place]. Due for dialysis today. 87 yo M with pmh of HTN, CAD, ESRD on HD (Tu,Thurs,Sat), Afib on eliquis presenting from [facility], Hypothyroidism, HLD with bright red blood per rectum. Pt found with Bloody BM's since yesterday. Pt missed his last dialysis session yesterday as a result. Pt also endorsing symptoms of fatigue, shortness of breath, and has been w/ prior COVID diagnosis while at [facility] prior to discharge to [facility]. Patient denies worsening abdominal pain, n/v/d. Patient denies fevers, chills. Patient reports he recently received Monoclonal antibodies, bebetelovimab while at [place].     Of note, patient was recently seen at [facility], admitted 4/14, found with Enterococcus bacteremia of unknown source, treated with antibiotics, and discharged to [facility] with Picc.     ED   Admitted with blood mixed stools, 2 large bloody BM's in ED, w/t Hgb 6. Pt given 1 u prbc's in the ED.   Nephrology consulted in the ED for fluid removal and plan to start dilaysis in the am.   Admitted with sob, found in Acute hypoxic respiratory failure requiring Nasal cannula, otherwise normotensive, afebrile, AA03. 87 yo M with pmh of HTN, CAD, ESRD on HD (Tu,Thurs,Sat), Afib on eliquis presenting from [facility], Hypothyroidism, HLD, aortic stenosis presents with bright red blood per rectum. Pt found with Bloody BM's since yesterday. Pt missed his last dialysis session yesterday as a result. Pt also endorsing symptoms of fatigue, shortness of breath. Pt reports he was diagnosed 1 day after discharge from [facility] to [facility]. Patient denies worsening abdominal pain, n/v/d. Patient denies fevers, chills. Patient reports he recently received Monoclonal antibodies, bebetelovimab while at [place].     Of note, patient was recently seen at [facility], admitted 4/14, found with Enterococcus bacteremia of unknown source, treated with antibiotics, and discharged to [facility] with Picc.     ED   Admitted with blood mixed stools, 2 large bloody BM's in ED, w/t Hgb 6. Pt given 1 u prbc's in the ED.   Nephrology consulted in the ED for fluid removal and plan to start dilaysis in the am.   Admitted with sob, found in Acute hypoxic respiratory failure requiring Nasal cannula, otherwise normotensive, afebrile, AA03. 87 yo M with pmh of HTN, CAD, ESRD on HD (Tu,Thurs,Sat), Afib on eliquis presenting from [facility], Hypothyroidism, HLD, aortic stenosis presents with bright red blood per rectum. Pt found with Bloody BM's since yesterday. Pt missed his last dialysis session yesterday as a result. Pt also endorsing symptoms of fatigue, shortness of breath. Note Pt found COVID positive 4/30 at [facility]. Patient denies worsening abdominal pain, n/v/d. Patient denies fevers, chills. Patient reports he recently received Monoclonal antibodies, bebetelovimab while at [place].     Of note, patient was recently seen at [facility], admitted 4/14, found with Enterococcus bacteremia of unknown source, where also found COVID positive, treated with antibiotics, and [facility] with [facility]c.     ED   Admitted with blood mixed stools, 2 large bloody BM's in ED, w/t Hgb 6. Pt given 1 u prbc's in the ED.   Nephrology consulted in the ED for fluid removal and plan to start dilaysis in the am.   Admitted with sob, found in Acute hypoxic respiratory failure requiring Nasal cannula, otherwise normotensive, afebrile, AA03.

## 2022-05-06 NOTE — H&P ADULT - NSHPLABSRESULTS_GEN_ALL_CORE
LABS: Personally reviewed labs, imaging, and ECG                          6.0    3.39  )-----------( 118      ( 05 May 2022 23:16 )             18.9       05-05    136  |  96<L>  |  39<H>  ----------------------------<  111<H>  4.0   |  23  |  5.82<H>    Ca    8.9      05 May 2022 20:16    TPro  5.6<L>  /  Alb  2.8<L>  /  TBili  0.2  /  DBili  x   /  AST  18  /  ALT  15  /  AlkPhos  122<H>  05-05       LIVER FUNCTIONS - ( 05 May 2022 20:16 )  Alb: 2.8 g/dL / Pro: 5.6 g/dL / ALK PHOS: 122 U/L / ALT: 15 U/L / AST: 18 U/L / GGT: x              PT/INR - ( 05 May 2022 20:16 )   PT: 18.0 sec;   INR: 1.54 ratio         PTT - ( 05 May 2022 20:16 )  PTT:35.5 sec    Lactate Trend    CAPILLARY BLOOD GLUCOSE    RADIOLOGY & ADDITIONAL TESTS:     < from: Xray Chest 1 View- PORTABLE-Urgent (Xray Chest 1 View- PORTABLE-Urgent .) (05.05.22 @ 21:57) >    INTERPRETATION:  EXAMINATION: XR CHEST URGENT    CLINICAL INDICATION: Shortness of breath    TECHNIQUE: Single frontal, portable view of the chest was obtained.    COMPARISON:  None.    FINDINGS:  Right-sided PICC line tip in the SVC.  Cannot accurately assess cardiac size in this projection.  Bilateral pleural effusions, right greater than left.    IMPRESSION:  Bilateral pleural effusions, right greater than left.    < end of copied text >

## 2022-05-07 NOTE — PROGRESS NOTE ADULT - ASSESSMENT
87 y/o M pmh HTN, CAD< ESRD on HD (Tu, Thurs, Sat), Afib on eliquis presenting from [place] with bright red blood per rectum, likely 2/2 brisk lower GI bleed, CT abdomen c/f sigmoid diverticular bleed.     Neuro   AA03, Responds to commands  No active issues    Cardio    #Aortic Stenosis  #Hx Congestive Heart Failure  Obtain records from [facility], was worked up and planned for TAVR at [facility]   f/u Echo  c/w Torsemide 100mg daily     #Afib, chronic  rate controlled  hold Diltiazem i/s/o Normotension while actively bleeding  hold Eliquis    #CAD  c/w Atorvastatin 10mg  hold Plavix 75mg        Pulm   #Acute Hypoxic Respiratory failure  i/s/o worsening acute blood loss anemia, w/t bilateral pleural effusions, and COVID '  c/w Nasal cannula, titrate to Sp02>88- 93%   Consider dex if pt continues to be hypoxic after fluid removal via dialysis    #Bilateral pleural effusions   CXR with interstitial vascular opacification and bilat pleural effusions  likely V overloaded i/s/o missed dialysis   Patient undergoing HD 5/6 for fluid removal       #Hx COPD   c/w Albuterol HFA inhaler prn       GI   #Lower GI Bleed   CTA Abdomen, Pelvis with brisk sigmoid colon bleed  IR consulted, want to f/u coags after Kcentra, trend CBC after blood transfusions, if does not appropriately respond, will consider intervention   GI consulted, deferring to IR  Keep NPO   pantoprazole 40mg BID  c/w Hold all NSAID's, DVT prophylaxis, bowel regimen i/s/o active bleed  Transfuse to Hg >8    /Renal   #ESRD, HD Tues, Thurs, Sat  Last HD missed Thurs 5/5 i/s/o active bleed  Nephrology consulted  Hemodialysis 5/6  BMP Q12, replete lytes prn  Strict I's/O's  c/w Renvela 1600mg TID    Heme  #Macrocytic Anemia   #Acute Blood Loss Anemia   MCV elevated, Baseline Hgb around 8 per daughter  f/u serum B12 and thiamine   CBC's Q12  transfuse if Hgb<7        #Pancytopenia  Likely bone marrow supression i/s/o Enterococcus bacteremia and now, lower GI bleed    Endocrine   #Hypothyroidism   Repeat TSH  c/w Levothyroxine 112mcg daily     ID   #Enterococcus Bacteremia   diagnosed, treated at [facility] in 4/2022 w/ unclear source (Per son, GI source was not considered, colonoscopy was deferred fo f/u outpatient)   c/w IV Ceftriaxone 2gm BI, stop after 32 days  c/w IV Ampicillin 2gm BID, stop after 32 days   f/u repeat cultures  -TTE to evaluate for endocarditis?     #COVID   s/p Monocloal antibodies, bebetelovimab while at [place]  Hold Dexamethasone i/s/o prior treatment course at [facility], Mab's, and respiratory symptoms better explained by pulmonary edema, pleural effusions   ctm    DVT Prophylaxis- hold Anticoagulation i/s/o active bleed. Start SCD's    5/6: Updated patient's daughter Francoise Del Toro over the phone   85 y/o M pmh HTN, CAD< ESRD on HD (Tu, Thurs, Sat), Afib on eliquis presenting from [place] with bright red blood per rectum, likely 2/2 brisk lower GI bleed, CT abdomen c/f sigmoid diverticular bleed.     Neuro   - AA0X3, Responds to commands  - no active issues    Cardio  #Aortic Stenosis  #Hx Congestive Heart Failure  - from records from [facility], was worked up and planned for TAVR at [facility]   - s/p HD (5/6)     #Afib, chronic  - rate controlled  - hold Diltiazem i/s/o normotension while actively bleeding  - hold Eliquis i/s/o GI Bleed, will need to have risks/benefits discussion with patient on continuing    #CAD  - c/w Atorvastatin 10mg  - hold Plavix 75mg i/s/o GI Bleed     Pulm   #Acute Hypoxic Respiratory failure  - i/s/o worsening acute blood loss anemia, w/ bilateral pleural effusions, and COVID (s/p MAB treatment)  - c/w Nasal cannula, titrate to Sp02>88- 93%   - consider dex if pt continues to be hypoxic after fluid removal via dialysis and is unable to have O2 weaned    #Bilateral pleural effusions   - CXR with interstitial vascular opacification and bilat pleural effusions  - likely V overloaded i/s/o missed dialysis   - patient s/p HD on (5/6) for fluid removal     #Hx COPD   c/w Albuterol HFA inhaler prn     GI   #Lower GI Bleed   - CTA Abdomen, Pelvis with brisk sigmoid colon bleed  - IR consulted, want to f/u coags after Kcentra, trend CBC after blood transfusions, likely will spontaneously resolve, if does not appropriately respond, will consider intervention   - GI consulted, deferring to IR  - keep NPO for now  - pantoprazole 40mg BID  - c/w holding NSAID's, DVT prophylaxis, bowel regimen i/s/o active bleed  - transfuse to Hg >8    /Renal   #ESRD, HD Tues, Thurs, Sat  - last HD missed Thurs 5/5 i/s/o active bleed  - Nephrology consulted  - s/p HD (5/6_  - BMP Q12, replete lytes prn  - strict I's/O's  - c/w Renvela 1600mg TID    Heme  #Macrocytic Anemia   #Acute Blood Loss Anemia   - MCV elevated, Baseline Hgb around 8 per daughter  - f/u serum B12 and thiamine   - CBC's Q12  - transfuse if Hgb<7    #Pancytopenia  - likely bone marrow supression i/s/o Enterococcus bacteremia and now, lower GI bleed    Endocrine   #Hypothyroidism   - repeat TSH  - c/w Levothyroxine 112mcg daily     ID   #Enterococcus Bacteremia   - diagnosed, treated at [facility] in 4/2022 w/ unclear source (Per son, GI source was not considered, colonoscopy was deferred fo f/u outpatient)   - c/w IV Ceftriaxone 2gm BI, stop after 32 days  - c/w IV Ampicillin 2gm BID, stop after 32 days   - f/u repeat cultures    #COVID   - s/p Monocloal antibodies, bebetelovimab while at [place]  - hold Dexamethasone i/s/o prior treatment course at [facility], and respiratory symptoms better explained by pulmonary edema, pleural effusions   - CTM    DVT Prophylaxis - hold Anticoagulation i/s/o active bleed. Start SCDs

## 2022-05-07 NOTE — DIETITIAN INITIAL EVALUATION ADULT - PERTINENT MEDS FT
MEDICATIONS  (STANDING):  ampicillin  IVPB 2 Gram(s) IV Intermittent every 12 hours  ascorbic acid 500 milliGRAM(s) Oral daily  atorvastatin 10 milliGRAM(s) Oral at bedtime  cefTRIAXone   IVPB 2000 milliGRAM(s) IV Intermittent every 12 hours  chlorhexidine 2% Cloths 1 Application(s) Topical daily  epoetin ruthann-epbx (RETACRIT) Injectable 34581 Unit(s) IV Push <User Schedule>  folic acid 1 milliGRAM(s) Oral daily  levothyroxine Injectable 84 MICROGram(s) IV Push at bedtime  lidocaine/prilocaine Cream 1 Application(s) Topical <User Schedule>  pantoprazole  Injectable 40 milliGRAM(s) IV Push daily  sevelamer carbonate 1600 milliGRAM(s) Oral three times a day with meals  zinc oxide 20% Ointment 1 Application(s) Topical daily  zinc sulfate 220 milliGRAM(s) Oral daily    MEDICATIONS  (PRN):  acetaminophen     Tablet .. 650 milliGRAM(s) Oral every 6 hours PRN Temp greater or equal to 38C (100.4F), Mild Pain (1 - 3)  ALBUTerol    90 MICROgram(s) HFA Inhaler 2 Puff(s) Inhalation every 6 hours PRN Bronchospasm

## 2022-05-07 NOTE — DIETITIAN INITIAL EVALUATION ADULT - NSICDXPASTMEDICALHX_GEN_ALL_CORE_FT
PAST MEDICAL HISTORY:  Afib     Aortic stenosis     CAD (coronary artery disease)     ESRD on dialysis     HLD (hyperlipidemia)     Mild HTN

## 2022-05-07 NOTE — PROGRESS NOTE ADULT - SUBJECTIVE AND OBJECTIVE BOX
INTERVAL HPI/OVERNIGHT EVENTS:    SUBJECTIVE: Patient seen and examined at bedside.     REVIEW OF SYSTEMS:  CONSTITUTIONAL: No fevers, chills, fatigue, weakness  EYES: No loss of vision, double vision, blurry vision  EARS: No ringing in ears  NOSE: No nasal congestion, runny nose  MOUTH/THROAT: No sore throat, painful swallowing, lumps on neck  CARDIOVASCULAR: No chest pain, palpitations  RESPIRATORY: No cough, shortness of breath  GASTROINTESTINAL: No abdominal pain, nausea, vomiting, diarrhea, constipation  GENITOURINARY: No dysuria, frequency or hematuria  SKIN: No rashes, swelling  MSK: No muscle aches, joint aches  NEUROLOGICAL: No headache, numbness, tingling    OBJECTIVE:    VITAL SIGNS:  ICU Vital Signs Last 24 Hrs  T(C): 35.6 (07 May 2022 04:00), Max: 36.2 (06 May 2022 12:00)  T(F): 96 (07 May 2022 04:00), Max: 97.1 (06 May 2022 12:00)  HR: 92 (07 May 2022 06:00) (65 - 100)  BP: 121/53 (07 May 2022 06:00) (103/44 - 133/58)  BP(mean): 69 (07 May 2022 06:00) (60 - 79)  ABP: --  ABP(mean): --  RR: 15 (07 May 2022 06:00) (13 - 23)  SpO2: 100% (07 May 2022 06:00) (92% - 100%)        05-06 @ 07:01  -  05-07 @ 07:00  --------------------------------------------------------  IN: 1555 mL / OUT: 3500 mL / NET: -1945 mL      CAPILLARY BLOOD GLUCOSE          PHYSICAL EXAM:  GENERAL: NAD  HEENT: NC/AT, PERRL, EOMI, no conjunctival pallor or scleral icterus, moist mucous membranes  NECK: supple  CV: +S1/S2, RRR  RESPIRATORY: CTA b/l, no w/r/r  ABDOMEN: soft, NTND  MSK: Able to move all 4 extremities  NEURO: AAOx4 (person, place, time, event). Able to follow commands.  SKIN: WWP, 2+ peripheral pulses, no LE edema    MEDICATIONS:  MEDICATIONS  (STANDING):  ampicillin  IVPB 2 Gram(s) IV Intermittent every 12 hours  ascorbic acid 500 milliGRAM(s) Oral daily  atorvastatin 10 milliGRAM(s) Oral at bedtime  cefTRIAXone   IVPB 2000 milliGRAM(s) IV Intermittent every 12 hours  chlorhexidine 2% Cloths 1 Application(s) Topical daily  epoetin ruthann-epbx (RETACRIT) Injectable 03019 Unit(s) IV Push <User Schedule>  folic acid 1 milliGRAM(s) Oral daily  levothyroxine Injectable 84 MICROGram(s) IV Push at bedtime  lidocaine/prilocaine Cream 1 Application(s) Topical <User Schedule>  pantoprazole  Injectable 40 milliGRAM(s) IV Push daily  sevelamer carbonate 1600 milliGRAM(s) Oral three times a day with meals  zinc oxide 20% Ointment 1 Application(s) Topical daily  zinc sulfate 220 milliGRAM(s) Oral daily    MEDICATIONS  (PRN):  acetaminophen     Tablet .. 650 milliGRAM(s) Oral every 6 hours PRN Temp greater or equal to 38C (100.4F), Mild Pain (1 - 3)  ALBUTerol    90 MICROgram(s) HFA Inhaler 2 Puff(s) Inhalation every 6 hours PRN Bronchospasm      ALLERGIES:  Allergies    No Known Allergies    Intolerances    codeine (Other (Mild to Mod))      LABS:                        9.6    5.68  )-----------( 128      ( 07 May 2022 03:13 )             28.9     05-07    136  |  97<L>  |  32<H>  ----------------------------<  70  4.0   |  24  |  4.74<H>    Ca    8.8      07 May 2022 03:13  Phos  5.3     05-07  Mg     2.20     05-07    TPro  5.5<L>  /  Alb  2.9<L>  /  TBili  0.4  /  DBili  x   /  AST  19  /  ALT  14  /  AlkPhos  110  05-07    PT/INR - ( 06 May 2022 15:55 )   PT: 12.7 sec;   INR: 1.09 ratio         PTT - ( 06 May 2022 15:55 )  PTT:33.0 sec      RADIOLOGY & ADDITIONAL TESTS: Reviewed. Patient is a 86y old Male who presents with a chief complaint of GI bleed.    INTERVAL HPI/OVERNIGHT EVENTS: No BMs overnight per patient and nursing staff.     SUBJECTIVE: Patient seen and examined at bedside. Patient states he feels fine, without acute new complaints. Patient denies fevers/chills, nausea/vomiting, chest pain, abdominal pain, bloody stools, nor changes in urination.     REVIEW OF SYSTEMS:  All other ROS negative.     OBJECTIVE:    VITAL SIGNS:  ICU Vital Signs Last 24 Hrs  T(C): 35.6 (07 May 2022 04:00), Max: 36.2 (06 May 2022 12:00)  T(F): 96 (07 May 2022 04:00), Max: 97.1 (06 May 2022 12:00)  HR: 92 (07 May 2022 06:00) (65 - 100)  BP: 121/53 (07 May 2022 06:00) (103/44 - 133/58)  BP(mean): 69 (07 May 2022 06:00) (60 - 79)  RR: 15 (07 May 2022 06:00) (13 - 23)  SpO2: 100% (07 May 2022 06:00) (92% - 100%)        05-06 @ 07:01  -  05-07 @ 07:00  --------------------------------------------------------  IN: 1555 mL / OUT: 3500 mL / NET: -1945 mL      CAPILLARY BLOOD GLUCOSE          PHYSICAL EXAM:  GENERAL: NAD, lying in bed comfortably  HEENT: NC/AT, PERRL, EOMI, no conjunctival pallor or scleral icterus, moist mucous membranes  NECK: supple  CV: Irregular rate and rhythm, systolic murmur appreciated  RESPIRATORY: Decreased breath sounds at b/l bases  ABDOMEN: soft, NTND  MSK: Able to move all 4 extremities  NEURO: AAOx4 (person, place, time, event). Able to follow commands.  SKIN: WWP, no LE edema    MEDICATIONS:  MEDICATIONS  (STANDING):  ampicillin  IVPB 2 Gram(s) IV Intermittent every 12 hours  ascorbic acid 500 milliGRAM(s) Oral daily  atorvastatin 10 milliGRAM(s) Oral at bedtime  cefTRIAXone   IVPB 2000 milliGRAM(s) IV Intermittent every 12 hours  chlorhexidine 2% Cloths 1 Application(s) Topical daily  epoetin ruthann-epbx (RETACRIT) Injectable 07117 Unit(s) IV Push <User Schedule>  folic acid 1 milliGRAM(s) Oral daily  levothyroxine Injectable 84 MICROGram(s) IV Push at bedtime  lidocaine/prilocaine Cream 1 Application(s) Topical <User Schedule>  pantoprazole  Injectable 40 milliGRAM(s) IV Push daily  sevelamer carbonate 1600 milliGRAM(s) Oral three times a day with meals  zinc oxide 20% Ointment 1 Application(s) Topical daily  zinc sulfate 220 milliGRAM(s) Oral daily    MEDICATIONS  (PRN):  acetaminophen     Tablet .. 650 milliGRAM(s) Oral every 6 hours PRN Temp greater or equal to 38C (100.4F), Mild Pain (1 - 3)  ALBUTerol    90 MICROgram(s) HFA Inhaler 2 Puff(s) Inhalation every 6 hours PRN Bronchospasm      ALLERGIES:  Allergies    No Known Allergies    Intolerances    codeine (Other (Mild to Mod))      LABS:                        9.6    5.68  )-----------( 128      ( 07 May 2022 03:13 )             28.9     05-07    136  |  97<L>  |  32<H>  ----------------------------<  70  4.0   |  24  |  4.74<H>    Ca    8.8      07 May 2022 03:13  Phos  5.3     05-07  Mg     2.20     05-07    TPro  5.5<L>  /  Alb  2.9<L>  /  TBili  0.4  /  DBili  x   /  AST  19  /  ALT  14  /  AlkPhos  110  05-07    PT/INR - ( 06 May 2022 15:55 )   PT: 12.7 sec;   INR: 1.09 ratio         PTT - ( 06 May 2022 15:55 )  PTT:33.0 sec      RADIOLOGY & ADDITIONAL TESTS: Reviewed.

## 2022-05-07 NOTE — DIETITIAN INITIAL EVALUATION ADULT - OTHER INFO
85 y/o male with ESRD requiring HD admitted with dx GIB, found to be Covid+. Pt being maintained NPO at this time during work up. Pt was unable to provide nutrition hx at time of visit. Nutrition consult generated for Stage II sacral pressure injury which confers a higher nutrition need. Height measurement obtained via [facility] records. Pt noted with fecal incontinence with red clots identified 5/6. Nutrition plan of care deferred to medical team. If pt to have NPO prolonged, consider alternate means of nutrition support. RDN services to remain available as needed.

## 2022-05-07 NOTE — PROGRESS NOTE ADULT - TIME BILLING
Medical management as above, review of results/records, discussion with consultants, and discharge planning.

## 2022-05-07 NOTE — DIETITIAN INITIAL EVALUATION ADULT - ADD RECOMMEND
1). Nutrition plan of care as per medical team. 2). Monitor weights, labs, BM's, skin integrity and edema. 3). Follow pt as per protocol.

## 2022-05-07 NOTE — PROGRESS NOTE ADULT - ASSESSMENT
Mr. Del Toro is an 86 year-old man with history of multiple medical issues including hypertension, coronary artery disease, aortic stenosis, atrial fibrillation on Eliquis, and end stage renal disease. He presented overnight from [facility] with hematochezia for 1-2 days. He complained as well of fatigue and shortness of breath. He has been receiving hemodialysis of late at [facility] on Tuesdays, Thursdays, and Saturdays. He was last dialyzed Tuesday 5/3/22; he missed dialysis Thursday. Of note, he was recently admitted at [facility] (4/14/22) with enterococcal bacteremia; admission was complicated by COVID positivity.    ASSESSMENT:  (1)Renal - ESRD - HD TTS, last HD yesterday with 3L net fluid removal  (2)Anemia - in setting of GIB -s/p PRBC, H/H improving. GI/IR input noted. IR with no interventions recommended at this time  (3)CV - hypervolemia/pleural effusions - improving    RECOMMEND:  (1)Next HD Monday  (2)CHARLIE with HD as needed   (3)Meds for GFR <10/HD           Cindy Harmon NP  [facility]  Office: [phone]

## 2022-05-07 NOTE — CHART NOTE - NSCHARTNOTEFT_GEN_A_CORE
Follow up to initial consult of 5/6.     Patient's hemoglobin remains stable without the need for additional transfusions.   No plan for intervention at this time.  Will sign off for now, please reconsult as needed.    Please page IR with any questions or concerns, Pager [phone].

## 2022-05-07 NOTE — PROGRESS NOTE ADULT - SUBJECTIVE AND OBJECTIVE BOX
Pt seen & examined in bed. Appears in good spirits with no acute complaints. Last HD yesterday with 3L net fluid removal.    PAST MEDICAL & SURGICAL HISTORY:  HLD (hyperlipidemia)  HTN  ESRD on dialysis  CAD (coronary artery disease)  Afib  Aortic stenosis  Hypothyroidism    Allergies  codeine (Other (Mild to Mod))    SOCIAL HISTORY:  Denies ETOh,Smoking,     FAMILY HISTORY:  No pertinent family history in first degree relatives    REVIEW OF SYSTEMS:  CONSTITUTIONAL: (+)fatigue  EYES/ENT: No visual changes;  No vertigo or throat pain   NECK: No pain or stiffness  RESPIRATORY: No cough, wheezing, hemoptysis; (+)shortness of breath  CARDIOVASCULAR: No chest pain or palpitations  GASTROINTESTINAL: (+)hematochezia  GENITOURINARY: No dysuria, frequency or hematuria  NEUROLOGICAL: No numbness or weakness  SKIN: No itching, burning, rashes, or lesions   All other review of systems is negative unless indicated above.    VITAL:  T(C): , Max: 36.7 (05-05-22 @ 19:06)  T(F): , Max: 98 (05-05-22 @ 19:06)  HR: 74 (05-06-22 @ 07:00)  BP: 109/48 (05-06-22 @ 07:00)  BP(mean): 63 (05-06-22 @ 07:00)  RR: 14 (05-06-22 @ 07:00)  SpO2: 100% (05-06-22 @ 07:00)    PHYSICAL EXAM:  Constitutional: NAD, Alert  HEENT: NCAT, MMM  Neck: Supple, No JVD  Respiratory: decreased BS b/l bases  Cardiovascular: irreg s1s2  Gastrointestinal: BS+, soft, NT/ND  Extremities: No peripheral edema b/l  Neurological: reduced generalized strength  : + raymond  Skin: No rashes, no nevi  Access: LUE AVG (+)thrill    LABS:                        6.7    3.37  )-----------( 108      ( 06 May 2022 06:57 )             20.7     Na(136)/K(4.0)/Cl(96)/HCO3(23)/BUN(39)/Cr(5.82)Glu(111)/Ca(8.9)/Mg(--)/PO4(--)    05-05 @ 20:16      IMAGING:  < from: CT Angio Abdomen and Pelvis w/ IV Cont (05.06.22 @ 03:42) >  INTERPRETATION:  Active extravasation of contrast within the sigmoid   colon (4:80) consistent with active GI bleed  Moderate right and small left pleural effusions with adjacent atelectasis  Cholelithiasis and sludge within the gallbladder  Diverticulosis of the sigmoid colon  Small umbilical hernia containing a small portion of nonobstructed bowel  Trace presacral free fluid  Extensive atherosclerotic changes  Anasarca  Findings were discussed with provider James.  Follow up official report.

## 2022-05-07 NOTE — DIETITIAN INITIAL EVALUATION ADULT - PERTINENT LABORATORY DATA
05-07    136  |  97<L>  |  32<H>  ----------------------------<  70  4.0   |  24  |  4.74<H>    Ca    8.8      07 May 2022 03:13  Phos  5.3     05-07  Mg     2.20     05-07    TPro  5.5<L>  /  Alb  2.9<L>  /  TBili  0.4  /  DBili  x   /  AST  19  /  ALT  14  /  AlkPhos  110  05-07

## 2022-05-07 NOTE — CHART NOTE - NSCHARTNOTEFT_GEN_A_CORE
MICU Transfer Note    Transfer from: MICU    Transfer to: ( X ) Medicine    (  ) Telemetry     (   ) RCU        (    ) Palliative         (   ) Stroke Unit          (   ) __________________    Accepting Physician:  Signout given to:     MICU COURSE:          ASSESSMENT & PLAN:             FOR FOLLOW UP: MICU Transfer Note    Transfer from: MICU    Transfer to: ( X ) Medicine    (  ) Telemetry     (   ) RCU        (    ) Palliative         (   ) Stroke Unit          (   ) __________________    Accepting Physician:  Signout given to:     MICU COURSE:  85 yo M with pmh of HTN, CAD, ESRD on HD (Tu,Thurs,Sat), Afib on eliquis presenting from [facility], Hypothyroidism, HLD, aortic stenosis presents with bright red blood per rectum. Pt found with Bloody BM's since yesterday. Pt missed his last dialysis session yesterday as a result. Pt also endorsing symptoms of fatigue, shortness of breath. Note Pt found COVID positive 4/30 at [facility]. Patient denies worsening abdominal pain, n/v/d. Patient denies fevers, chills. Patient reports he recently received Monoclonal antibodies, bebetelovimab while at [place].     Of note, patient was recently seen at [facility], admitted 4/14, found with Enterococcus bacteremia of unknown source, where also found COVID positive, treated with antibiotics, and discharged to [facility] with Picc.     ED   Admitted with blood mixed stools, 2 large bloody BM's in ED, w/t Hgb 6. Pt given 1 u prbc's in the ED.   Nephrology consulted in the ED for fluid removal and plan to start dilaysis in the am.   Admitted with sob, found in Acute hypoxic respiratory failure requiring Nasal cannula, otherwise normotensive, afebrile, AA03.        ASSESSMENT & PLAN:   85 y/o M pmh HTN, CAD< ESRD on HD (Tu, Thurs, Sat), Afib on eliquis presenting from [place] with bright red blood per rectum, likely 2/2 brisk lower GI bleed, CT abdomen c/f sigmoid diverticular bleed.     Neuro   AA03, Responds to commands  No active issues    Cardio    #Aortic Stenosis  #Hx Congestive Heart Failure  Obtain records from [facility], was worked up and planned for TAVR at [facility]   f/u Echo  c/w Torsemide 100mg daily     #Afib, chronic  rate controlled  hold Diltiazem i/s/o Normotension while actively bleeding  hold Eliquis    #CAD  c/w Atorvastatin 10mg  hold Plavix 75mg        Pulm   #Acute Hypoxic Respiratory failure  i/s/o worsening acute blood loss anemia, w/t bilateral pleural effusions, and COVID '  c/w Nasal cannula, titrate to Sp02>88- 93%   Consider dex if pt continues to be hypoxic after fluid removal via dialysis    #Bilateral pleural effusions   CXR with interstitial vascular opacification and bilat pleural effusions  likely V overloaded i/s/o missed dialysis   Patient undergoing HD 5/6 for fluid removal       #Hx COPD   c/w Albuterol HFA inhaler prn       GI   #Lower GI Bleed   CTA Abdomen, Pelvis with brisk sigmoid colon bleed  IR consulted, want to f/u coags after Kcentra, trend CBC after blood transfusions, if does not appropriately respond, will consider intervention   GI consulted, deferring to IR  Keep NPO   pantoprazole 40mg BID  c/w Hold all NSAID's, DVT prophylaxis, bowel regimen i/s/o active bleed  Transfuse to Hg >8    /Renal   #ESRD, HD Tues, Thurs, Sat  Last HD missed Thurs 5/5 i/s/o active bleed  Nephrology consulted  Hemodialysis 5/6  BMP Q12, replete lytes prn  Strict I's/O's  c/w Renvela 1600mg TID    Heme  #Macrocytic Anemia   #Acute Blood Loss Anemia   MCV elevated, Baseline Hgb around 8 per daughter  f/u serum B12 and thiamine   CBC's Q12  transfuse if Hgb<7        #Pancytopenia  Likely bone marrow supression i/s/o Enterococcus bacteremia and now, lower GI bleed    Endocrine   #Hypothyroidism   Repeat TSH  c/w Levothyroxine 112mcg daily     ID   #Enterococcus Bacteremia   diagnosed, treated at [facility] in 4/2022 w/ unclear source (Per son, GI source was not considered, colonoscopy was deferred fo f/u outpatient)   c/w IV Ceftriaxone 2gm BI, stop after 32 days  c/w IV Ampicillin 2gm BID, stop after 32 days   f/u repeat cultures  -TTE to evaluate for endocarditis?     #COVID   s/p Monocloal antibodies, bebetelovimab while at [place]  Hold Dexamethasone i/s/o prior treatment course at [facility], Mab's, and respiratory symptoms better explained by pulmonary edema, pleural effusions   ctm    DVT Prophylaxis- hold Anticoagulation i/s/o active bleed. Start SCD's          FOR FOLLOW UP:  [ ] if worsening hypoxia, consider dexamethasone  [ ] f/u TTE  [ ] c/w abx until end-date  [ ] obtain records from [facility]  [ ] If rebleed, c/s IR  [ ] transfuse to Hg >8  [ ] trend CBC q12 or q24 if bleed resolves MICU Transfer Note    Transfer from: MICU    Transfer to: ( X ) Medicine    (  ) Telemetry     (   ) RCU        (    ) Palliative         (   ) Stroke Unit          (   ) __________________    Accepting Physician:  Signout given to:     MICU COURSE:  85 yo M with pmh of HTN, CAD, ESRD on HD (Tu,Thurs,Sat), Afib on eliquis presenting from [facility], Hypothyroidism, HLD, aortic stenosis presents with bright red blood per rectum. Pt found with Bloody BM's since yesterday. Pt missed his last dialysis session yesterday as a result. Pt also endorsing symptoms of fatigue, shortness of breath. Note Pt found COVID positive 4/30 at [facility]. Patient denies worsening abdominal pain, n/v/d. Patient denies fevers, chills. Patient reports he recently received Monoclonal antibodies, bebetelovimab while at [place].     Of note, patient was recently seen at [facility], admitted 4/14, found with Enterococcus bacteremia of unknown source, where also found COVID positive, treated with antibiotics, and discharged to [facility] with Picc.     ED   Admitted with blood mixed stools, 2 large bloody BM's in ED, w/t Hgb 6. Pt given 1 u prbc's in the ED.   Nephrology consulted in the ED for fluid removal and plan to start dilaysis in the am.   Admitted with sob, found in Acute hypoxic respiratory failure requiring Nasal cannula, otherwise normotensive, afebrile, AA03.    In the MICU, pt received total of 3 units of pRBC, 1u platelet and Kcentra. GI and IR c/s for new active bleed in sigmoid colon. IR deferring procedure at this time, GI deferring to IR for intervention. Received dialysis w/ fluid removal. Pt otherwise hemodynamically stable after tranfusion w/ resolution of bleed. Medically optimized for transfer to floors.     ASSESSMENT & PLAN:   87 y/o M pmh HTN, CAD< ESRD on HD (Tu, Thurs, Sat), Afib on eliquis presenting from [place] with bright red blood per rectum, likely 2/2 brisk lower GI bleed, CT abdomen c/f sigmoid diverticular bleed.     Neuro   AA03, Responds to commands  No active issues    Cardio    #Aortic Stenosis  #Hx Congestive Heart Failure  Obtain records from [facility], was worked up and planned for TAVR at [facility]   f/u Echo  c/w Torsemide 100mg daily     #Afib, chronic  rate controlled  hold Diltiazem i/s/o Normotension while actively bleeding  hold Eliquis    #CAD  c/w Atorvastatin 10mg  hold Plavix 75mg        Pulm   #Acute Hypoxic Respiratory failure  i/s/o worsening acute blood loss anemia, w/t bilateral pleural effusions, and COVID '  c/w Nasal cannula, titrate to Sp02>88- 93%   Consider dex if pt continues to be hypoxic after fluid removal via dialysis    #Bilateral pleural effusions   CXR with interstitial vascular opacification and bilat pleural effusions  likely V overloaded i/s/o missed dialysis   Patient undergoing HD 5/6 for fluid removal       #Hx COPD   c/w Albuterol HFA inhaler prn       GI   #Lower GI Bleed   CTA Abdomen, Pelvis with brisk sigmoid colon bleed  IR consulted, want to f/u coags after Kcentra, trend CBC after blood transfusions, if does not appropriately respond, will consider intervention   GI consulted, deferring to IR  Keep NPO   pantoprazole 40mg BID  c/w Hold all NSAID's, DVT prophylaxis, bowel regimen i/s/o active bleed  Transfuse to Hg >8    /Renal   #ESRD, HD Tues, Thurs, Sat  Last HD missed Thurs 5/5 i/s/o active bleed  Nephrology consulted  Hemodialysis 5/6  BMP Q12, replete lytes prn  Strict I's/O's  c/w Renvela 1600mg TID    Heme  #Macrocytic Anemia   #Acute Blood Loss Anemia   MCV elevated, Baseline Hgb around 8 per daughter  f/u serum B12 and thiamine   CBC's Q12  transfuse if Hgb<7        #Pancytopenia  Likely bone marrow supression i/s/o Enterococcus bacteremia and now, lower GI bleed    Endocrine   #Hypothyroidism   Repeat TSH  c/w Levothyroxine 112mcg daily     ID   #Enterococcus Bacteremia   diagnosed, treated at [facility] in 4/2022 w/ unclear source (Per son, GI source was not considered, colonoscopy was deferred fo f/u outpatient)   c/w IV Ceftriaxone 2gm BI, stop after 32 days  c/w IV Ampicillin 2gm BID, stop after 32 days   f/u repeat cultures  -TTE to evaluate for endocarditis?     #COVID   s/p Monocloal antibodies, bebetelovimab while at [place]  Hold Dexamethasone i/s/o prior treatment course at [facility], Mab's, and respiratory symptoms better explained by pulmonary edema, pleural effusions   ctm    DVT Prophylaxis- hold Anticoagulation i/s/o active bleed. Start SCD's          FOR FOLLOW UP:  [ ] if worsening hypoxia, consider dexamethasone  [ ] f/u TTE  [ ] c/w abx until end-date  [ ] obtain records from [facility]  [ ] If rebleed, c/s IR  [ ] transfuse to Hg >8  [ ] trend CBC q12 or q24 if bleed resolves MICU Transfer Note    Transfer from: MICU    Transfer to: (  ) Medicine    ( X ) Telemetry     (   ) RCU        (    ) Palliative         (   ) Stroke Unit          (   ) __________________    Accepting Physician: Dr. Buster Funk given to:    MICU COURSE:  85 yo M with pmh of HTN, CAD, ESRD on HD (Tu,Thurs,Sat), Afib on eliquis presenting from [facility], Hypothyroidism, HLD, aortic stenosis presents with bright red blood per rectum. Pt found with Bloody BM's since yesterday. Pt missed his last dialysis session yesterday as a result. Pt also endorsing symptoms of fatigue, shortness of breath. Note Pt found COVID positive 4/30 at [facility]. Patient denies worsening abdominal pain, n/v/d. Patient denies fevers, chills. Patient reports he recently received Monoclonal antibodies, bebetelovimab while at [place].     Of note, patient was recently seen at [facility], admitted 4/14, found with Enterococcus bacteremia of unknown source, where also found COVID positive, treated with antibiotics, and discharged to [facility] with Picc.     ED   Admitted with blood mixed stools, 2 large bloody BM's in ED, w/t Hgb 6. Pt given 1 u prbc's in the ED.   Nephrology consulted in the ED for fluid removal and plan to start dilaysis in the am.   Admitted with sob, found in Acute hypoxic respiratory failure requiring Nasal cannula, otherwise normotensive, afebrile, AA03.    In the MICU, pt received total of 3 units of pRBC, 1u platelet and Kcentra. GI and IR c/s for new active bleed in sigmoid colon. IR deferring procedure at this time, GI deferring to IR for intervention. Received dialysis w/ fluid removal. Pt otherwise hemodynamically stable after tranfusion w/ resolution of bleed. Medically optimized for transfer to floors.     ASSESSMENT & PLAN:   87 y/o M pmh HTN, CAD< ESRD on HD (Tu, Thurs, Sat), Afib on eliquis presenting from [place] with bright red blood per rectum, likely 2/2 brisk lower GI bleed, CT abdomen c/f sigmoid diverticular bleed.     Neuro   AA03, Responds to commands  No active issues    Cardio    #Aortic Stenosis  #Hx Congestive Heart Failure  Obtain records from [facility], was worked up and planned for TAVR at [facility]   f/u Echo  c/w Torsemide 100mg daily     #Afib, chronic  rate controlled  hold Diltiazem i/s/o Normotension while actively bleeding  hold Eliquis    #CAD  c/w Atorvastatin 10mg  hold Plavix 75mg        Pulm   #Acute Hypoxic Respiratory failure  i/s/o worsening acute blood loss anemia, w/t bilateral pleural effusions, and COVID '  c/w Nasal cannula, titrate to Sp02>88- 93%   Consider dex if pt continues to be hypoxic after fluid removal via dialysis    #Bilateral pleural effusions   CXR with interstitial vascular opacification and bilat pleural effusions  likely V overloaded i/s/o missed dialysis   Patient undergoing HD 5/6 for fluid removal       #Hx COPD   c/w Albuterol HFA inhaler prn       GI   #Lower GI Bleed   CTA Abdomen, Pelvis with brisk sigmoid colon bleed  IR consulted, want to f/u coags after Kcentra, trend CBC after blood transfusions, if does not appropriately respond, will consider intervention   GI consulted, deferring to IR  Keep NPO   pantoprazole 40mg BID  c/w Hold all NSAID's, DVT prophylaxis, bowel regimen i/s/o active bleed  Transfuse to Hg >8    /Renal   #ESRD, HD Tues, Thurs, Sat  Last HD missed Thurs 5/5 i/s/o active bleed  Nephrology consulted  Hemodialysis 5/6  BMP Q12, replete lytes prn  Strict I's/O's  c/w Renvela 1600mg TID    Heme  #Macrocytic Anemia   #Acute Blood Loss Anemia   MCV elevated, Baseline Hgb around 8 per daughter  f/u serum B12 and thiamine   CBC's Q12  transfuse if Hgb<7        #Pancytopenia  Likely bone marrow supression i/s/o Enterococcus bacteremia and now, lower GI bleed    Endocrine   #Hypothyroidism   Repeat TSH  c/w Levothyroxine 112mcg daily     ID   #Enterococcus Bacteremia   diagnosed, treated at [facility] in 4/2022 w/ unclear source (Per son, GI source was not considered, colonoscopy was deferred fo f/u outpatient)   c/w IV Ceftriaxone 2gm BI, stop after 32 days  c/w IV Ampicillin 2gm BID, stop after 32 days   f/u repeat cultures  -TTE to evaluate for endocarditis?     #COVID   s/p Monocloal antibodies, bebetelovimab while at [place]  Hold Dexamethasone i/s/o prior treatment course at [facility], Mab's, and respiratory symptoms better explained by pulmonary edema, pleural effusions   ctm    DVT Prophylaxis- hold Anticoagulation i/s/o active bleed. Start SCD's          FOR FOLLOW UP:  [ ] if worsening hypoxia, consider dexamethasone  [ ] f/u TTE  [ ] c/w abx until end-date  [ ] obtain records from [facility]  [ ] If rebleed, c/s IR  [ ] transfuse to Hg >8  [ ] trend CBC q12 or q24 if bleed resolves MICU Transfer Note    Transfer from: MICU    Transfer to: (  ) Medicine    ( X ) Telemetry     (   ) RCU        (    ) Palliative         (   ) Stroke Unit          (   ) __________________    Accepting Physician: Dr. Goins  Signout given to: LAUREL, MAR, ACP, JAZIEL    MICU COURSE:  87 yo M with pmh of HTN, CAD, ESRD on HD (Tu,Thurs,Sat), Afib on eliquis presenting from [facility], Hypothyroidism, HLD, aortic stenosis presents with bright red blood per rectum. Pt found with Bloody BM's since yesterday. Pt missed his last dialysis session yesterday as a result. Pt also endorsing symptoms of fatigue, shortness of breath. Note Pt found COVID positive 4/30 at [facility]. Patient denies worsening abdominal pain, n/v/d. Patient denies fevers, chills. Patient reports he recently received Monoclonal antibodies, bebetelovimab while at [place].     Of note, patient was recently seen at [facility], admitted 4/14, found with Enterococcus bacteremia of unknown source, where also found COVID positive, treated with antibiotics, and discharged to [facility] with Picc.     ED   Admitted with blood mixed stools, 2 large bloody BM's in ED, w/t Hgb 6. Pt given 1 u prbc's in the ED.   Nephrology consulted in the ED for fluid removal and plan to start dilaysis in the am.   Admitted with sob, found in Acute hypoxic respiratory failure requiring Nasal cannula, otherwise normotensive, afebrile, AA03.    In the MICU, pt received total of 3 units of pRBC, 1u platelet and Kcentra. GI and IR c/s for new active bleed in sigmoid colon. IR deferring procedure at this time, GI deferring to IR for intervention. Received dialysis w/ fluid removal. Pt otherwise hemodynamically stable after tranfusion w/ resolution of bleed. Medically optimized for transfer to floors.     ASSESSMENT & PLAN:   87 y/o M pmh HTN, CAD< ESRD on HD (Tu, Thurs, Sat), Afib on eliquis presenting from [place] with bright red blood per rectum, likely 2/2 brisk lower GI bleed, CT abdomen c/f sigmoid diverticular bleed.     Neuro   AA03, Responds to commands  No active issues    Cardio    #Aortic Stenosis  #Hx Congestive Heart Failure  Obtain records from [facility], was worked up and planned for TAVR at [facility]   f/u Echo  c/w Torsemide 100mg daily     #Afib, chronic  rate controlled  hold Diltiazem i/s/o Normotension while actively bleeding  hold Eliquis    #CAD  c/w Atorvastatin 10mg  hold Plavix 75mg        Pulm   #Acute Hypoxic Respiratory failure  i/s/o worsening acute blood loss anemia, w/t bilateral pleural effusions, and COVID '  c/w Nasal cannula, titrate to Sp02>88- 93%   Consider dex if pt continues to be hypoxic after fluid removal via dialysis    #Bilateral pleural effusions   CXR with interstitial vascular opacification and bilat pleural effusions  likely V overloaded i/s/o missed dialysis   Patient undergoing HD 5/6 for fluid removal       #Hx COPD   c/w Albuterol HFA inhaler prn       GI   #Lower GI Bleed   CTA Abdomen, Pelvis with brisk sigmoid colon bleed  IR consulted, want to f/u coags after Kcentra, trend CBC after blood transfusions, if does not appropriately respond, will consider intervention   GI consulted, deferring to IR  Keep NPO   pantoprazole 40mg BID  c/w Hold all NSAID's, DVT prophylaxis, bowel regimen i/s/o active bleed  Transfuse to Hg >8    /Renal   #ESRD, HD Tues, Thurs, Sat  Last HD missed Thurs 5/5 i/s/o active bleed  Nephrology consulted  Hemodialysis 5/6  BMP Q12, replete lytes prn  Strict I's/O's  c/w Renvela 1600mg TID    Heme  #Macrocytic Anemia   #Acute Blood Loss Anemia   MCV elevated, Baseline Hgb around 8 per daughter  f/u serum B12 and thiamine   CBC's Q12  transfuse if Hgb<7        #Pancytopenia  Likely bone marrow supression i/s/o Enterococcus bacteremia and now, lower GI bleed    Endocrine   #Hypothyroidism   Repeat TSH  c/w Levothyroxine 112mcg daily     ID   #Enterococcus Bacteremia   diagnosed, treated at [facility] in 4/2022 w/ unclear source (Per son, GI source was not considered, colonoscopy was deferred fo f/u outpatient)   c/w IV Ceftriaxone 2gm BI, stop after 32 days  c/w IV Ampicillin 2gm BID, stop after 32 days   f/u repeat cultures  -TTE to evaluate for endocarditis?     #COVID   s/p Monocloal antibodies, bebetelovimab while at [place]  Hold Dexamethasone i/s/o prior treatment course at [facility], Mab's, and respiratory symptoms better explained by pulmonary edema, pleural effusions   ctm    DVT Prophylaxis- hold Anticoagulation i/s/o active bleed. Start SCD's          FOR FOLLOW UP:  [ ] if worsening hypoxia, consider dexamethasone  [ ] f/u TTE  [ ] c/w abx until end-date  [ ] obtain records from [facility]  [ ] If rebleed, c/s IR  [ ] transfuse to Hg >8  [ ] trend CBC q12 or q24 if bleed resolves  [ ] f/u with outpatient cardiologist regarding need for restarting AC +/- plavix,   [ ] can slowly initiate rate control as HR becomes unctonrolled as pressure tolerates

## 2022-05-08 NOTE — CHART NOTE - NSCHARTNOTEFT_GEN_A_CORE
MAR Accept Note  Dr. Ani Tijerina, PGY3  Transfer to:  Medicine  Accepting Attending Physician:  Buster  Assigned Room:  510A    Patient seen and examined.   Labs and data reviewed.   No findings precluding transfer of service.       HPI/MICU COURSE:   Please refer to MICU transfer note for full details. Briefly, this is a 85 yo M with pmh of HTN, CAD, ESRD on HD (Tu,Thurs,Sat), Afib on eliquis presenting from [facility], Hypothyroidism, HLD, aortic stenosis presents with bright red blood per rectum. Pt found with Bloody BM's since yesterday. Pt missed his last dialysis session yesterday as a result. Pt also endorsing symptoms of fatigue, shortness of breath. Note Pt found COVID positive 4/30 at [facility]. Patient denies worsening abdominal pain, n/v/d. Patient denies fevers, chills. Patient reports he recently received Monoclonal antibodies, bebetelovimab while at [facility].     Of note, patient was recently seen at [facility], admitted 4/14, found with Enterococcus bacteremia of unknown source, where also found COVID positive, treated with antibiotics, and discharged to [facility] with Picc.     ED   Admitted with blood mixed stools, 2 large bloody BM's in ED, w/t Hgb 6. Pt given 1 u prbc's in the ED.   Nephrology consulted in the ED for fluid removal and plan to start dilaysis in the am.   Admitted with sob, found in Acute hypoxic respiratory failure requiring Nasal cannula, otherwise normotensive, afebrile, AA03.    In the MICU, pt received total of 3 units of pRBC, 1u platelet and Kcentra. GI and IR c/s for new active bleed in sigmoid colon. IR deferring procedure at this time, GI deferring to IR for intervention. Received dialysis w/ fluid removal. Pt otherwise hemodynamically stable after tranfusion w/ resolution of bleed. Medically optimized for transfer to floors.         FOR FOLLOW-UP:  [ ] if worsening hypoxia, consider dexamethasone  [ ] f/u TTE  [ ] c/w abx until end-date  [ ] obtain records from [facility]  [ ] trend CBC q12 or q24 if bleed resolves, transfuse to hgb >8, if rebleed c/s IR  [ ] restart rate control medications (metoprolol)  [ ] f/u regarding restarting AC and antiplatelet agents (pt discussing with outpatient cardiologist)    Ani Tijerina, PGY3

## 2022-05-08 NOTE — PROGRESS NOTE ADULT - ASSESSMENT
85 y/o M pmh HTN, CAD< ESRD on HD (Tu, Thurs, Sat), Afib on eliquis presenting from [place] with bright red blood per rectum, likely 2/2 brisk lower GI bleed, CT abdomen c/f sigmoid diverticular bleed.     Neuro   - AA0X3, Responds to commands  - no active issues    Cardio  #Aortic Stenosis  #Hx Congestive Heart Failure  - from records from [facility], was worked up and planned for TAVR at [facility]   - s/p HD (5/6)     #Afib, chronic  - rate controlled  - hold Diltiazem i/s/o normotension while actively bleeding  - hold Eliquis i/s/o GI Bleed, will need to have risks/benefits discussion with patient on continuing    #CAD  - c/w Atorvastatin 10mg  - hold Plavix 75mg i/s/o GI Bleed     Pulm   #Acute Hypoxic Respiratory failure  - i/s/o worsening acute blood loss anemia, w/ bilateral pleural effusions, and COVID (s/p MAB treatment)  - c/w Nasal cannula, titrate to Sp02>88- 93%   - consider dex if pt continues to be hypoxic after fluid removal via dialysis and is unable to have O2 weaned    #Bilateral pleural effusions   - CXR with interstitial vascular opacification and bilat pleural effusions  - likely V overloaded i/s/o missed dialysis   - patient s/p HD on (5/6) for fluid removal     #Hx COPD   c/w Albuterol HFA inhaler prn     GI   #Lower GI Bleed   - CTA Abdomen, Pelvis with brisk sigmoid colon bleed  - IR consulted, want to f/u coags after Kcentra, trend CBC after blood transfusions, likely will spontaneously resolve, if does not appropriately respond, will consider intervention   - GI consulted, deferring to IR  - keep NPO for now  - pantoprazole 40mg BID  - c/w holding NSAID's, DVT prophylaxis, bowel regimen i/s/o active bleed  - transfuse to Hg >8    /Renal   #ESRD, HD Tues, Thurs, Sat  - last HD missed Thurs 5/5 i/s/o active bleed  - Nephrology consulted  - s/p HD (5/6_  - BMP Q12, replete lytes prn  - strict I's/O's  - c/w Renvela 1600mg TID    Heme  #Macrocytic Anemia   #Acute Blood Loss Anemia   - MCV elevated, Baseline Hgb around 8 per daughter  - f/u serum B12 and thiamine   - CBC's Q12  - transfuse if Hgb<7    #Pancytopenia  - likely bone marrow supression i/s/o Enterococcus bacteremia and now, lower GI bleed    Endocrine   #Hypothyroidism   - repeat TSH  - c/w Levothyroxine 112mcg daily     ID   #Enterococcus Bacteremia   - diagnosed, treated at [facility] in 4/2022 w/ unclear source (Per son, GI source was not considered, colonoscopy was deferred fo f/u outpatient)   - c/w IV Ceftriaxone 2gm BI, stop after 32 days  - c/w IV Ampicillin 2gm BID, stop after 32 days   - f/u repeat cultures    #COVID   - s/p Monocloal antibodies, bebetelovimab while at [place]  - hold Dexamethasone i/s/o prior treatment course at [facility], and respiratory symptoms better explained by pulmonary edema, pleural effusions   - CTM    DVT Prophylaxis - hold Anticoagulation i/s/o active bleed. Start SCDs   85 y/o M pmh HTN, CAD< ESRD on HD (Tu, Thurs, Sat), Afib on eliquis presenting from [place] with bright red blood per rectum, likely 2/2 brisk lower GI bleed, CT abdomen c/f sigmoid diverticular bleed now stable .     Neuro   - AA0X3, Responds to commands  - no active issues    Cardio  #Aortic Stenosis  #Hx Congestive Heart Failure  - from records from [facility], was worked up and planned for TAVR at [facility]   - s/p HD (5/6)     #Afib, chronic  - rate controlled  - hold Diltiazem i/s/o normotension while actively bleeding  can restart as tolerated   - hold Eliquis i/s/o GI Bleed, will need to have risks/benefits discussion with patient on continuing: patient contacting outpatient GI doctor    #CAD  - c/w Atorvastatin 10mg  - hold Plavix 75mg i/s/o GI Bleed     Pulm   #Acute Hypoxic Respiratory failure  - i/s/o worsening acute blood loss anemia, w/ bilateral pleural effusions, and COVID (s/p MAB treatment)  - c/w Nasal cannula, titrate to Sp02>88- 93%   - consider dex if pt continues to be hypoxic after fluid removal via dialysis and is unable to have O2 weaned    #Bilateral pleural effusions   - CXR with interstitial vascular opacification and bilat pleural effusions  - likely V overloaded i/s/o missed dialysis   - patient s/p HD on (5/6) for fluid removal     #Hx COPD   c/w Albuterol HFA inhaler prn     GI   #Lower GI Bleed   - CTA Abdomen, Pelvis with brisk sigmoid colon bleed  - IR consulted, want to f/u coags after Kcentra, trend CBC after blood transfusions, likely will spontaneously resolve, if does not appropriately respond, will consider intervention   - GI consulted, deferring to IR, IR signed off  - pantoprazole 40mg BID  - c/w holding NSAID's, DVT prophylaxis, bowel regimen i/s/o active bleed  - transfuse to Hg >8  - Hgb stable    /Renal   #ESRD, HD Tues, Thurs, Sat  - last HD missed Thurs 5/5 i/s/o active bleed  - Nephrology consulted  - s/p HD (5/6_5/7) next on 5/9  - BMP Q12, replete lytes prn  - strict I's/O's  - c/w Renvela 1600mg TID    Heme  #Macrocytic Anemia   #Acute Blood Loss Anemia   - MCV elevated, Baseline Hgb around 8 per daughter  - f/u serum B12 and thiamine   - CBC's Q12  - transfuse if Hgb<7    #Pancytopenia  - likely bone marrow supression i/s/o Enterococcus bacteremia and now, lower GI bleed    Endocrine   #Hypothyroidism   - repeat TSH  - c/w Levothyroxine 112mcg daily     ID   #Enterococcus Bacteremia   - diagnosed, treated at [facility] in 4/2022 w/ unclear source (Per son, GI source was not considered, colonoscopy was deferred fo f/u outpatient)   - c/w IV Ceftriaxone 2gm BI, stop after 32 days  - c/w IV Ampicillin 2gm BID, stop after 32 days   - f/u repeat cultures    #COVID   - s/p Monocloal antibodies, bebetelovimab while at [place]  - hold Dexamethasone i/s/o prior treatment course at [facility], and respiratory symptoms better explained by pulmonary edema, pleural effusions   - CTM    DVT Prophylaxis - hold Anticoagulation i/s/o active bleed. Start SCDs    updated family 5/8

## 2022-05-08 NOTE — PROGRESS NOTE ADULT - ASSESSMENT
Mr. Del Toro is an 86 year-old man with history of multiple medical issues including hypertension, coronary artery disease, aortic stenosis, atrial fibrillation on Eliquis, and end stage renal disease. He presented overnight from [facility] with hematochezia for 1-2 days. He complained as well of fatigue and shortness of breath. He has been receiving hemodialysis of late at [facility] on Tuesdays, Thursdays, and Saturdays. He was last dialyzed Tuesday 5/3/22; he missed dialysis Thursday. Of note, he was recently admitted at [facility] (4/14/22) with enterococcal bacteremia; admission was complicated by COVID positivity.    ASSESSMENT:  (1)Renal - ESRD - HD TTS, last HD Friday with 3L net fluid removal  (2)Anemia - in setting of GIB -s/p PRBC, H/H improved although lower today   GI/IR input noted. IR with no interventions recommended at this time  (3)CV - hypervolemia/pleural effusions - improving    RECOMMEND:  (1)Subsequent HD tomorrow  (2)Strict I/Os  (3)Dose meds for GFR <10/HD           Cindy Harmon NP  [facility]  Office: [phone]

## 2022-05-08 NOTE — PROGRESS NOTE ADULT - SUBJECTIVE AND OBJECTIVE BOX
Pt seen & examined in bed. +SOB when conversing, continues with supplemental O2. No signs of distress noted. Last HD Friday with 3L net removal.    PAST MEDICAL & SURGICAL HISTORY:  HLD (hyperlipidemia)  HTN  ESRD on dialysis  CAD (coronary artery disease)  Afib  Aortic stenosis  Hypothyroidism    Allergies  codeine (Other (Mild to Mod))    SOCIAL HISTORY:  Denies ETOh,Smoking,     FAMILY HISTORY:  No pertinent family history in first degree relatives    REVIEW OF SYSTEMS:  CONSTITUTIONAL: (+)fatigue  EYES/ENT: No visual changes;  No vertigo or throat pain   NECK: No pain or stiffness  RESPIRATORY: No cough, wheezing, hemoptysis; (+)shortness of breath  CARDIOVASCULAR: No chest pain or palpitations  GASTROINTESTINAL: (+)hematochezia  GENITOURINARY: No dysuria, frequency or hematuria  NEUROLOGICAL: No numbness or weakness  SKIN: No itching, burning, rashes, or lesions   All other review of systems is negative unless indicated above.    VITAL:  T(C): , Max: 36.7 (05-05-22 @ 19:06)  T(F): , Max: 98 (05-05-22 @ 19:06)  HR: 74 (05-06-22 @ 07:00)  BP: 109/48 (05-06-22 @ 07:00)  BP(mean): 63 (05-06-22 @ 07:00)  RR: 14 (05-06-22 @ 07:00)  SpO2: 100% (05-06-22 @ 07:00)    PHYSICAL EXAM:  Constitutional: NAD, Alert  HEENT: NCAT, MMM  Neck: Supple, No JVD  Respiratory: decreased BS b/l   Cardiovascular: irreg s1s2  Gastrointestinal: BS+, soft, NT/ND  Extremities: No peripheral edema b/l  Neurological: reduced generalized strength  : + raymond  Skin: No rashes, no nevi  Access: LUE AVG (+)thrill    LABS:                        6.7    3.37  )-----------( 108      ( 06 May 2022 06:57 )             20.7     Na(136)/K(4.0)/Cl(96)/HCO3(23)/BUN(39)/Cr(5.82)Glu(111)/Ca(8.9)/Mg(--)/PO4(--)    05-05 @ 20:16      IMAGING:  < from: CT Angio Abdomen and Pelvis w/ IV Cont (05.06.22 @ 03:42) >  INTERPRETATION:  Active extravasation of contrast within the sigmoid   colon (4:80) consistent with active GI bleed  Moderate right and small left pleural effusions with adjacent atelectasis  Cholelithiasis and sludge within the gallbladder  Diverticulosis of the sigmoid colon  Small umbilical hernia containing a small portion of nonobstructed bowel  Trace presacral free fluid  Extensive atherosclerotic changes  Anasarca  Findings were discussed with provider James.  Follow up official report.

## 2022-05-08 NOTE — PROGRESS NOTE ADULT - SUBJECTIVE AND OBJECTIVE BOX
INTERVAL HPI/OVERNIGHT EVENTS:    SUBJECTIVE: Patient seen and examined at bedside.     CONSTITUTIONAL: No weakness, fevers or chills  EYES/ENT: No visual changes;  No vertigo or throat pain   NECK: No pain or stiffness  RESPIRATORY: No cough, wheezing, hemoptysis; No shortness of breath  CARDIOVASCULAR: No chest pain or palpitations  GASTROINTESTINAL: No abdominal or epigastric pain. No nausea, vomiting, or hematemesis; No diarrhea or constipation. No melena or hematochezia.  GENITOURINARY: No dysuria, frequency or hematuria  NEUROLOGICAL: No numbness or weakness  SKIN: No itching, rashes    OBJECTIVE:    VITAL SIGNS:  ICU Vital Signs Last 24 Hrs  T(C): 36.1 (08 May 2022 04:00), Max: 36.1 (08 May 2022 04:00)  T(F): 97 (08 May 2022 04:00), Max: 97 (08 May 2022 04:00)  HR: 90 (08 May 2022 07:00) (85 - 104)  BP: 136/69 (08 May 2022 07:00) (113/54 - 137/61)  BP(mean): 89 (08 May 2022 07:00) (68 - 107)  ABP: --  ABP(mean): --  RR: 18 (08 May 2022 07:00) (13 - 25)  SpO2: 97% (08 May 2022 07:00) (95% - 100%)        05-07 @ 07:01  -  05-08 @ 07:00  --------------------------------------------------------  IN: 60 mL / OUT: 200 mL / NET: -140 mL      CAPILLARY BLOOD GLUCOSE      POCT Blood Glucose.: 112 mg/dL (08 May 2022 05:58)      PHYSICAL EXAM:    General: NAD  HEENT: NC/AT; PERRL, clear conjunctiva  Neck: supple  Respiratory: CTA b/l  Cardiovascular: +S1/S2; RRR  Abdomen: soft, NT/ND; +BS x4  Extremities: WWP, 2+ peripheral pulses b/l; no LE edema  Skin: normal color and turgor; no rash  Neurological:    MEDICATIONS:  MEDICATIONS  (STANDING):  ampicillin  IVPB 2 Gram(s) IV Intermittent every 12 hours  ascorbic acid 500 milliGRAM(s) Oral daily  atorvastatin 10 milliGRAM(s) Oral at bedtime  cefTRIAXone   IVPB 2000 milliGRAM(s) IV Intermittent every 12 hours  chlorhexidine 2% Cloths 1 Application(s) Topical daily  epoetin ruthann-epbx (RETACRIT) Injectable 12382 Unit(s) IV Push <User Schedule>  folic acid 1 milliGRAM(s) Oral daily  levothyroxine Injectable 84 MICROGram(s) IV Push at bedtime  lidocaine/prilocaine Cream 1 Application(s) Topical <User Schedule>  pantoprazole  Injectable 40 milliGRAM(s) IV Push daily  sevelamer carbonate 1600 milliGRAM(s) Oral three times a day with meals  zinc oxide 20% Ointment 1 Application(s) Topical daily  zinc sulfate 220 milliGRAM(s) Oral daily    MEDICATIONS  (PRN):  acetaminophen     Tablet .. 650 milliGRAM(s) Oral every 6 hours PRN Temp greater or equal to 38C (100.4F), Mild Pain (1 - 3)  ALBUTerol    90 MICROgram(s) HFA Inhaler 2 Puff(s) Inhalation every 6 hours PRN Bronchospasm      ALLERGIES:  Allergies    No Known Allergies    Intolerances    codeine (Other (Mild to Mod))      LABS:                        9.7    6.54  )-----------( 150      ( 08 May 2022 04:46 )             28.9     05-08    136  |  96<L>  |  44<H>  ----------------------------<  54<LL>  3.9   |  19<L>  |  6.02<H>    Ca    9.1      08 May 2022 04:46  Phos  6.2     05-08  Mg     2.40     05-08    TPro  5.3<L>  /  Alb  2.7<L>  /  TBili  0.3  /  DBili  x   /  AST  15  /  ALT  14  /  AlkPhos  104  05-08    PT/INR - ( 06 May 2022 15:55 )   PT: 12.7 sec;   INR: 1.09 ratio         PTT - ( 06 May 2022 15:55 )  PTT:33.0 sec      RADIOLOGY & ADDITIONAL TESTS: Reviewed.    Authored by Dr. Marcio SANDERSON [id number], [id number] INTERVAL HPI/OVERNIGHT EVENTS: overnight placed on NC for ?VIVIEN desaturations. tolerated dialysis. had non bloody bowel movements.     SUBJECTIVE: Patient seen and examined at bedside.     CONSTITUTIONAL: No weakness, fevers or chills  EYES/ENT: No visual changes;  No vertigo or throat pain   NECK: No pain or stiffness  RESPIRATORY: No cough, wheezing, hemoptysis; No shortness of breath  CARDIOVASCULAR: No chest pain or palpitations  GASTROINTESTINAL: No abdominal or epigastric pain. No nausea, vomiting, or hematemesis; No diarrhea or constipation. No melena or hematochezia.  GENITOURINARY: No dysuria, frequency or hematuria  NEUROLOGICAL: No numbness or weakness  SKIN: No itching, rashes    OBJECTIVE:    VITAL SIGNS:  ICU Vital Signs Last 24 Hrs  T(C): 36.1 (08 May 2022 04:00), Max: 36.1 (08 May 2022 04:00)  T(F): 97 (08 May 2022 04:00), Max: 97 (08 May 2022 04:00)  HR: 90 (08 May 2022 07:00) (85 - 104)  BP: 136/69 (08 May 2022 07:00) (113/54 - 137/61)  BP(mean): 89 (08 May 2022 07:00) (68 - 107)  ABP: --  ABP(mean): --  RR: 18 (08 May 2022 07:00) (13 - 25)  SpO2: 97% (08 May 2022 07:00) (95% - 100%)        05-07 @ 07:01  -  05-08 @ 07:00  --------------------------------------------------------  IN: 60 mL / OUT: 200 mL / NET: -140 mL      CAPILLARY BLOOD GLUCOSE      POCT Blood Glucose.: 112 mg/dL (08 May 2022 05:58)      PHYSICAL EXAM:    General: NAD  HEENT: NC/AT; PERRL, clear conjunctiva  Neck: supple  Respiratory: CTA b/l  Cardiovascular: +S1/S2; RRR  Abdomen: soft, NT/ND; +BS x4  Extremities: WWP, 2+ peripheral pulses b/l; minimal posterior leg edema  Skin: normal color and turgor; no rash  Neurological: ao3    MEDICATIONS:  MEDICATIONS  (STANDING):  ampicillin  IVPB 2 Gram(s) IV Intermittent every 12 hours  ascorbic acid 500 milliGRAM(s) Oral daily  atorvastatin 10 milliGRAM(s) Oral at bedtime  cefTRIAXone   IVPB 2000 milliGRAM(s) IV Intermittent every 12 hours  chlorhexidine 2% Cloths 1 Application(s) Topical daily  epoetin ruthann-epbx (RETACRIT) Injectable 49258 Unit(s) IV Push <User Schedule>  folic acid 1 milliGRAM(s) Oral daily  levothyroxine Injectable 84 MICROGram(s) IV Push at bedtime  lidocaine/prilocaine Cream 1 Application(s) Topical <User Schedule>  pantoprazole  Injectable 40 milliGRAM(s) IV Push daily  sevelamer carbonate 1600 milliGRAM(s) Oral three times a day with meals  zinc oxide 20% Ointment 1 Application(s) Topical daily  zinc sulfate 220 milliGRAM(s) Oral daily    MEDICATIONS  (PRN):  acetaminophen     Tablet .. 650 milliGRAM(s) Oral every 6 hours PRN Temp greater or equal to 38C (100.4F), Mild Pain (1 - 3)  ALBUTerol    90 MICROgram(s) HFA Inhaler 2 Puff(s) Inhalation every 6 hours PRN Bronchospasm      ALLERGIES:  Allergies    No Known Allergies    Intolerances    codeine (Other (Mild to Mod))      LABS:                        9.7    6.54  )-----------( 150      ( 08 May 2022 04:46 )             28.9     05-08    136  |  96<L>  |  44<H>  ----------------------------<  54<LL>  3.9   |  19<L>  |  6.02<H>    Ca    9.1      08 May 2022 04:46  Phos  6.2     05-08  Mg     2.40     05-08    TPro  5.3<L>  /  Alb  2.7<L>  /  TBili  0.3  /  DBili  x   /  AST  15  /  ALT  14  /  AlkPhos  104  05-08    PT/INR - ( 06 May 2022 15:55 )   PT: 12.7 sec;   INR: 1.09 ratio         PTT - ( 06 May 2022 15:55 )  PTT:33.0 sec      RADIOLOGY & ADDITIONAL TESTS: Reviewed.    Authored by Dr. Marcio SANDERSON [id number], [phone]

## 2022-05-09 NOTE — PHYSICAL THERAPY INITIAL EVALUATION ADULT - PATIENT PROFILE REVIEW, REHAB EVAL
PT orders received, chart reviewed. ACTIVITY: ambulate as tolerated RNDeidre, endorsed pt to PT initial evaluation. Pt willing and able to participate in therapy session./yes

## 2022-05-09 NOTE — PROGRESS NOTE ADULT - SUBJECTIVE AND OBJECTIVE BOX
Interval Events:   No acute events overnight.  Patient without acute symptoms at this time but "doesn't feel himself."  Transferred to floor over the weekend without further evidence of overt GI bleeding.    ROS:   12 point review of systems performed and negative except otherwise noted in HPI.    Hospital Medications:  acetaminophen     Tablet .. 650 milliGRAM(s) Oral every 6 hours PRN  ALBUTerol    90 MICROgram(s) HFA Inhaler 2 Puff(s) Inhalation every 6 hours PRN  aluminum hydroxide/magnesium hydroxide/simethicone Suspension 30 milliLiter(s) Oral every 4 hours PRN  ampicillin  IVPB 2 Gram(s) IV Intermittent every 12 hours  ascorbic acid 500 milliGRAM(s) Oral daily  atorvastatin 10 milliGRAM(s) Oral at bedtime  cefTRIAXone   IVPB 2000 milliGRAM(s) IV Intermittent every 12 hours  chlorhexidine 2% Cloths 1 Application(s) Topical daily  epoetin ruthann-epbx (RETACRIT) Injectable 26426 Unit(s) IV Push <User Schedule>  folic acid 1 milliGRAM(s) Oral daily  levothyroxine Injectable 84 MICROGram(s) IV Push at bedtime  lidocaine/prilocaine Cream 1 Application(s) Topical <User Schedule>  melatonin 3 milliGRAM(s) Oral at bedtime PRN  metoprolol succinate ER 25 milliGRAM(s) Oral daily  ondansetron Injectable 4 milliGRAM(s) IV Push every 8 hours PRN  pantoprazole  Injectable 40 milliGRAM(s) IV Push daily  sevelamer carbonate 1600 milliGRAM(s) Oral three times a day with meals  zinc oxide 20% Ointment 1 Application(s) Topical daily  zinc sulfate 220 milliGRAM(s) Oral daily      PHYSICAL EXAM:   Vital Signs:  Vital Signs Last 24 Hrs  T(C): 36.6 (09 May 2022 09:30), Max: 36.9 (08 May 2022 22:00)  T(F): 97.8 (09 May 2022 09:30), Max: 98.4 (08 May 2022 22:00)  HR: 99 (09 May 2022 09:30) (98 - 117)  BP: 139/72 (09 May 2022 09:30) (112/52 - 139/72)  BP(mean): 91 (08 May 2022 12:00) (91 - 91)  RR: 18 (09 May 2022 09:30) (16 - 18)  SpO2: 95% (09 May 2022 09:30) (95% - 100%)  Daily     Daily     GENERAL: no acute distress  NEURO: alert  HEENT: NCAT, no conjunctival pallor appreciated  CHEST: no respiratory distress, no accessory muscle use  CARDIAC: regular rate, +S1/S2  ABDOMEN: soft, nondistended, nontender, no rebound or guarding  EXTREMITIES: warm, well perfused  SKIN: no lesions noted    LABS: reviewed                        9.5    6.70  )-----------( 174      ( 09 May 2022 04:45 )             28.4     05-09    135  |  93<L>  |  51<H>  ----------------------------<  109<H>  4.2   |  23  |  6.81<H>    Ca    9.3      09 May 2022 04:45  Phos  6.1     05-09  Mg     2.40     05-09    TPro  5.8<L>  /  Alb  2.8<L>  /  TBili  0.4  /  DBili  x   /  AST  16  /  ALT  14  /  AlkPhos  121<H>  05-09    LIVER FUNCTIONS - ( 09 May 2022 04:45 )  Alb: 2.8 g/dL / Pro: 5.8 g/dL / ALK PHOS: 121 U/L / ALT: 14 U/L / AST: 16 U/L / GGT: x             Interval Diagnostic Studies: see sunrise for full report

## 2022-05-09 NOTE — PHYSICAL THERAPY INITIAL EVALUATION ADULT - ADDITIONAL COMMENTS
Pt reports living in a house with his child and 2 stairs to enter. Pt resides on the first floor. prior to admission, pt was at [facility]. Prior to last hospital admission, pt ambulated with a rolling walker.     Pt. left comfortable in bed with all tubes/lines intact, call bell in reach and in NAD.

## 2022-05-09 NOTE — PROGRESS NOTE ADULT - ASSESSMENT
86 year old male with history of HTN, CAD on Plavix, AF on Eliquis, ESRD on HD TTS, hypothyroidism, and aortic stenosis who presents with BRBPR, found to have CTA+ with active bleed in sigmoid colon.     # Hematochezia  # CAD on Plavix  # AF on Eliquis  # ESRD on HD TTS  # Recent enterococcus bacteremia  # Acute hypoxic respiratory failure  # +COVID  # Aortic stenosis  On 5/5/2022, patient developed bloody BMs.  Last dose(s) of Plavix and Eliquis were on the morning of 5/5/2022.  Active hematochezia in rectal pouch on initial exam.  CTA positive for active bleed in sigmoid colon with diverticulosis.  In light of this information, clinical presentation consistent with lower GI bleeding, likely from diverticular source; much less likely from angioectasias, malignancy, colitis, hemorrhoids given CTA findings.    Recommendations:  -trend vitals, CBC, and monitor for clinical signs of bleeding  -maintain active type and screen  -transfusion goal to maintain hemoglobin >/= 7.0 and platelets >/= 50  -despite positive CTA, no IR intervention performed given resolution of GI bleeding, again supporting intermittent diverticular bleeding as likely source  -would readdress indication for Plavix and Eliquis +/- Cardiology consult; unclear timing of last stent for CAD; patient at high risk for rebleeding given diverticulosis  -no further plans for anticipated inpatient GI interventions, colonoscopy of little to no utility at this time  -will sign off at this time, please call back with questions or if new issues arise    Recommendations incomplete until finalized by attending signature/attestation to note.    Charlie Aguilar, PGY-4  GI/Hepatology Fellow    MONDAY-FRIDAY 8AM-5PM:  Pager# [phone] ([facility]) or [phone] ([facility])    NON-URGENT CONSULTS:  Please email [email or web address]  AT NIGHT AND ON WEEKENDS:  Contact on-call GI fellow via answering service ([phone]) from 5pm-8am and on weekends/holidays

## 2022-05-09 NOTE — PHYSICAL THERAPY INITIAL EVALUATION ADULT - PERTINENT HX OF CURRENT PROBLEM, REHAB EVAL
86 year old Male pmh HTN, CAD< ESRD on HD (Tu, Thurs, Sat), Afib on eliquis presenting from [place] with bright red blood per rectum, likely 2/2 brisk lower GI bleed, CT abdomen c/f sigmoid diverticular bleed.

## 2022-05-09 NOTE — CONSULT NOTE ADULT - SUBJECTIVE AND OBJECTIVE BOX
Interventional Radiology Inpatient Consult Note       HPI:  87 yo M with pmh of HTN, CAD, ESRD on HD (Tu,Thurs,Sat), Afib on eliquis presenting from [facility], Hypothyroidism, HLD, aortic stenosis presents with bright red blood per rectum. Pt found with Bloody BM's since yesterday. Pt missed his last dialysis session yesterday as a result. Pt also endorsing symptoms of fatigue, shortness of breath. Note Pt found COVID positive 4/30 at [facility]. Patient denies worsening abdominal pain, n/v/d. Patient denies fevers, chills. Patient reports he recently received Monoclonal antibodies, bebetelovimab while at [facility].     Of note, patient was recently seen at [facility], admitted 4/14, found with Enterococcus bacteremia of unknown source, where also found COVID positive, treated with antibiotics, and discharged to [facility] with Pic[facility].     ED   Admitted with blood mixed stools, 2 large bloody BM's in ED, w/t Hgb 6. Pt given 1 u prbc's in the ED.   Nephrology consulted in the ED for fluid removal and plan to start dilaysis in the am.   Admitted with sob, found in Acute hypoxic respiratory failure requiring Nasal cannula, otherwise normotensive, afebrile, AA03. (06 May 2022 01:50)      Vital Signs: Vital Signs Last 24 Hrs  T(C): 36.6 (09 May 2022 14:00), Max: 36.9 (08 May 2022 22:00)  T(F): 97.8 (09 May 2022 14:00), Max: 98.4 (08 May 2022 22:00)  HR: 102 (09 May 2022 14:00) (99 - 117)  BP: 131/68 (09 May 2022 14:00) (112/52 - 139/72)  BP(mean): --  RR: 18 (09 May 2022 14:00) (18 - 18)  SpO2: 95% (09 May 2022 14:00) (95% - 100%)    Past Medical/ Surgical History: PAST MEDICAL & SURGICAL HISTORY:  ESRD on dialysis    CAD (coronary artery disease)    Afib    HLD (hyperlipidemia)    Mild HTN    Aortic stenosis    No significant past surgical history        Allergies: Allergies    No Known Allergies    Intolerances    codeine (Other (Mild to Mod))      Medications: MEDICATIONS  (STANDING):  ampicillin  IVPB 2 Gram(s) IV Intermittent every 12 hours  ascorbic acid 500 milliGRAM(s) Oral daily  atorvastatin 10 milliGRAM(s) Oral at bedtime  cefTRIAXone   IVPB 2000 milliGRAM(s) IV Intermittent every 12 hours  chlorhexidine 2% Cloths 1 Application(s) Topical daily  epoetin ruthann-epbx (RETACRIT) Injectable 88350 Unit(s) IV Push <User Schedule>  folic acid 1 milliGRAM(s) Oral daily  levothyroxine Injectable 84 MICROGram(s) IV Push at bedtime  lidocaine/prilocaine Cream 1 Application(s) Topical <User Schedule>  metoprolol succinate ER 25 milliGRAM(s) Oral daily  pantoprazole  Injectable 40 milliGRAM(s) IV Push daily  sevelamer carbonate 1600 milliGRAM(s) Oral three times a day with meals  zinc oxide 20% Ointment 1 Application(s) Topical daily  zinc sulfate 220 milliGRAM(s) Oral daily    MEDICATIONS  (PRN):  acetaminophen     Tablet .. 650 milliGRAM(s) Oral every 6 hours PRN Temp greater or equal to 38C (100.4F), Mild Pain (1 - 3)  ALBUTerol    90 MICROgram(s) HFA Inhaler 2 Puff(s) Inhalation every 6 hours PRN Bronchospasm  aluminum hydroxide/magnesium hydroxide/simethicone Suspension 30 milliLiter(s) Oral every 4 hours PRN Dyspepsia  melatonin 3 milliGRAM(s) Oral at bedtime PRN Insomnia  ondansetron Injectable 4 milliGRAM(s) IV Push every 8 hours PRN Nausea and/or Vomiting      SOCIAL HISTORY:    FAMILY HISTORY:  No pertinent family history in first degree relatives          PHYSICAL EXAM:    General:   Well-groomed, well-nourished, in no distress  Lungs:  CTA bilaterally  Cardiovascular:   S1, S2,   Abdomen:  Soft, non-tender, non-distended,   Extremities:  no calf tenderness/swelling bilaterally  Musculoskeletal:  Full ROM in all joints w/o swelling/tenderness/effusion  Neuro/Psych:  A &O x 3    LABS:                        9.5    6.70  )-----------( 174      ( 09 May 2022 04:45 )             28.4     05-09    135  |  93<L>  |  51<H>  ----------------------------<  109<H>  4.2   |  23  |  6.81<H>    Ca    9.3      09 May 2022 04:45  Phos  6.1     05-09  Mg     2.40     05-09    TPro  5.8<L>  /  Alb  2.8<L>  /  TBili  0.4  /  DBili  x   /  AST  16  /  ALT  14  /  AlkPhos  121<H>  05-09    PT/INR - ( 09 May 2022 04:45 )   PT: 13.6 sec;   INR: 1.17 ratio         PTT - ( 09 May 2022 04:45 )  PTT:24.8 sec      RADIOLOGY & ADDITIONAL STUDIES:    ACC: [id number] EXAM:  CT ANGIO ABD PELV (W)AW IC                          PROCEDURE DATE:  05/06/2022          INTERPRETATION:  CLINICAL INFORMATION: Active GI bleeding.    COMPARISON: None.    CONTRAST/COMPLICATIONS:  IV Contrast: Omnipaque 350  90cc administered   10 cc discarded  Oral Contrast: NONE  Complications: None reported at time of study completion    PROCEDURE:  CT of the Abdomen and Pelvis was performed.  Precontrast, Arterial and Delayed phases were performed.  Sagittal and coronal reformats were performed.    FINDINGS:  LOWER CHEST: Partially imaged large right pleural effusion with passive   atelectasis of the right lower lobe. Partially imaged small to moderate   left pleural effusion with passive atelectasis of left lower lobe.   Loculated fluid in the right minor and left major fissures. Coronary   artery calcifications. Hypoattenuation of the cardiac blood pool on   noncontrast imaging consistent with anemia.    LIVER: Within normal limits.  BILE DUCTS: Normal caliber.  GALLBLADDER: Cholelithiasis.  SPLEEN: Within normal limits.  PANCREAS: Within normal limits.  ADRENALS: Within normal limits.  KIDNEYS/URETERS: No hydronephrosis. Atrophic left kidney. Bilateral renal   cysts and subcentimeter hypoattenuating fociwhich are too small to   characterize.    BLADDER: Within normal limits.  REPRODUCTIVE ORGANS: Prostate is enlarged.    BOWEL: Active contrast extravasation is noted in the sigmoid colon (4:80)   with delayed venous pooling (5:75). Colonic diverticulosis without   diverticulitis. No bowel obstruction. Appendix is not visualized. No   evidence of inflammation in the pericecal region.  PERITONEUM: No ascites.  VESSELS: Atherosclerotic changes. Partially imaged occlusion of the left   superficial femoral artery.  RETROPERITONEUM/LYMPH NODES: No lymphadenopathy.  ABDOMINAL WALL: Small umbilical hernia containing portion of   nonobstructed bowel. Small fat-containing left inguinal hernia.    Subcutaneous edema.  BONES: Sclerotically marginated minimally displaced fracture of the left   L3 transverse process, likely old. Additional degenerative changes of the   spine.    IMPRESSION:    Active GI bleeding visualized in the sigmoid colon as described above   with sigmoid diverticulosis.    Large right pleural effusion. Small to moderate left pleural effusion.    These findings were discussed with Provider James on 5/6/2022 at 7:37   AM] by Dr. Bromberg-Neuren.                --- End of Report ---          KEYON MENDOZA MD; Resident Radiology  This document has been electronically signed.  JAMES SILVESTRE MD; Attending Radiologist  This document has been electronically signed. May  6 2022 10:13AM        A/P: 86M with PMHx of HTN, CAD, ESRD on HD (Tu,Thurs,Sat), Afib on eliquis presenting from [facility], Hypothyroidism, HLD, aortic stenosis presents with bright red blood per rectum. Pt found with Bloody BM's.    CTA on presentation +LGIB; likely diverticular origin.     d/w team. Likely intermittent diverticular bleed in patient on AC.    IR intervention is not warranted at this time as the pt is hemodynamically stable and has stability of H/H since 5/6.    No repeat imaging is recommended as pt's vitals and labs do not suggest active GIB. Blood on stool today may be residual, or the pt may have intermittent bleeding. IR defers to GI consult service for further management.     IR signing off.    Reconsult PRN

## 2022-05-09 NOTE — PROGRESS NOTE ADULT - SUBJECTIVE AND OBJECTIVE BOX
[facility] Division of Hospital Medicine  Michell Simon MD  Pager (MIRTHA-F, 8A-5P): [phone]  Other Times:  [phone]      SUBJECTIVE / OVERNIGHT EVENTS: Patient w 2 bloody BMs this am, dyspnea while speaking with me today (on 2L), weak but able to pull himself up to allow for lung exam posteriorly. Not able to answer all questions appropriately but unclear what his baseline MS is. Pt says "I don't feel good." Doesn't want to do HD today.     MEDICATIONS  (STANDING):  ampicillin  IVPB 2 Gram(s) IV Intermittent every 12 hours  ascorbic acid 500 milliGRAM(s) Oral daily  atorvastatin 10 milliGRAM(s) Oral at bedtime  cefTRIAXone   IVPB 2000 milliGRAM(s) IV Intermittent every 12 hours  chlorhexidine 2% Cloths 1 Application(s) Topical daily  epoetin ruthann-epbx (RETACRIT) Injectable 07739 Unit(s) IV Push <User Schedule>  folic acid 1 milliGRAM(s) Oral daily  furosemide   IVPB 120 milliGRAM(s) IV Intermittent once  levothyroxine Injectable 84 MICROGram(s) IV Push at bedtime  lidocaine/prilocaine Cream 1 Application(s) Topical <User Schedule>  pantoprazole  Injectable 40 milliGRAM(s) IV Push daily  sevelamer carbonate 1600 milliGRAM(s) Oral three times a day with meals  zinc oxide 20% Ointment 1 Application(s) Topical daily  zinc sulfate 220 milliGRAM(s) Oral daily    MEDICATIONS  (PRN):  acetaminophen     Tablet .. 650 milliGRAM(s) Oral every 6 hours PRN Temp greater or equal to 38C (100.4F), Mild Pain (1 - 3)  ALBUTerol    90 MICROgram(s) HFA Inhaler 2 Puff(s) Inhalation every 6 hours PRN Bronchospasm  aluminum hydroxide/magnesium hydroxide/simethicone Suspension 30 milliLiter(s) Oral every 4 hours PRN Dyspepsia  melatonin 3 milliGRAM(s) Oral at bedtime PRN Insomnia  ondansetron Injectable 4 milliGRAM(s) IV Push every 8 hours PRN Nausea and/or Vomiting      I&O's Summary    08 May 2022 07:01  -  09 May 2022 07:00  --------------------------------------------------------  IN: 240 mL / OUT: 450 mL / NET: -210 mL    09 May 2022 07:01  -  09 May 2022 15:10  --------------------------------------------------------  IN: 0 mL / OUT: 75 mL / NET: -75 mL        PHYSICAL EXAM:  Vital Signs Last 24 Hrs  T(C): 36.6 (09 May 2022 14:00), Max: 36.9 (08 May 2022 22:00)  T(F): 97.8 (09 May 2022 14:00), Max: 98.4 (08 May 2022 22:00)  HR: 102 (09 May 2022 14:00) (99 - 117)  BP: 131/68 (09 May 2022 14:00) (112/52 - 139/72)  BP(mean): --  RR: 18 (09 May 2022 14:00) (18 - 18)  SpO2: 95% (09 May 2022 14:00) (95% - 100%)    General: in mod distress, dyspnea while speaking, unable to complete sentences  HEENT: NC/AT; PERRL, clear conjunctiva, pale appearing  Neck: supple  Respiratory: crackles bilaterally posterior lung fields on 2L  Cardiovascular: +S1/S2; RRR, extremities cool and pale  Abdomen: soft, NT/ND; +BS x4  Extremities: WWP, 2+ peripheral pulses b/l  Skin: pale  Neurological: ao3, occasionally not answering questions correctly    LABS:                        9.5    6.70  )-----------( 174      ( 09 May 2022 04:45 )             28.4     05-09    135  |  93<L>  |  51<H>  ----------------------------<  109<H>  4.2   |  23  |  6.81<H>    Ca    9.3      09 May 2022 04:45  Phos  6.1     05-09  Mg     2.40     05-09    TPro  5.8<L>  /  Alb  2.8<L>  /  TBili  0.4  /  DBili  x   /  AST  16  /  ALT  14  /  AlkPhos  121<H>  05-09    PT/INR - ( 09 May 2022 04:45 )   PT: 13.6 sec;   INR: 1.17 ratio         PTT - ( 09 May 2022 04:45 )  PTT:24.8 sec            RADIOLOGY & ADDITIONAL TESTS:  Results Reviewed:   Imaging Personally Reviewed:  Electrocardiogram Personally Reviewed:    COORDINATION OF CARE:  Care Discussed with Consultants/Other Providers [Y/N]: GI IR and NEphro    < from: CT Angio Abdomen and Pelvis w/ IV Cont (05.06.22 @ 03:42) >  FINDINGS:  LOWER CHEST: Partially imaged large right pleural effusion with passive   atelectasis of the right lower lobe. Partially imaged small to moderate   left pleural effusion with passive atelectasis of left lower lobe.   Loculated fluid in the right minor and left major fissures. Coronary   artery calcifications. Hypoattenuation of the cardiac blood pool on   noncontrast imaging consistent with anemia.    LIVER: Within normal limits.  BILE DUCTS: Normal caliber.  GALLBLADDER: Cholelithiasis.  SPLEEN: Within normal limits.  PANCREAS: Within normal limits.  ADRENALS: Within normal limits.  KIDNEYS/URETERS: No hydronephrosis. Atrophic left kidney. Bilateral renal   cysts and subcentimeter hypoattenuating fociwhich are too small to   characterize.    BLADDER: Within normal limits.  REPRODUCTIVE ORGANS: Prostate is enlarged.    BOWEL: Active contrast extravasation is noted in the sigmoid colon (4:80)   with delayed venous pooling (5:75). Colonic diverticulosis without   diverticulitis. No bowel obstruction. Appendix is not visualized. No   evidence of inflammation in the pericecal region.  PERITONEUM: No ascites.  VESSELS: Atherosclerotic changes. Partially imaged occlusion of the left   superficial femoral artery.  RETROPERITONEUM/LYMPH NODES: No lymphadenopathy.  ABDOMINAL WALL: Small umbilical hernia containing portion of   nonobstructed bowel. Small fat-containing left inguinal hernia.    Subcutaneous edema.  BONES: Sclerotically marginated minimally displaced fracture of the left   L3 transverse process, likely old. Additional degenerative changes of the   spine.    IMPRESSION:    Active GI bleeding visualized in the sigmoid colon as described above   with sigmoid diverticulosis.    Large right pleural effusion. Small to moderate left pleural effusion.    These findings were discussed with Provider James on 5/6/2022 at 7:37   AM] by Dr. Bromberg-Neuren.    < end of copied text >

## 2022-05-09 NOTE — PROGRESS NOTE ADULT - SUBJECTIVE AND OBJECTIVE BOX
Overnight events noted      VITAL:  T(C): , Max: 36.9 (05-08-22 @ 22:00)  T(F): , Max: 98.4 (05-08-22 @ 22:00)  HR: 104 (05-09-22 @ 05:28)  BP: 112/52 (05-09-22 @ 05:28)  BP(mean): 91 (05-08-22 @ 12:00)  RR: 18 (05-09-22 @ 05:28)  SpO2: 100% (05-09-22 @ 05:28)      PHYSICAL EXAM:  Constitutional: NAD, Alert  HEENT: NCAT, MMM  Neck: Supple, No JVD  Respiratory: decreased BS b/l bases  Cardiovascular: irreg s1s2  Gastrointestinal: BS+, soft, NT/ND  Extremities: No peripheral edema b/l  Neurological: reduced generalized strength  Back: no CVAT b/l  Skin: No rashes, no nevi  Access: LUE AVG (+)thrill    LABS:                        9.5    6.70  )-----------( 174      ( 09 May 2022 04:45 )             28.4     Na(135)/K(4.2)/Cl(93)/HCO3(23)/BUN(51)/Cr(6.81)Glu(109)/Ca(9.3)/Mg(2.40)/PO4(6.1)    05-09 @ 04:45  Na(136)/K(3.9)/Cl(96)/HCO3(19)/BUN(44)/Cr(6.02)Glu(54)/Ca(9.1)/Mg(2.40)/PO4(6.2)    05-08 @ 04:46  Na(136)/K(4.0)/Cl(97)/HCO3(24)/BUN(32)/Cr(4.74)Glu(70)/Ca(8.8)/Mg(2.20)/PO4(5.3)    05-07 @ 03:13  Na(137)/K(3.9)/Cl(98)/HCO3(25)/BUN(25)/Cr(3.71)Glu(82)/Ca(8.8)/Mg(--)/PO4(--)    05-06 @ 15:55      IMPRESSION: 86M w/ HTN, CAD, AS, AFib, and ESRD-HD, 5/6/22 p/w GIB/acute pulmonary edema/COVID    (1)Renal - ESRD - usually gets HD TTS; last dialyzed Friday - indicated for next HD today  (2)Anemia - in setting of GIB - H/H appears to have stabilized  (3)CV - warranting aggressive UF with HD today    RECOMMEND:  (1)HD today - 3hours - 3kg UF as able; Retacrit with HD        Jonathan Cardona MD  [facility]  Office: [phone]  Cell: [phone]       Overnight events noted      VITAL:  T(C): , Max: 36.9 (05-08-22 @ 22:00)  T(F): , Max: 98.4 (05-08-22 @ 22:00)  HR: 104 (05-09-22 @ 05:28)  BP: 112/52 (05-09-22 @ 05:28)  BP(mean): 91 (05-08-22 @ 12:00)  RR: 18 (05-09-22 @ 05:28)  SpO2: 100% (05-09-22 @ 05:28)      PHYSICAL EXAM:  Constitutional: NAD, Alert  HEENT: NCAT, MMM  Neck: Supple, No JVD  Respiratory: decreased BS b/l bases  Cardiovascular: irreg tachy s1s2  Gastrointestinal: BS+, soft, NT/ND  Extremities: No peripheral edema b/l  Neurological: reduced generalized strength  Back: no CVAT b/l  Skin: No rashes, no nevi  Access: LUE AVG (+)thrill    LABS:                        9.5    6.70  )-----------( 174      ( 09 May 2022 04:45 )             28.4     Na(135)/K(4.2)/Cl(93)/HCO3(23)/BUN(51)/Cr(6.81)Glu(109)/Ca(9.3)/Mg(2.40)/PO4(6.1)    05-09 @ 04:45  Na(136)/K(3.9)/Cl(96)/HCO3(19)/BUN(44)/Cr(6.02)Glu(54)/Ca(9.1)/Mg(2.40)/PO4(6.2)    05-08 @ 04:46  Na(136)/K(4.0)/Cl(97)/HCO3(24)/BUN(32)/Cr(4.74)Glu(70)/Ca(8.8)/Mg(2.20)/PO4(5.3)    05-07 @ 03:13  Na(137)/K(3.9)/Cl(98)/HCO3(25)/BUN(25)/Cr(3.71)Glu(82)/Ca(8.8)/Mg(--)/PO4(--)    05-06 @ 15:55      IMPRESSION: 86M w/ HTN, CAD, AS, AFib, and ESRD-HD, 5/6/22 p/w GIB/acute pulmonary edema/COVID    (1)Renal - ESRD - usually gets HD TTS; last dialyzed Friday - indicated for next HD today  (2)Anemia - in setting of GIB - H/H appears to have stabilized  (3)ID - on IV Ampicillin/Rocephin - does he require the abx?    RECOMMEND:  (1)HD today - 3hours - 2kg UF as able; Retacrit with HD  (2)D/C Abx?      Jonathan Cardona MD  [facility]  Office: [phone]  Cell: [phone]       Overnight events noted      VITAL:  T(C): , Max: 36.9 (05-08-22 @ 22:00)  T(F): , Max: 98.4 (05-08-22 @ 22:00)  HR: 104 (05-09-22 @ 05:28)  BP: 112/52 (05-09-22 @ 05:28)  BP(mean): 91 (05-08-22 @ 12:00)  RR: 18 (05-09-22 @ 05:28)  SpO2: 100% (05-09-22 @ 05:28)      PHYSICAL EXAM:  Constitutional: NAD, Alert  HEENT: NCAT, MMM  Neck: Supple, No JVD  Respiratory: decreased BS b/l bases  Cardiovascular: irreg tachy s1s2  Gastrointestinal: BS+, soft, NT/ND  Extremities: No peripheral edema b/l  Neurological: reduced generalized strength  Back: no CVAT b/l  Skin: No rashes, no nevi  Access: LUE AVG (+)thrill    LABS:                        9.5    6.70  )-----------( 174      ( 09 May 2022 04:45 )             28.4     Na(135)/K(4.2)/Cl(93)/HCO3(23)/BUN(51)/Cr(6.81)Glu(109)/Ca(9.3)/Mg(2.40)/PO4(6.1)    05-09 @ 04:45  Na(136)/K(3.9)/Cl(96)/HCO3(19)/BUN(44)/Cr(6.02)Glu(54)/Ca(9.1)/Mg(2.40)/PO4(6.2)    05-08 @ 04:46  Na(136)/K(4.0)/Cl(97)/HCO3(24)/BUN(32)/Cr(4.74)Glu(70)/Ca(8.8)/Mg(2.20)/PO4(5.3)    05-07 @ 03:13  Na(137)/K(3.9)/Cl(98)/HCO3(25)/BUN(25)/Cr(3.71)Glu(82)/Ca(8.8)/Mg(--)/PO4(--)    05-06 @ 15:55      IMPRESSION: 86M w/ HTN, CAD, AS, AFib, and ESRD-HD, 5/6/22 p/w GIB/acute pulmonary edema/COVID    (1)Renal - ESRD - usually gets HD TTS; last dialyzed Friday - indicated for next HD today  (2)Anemia - in setting of GIB - H/H appears to have stabilized  (3)ID - enterococcal bacteremia 4/2022 - on 32-day course of IV Ampicillin/Rocephin     RECOMMEND:  (1)HD today - 3hours - 2kg UF as able; Retacrit with HD      Jonathan Cardona MD  [facility]  Office: [phone]  Cell: [phone]       No pain, no sob      VITAL:  T(C): , Max: 36.9 (05-08-22 @ 22:00)  T(F): , Max: 98.4 (05-08-22 @ 22:00)  HR: 104 (05-09-22 @ 05:28)  BP: 112/52 (05-09-22 @ 05:28)  BP(mean): 91 (05-08-22 @ 12:00)  RR: 18 (05-09-22 @ 05:28)  SpO2: 100% (05-09-22 @ 05:28)      PHYSICAL EXAM:  Constitutional: NAD, mildly demented  HEENT: NCAT, MMM  Neck: Supple, No JVD  Respiratory: decreased BS b/l bases  Cardiovascular: irreg tachy s1s2  Gastrointestinal: BS+, soft, NT/ND  Extremities: No peripheral edema b/l  Neurological: reduced generalized strength  Back: no CVAT b/l  Skin: No rashes, no nevi  Access: LUE AVG (+)thrill    LABS:                        9.5    6.70  )-----------( 174      ( 09 May 2022 04:45 )             28.4     Na(135)/K(4.2)/Cl(93)/HCO3(23)/BUN(51)/Cr(6.81)Glu(109)/Ca(9.3)/Mg(2.40)/PO4(6.1)    05-09 @ 04:45  Na(136)/K(3.9)/Cl(96)/HCO3(19)/BUN(44)/Cr(6.02)Glu(54)/Ca(9.1)/Mg(2.40)/PO4(6.2)    05-08 @ 04:46  Na(136)/K(4.0)/Cl(97)/HCO3(24)/BUN(32)/Cr(4.74)Glu(70)/Ca(8.8)/Mg(2.20)/PO4(5.3)    05-07 @ 03:13  Na(137)/K(3.9)/Cl(98)/HCO3(25)/BUN(25)/Cr(3.71)Glu(82)/Ca(8.8)/Mg(--)/PO4(--)    05-06 @ 15:55      IMPRESSION: 86M w/ HTN, CAD, AS, AFib, and ESRD-HD, 5/6/22 p/w GIB/acute pulmonary edema/COVID    (1)Renal - ESRD - usually gets HD TTS; last dialyzed Friday - indicated for next HD today  (2)Anemia - in setting of GIB - H/H appears to have stabilized  (3)ID - enterococcal bacteremia 4/2022 - on 32-day course of IV Ampicillin/Rocephin     RECOMMEND:  (1)HD today - 3hours - 2kg UF as able; Retacrit with HD      Jonathan Cardona MD  [facility]  Office: [phone]  Cell: [phone]

## 2022-05-09 NOTE — PROGRESS NOTE ADULT - ASSESSMENT
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      HD on 5/9 and dose lasix at 120 mg IV x one given concern for hypervol as diuretics have been held since 5/6. Still with active GIB, last hematechezia on am of 5/9, transfuse 1u PRBC. IR and GI without plans for intervention.        #Aortic Stenosis  #Hx Congestive Heart Failure  - from records from [facility], was worked up and planned for TAVR at [facility]   - s/p HD (5/6)     #Afib, chronic  - rate controlled  - hold Diltiazem i/s/o normotension while actively bleeding  can restart as tolerated   - hold Eliquis i/s/o GI Bleed, will need to have risks/benefits discussion with patient on continuing: patient contacting outpatient GI doctor    #CAD  - c/w Atorvastatin 10mg  - hold Plavix 75mg i/s/o GI Bleed   #Acute Hypoxic Respiratory failure  - i/s/o worsening acute blood loss anemia, w/ bilateral pleural effusions, and COVID (s/p MAB treatment)  - c/w Nasal cannula, titrate to Sp02>88- 93%   - consider dex if pt continues to be hypoxic after fluid removal via dialysis and is unable to have O2 weaned    #Bilateral pleural effusions   - CXR with interstitial vascular opacification and bilat pleural effusions  - likely V overloaded i/s/o missed dialysis   - patient s/p HD on (5/6) for fluid removal     #Hx COPD   c/w Albuterol HFA inhaler prn       #Lower GI Bleed   - CTA Abdomen, Pelvis with brisk sigmoid colon bleed  - IR consulted, want to f/u coags after Kcentra, trend CBC after blood transfusions, likely will spontaneously resolve, if does not appropriately respond, will consider intervention   - GI consulted, deferring to IR, IR signed off  - pantoprazole 40mg BID  - c/w holding NSAID's, DVT prophylaxis, bowel regimen i/s/o active bleed  - transfuse to Hg >8  - Hgb stable       #ESRD, HD Tues, Thurs, Sat  - last HD missed Thurs 5/5 i/s/o active bleed  - Nephrology consulted  - s/p HD (5/6_5/7) next on 5/9  - BMP Q12, replete lytes prn  - strict I's/O's  - c/w Renvela 1600mg TID      #Macrocytic Anemia   #Acute Blood Loss Anemia   - MCV elevated, Baseline Hgb around 8 per daughter  - f/u serum B12 and thiamine   - CBC's Q12  - transfuse if Hgb<7    #Pancytopenia  - likely bone marrow supression i/s/o Enterococcus bacteremia and now, lower GI bleed      #Hypothyroidism   - repeat TSH  - c/w Levothyroxine 112mcg daily       #Enterococcus Bacteremia   - diagnosed, treated at [facility] in 4/2022 w/ unclear source (Per son, GI source was not considered, colonoscopy was deferred fo f/u outpatient)   - c/w IV Ceftriaxone 2gm BI, stop after 32 days  - c/w IV Ampicillin 2gm BID, stop after 32 days   - f/u repeat cultures    #COVID   - s/p Monocloal antibodies, bebetelovimab while at [facility]  - hold Dexamethasone i/s/o prior treatment course at [facility], and respiratory symptoms better explained by pulmonary edema, pleural effusions   - CTM    DVT Prophylaxis - hold Anticoagulation i/s/o active bleed. 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      HD on 5/9 and dose lasix at 120 mg IV x one given concern for hypervol as diuretics have been held since 5/6. Still with active GIB, last hematechezia on am of 5/9, transfuse 1u PRBC. IR and GI without plans for intervention. Repeat CBC ordered, pRBC 1 unite ordered, repeat VBG/lactate pending and f/u repeat CXR. May need thora ? will d/w puljerica wynne obtain records from prior hospitalization at [facility]--pt states he had a thoracentesis at that time.    #Diverticular GI Bleed, active   - CTA Abdomen, Pelvis with brisk sigmoid colon bleed  - IR consulted, want to f/u coags after Kcentra, trend CBC after blood transfusions, likely will spontaneously resolve, if does not appropriately respond, will consider intervention   - GI consulted, deferring to IR, IR signed off  - pantoprazole 40mg BID  - c/w holding NSAID's, DVT prophylaxis, bowel regimen i/s/o active bleed  - transfuse to Hg >8  - Hgb stable  - hold BB iso of active bleed       #ESRD, HD Tues, Thurs, Sat  - last HD missed Thurs 5/5 i/s/o active bleed  - Nephrology consulted  - s/p HD (5/6_5/7) next on 5/9  - BMP Q12, replete lytes prn  - strict I's/O's  - c/w Renvela 1600mg TID  - normally on torsemide 100 mg daily--> holding, dosing w IV diuretics in acute setting, 120 mg IV lasix x once on 5/9 and assess UOP    #Aortic Stenosis  #Hx Congestive Heart Failure  - from records from [facility], was worked up and planned for TAVR at [facility]      #Afib, chronic  - rate controlled  - hold Diltiazem i/s/o normotension while actively bleeding  can restart as tolerated   - hold Eliquis i/s/o GI Bleed, will need to have risks/benefits discussion with patient on continuing: patient contacting outpatient GI doctor  - resume metop 25 mg daily once GIB stops    #CAD  - c/w Atorvastatin 10mg  - hold Plavix 75mg i/s/o GI Bleed   #Acute Hypoxic Respiratory failure  - i/s/o worsening acute blood loss anemia, w/ bilateral pleural effusions, and COVID (s/p MAB treatment)  - c/w Nasal cannula, titrate to Sp02>88- 93%   - consider dex if pt continues to be hypoxic after fluid removal via dialysis and is unable to have O2 weaned    #Bilateral pleural effusions   - CXR with interstitial vascular opacification and bilat pleural effusions  - likely V overloaded i/s/o missed dialysis   - patient s/p HD on (5/6) for fluid removal   - repeat CXR, d/w pulm and get outpt records if he has had prior thora    #Hx COPD   c/w Albuterol HFA inhaler prn     #Macrocytic Anemia   #Acute Blood Loss Anemia   - MCV elevated, Baseline Hgb around 8 per daughter  - f/u serum B12 and thiamine   - CBC's Q12  - transfuse if Hgb<7    #Pancytopenia  - likely bone marrow supression i/s/o Enterococcus bacteremia and now, lower GI bleed      #Hypothyroidism   - repeat TSH  - c/w Levothyroxine 112mcg daily       #Enterococcus Bacteremia   - diagnosed, treated at [facility] in 4/2022 w/ unclear source (Per son, GI source was not considered, colonoscopy was deferred fo f/u outpatient)   - c/w IV Ceftriaxone 2gm BI, stop after 32 days  - c/w IV Ampicillin 2gm BID, stop after 32 days   - f/u repeat cultures    #COVID   - s/p Monocloal antibodies, bebetelovimab while at [place]  - hold Dexamethasone i/s/o prior treatment course at [facility], and respiratory symptoms better explained by pulmonary edema, pleural effusions   - CTM    DVT Prophylaxis - hold Anticoagulation i/s/o active bleed. Start SCDs    updated family 5/8

## 2022-05-10 NOTE — PROGRESS NOTE ADULT - SUBJECTIVE AND OBJECTIVE BOX
Interval Events:   No acute events overnight.  Patient actively coughing during my interview.  No overt GI bleeding per patient and nursing staff this morning.    ROS:   12 point review of systems performed and negative except otherwise noted in HPI.    Hospital Medications:  acetaminophen     Tablet .. 650 milliGRAM(s) Oral every 6 hours PRN  ALBUTerol    90 MICROgram(s) HFA Inhaler 2 Puff(s) Inhalation every 6 hours PRN  aluminum hydroxide/magnesium hydroxide/simethicone Suspension 30 milliLiter(s) Oral every 4 hours PRN  ampicillin  IVPB 2 Gram(s) IV Intermittent every 12 hours  ascorbic acid 500 milliGRAM(s) Oral daily  atorvastatin 10 milliGRAM(s) Oral at bedtime  cefTRIAXone   IVPB 2000 milliGRAM(s) IV Intermittent every 12 hours  chlorhexidine 2% Cloths 1 Application(s) Topical daily  epoetin ruthann-epbx (RETACRIT) Injectable 54770 Unit(s) IV Push <User Schedule>  folic acid 1 milliGRAM(s) Oral daily  levothyroxine Injectable 84 MICROGram(s) IV Push at bedtime  lidocaine/prilocaine Cream 1 Application(s) Topical <User Schedule>  lidocaine/prilocaine Cream 1 Application(s) Topical once  melatonin 3 milliGRAM(s) Oral at bedtime PRN  ondansetron Injectable 4 milliGRAM(s) IV Push every 8 hours PRN  pantoprazole  Injectable 40 milliGRAM(s) IV Push daily  sevelamer carbonate 1600 milliGRAM(s) Oral three times a day with meals  zinc oxide 20% Ointment 1 Application(s) Topical daily  zinc sulfate 220 milliGRAM(s) Oral daily      PHYSICAL EXAM:   Vital Signs:  Vital Signs Last 24 Hrs  T(C): 36.4 (10 May 2022 11:19), Max: 36.9 (09 May 2022 15:18)  T(F): 97.6 (10 May 2022 11:19), Max: 98.5 (09 May 2022 15:18)  HR: 77 (10 May 2022 11:19) (77 - 102)  BP: 118/75 (10 May 2022 11:19) (118/75 - 163/64)  BP(mean): --  RR: 18 (10 May 2022 11:19) (18 - 19)  SpO2: 100% (10 May 2022 11:19) (95% - 100%)  Daily Height in cm: 167.64 (09 May 2022 20:32)    Daily Weight in k (10 May 2022 05:39)    GENERAL: no acute distress  NEURO: alert  HEENT: NCAT, no conjunctival pallor appreciated  CHEST: no respiratory distress, no accessory muscle use  CARDIAC: regular rate, +S1/S2  ABDOMEN: soft, nondistended, nontender, no rebound or guarding  EXTREMITIES: warm, well perfused  SKIN: no lesions noted    LABS: reviewed                        10.  )-----------( 140      ( 10 May 2022 07:30 )             31.9     05-10    131<L>  |  94<L>  |  QNS  ----------------------------<  QNS  4.2   |  QNS  |  QNS    Ca    QNS      10 May 2022 07:30  Phos  5.2     05-10  Mg     2.30     05-10    TPro  QNS  /  Alb  QNS  /  TBili  QNS  /  DBili  x   /  AST  QNS  /  ALT  QNS  /  AlkPhos  QNS  05-10    LIVER FUNCTIONS - ( 10 May 2022 07:30 )  Alb: QNS g/dL / Pro: QNS g/dL / ALK PHOS: QNS U/L / ALT: QNS U/L / AST: QNS U/L / GGT: x             Interval Diagnostic Studies: see sunrise for full report

## 2022-05-10 NOTE — PROGRESS NOTE ADULT - ATTENDING COMMENTS
86 year old male with history of HTN, CAD on Plavix, AF on Eliquis, ESRD on HD TTS, hypothyroidism, and aortic stenosis who presents with BRBPR, found to have CTA+ with active bleed in sigmoid colon which has now resolved, likely diverticular    1- No plans for colonoscopy emergently as bleeding has resolved  2- Cardiology to weigh in for risks/benefits of continued Plavix  3- Liquid diet    Fatmata Cruz MD   of Medicine, Gastroenterology  [address] [place] [address], [address]  [place] [address]  [phone]
86 year old male with history of HTN, CAD on Plavix, AF on Eliquis, ESRD on HD TTS, hypothyroidism, and aortic stenosis who presents with BRBPR, found to have CTA+ with active bleed in sigmoid colon which has now resolved, likely diverticular  recurrent episode last night, s/p 1 unit  COVID+, cough, on nasal 02, ecchymosis of arm    1- Monitor CBC, transfuse if hgb <8  2- Cardiology to weigh in for risks/benefits of continued Plavix  3- Advance diet as tolerated  4- no plans for urgent colonoscopy as pt remains hemodynamically stable, and has serious medical active comorbidities with compromise to pulm status    Fatmata Cruz MD   of Medicine, Gastroenterology  [address] [place] [address], [address]  [place] [address]  [phone]
87 y/o M with a PMH as above here with acute blood loose anemia secondary to LGIB.  CTA showed possible extravasation in the sigmoid colon.  Follow up IR.  Trend CBC. Will administer additional two units of PRBCs, platelets and PCC.  Monitor BP with goal MAP>65.  Hold Plavix and Eliquis.  GI consulted as well.  Cont ceftriaxone and ampicillin for recent enterococcus bacteremia.  Patient examined and case reviewed in detail on bedside rounds. Frequent bedside visits with therapy change today.  Prognosis guarded.
87 y/o M with a PMH as above here with acute blood loose anemia secondary to LGIB.  CTA showed possible extravasation in the sigmoid colon.  Follow up IR.  Trend CBC. Will administer additional two units of PRBCs, platelets and PCC.  Monitor BP with goal MAP>65.  Hold Plavix and Eliquis.  Appreciate GI consult with no active intervention needed. Cont ceftriaxone and ampicillin for recent enterococcus bacteremia. medically stable to go to the floor.  Patient examined and case reviewed in detail on bedside rounds. Frequent bedside visits with therapy change today.  Prognosis guarded.

## 2022-05-10 NOTE — PROGRESS NOTE ADULT - ASSESSMENT
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      HD on 5/9 and dose lasix at 120 mg IV x one given concern for hypervol as diuretics have been held since 5/6. Still with active GIB, last hematechezia on am of 5/9, transfuse 1u PRBC. IR and GI without plans for intervention. Repeat CBC ordered, pRBC 1 unite ordered, repeat VBG/lactate pending and f/u repeat CXR. May need thora ? will d/w pulm annstephie obtain records from prior hospitalization at [facility]--pt states he had a thoracentesis at that time.    5/10- Diverticular bleed appears to have stabilized without intervention by IR. Need rec's from Cards regarding resuming eliquis and plavix (pt had cath at [facility] for TAVR work up, per Francoise, will obtain report.    #Diverticular GI Bleed, active, improving   - CTA Abdomen, Pelvis with brisk sigmoid colon bleed  - IR consulted, want to f/u coags after Kcentra, trend CBC after blood transfusions, likely will spontaneously resolve, if does not appropriately respond, will consider intervention   - GI consulted, deferring to IR, IR signed off  - pantoprazole 40mg BID  - c/w holding NSAID's, DVT prophylaxis, bowel regimen i/s/o active bleed  - transfuse to Hg >8  - Hgb stable  - hold BB iso of active bleed  - Holding Eliquis and plavix currently      #ESRD, HD Tues, Thurs, Sat  - last HD missed Thurs 5/5 i/s/o active bleed  - Nephrology consulted  - s/p HD (5/6_5/7) next on 5/9  - BMP Q12, replete lytes prn  - strict I's/O's  - c/w Renvela 1600mg TID  - normally on torsemide 100 mg daily--> holding, dosing w IV diuretics in acute setting, 120 mg IV lasix x once on 5/9 and assess UOP. Resume daily torsemide 100 mg starting 5/11.     #Aortic Stenosis  #Hx Congestive Heart Failure  - from records from [facility], was worked up and planned for TAVR at [facility]      #Afib, chronic  - rate controlled  - hold Diltiazem i/s/o normotension while actively bleeding  can restart as tolerated   - hold Eliquis i/s/o GI Bleed, will need to have risks/benefits discussion with patient on continuing: patient contacting outpatient GI doctor  - resume metop 25 mg daily once GIB stops    #CAD  - c/w Atorvastatin 10mg  - hold Plavix 75mg i/s/o GI Bleed   #Acute Hypoxic Respiratory failure  - i/s/o worsening acute blood loss anemia, w/ bilateral pleural effusions, and COVID (s/p MAB treatment)  - c/w Nasal cannula, titrate to Sp02>88- 93%   - consider dex if pt continues to be hypoxic after fluid removal via dialysis and is unable to have O2 weaned    #Bilateral pleural effusions   - CXR with interstitial vascular opacification and bilat pleural effusions; s/p thora at [facility] in April   - likely V overloaded i/s/o missed dialysis and torsemide had been held  - patient s/p HD on (5/6) for fluid removal   - repeat CXR, d/w pulm and get outpt records if he has had prior thora    #Hx COPD   c/w Albuterol HFA inhaler prn     #Macrocytic Anemia   #Acute Blood Loss Anemia   - MCV elevated, Baseline Hgb around 8 per daughter  - f/u serum B12 and thiamine   - CBC's Q12  - transfuse if Hgb<7    #Pancytopenia  - likely bone marrow supression i/s/o Enterococcus bacteremia and now, lower GI bleed      #Hypothyroidism   - repeat TSH  - c/w Levothyroxine 112mcg daily       #Enterococcus Bacteremia   - diagnosed, treated at [facility] in 4/2022 w/ unclear source (Per son, GI source was not considered, colonoscopy was deferred fo f/u outpatient)   - c/w IV Ceftriaxone 2gm BI, stop after 32 days  - c/w IV Ampicillin 2gm BID, stop after 32 days   - f/u repeat cultures    #COVID   - s/p Monocloal antibodies, bebetelovimab while at [place]  - hold Dexamethasone i/s/o prior treatment course at [facility], and respiratory symptoms better explained by pulmonary edema, pleural effusions   - CTM    DVT Prophylaxis - hold Anticoagulation i/s/o active bleed. Start SCDs    updated family 5/8, 5/10

## 2022-05-10 NOTE — PROGRESS NOTE ADULT - SUBJECTIVE AND OBJECTIVE BOX
NEPHROLOGY     Patient seen and examined.    MEDICATIONS  (STANDING):  ampicillin  IVPB 2 Gram(s) IV Intermittent every 12 hours  ascorbic acid 500 milliGRAM(s) Oral daily  atorvastatin 10 milliGRAM(s) Oral at bedtime  cefTRIAXone   IVPB 2000 milliGRAM(s) IV Intermittent every 12 hours  chlorhexidine 2% Cloths 1 Application(s) Topical daily  epoetin ruthann-epbx (RETACRIT) Injectable 19759 Unit(s) IV Push <User Schedule>  folic acid 1 milliGRAM(s) Oral daily  levothyroxine Injectable 84 MICROGram(s) IV Push at bedtime  lidocaine/prilocaine Cream 1 Application(s) Topical <User Schedule>  pantoprazole  Injectable 40 milliGRAM(s) IV Push daily  sevelamer carbonate 1600 milliGRAM(s) Oral three times a day with meals  zinc oxide 20% Ointment 1 Application(s) Topical daily  zinc sulfate 220 milliGRAM(s) Oral daily    VITALS:  T(C): , Max: 36.9 (05-09-22 @ 15:18)  T(F): , Max: 98.5 (05-09-22 @ 15:18)  HR: 88 (05-10-22 @ 05:39)  BP: 144/78 (05-10-22 @ 05:39)  RR: 18 (05-10-22 @ 05:39)  SpO2: 97% (05-10-22 @ 05:39)    I and O's:    05-09 @ 07:01  -  05-10 @ 07:00  --------------------------------------------------------  IN: 1150 mL / OUT: 2575 mL / NET: -1425 mL    Height (cm): 167.6 (05-09 @ 20:32)    PHYSICAL EXAM:  Constitutional: NAD, mildly demented  HEENT: NCAT, MMM  Neck: Supple, No JVD  Respiratory: decreased BS b/l bases  Cardiovascular: irreg tachy s1s2  Gastrointestinal: BS+, soft, NT/ND  Extremities: No peripheral edema b/l  Neurological: reduced generalized strength  Back: no CVAT b/l  Skin: No rashes, no nevi  [facility]    LABS:                        10.5   4.92  )-----------( 140      ( 10 May 2022 07:30 )             31.9     05-10    131<L>  |  94<L>  |  QNS  ----------------------------<  QNS  4.2   |  QNS  |  QNS    Ca    QNS      10 May 2022 07:30  Phos  5.2     05-10  Mg     2.30     05-10    TPro  QNS  /  Alb  QNS  /  TBili  QNS  /  DBili  x   /  AST  QNS  /  ALT  QNS  /  AlkPhos  QNS  05-10   NEPHROLOGY     Patient seen and examined, reports feeling ok, though states some sob, he is comfortable on 3L NC, denies pain, currently in no acute distress. Tolerated HD yesterday and removed 1.8kg.     MEDICATIONS  (STANDING):  ampicillin  IVPB 2 Gram(s) IV Intermittent every 12 hours  ascorbic acid 500 milliGRAM(s) Oral daily  atorvastatin 10 milliGRAM(s) Oral at bedtime  cefTRIAXone   IVPB 2000 milliGRAM(s) IV Intermittent every 12 hours  chlorhexidine 2% Cloths 1 Application(s) Topical daily  epoetin ruthann-epbx (RETACRIT) Injectable 63955 Unit(s) IV Push <User Schedule>  folic acid 1 milliGRAM(s) Oral daily  levothyroxine Injectable 84 MICROGram(s) IV Push at bedtime  lidocaine/prilocaine Cream 1 Application(s) Topical <User Schedule>  pantoprazole  Injectable 40 milliGRAM(s) IV Push daily  sevelamer carbonate 1600 milliGRAM(s) Oral three times a day with meals  zinc oxide 20% Ointment 1 Application(s) Topical daily  zinc sulfate 220 milliGRAM(s) Oral daily    VITALS:  T(C): , Max: 36.9 (05-09-22 @ 15:18)  T(F): , Max: 98.5 (05-09-22 @ 15:18)  HR: 88 (05-10-22 @ 05:39)  BP: 144/78 (05-10-22 @ 05:39)  RR: 18 (05-10-22 @ 05:39)  SpO2: 97% (05-10-22 @ 05:39)    I and O's:    05-09 @ 07:01  -  05-10 @ 07:00  --------------------------------------------------------  IN: 1150 mL / OUT: 2575 mL / NET: -1425 mL    Height (cm): 167.6 (05-09 @ 20:32)    PHYSICAL EXAM:  Constitutional: NAD, mildly demented  HEENT: NCAT, MMM  Neck: Supple, No JVD  Respiratory: decreased BS b/l bases  Cardiovascular: irreg tachy s1s2  Gastrointestinal: BS+, soft, NT/ND  Extremities: No peripheral edema b/l  Neurological: reduced generalized strength  Back: no CVAT b/l  Skin: No rashes, no nevi  Access: HASMUKH BLANDON (+)thrill    LABS:                        10.5   4.92  )-----------( 140      ( 10 May 2022 07:30 )             31.9     05-10    131<L>  |  94<L>  |  QNS  ----------------------------<  QNS  4.2   |  QNS  |  QNS    Ca    QNS      10 May 2022 07:30  Phos  5.2     05-10  Mg     2.30     05-10    TPro  QNS  /  Alb  QNS  /  TBili  QNS  /  DBili  x   /  AST  QNS  /  ALT  QNS  /  AlkPhos  QNS  05-10

## 2022-05-10 NOTE — PROGRESS NOTE ADULT - ASSESSMENT
86 year old male with history of HTN, CAD on Plavix, AF on Eliquis, ESRD on HD TTS, hypothyroidism, and aortic stenosis who presents with BRBPR, found to have CTA+ with active bleed in sigmoid colon.     # Hematochezia  # CAD on Plavix  # AF on Eliquis  # ESRD on HD TTS  # Recent enterococcus bacteremia  # Acute hypoxic respiratory failure  # +COVID  # Aortic stenosis  On 5/5/2022, patient developed bloody BMs.  Last dose(s) of Plavix and Eliquis were on the morning of 5/5/2022.  Active hematochezia in rectal pouch on initial exam.  CTA positive for active bleed in sigmoid colon with diverticulosis.  In light of this information, clinical presentation consistent with lower GI bleeding, likely from diverticular source; much less likely from angioectasias, malignancy, colitis, hemorrhoids given CTA findings.    Recommendations:  -trend vitals, CBC, and monitor for clinical signs of bleeding  -maintain active type and screen  -transfusion goal to maintain hemoglobin >/= 7.0 and platelets >/= 50  -despite positive CTA, no IR intervention performed given resolution of GI bleeding, again supporting intermittent diverticular bleeding as likely source  -would readdress indication for Plavix and Eliquis +/- Cardiology consult; unclear timing of last stent for CAD; patient at high risk for rebleeding given diverticulosis  -called back for reported bloody bowel movement yesterday, however no overt bleeding this morning per patient and nursing staff; no further plans for anticipated inpatient GI interventions, colonoscopy of little to no utility at this time; patient also not optimized given 3L supplemental oxygen and COVID+  -will sign off at this time, please call back with questions or if new issues arise    Recommendations incomplete until finalized by attending signature/attestation to note.    Charlie Aguilar, PGY-4  GI/Hepatology Fellow    MONDAY-FRIDAY 8AM-5PM:  Pager# [phone] ([facility]) or [phone] ([facility])    NON-URGENT CONSULTS:  Please email [email or web address]  AT NIGHT AND ON WEEKENDS:  Contact on-call GI fellow via answering service ([phone]) from 5pm-8am and on weekends/holidays

## 2022-05-10 NOTE — CONSULT NOTE ADULT - SUBJECTIVE AND OBJECTIVE BOX
CHIEF COMPLAINT:    HPI per H and P and MICU transfer note:  MICU COURSE:  "85 yo M with pmh of HTN, CAD, ESRD on HD (Tu,Thurs,Sat), Afib on eliquis presenting from [facility], Hypothyroidism, HLD, aortic stenosis presents with bright red blood per rectum. Pt found with Bloody BM's since yesterday. Pt missed his last dialysis session yesterday as a result. Pt also endorsing symptoms of fatigue, shortness of breath. Note Pt found COVID positive 4/30 at [facility]. Patient denies worsening abdominal pain, n/v/d. Patient denies fevers, chills. Patient reports he recently received Monoclonal antibodies, bebetelovimab while at [place].     Of note, patient was recently seen at [facility] [place], admitted 4/14, found with Enterococcus bacteremia of unknown source, where also found COVID positive, treated with antibiotics, and discharged to [facility] with Picc.     ED   Admitted with blood mixed stools, 2 large bloody BM's in ED, w/t Hgb 6. Pt given 1 u prbc's in the ED.   Nephrology consulted in the ED for fluid removal and plan to start dilaysis in the am.   Admitted with sob, found in Acute hypoxic respiratory failure requiring Nasal cannula, otherwise normotensive, afebrile, AA03.    In the MICU, pt received total of 3 units of pRBC, 1u platelet and Kcentra. GI and IR c/s for new active bleed in sigmoid colon. IR deferring procedure at this time, GI deferring to IR for intervention. Received dialysis w/ fluid removal. Pt otherwise hemodynamically stable after transfusion w/ resolution of bleed. Medically optimized for transfer to floors."       PAST MEDICAL & SURGICAL HISTORY:  ESRD on dialysis    CAD (coronary artery disease)    Afib    HLD (hyperlipidemia)    Mild HTN    Aortic stenosis    No significant past surgical history        FAMILY HISTORY:  No pertinent family history in first degree relatives        SOCIAL HISTORY:  Smoking: [ ] Never Smoked [x ] Former Smoker  50 years 1PPD quit in the 2000s  Substance Use: [x ] Never Used [ ] Used ____  EtOH Use: not currently      Allergies    No Known Allergies    Intolerances    codeine (Other (Mild to Mod))      HOME MEDICATIONS:     ROS  Constitutional: denies fevers, chills, night sweats, weight loss  HEENT: denies visual changes, cough  Cardiovascular: denies palpitations, chest pain, edema  Respiratory: denies SOB, wheezing  Gastrointestinal: denies N/V/D, abdominal pain, hematochezia, melena  : denies dysuria, urinary urgency, increased frequency  MSK: denies muscle weakness, joint pain  Skin: denies new rashes or masses  Heme: denies bleeding, bruising  Neuro: denies headache, weakness    PHYSICAL EXAM:   GEN: Age appropriate, resting comfortably in bed, no acute distress, non toxic appearing, speaking in complete sentences.   HEENT: Conjunctiva and sclera normal  PULM: Lungs CTAB, no wheezes, rales, rhonchi  CV: RRR, S1S2, no MRG  MSK: no stiffness or joint effusions  Abdominal: Soft, nontender to palpation, non-distended, +BS  Extremities: No edema or cyanosis  NEURO: AAOx3  Psych: normal affect, normal behavior  Skin: No rashes, lesions      OBJECTIVE:  ICU Vital Signs Last 24 Hrs  T(C): 36.7 (10 May 2022 05:39), Max: 36.9 (09 May 2022 15:18)  T(F): 98 (10 May 2022 05:39), Max: 98.5 (09 May 2022 15:18)  HR: 88 (10 May 2022 05:39) (78 - 102)  BP: 144/78 (10 May 2022 05:39) (131/68 - 163/64)  BP(mean): --  ABP: --  ABP(mean): --  RR: 18 (10 May 2022 05:39) (18 - 19)  SpO2: 97% (10 May 2022 05:39) (95% - 97%)        05-09 @ 07:01  -  05-10 @ 07:00  --------------------------------------------------------  IN: 1150 mL / OUT: 2575 mL / NET: -1425 mL      CAPILLARY BLOOD GLUCOSE            HOSPITAL MEDICATIONS:    ampicillin  IVPB 2 Gram(s) IV Intermittent every 12 hours  cefTRIAXone   IVPB 2000 milliGRAM(s) IV Intermittent every 12 hours      atorvastatin 10 milliGRAM(s) Oral at bedtime  levothyroxine Injectable 84 MICROGram(s) IV Push at bedtime    ALBUTerol    90 MICROgram(s) HFA Inhaler 2 Puff(s) Inhalation every 6 hours PRN    acetaminophen     Tablet .. 650 milliGRAM(s) Oral every 6 hours PRN  melatonin 3 milliGRAM(s) Oral at bedtime PRN  ondansetron Injectable 4 milliGRAM(s) IV Push every 8 hours PRN    aluminum hydroxide/magnesium hydroxide/simethicone Suspension 30 milliLiter(s) Oral every 4 hours PRN  pantoprazole  Injectable 40 milliGRAM(s) IV Push daily        ascorbic acid 500 milliGRAM(s) Oral daily  folic acid 1 milliGRAM(s) Oral daily  zinc sulfate 220 milliGRAM(s) Oral daily    epoetin ruthann-epbx (RETACRIT) Injectable 04032 Unit(s) IV Push <User Schedule>    chlorhexidine 2% Cloths 1 Application(s) Topical daily  lidocaine/prilocaine Cream 1 Application(s) Topical <User Schedule>  zinc oxide 20% Ointment 1 Application(s) Topical daily    sevelamer carbonate 1600 milliGRAM(s) Oral three times a day with meals      LABS:                        9.2    6.74  )-----------( 174      ( 09 May 2022 15:24 )             27.7     Hgb Trend: 9.2<--, 9.5<--, 9.4<--, 9.7<--, 9.9<--  05-09    135  |  93<L>  |  51<H>  ----------------------------<  109<H>  4.2   |  23  |  6.81<H>    Ca    9.3      09 May 2022 04:45  Phos  6.1     05-09  Mg     2.40     05-09    TPro  5.8<L>  /  Alb  2.8<L>  /  TBili  0.4  /  DBili  x   /  AST  16  /  ALT  14  /  AlkPhos  121<H>  05-09    Creatinine Trend: 6.81<--, 6.02<--, 4.74<--, 3.71<--, 5.82<--  PT/INR - ( 09 May 2022 04:45 )   PT: 13.6 sec;   INR: 1.17 ratio         PTT - ( 09 May 2022 04:45 )  PTT:24.8 sec      Venous Blood Gas:  05-09 @ 15:37  7.37/43/136/25/99.4  VBG Lactate: 1.0             CHIEF COMPLAINT:    HPI per H and P and MICU transfer note:  MICU COURSE:  "87 yo M with pmh of HTN, CAD, ESRD on HD (Tu,Thurs,Sat), Afib on eliquis presenting from [facility], Hypothyroidism, HLD, aortic stenosis presents with bright red blood per rectum. Pt found with Bloody BM's since yesterday. Pt missed his last dialysis session yesterday as a result. Pt also endorsing symptoms of fatigue, shortness of breath. Note Pt found COVID positive 4/30 at [facility]. Patient denies worsening abdominal pain, n/v/d. Patient denies fevers, chills. Patient reports he recently received Monoclonal antibodies, bebetelovimab while at [place].     Of note, patient was recently seen at [facility], admitted 4/14, found with Enterococcus bacteremia of unknown source, where also found COVID positive, treated with antibiotics, and discharged to [facility] with Picc.     ED   Admitted with blood mixed stools, 2 large bloody BM's in ED, w/t Hgb 6. Pt given 1 u prbc's in the ED.   Nephrology consulted in the ED for fluid removal and plan to start dilaysis in the am.   Admitted with sob, found in Acute hypoxic respiratory failure requiring Nasal cannula, otherwise normotensive, afebrile, AA03.    In the MICU, pt received total of 3 units of pRBC, 1u platelet and Kcentra. GI and IR c/s for new active bleed in sigmoid colon. IR deferring procedure at this time, GI deferring to IR for intervention. Received dialysis w/ fluid removal. Pt otherwise hemodynamically stable after transfusion w/ resolution of bleed. Medically optimized for transfer to floors."       PAST MEDICAL & SURGICAL HISTORY:  ESRD on dialysis    CAD (coronary artery disease)    Afib    HLD (hyperlipidemia)    Mild HTN    Aortic stenosis    No significant past surgical history        FAMILY HISTORY:  No pertinent family history in first degree relatives        SOCIAL HISTORY:  Smoking: [ ] Never Smoked [x ] Former Smoker  50 years 1PPD quit in the 2000s  Substance Use: [x ] Never Used [ ] Used ____  EtOH Use: not currently      Allergies    No Known Allergies    Intolerances    codeine (Other (Mild to Mod))      HOME MEDICATIONS:     ROS  Constitutional: denies fevers, chills, night sweats, weight loss  HEENT: denies visual changes, cough  Cardiovascular: denies palpitations, chest pain, edema  Respiratory: denies SOB, wheezing  Gastrointestinal: denies N/V/D, abdominal pain, hematochezia, melena  : denies dysuria, urinary urgency, increased frequency  MSK: denies muscle weakness, joint pain  Skin: denies new rashes or masses  Heme: denies bleeding, bruising  Neuro: denies headache, weakness    PHYSICAL EXAM:   GEN: Age appropriate, resting comfortably in bed, no acute distress, non toxic appearing, speaking in complete sentences.   HEENT: Conjunctiva and sclera normal  PULM: Lungs decreased breath sounds at the bases  CV: RRR, S1S2, no MRG  MSK: no stiffness or joint effusions  Abdominal: Soft, nontender to palpation, non-distended, +BS  Extremities: No edema or cyanosis  NEURO: AAOx3  Psych: normal affect, normal behavior  Skin: No rashes, lesions      OBJECTIVE:  ICU Vital Signs Last 24 Hrs  T(C): 36.7 (10 May 2022 05:39), Max: 36.9 (09 May 2022 15:18)  T(F): 98 (10 May 2022 05:39), Max: 98.5 (09 May 2022 15:18)  HR: 88 (10 May 2022 05:39) (78 - 102)  BP: 144/78 (10 May 2022 05:39) (131/68 - 163/64)  BP(mean): --  ABP: --  ABP(mean): --  RR: 18 (10 May 2022 05:39) (18 - 19)  SpO2: 97% (10 May 2022 05:39) (95% - 97%)        05-09 @ 07:01  -  05-10 @ 07:00  --------------------------------------------------------  IN: 1150 mL / OUT: 2575 mL / NET: -1425 mL      CAPILLARY BLOOD GLUCOSE            HOSPITAL MEDICATIONS:    ampicillin  IVPB 2 Gram(s) IV Intermittent every 12 hours  cefTRIAXone   IVPB 2000 milliGRAM(s) IV Intermittent every 12 hours      atorvastatin 10 milliGRAM(s) Oral at bedtime  levothyroxine Injectable 84 MICROGram(s) IV Push at bedtime    ALBUTerol    90 MICROgram(s) HFA Inhaler 2 Puff(s) Inhalation every 6 hours PRN    acetaminophen     Tablet .. 650 milliGRAM(s) Oral every 6 hours PRN  melatonin 3 milliGRAM(s) Oral at bedtime PRN  ondansetron Injectable 4 milliGRAM(s) IV Push every 8 hours PRN    aluminum hydroxide/magnesium hydroxide/simethicone Suspension 30 milliLiter(s) Oral every 4 hours PRN  pantoprazole  Injectable 40 milliGRAM(s) IV Push daily        ascorbic acid 500 milliGRAM(s) Oral daily  folic acid 1 milliGRAM(s) Oral daily  zinc sulfate 220 milliGRAM(s) Oral daily    epoetin ruthann-epbx (RETACRIT) Injectable 76943 Unit(s) IV Push <User Schedule>    chlorhexidine 2% Cloths 1 Application(s) Topical daily  lidocaine/prilocaine Cream 1 Application(s) Topical <User Schedule>  zinc oxide 20% Ointment 1 Application(s) Topical daily    sevelamer carbonate 1600 milliGRAM(s) Oral three times a day with meals      LABS:                        9.2    6.74  )-----------( 174      ( 09 May 2022 15:24 )             27.7     Hgb Trend: 9.2<--, 9.5<--, 9.4<--, 9.7<--, 9.9<--  05-09    135  |  93<L>  |  51<H>  ----------------------------<  109<H>  4.2   |  23  |  6.81<H>    Ca    9.3      09 May 2022 04:45  Phos  6.1     05-09  Mg     2.40     05-09    TPro  5.8<L>  /  Alb  2.8<L>  /  TBili  0.4  /  DBili  x   /  AST  16  /  ALT  14  /  AlkPhos  121<H>  05-09    Creatinine Trend: 6.81<--, 6.02<--, 4.74<--, 3.71<--, 5.82<--  PT/INR - ( 09 May 2022 04:45 )   PT: 13.6 sec;   INR: 1.17 ratio         PTT - ( 09 May 2022 04:45 )  PTT:24.8 sec      Venous Blood Gas:  05-09 @ 15:37  7.37/43/136/25/99.4  VBG Lactate: 1.0

## 2022-05-10 NOTE — PROGRESS NOTE ADULT - ASSESSMENT
IMPRESSION: 86M w/ HTN, CAD, AS, AFib, and ESRD-HD, 5/6/22 p/w GIB/acute pulmonary edema/COVID    (1)Renal - ESRD - usually gets HD TTS; last dialyzed yesterday - will have HD again today and will be back on TTS schedule  (2)Anemia - in setting of GIB - H/H appears to have stabilized, slightly higher today   (3)ID - enterococcal bacteremia 4/2022 - on 32-day course of IV Ampicillin/Rocephin     RECOMMEND:  (1)HD today - 2hours - 2kg UF as able; Retacrit with HD    Maria Elena Minaya NP-C  [facility]  [phone]

## 2022-05-10 NOTE — CONSULT NOTE ADULT - ASSESSMENT
The patient is an 86 year old male former smoker (50 years, 1PPD), no pulmonary hx, PMH of HF, severe AS (w plans for TAVR at [facility]), HTN, CAD< ESRD on HD (, Thurs, Sat), Afib on eliquis, CHF w , COVID positive  s/p mAB, enterococcus bacteremia (dx at [facility], on ceft/amipcillin,), recurrent pleural effusions presenting from [place] with bright red blood per rectum. Found to have evidence of LGIB likely diverticular s/p MICU admission, multiple transfusions. Downgraded to the floors. Pulm consulted for hypoxemic respiratory failure.     Assessment: Patient seen and examined at the bedside. GCS 15, answering questions appropriately. Denies any pulmonary hx or ever seeing a pulmonologist. Some decreased breath sounds at the bases. No tachypnea or increased WOB on exam. Patient denies SOB or cough however endorses some AGUDELO. Patients oxygen was turned off at bedside during our interview and he maintained oxygen saturation >96% for 10 minutes. Patient requested the oxygen be left on for comfort therefore we downtitrated him from 3L to 1L. Patient non toxic appearing, no signs or symptoms of PNA.     Plan:  C/w nasal cannula 1L wean as tolerated target O2 sat 92-94%  check ambulatory sats prior to discharge  Fluid removal / optimization per renal / HD  Please obtain records from [facility] recent hospitalization including discharge summary, pulmonary notes if any.     This patient will need to follow up with the pulmonary team after discharge:  Please email: [email or web address] to setup an appointment prior to discharge with any available pulmonologist. The appointment should be within 1-2 weeks of discharge from the hospital. Include the patient's name, , MRN and contact information in the email.      Pulmonary[facility]  [address] [place] [address]  [phone]    Please discuss the appointment details with the patient and include the appointment details in the patients discharge summary.

## 2022-05-10 NOTE — PROGRESS NOTE ADULT - SUBJECTIVE AND OBJECTIVE BOX
[facility] Division of Hospital Medicine  Michell Simon MD  Pager (MIRTHA-HUMA, 8A-5P): [phone]  Other Times:  [phone]      SUBJECTIVE / OVERNIGHT EVENTS: One episode of blood tinged stool on wipe this am per nursing. Now on 3L o2, pt more confused this am, doesn't remember our discussion yesterday. Spoke with Francoise (daughter) over the phone, who also feels he is more confused today. Last HD 5/9, planned for 5/10 as well.     MEDICATIONS  (STANDING):  ampicillin  IVPB 2 Gram(s) IV Intermittent every 12 hours  ascorbic acid 500 milliGRAM(s) Oral daily  atorvastatin 10 milliGRAM(s) Oral at bedtime  cefTRIAXone   IVPB 2000 milliGRAM(s) IV Intermittent every 12 hours  chlorhexidine 2% Cloths 1 Application(s) Topical daily  epoetin ruthann-epbx (RETACRIT) Injectable 94663 Unit(s) IV Push <User Schedule>  folic acid 1 milliGRAM(s) Oral daily  levothyroxine Injectable 84 MICROGram(s) IV Push at bedtime  lidocaine/prilocaine Cream 1 Application(s) Topical <User Schedule>  lidocaine/prilocaine Cream 1 Application(s) Topical once  pantoprazole  Injectable 40 milliGRAM(s) IV Push daily  sevelamer carbonate 1600 milliGRAM(s) Oral three times a day with meals  zinc oxide 20% Ointment 1 Application(s) Topical daily  zinc sulfate 220 milliGRAM(s) Oral daily    MEDICATIONS  (PRN):  acetaminophen     Tablet .. 650 milliGRAM(s) Oral every 6 hours PRN Temp greater or equal to 38C (100.4F), Mild Pain (1 - 3)  ALBUTerol    90 MICROgram(s) HFA Inhaler 2 Puff(s) Inhalation every 6 hours PRN Bronchospasm  aluminum hydroxide/magnesium hydroxide/simethicone Suspension 30 milliLiter(s) Oral every 4 hours PRN Dyspepsia  melatonin 3 milliGRAM(s) Oral at bedtime PRN Insomnia  ondansetron Injectable 4 milliGRAM(s) IV Push every 8 hours PRN Nausea and/or Vomiting      I&O's Summary    09 May 2022 07:01  -  10 May 2022 07:00  --------------------------------------------------------  IN: 1150 mL / OUT: 2575 mL / NET: -1425 mL    10 May 2022 07:01  -  10 May 2022 13:53  --------------------------------------------------------  IN: 360 mL / OUT: 0 mL / NET: 360 mL        PHYSICAL EXAM:  Vital Signs Last 24 Hrs  T(C): 36.4 (10 May 2022 11:19), Max: 36.9 (09 May 2022 15:18)  T(F): 97.6 (10 May 2022 11:19), Max: 98.5 (09 May 2022 15:18)  HR: 77 (10 May 2022 11:19) (77 - 102)  BP: 118/75 (10 May 2022 11:19) (118/75 - 163/64)  BP(mean): --  RR: 18 (10 May 2022 11:19) (18 - 19)  SpO2: 100% (10 May 2022 11:19) (95% - 100%)    General: in mod distress, dyspnea while speaking, unable to complete sentences  HEENT: NC/AT; PERRL, clear conjunctiva, pale appearing  Neck: supple  Respiratory: crackles bilaterally posterior lung fields on 2L  Cardiovascular: +S1/S2; RRR, extremities cool and pale  Abdomen: soft, NT/ND; +BS x4  Extremities: WWP, 2+ peripheral pulses b/l  Skin: pale  Neurological: ao3, occasionally not answering questions correctly    LABS:                        10.5   4.92  )-----------( 140      ( 10 May 2022 07:30 )             31.9     05-10    131<L>  |  94<L>  |  QNS  ----------------------------<  QNS  4.2   |  QNS  |  QNS    Ca    QNS      10 May 2022 07:30  Phos  5.2     05-10  Mg     2.30     05-10    TPro  QNS  /  Alb  QNS  /  TBili  QNS  /  DBili  x   /  AST  QNS  /  ALT  QNS  /  AlkPhos  QNS  05-10    PT/INR - ( 09 May 2022 04:45 )   PT: 13.6 sec;   INR: 1.17 ratio         PTT - ( 09 May 2022 04:45 )  PTT:24.8 sec            RADIOLOGY & ADDITIONAL TESTS:  Results Reviewed:   Imaging Personally Reviewed:  Electrocardiogram Personally Reviewed:    COORDINATION OF CARE:  Care Discussed with Consultants/Other Providers [Y/N]: Nephro, GI

## 2022-05-11 NOTE — PROGRESS NOTE ADULT - ASSESSMENT
IMPRESSION: 86M w/ HTN, CAD, AS, AFib, and ESRD-HD, 5/6/22 p/w GIB/acute pulmonary edema/COVID    (1)Renal - ESRD - usually gets HD TTS; dialyzed yesterday to get back on TTS schedule   (2)Anemia - in setting of GIB - H/H appears to have stabilized  (3)ID - enterococcal bacteremia 4/2022 - on 32-day course of IV Ampicillin/Rocephin     RECOMMEND:  (1)HD tomorrow: 3hrs, 2.5kg UF as able; Retacrit with HD    Maria Elena Minaya NP-C  [facility]  [phone]      IMPRESSION: 86M w/ HTN, CAD, AS, AFib, and ESRD-HD, 5/6/22 p/w GIB/acute pulmonary edema/COVID    (1)Renal - ESRD - usually gets HD TTS; dialyzed yesterday to get back on TTS schedule   (2)Anemia - in setting of GIB - H/H appears to have stabilized  (3)ID - enterococcal bacteremia 4/2022 - on 32-day course of IV Ampicillin/Rocephin     RECOMMEND:  (1)HD tomorrow: 3hrs, 2.5kg UF as able; Retacrit with HD    Maria Elena Minaya NP-C  [facility]  [phone]         RENAL ATTENDING NOTE  Patient seen and examined with NP. Agree with assessment and plan as above.    Jonathan Cardona MD  [facility]  [phone]

## 2022-05-11 NOTE — PROGRESS NOTE ADULT - ASSESSMENT
87 y/o M pmh HTN, CAD< ESRD on HD (Tu, Thurs, Sat), Afib on eliquis, CHF w severe AS (w plans for TAVR at [facility]), COVID positive 4/30 s/p mAB, enterococcus bacteremia (on ceft/amipcillin, end date of ***), recurrent pleural effusions presenting from [place] with bright red blood per rectum,  2/2 brisk lower GI bleed (diverticular) s/p transfusion, AMS and worsening hypoxic respiratory failure 5/9-5/11, now improved.      HD on 5/9 and dose lasix at 120 mg IV x one given concern for hypervol as diuretics have been held since 5/6. Still with active GIB, last hematechezia on am of 5/9, transfuse 1u PRBC. IR and GI without plans for intervention. 1 u pRBC on 5/9. Pulm consulted, no thora planned as suspected to be transudative, chronic, related to hypervol. Had thora at [facility].    #Diverticular GI Bleed, active, improving   5/10- Diverticular bleed appears to have stabilized without intervention by IR. Need rec's from Cards regarding resuming eliquis and plavix (pt had cath at [facility] for TAVR work up, per Cards Dr. Conteh, coronaries patent, has never required stents. Previously was on plavix for PAD (with stent).   - CTA Abdomen, Pelvis with brisk sigmoid colon bleed  - IR consulted, want to f/u coags after Kcentra--> no intervention, resolved spontaneously  - GI consulted, deferring to IR, IR signed off  - pantoprazole 40mg BID--> d/c  - c/w holding NSAID's, DVT prophylaxis, bowel regimen i/s/o active bleed  - transfuse to Hg >8  - Hgb stable  - hold BB iso of active bleed--> resume metop 5/11  - Resume Eliquis 2.5 mg BID on 5/11  - cont to HOLD plavix (was on it for PAD), Cards in agreement to hold plavix    #ESRD, HD Tues, Thurs, Sat  - last HD missed Thurs 5/5 i/s/o active bleed  - Nephrology consulted  - s/p HD (5/6_5/7) next on 5/9  - BMP Q12, replete lytes prn  - strict I's/O's  - c/w Renvela 1600mg TID  - normally on torsemide 100 mg daily--> holding, dosing w IV diuretics in acute setting, 120 mg IV lasix x once on 5/9 and assess UOP. Resume daily torsemide 100 mg starting 5/11.     #Severe Aortic Stenosis  #Hx Congestive Heart Failure  - from records from [facility], was worked up and planned for TAVR at [facility] with patent coronaries  - Dr. Ryan Conteh-- [phone], Cardiologist outpt     #Afib, chronic  - rate controlled  - hold Diltiazem i/s/o normotension while actively bleeding  can restart as tolerated   - resume eliquis 5/11, discussed with cards  - resume metop 25 mg daily once GIB stops    #CAD  Patent coronaries on [facility].   - c/w Atorvastatin 10mg  - Pt was not on aspirin prior toa dmisison, was on plavix for peripheral artery disease only    #Acute Hypoxic Respiratory failure  - i/s/o worsening acute blood loss anemia, w/ bilateral pleural effusions, and COVID (s/p MAB treatment)  - c/w Nasal cannula, titrate to Sp02>88- 93%   - consider dex if pt continues to be hypoxic after fluid removal via dialysis and is unable to have O2 weaned    #Bilateral pleural effusions   - CXR with interstitial vascular opacification and bilat pleural effusions; s/p thora at [facility] in April   - likely V overloaded i/s/o missed dialysis and torsemide had been held  - patient s/p HD on (5/6) for fluid removal   - Pulm consulted, no plan for thora given chronicity and improved 02 sats    #Hx COPD   c/w Albuterol HFA inhaler prn   Resume home symbicort    #Macrocytic Anemia   #Acute Blood Loss Anemia   - MCV elevated, Baseline Hgb around 8 per daughter  - f/u serum B12 and thiamine   - CBC's Q12  - transfuse if Hgb<7    #Pancytopenia, improved  - likely bone marrow supression i/s/o Enterococcus bacteremia and now, lower GI bleed      #Hypothyroidism   - repeat TSH  - c/w Levothyroxine 112mcg daily       #Enterococcus Bacteremia   - diagnosed, treated at [facility] in 4/2022 w/ unclear source (Per son, GI source was not considered, colonoscopy was deferred fo f/u outpatient)   - c/w IV Ceftriaxone 2gm BI, stop after 32 days  - c/w IV Ampicillin 2gm BID, stop after 32 days   - f/u repeat cultures    #COVID   - s/p Monocloal antibodies, bebetelovimab while at [place]  - hold Dexamethasone i/s/o prior treatment course at [facility], and respiratory symptoms better explained by pulmonary edema, pleural effusions   - CTM    DVT Prophylaxis - hold Anticoagulation i/s/o active bleed. Start SCDs    updated family 5/8, 5/10, 5/11

## 2022-05-11 NOTE — PROGRESS NOTE ADULT - SUBJECTIVE AND OBJECTIVE BOX
NEPHROLOGY     Patient seen and examined having breakfast. Pt reports feeling ok, breathing improving, less sob and comfortable on 3L NC, denies pain, in no acute distress. Tolerated HD yesterday and removed 2kg.     MEDICATIONS  (STANDING):  ampicillin  IVPB 2 Gram(s) IV Intermittent every 12 hours  ascorbic acid 500 milliGRAM(s) Oral daily  atorvastatin 10 milliGRAM(s) Oral at bedtime  cefTRIAXone   IVPB 2000 milliGRAM(s) IV Intermittent every 12 hours  chlorhexidine 2% Cloths 1 Application(s) Topical daily  epoetin ruthann-epbx (RETACRIT) Injectable 97834 Unit(s) IV Push <User Schedule>  folic acid 1 milliGRAM(s) Oral daily  levothyroxine Injectable 84 MICROGram(s) IV Push at bedtime  lidocaine/prilocaine Cream 1 Application(s) Topical <User Schedule>  lidocaine/prilocaine Cream 1 Application(s) Topical once  pantoprazole  Injectable 40 milliGRAM(s) IV Push daily  sevelamer carbonate 1600 milliGRAM(s) Oral three times a day with meals  torsemide 100 milliGRAM(s) Oral daily  zinc oxide 20% Ointment 1 Application(s) Topical daily  zinc sulfate 220 milliGRAM(s) Oral daily    VITALS:  T(C): , Max: 36.8 (05-10-22 @ 21:48)  T(F): , Max: 98.2 (05-10-22 @ 21:48)  HR: 92 (05-11-22 @ 04:30)  BP: 160/84 (05-11-22 @ 04:30)  RR: 18 (05-11-22 @ 04:30)  SpO2: 98% (05-11-22 @ 04:30)    I and O's:    05-10 @ 07:01  -  05-11 @ 07:00  --------------------------------------------------------  IN: 1160 mL / OUT: 2950 mL / NET: -1790 mL    PHYSICAL EXAM:  Constitutional: NAD, mildly demented  HEENT: NCAT, MMM  Neck: Supple, No JVD  Respiratory: decreased BS b/l bases  Cardiovascular: irreg tachy s1s2  Gastrointestinal: BS+, soft, NT/ND  Extremities: Tr le edema   Neurological: reduced generalized strength  Back: no CVAT b/l  Skin: No rashes, no nevi  Access: HASMUKH BLANDON (+)thrill    LABS:                        10.1   5.65  )-----------( 147      ( 11 May 2022 05:34 )             31.0     05-11    136  |  96<L>  |  26<H>  ----------------------------<  89  3.4<L>   |  25  |  4.74<H>    Ca    8.9      11 May 2022 05:34  Phos  4.6     05-11  Mg     2.20     05-11    TPro  5.9<L>  /  Alb  2.9<L>  /  TBili  0.3  /  DBili  x   /  AST  17  /  ALT  13  /  AlkPhos  145<H>  05-11

## 2022-05-11 NOTE — PROGRESS NOTE ADULT - SUBJECTIVE AND OBJECTIVE BOX
[facility] Division of Hospital Medicine  Michell Simon MD  Pager (MIRTHA-HUMA, 8A-5P): [phone]  Other Times:  [phone]      SUBJECTIVE / OVERNIGHT EVENTS: Improvement in mental status this morning. Reports some blood tinged stool this am. Satting well on 1L o2.     MEDICATIONS  (STANDING):  ampicillin  IVPB 2 Gram(s) IV Intermittent every 12 hours  ascorbic acid 500 milliGRAM(s) Oral daily  atorvastatin 10 milliGRAM(s) Oral at bedtime  cefTRIAXone   IVPB 2000 milliGRAM(s) IV Intermittent every 12 hours  chlorhexidine 2% Cloths 1 Application(s) Topical daily  epoetin ruthann-epbx (RETACRIT) Injectable 00698 Unit(s) IV Push <User Schedule>  folic acid 1 milliGRAM(s) Oral daily  levothyroxine Injectable 84 MICROGram(s) IV Push at bedtime  lidocaine/prilocaine Cream 1 Application(s) Topical <User Schedule>  lidocaine/prilocaine Cream 1 Application(s) Topical once  pantoprazole  Injectable 40 milliGRAM(s) IV Push daily  sevelamer carbonate 1600 milliGRAM(s) Oral three times a day with meals  torsemide 100 milliGRAM(s) Oral daily  zinc oxide 20% Ointment 1 Application(s) Topical daily  zinc sulfate 220 milliGRAM(s) Oral daily    MEDICATIONS  (PRN):  acetaminophen     Tablet .. 650 milliGRAM(s) Oral every 6 hours PRN Temp greater or equal to 38C (100.4F), Mild Pain (1 - 3)  ALBUTerol    90 MICROgram(s) HFA Inhaler 2 Puff(s) Inhalation every 6 hours PRN Bronchospasm  aluminum hydroxide/magnesium hydroxide/simethicone Suspension 30 milliLiter(s) Oral every 4 hours PRN Dyspepsia  melatonin 3 milliGRAM(s) Oral at bedtime PRN Insomnia  ondansetron Injectable 4 milliGRAM(s) IV Push every 8 hours PRN Nausea and/or Vomiting      I&O's Summary    10 May 2022 07:01  -  11 May 2022 07:00  --------------------------------------------------------  IN: 1160 mL / OUT: 2950 mL / NET: -1790 mL    11 May 2022 07:01  -  11 May 2022 14:23  --------------------------------------------------------  IN: 0 mL / OUT: 150 mL / NET: -150 mL        PHYSICAL EXAM:  Vital Signs Last 24 Hrs  T(C): 36.6 (11 May 2022 10:59), Max: 36.8 (10 May 2022 21:48)  T(F): 97.8 (11 May 2022 10:59), Max: 98.2 (10 May 2022 21:48)  HR: 92 (11 May 2022 10:59) (90 - 94)  BP: 114/63 (11 May 2022 10:59) (114/63 - 168/80)  BP(mean): --  RR: 18 (11 May 2022 10:59) (18 - 18)  SpO2: 100% (11 May 2022 10:59) (98% - 100%)    General: NAD, dyspnea improv, now on 1L  HEENT: NC/AT; PERRL, clear conjunctiva, pallor improving  Neck: supple  Respiratory: crackles bilaterally posterior lung fields on 2L  Cardiovascular: +S1/S2; RRR, extremities   Abdomen: soft, NT/ND; +BS x4  Extremities: WWP, 2+ peripheral pulses b/l  Skin: pale  Neurological: ao3, occasionally not answering questions correctly  LABS:                        10.1   5.65  )-----------( 147      ( 11 May 2022 05:34 )             31.0     05-11    136  |  96<L>  |  26<H>  ----------------------------<  89  3.4<L>   |  25  |  4.74<H>    Ca    8.9      11 May 2022 05:34  Phos  4.6     05-11  Mg     2.20     05-11    TPro  5.9<L>  /  Alb  2.9<L>  /  TBili  0.3  /  DBili  x   /  AST  17  /  ALT  13  /  AlkPhos  145<H>  05-11                RADIOLOGY & ADDITIONAL TESTS:  Results Reviewed:   Imaging Personally Reviewed:  Electrocardiogram Personally Reviewed:    COORDINATION OF CARE:  Care Discussed with Consultants/Other Providers [Y/N]: Cardiology (outpt), Nephro, GI   [facility] Division of Hospital Medicine  Michell Simon MD  Pager (MIRTHA-F, 8A-5P): [phone]  Other Times:  [phone]      SUBJECTIVE / OVERNIGHT EVENTS: Improvement in mental status this morning. Reports some blood tinged stool this am. Satting well on 1L o2.     Working on obtaining [facility] records-- specifically CT imaging and culture data.    MEDICATIONS  (STANDING):  ampicillin  IVPB 2 Gram(s) IV Intermittent every 12 hours  ascorbic acid 500 milliGRAM(s) Oral daily  atorvastatin 10 milliGRAM(s) Oral at bedtime  cefTRIAXone   IVPB 2000 milliGRAM(s) IV Intermittent every 12 hours  chlorhexidine 2% Cloths 1 Application(s) Topical daily  epoetin ruthann-epbx (RETACRIT) Injectable 53046 Unit(s) IV Push <User Schedule>  folic acid 1 milliGRAM(s) Oral daily  levothyroxine Injectable 84 MICROGram(s) IV Push at bedtime  lidocaine/prilocaine Cream 1 Application(s) Topical <User Schedule>  lidocaine/prilocaine Cream 1 Application(s) Topical once  pantoprazole  Injectable 40 milliGRAM(s) IV Push daily  sevelamer carbonate 1600 milliGRAM(s) Oral three times a day with meals  torsemide 100 milliGRAM(s) Oral daily  zinc oxide 20% Ointment 1 Application(s) Topical daily  zinc sulfate 220 milliGRAM(s) Oral daily    MEDICATIONS  (PRN):  acetaminophen     Tablet .. 650 milliGRAM(s) Oral every 6 hours PRN Temp greater or equal to 38C (100.4F), Mild Pain (1 - 3)  ALBUTerol    90 MICROgram(s) HFA Inhaler 2 Puff(s) Inhalation every 6 hours PRN Bronchospasm  aluminum hydroxide/magnesium hydroxide/simethicone Suspension 30 milliLiter(s) Oral every 4 hours PRN Dyspepsia  melatonin 3 milliGRAM(s) Oral at bedtime PRN Insomnia  ondansetron Injectable 4 milliGRAM(s) IV Push every 8 hours PRN Nausea and/or Vomiting      I&O's Summary    10 May 2022 07:01  -  11 May 2022 07:00  --------------------------------------------------------  IN: 1160 mL / OUT: 2950 mL / NET: -1790 mL    11 May 2022 07:01  -  11 May 2022 14:23  --------------------------------------------------------  IN: 0 mL / OUT: 150 mL / NET: -150 mL        PHYSICAL EXAM:  Vital Signs Last 24 Hrs  T(C): 36.6 (11 May 2022 10:59), Max: 36.8 (10 May 2022 21:48)  T(F): 97.8 (11 May 2022 10:59), Max: 98.2 (10 May 2022 21:48)  HR: 92 (11 May 2022 10:59) (90 - 94)  BP: 114/63 (11 May 2022 10:59) (114/63 - 168/80)  BP(mean): --  RR: 18 (11 May 2022 10:59) (18 - 18)  SpO2: 100% (11 May 2022 10:59) (98% - 100%)    General: NAD, dyspnea improv, now on 1L  HEENT: NC/AT; PERRL, clear conjunctiva, pallor improving  Neck: supple  Respiratory: crackles bilaterally posterior lung fields on 2L  Cardiovascular: +S1/S2; RRR, extremities   Abdomen: soft, NT/ND; +BS x4  Extremities: WWP, 2+ peripheral pulses b/l  Skin: pale  Neurological: ao3, occasionally not answering questions correctly  LABS:                        10.1   5.65  )-----------( 147      ( 11 May 2022 05:34 )             31.0     05-11    136  |  96<L>  |  26<H>  ----------------------------<  89  3.4<L>   |  25  |  4.74<H>    Ca    8.9      11 May 2022 05:34  Phos  4.6     05-11  Mg     2.20     05-11    TPro  5.9<L>  /  Alb  2.9<L>  /  TBili  0.3  /  DBili  x   /  AST  17  /  ALT  13  /  AlkPhos  145<H>  05-11                RADIOLOGY & ADDITIONAL TESTS:  Results Reviewed:   Imaging Personally Reviewed:  Electrocardiogram Personally Reviewed:    COORDINATION OF CARE:  Care Discussed with Consultants/Other Providers [Y/N]: Cardiology (outpt), Nephro, GI

## 2022-05-12 NOTE — PROGRESS NOTE ADULT - SUBJECTIVE AND OBJECTIVE BOX
NEPHROLOGY     Patient seen and examined.    MEDICATIONS  (STANDING):  ampicillin  IVPB 2 Gram(s) IV Intermittent every 12 hours  apixaban 2.5 milliGRAM(s) Oral two times a day  ascorbic acid 500 milliGRAM(s) Oral daily  atorvastatin 10 milliGRAM(s) Oral at bedtime  budesonide  80 MICROgram(s)/formoterol 4.5 MICROgram(s) Inhaler 2 Puff(s) Inhalation two times a day  cefTRIAXone   IVPB 2000 milliGRAM(s) IV Intermittent every 12 hours  chlorhexidine 2% Cloths 1 Application(s) Topical daily  epoetin ruthann-epbx (RETACRIT) Injectable 88839 Unit(s) IV Push <User Schedule>  folic acid 1 milliGRAM(s) Oral daily  levothyroxine Injectable 84 MICROGram(s) IV Push at bedtime  lidocaine/prilocaine Cream 1 Application(s) Topical <User Schedule>  metoprolol tartrate 12.5 milliGRAM(s) Oral two times a day  sevelamer carbonate 1600 milliGRAM(s) Oral three times a day with meals  torsemide 100 milliGRAM(s) Oral daily  zinc oxide 20% Ointment 1 Application(s) Topical daily  zinc sulfate 220 milliGRAM(s) Oral daily    VITALS:  T(C): , Max: 36.8 (05-11-22 @ 22:40)  T(F): , Max: 98.3 (05-11-22 @ 22:40)  HR: 96 (05-12-22 @ 09:40)  BP: 107/70 (05-12-22 @ 09:40)  RR: 17 (05-12-22 @ 09:40)  SpO2: 99% (05-12-22 @ 04:45)    I and O's:    05-11 @ 07:01  -  05-12 @ 07:00  --------------------------------------------------------  IN: 880 mL / OUT: 750 mL / NET: 130 mL    05-12 @ 07:01  -  05-12 @ 10:52  --------------------------------------------------------  IN: 400 mL / OUT: 2900 mL / NET: -2500 mL    PHYSICAL EXAM:      LABS:                        9.5    6.00  )-----------( 143      ( 12 May 2022 07:15 )             28.7     05-12    136  |  93<L>  |  36<H>  ----------------------------<  91  3.7   |  23  |  5.86<H>    Ca    9.0      12 May 2022 07:15  Phos  5.7     05-12  Mg     2.40     05-12    TPro  5.9<L>  /  Alb  2.9<L>  /  TBili  0.3  /  DBili  x   /  AST  17  /  ALT  13  /  AlkPhos  145<H>  05-11   NEPHROLOGY     Patient seen and examined about to have breakfast, he reports feeling much better, less sob, comfortable on NC, denies pain, in no acute distress. Tolerated HD and removed 2.5kg.     MEDICATIONS  (STANDING):  ampicillin  IVPB 2 Gram(s) IV Intermittent every 12 hours  apixaban 2.5 milliGRAM(s) Oral two times a day  ascorbic acid 500 milliGRAM(s) Oral daily  atorvastatin 10 milliGRAM(s) Oral at bedtime  budesonide  80 MICROgram(s)/formoterol 4.5 MICROgram(s) Inhaler 2 Puff(s) Inhalation two times a day  cefTRIAXone   IVPB 2000 milliGRAM(s) IV Intermittent every 12 hours  chlorhexidine 2% Cloths 1 Application(s) Topical daily  epoetin ruthann-epbx (RETACRIT) Injectable 29594 Unit(s) IV Push <User Schedule>  folic acid 1 milliGRAM(s) Oral daily  levothyroxine Injectable 84 MICROGram(s) IV Push at bedtime  lidocaine/prilocaine Cream 1 Application(s) Topical <User Schedule>  metoprolol tartrate 12.5 milliGRAM(s) Oral two times a day  sevelamer carbonate 1600 milliGRAM(s) Oral three times a day with meals  torsemide 100 milliGRAM(s) Oral daily  zinc oxide 20% Ointment 1 Application(s) Topical daily  zinc sulfate 220 milliGRAM(s) Oral daily    VITALS:  T(C): , Max: 36.8 (05-11-22 @ 22:40)  T(F): , Max: 98.3 (05-11-22 @ 22:40)  HR: 96 (05-12-22 @ 09:40)  BP: 107/70 (05-12-22 @ 09:40)  RR: 17 (05-12-22 @ 09:40)  SpO2: 99% (05-12-22 @ 04:45)    I and O's:    05-11 @ 07:01  -  05-12 @ 07:00  --------------------------------------------------------  IN: 880 mL / OUT: 750 mL / NET: 130 mL    05-12 @ 07:01  -  05-12 @ 10:52  --------------------------------------------------------  IN: 400 mL / OUT: 2900 mL / NET: -2500 mL    PHYSICAL EXAM:  Constitutional: NAD, mildly demented  HEENT: NCAT, MMM  Neck: Supple, No JVD  Respiratory: decreased BS b/l bases  Cardiovascular: irreg tachy s1s2  Gastrointestinal: BS+, soft, NT/ND  Extremities: No peripheral edema   Neurological: reduced generalized strength  Back: no CVAT b/l  Skin: No rashes, no nevi  Access: HASMUKH BLANDON (+)thrill    LABS:                        9.5    6.00  )-----------( 143      ( 12 May 2022 07:15 )             28.7     05-12    136  |  93<L>  |  36<H>  ----------------------------<  91  3.7   |  23  |  5.86<H>    Ca    9.0      12 May 2022 07:15  Phos  5.7     05-12  Mg     2.40     05-12    TPro  5.9<L>  /  Alb  2.9<L>  /  TBili  0.3  /  DBili  x   /  AST  17  /  ALT  13  /  AlkPhos  145<H>  05-11

## 2022-05-12 NOTE — CHART NOTE - NSCHARTNOTEFT_GEN_A_CORE
Source: Patient A&Ox 4 [ x]    Family [ ]     other [ x] chart, RN    Nutrition consult for assessment.     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    + lower GIB   + ESRD on HD     No swallow assessment in chart, but pt is ordered for minced and moist diet. Pt is amenable to adding Nepro supplement and also requested vanilla ice cream. Pt states he really wasn't eating between Thursday and Sunday (NPO during that time). Reports his appetite and PO is good now and completing >75%  meals. Prefers minced and moist consistency and does not want to change to regular.        GI: WDL.     PO intake:   % [x ]  other :        Anthropometrics: Height (cm): 167.6 (05-09)  Weight (kg): 79.8 (05-06), Weight stable this admission.  BMI (kg/m2): 28.4 (05-09)    Edema: 1+ gen   Pressure Injuries: stage 2 sacrum     __________________ Pertinent Medications__________________   MEDICATIONS  (STANDING):  albuterol/ipratropium for Nebulization 3 milliLiter(s) Nebulizer every 6 hours  ampicillin  IVPB 2 Gram(s) IV Intermittent every 12 hours  apixaban 2.5 milliGRAM(s) Oral two times a day  ascorbic acid 500 milliGRAM(s) Oral daily  atorvastatin 10 milliGRAM(s) Oral at bedtime  budesonide  80 MICROgram(s)/formoterol 4.5 MICROgram(s) Inhaler 2 Puff(s) Inhalation two times a day  cefTRIAXone   IVPB 2000 milliGRAM(s) IV Intermittent every 12 hours  chlorhexidine 2% Cloths 1 Application(s) Topical daily  epoetin ruthann-epbx (RETACRIT) Injectable 79476 Unit(s) IV Push <User Schedule>  folic acid 1 milliGRAM(s) Oral daily  levothyroxine Injectable 84 MICROGram(s) IV Push at bedtime  lidocaine/prilocaine Cream 1 Application(s) Topical <User Schedule>  metoprolol tartrate 12.5 milliGRAM(s) Oral two times a day  sevelamer carbonate 1600 milliGRAM(s) Oral three times a day with meals  torsemide 100 milliGRAM(s) Oral daily  zinc oxide 20% Ointment 1 Application(s) Topical daily  zinc sulfate 220 milliGRAM(s) Oral daily      __________________ Pertinent Labs__________________   05-12 Na136 mmol/L Glu 91 mg/dL K+ 3.7 mmol/L Cr  5.86 mg/dL<H> BUN 36 mg/dL<H> 05-12 Phos 5.7 mg/dL<H> 05-11 Alb 2.9 g/dL<L>        Estimated Needs:   [ x] no change since previous assessment      Previous Nutrition Diagnosis: Inadequate energy intake     Nutrition Diagnosis is [x ] ongoing - PO improving        Recommendations:  1. Add Nepro 1x daily (425 kcals, 19.1g protein).   2. Magic Cup 1x daily via  (290 john, 9 gm pro).   2. Encourage PO intake and honor food preferences as able.         Monitoring and Evaluation:      [ x] Tolerance to diet prescription [x ] weights [x ] follow up per protocol  [ ] other:

## 2022-05-12 NOTE — CHART NOTE - NSCHARTNOTEFT_GEN_A_CORE
Notified by RN that patient has bilateral hands that are cool to touch and are blue. Patient is seen and examined in the bedside. Patient denies pain, decrease in sensation, and denies tingling/numbness from the b/l upper extremities to the hands. Patient's hands feels cool to touch when palpated. Discussed with Dr. Simon at bedside. VBG, CBC, and VBG with lactate are ordered. TTE results shows, "The valve is heavily calcified and grossly appears significant stenotic.  However, unable to obtain accurate transaortic gradients.  Unable to accurately estimate the aortic valve area". Cardiology to be consulted. Notified by RN that patient has bilateral hands that are cool to touch and are blue . Patient is seen and examined in the bedside. Patient denies pain, decrease in sensation, and denies tingling/numbness from the b/l upper extremities to the hands. Patient's hands feels cool to touch when palpated. Upon palpation, +2 palpable radial pulses. Discussed with Dr. Simon at bedside. VBG, CBC, and VBG with lactate are ordered. TTE results shows, "The valve is heavily calcified and grossly appears significant stenotic.  However, unable to obtain accurate transaortic gradients.  Unable to accurately estimate the aortic valve area". Cardiology consulted. Notified by RN that patient has bilateral hands that are cool to touch and cyanotic. Patient is seen and examined in the bedside. Patient denies pain, decrease in sensation, and denies tingling/numbness from the b/l upper extremities to the hands. Patient's hands feels cool to touch when palpated. Upon palpation, +2 palpable radial pulses. Discussed with Dr. Simon at bedside. VBG, CBC, and VBG with lactate are ordered. TTE results shows, "The valve is heavily calcified and grossly appears significant stenotic.  However, unable to obtain accurate transaortic gradients.  Unable to accurately estimate the aortic valve area". Cardiology consulted.

## 2022-05-12 NOTE — PROGRESS NOTE ADULT - SUBJECTIVE AND OBJECTIVE BOX
[facility] Division of Hospital Medicine  Michell Simon MD  Pager (M-F, 8A-5P): [phone]  Other Times:  [phone]      SUBJECTIVE / OVERNIGHT EVENTS: NAEON, no further bloody BMs, However, upper extremities noted to be discolored (cyanotic) and cool after his HD session today 5/12 by nursing. Pt feels overall about the same.    MEDICATIONS  (STANDING):  albuterol/ipratropium for Nebulization 3 milliLiter(s) Nebulizer every 6 hours  ampicillin  IVPB 2 Gram(s) IV Intermittent every 12 hours  apixaban 2.5 milliGRAM(s) Oral two times a day  ascorbic acid 500 milliGRAM(s) Oral daily  atorvastatin 10 milliGRAM(s) Oral at bedtime  budesonide  80 MICROgram(s)/formoterol 4.5 MICROgram(s) Inhaler 2 Puff(s) Inhalation two times a day  cefTRIAXone   IVPB 2000 milliGRAM(s) IV Intermittent every 12 hours  chlorhexidine 2% Cloths 1 Application(s) Topical daily  epoetin ruthann-epbx (RETACRIT) Injectable 20612 Unit(s) IV Push <User Schedule>  folic acid 1 milliGRAM(s) Oral daily  levothyroxine Injectable 84 MICROGram(s) IV Push at bedtime  lidocaine/prilocaine Cream 1 Application(s) Topical <User Schedule>  metoprolol tartrate 12.5 milliGRAM(s) Oral two times a day  sevelamer carbonate 1600 milliGRAM(s) Oral three times a day with meals  torsemide 100 milliGRAM(s) Oral daily  zinc oxide 20% Ointment 1 Application(s) Topical daily  zinc sulfate 220 milliGRAM(s) Oral daily    MEDICATIONS  (PRN):  acetaminophen     Tablet .. 650 milliGRAM(s) Oral every 6 hours PRN Temp greater or equal to 38C (100.4F), Mild Pain (1 - 3)  ALBUTerol    90 MICROgram(s) HFA Inhaler 2 Puff(s) Inhalation every 6 hours PRN Bronchospasm  aluminum hydroxide/magnesium hydroxide/simethicone Suspension 30 milliLiter(s) Oral every 4 hours PRN Dyspepsia  melatonin 3 milliGRAM(s) Oral at bedtime PRN Insomnia  ondansetron Injectable 4 milliGRAM(s) IV Push every 8 hours PRN Nausea and/or Vomiting      I&O's Summary    11 May 2022 07:01  -  12 May 2022 07:00  --------------------------------------------------------  IN: 880 mL / OUT: 750 mL / NET: 130 mL    12 May 2022 07:01  -  12 May 2022 15:45  --------------------------------------------------------  IN: 640 mL / OUT: 3000 mL / NET: -2360 mL        PHYSICAL EXAM:  Vital Signs Last 24 Hrs  T(C): 36.5 (12 May 2022 11:22), Max: 36.8 (11 May 2022 22:40)  T(F): 97.7 (12 May 2022 11:22), Max: 98.3 (11 May 2022 22:40)  HR: 91 (12 May 2022 11:22) (87 - 96)  BP: 109/72 (12 May 2022 11:22) (100/60 - 129/75)  BP(mean): --  RR: 17 (12 May 2022 09:40) (17 - 18)  SpO2: 99% (12 May 2022 04:45) (98% - 99%)  General: NAD, dyspnea improv, now on 1L  HEENT: NC/AT; PERRL, clear conjunctiva, pallor improving  Neck: supple  Respiratory: crackles bilaterally posterior lung fields on 2L  Cardiovascular: +S1/S2; RRR, extremities cyanotic (distal fingertips) bilaterally, cool hands/forearms, lower extremities warm  Abdomen: soft, NT/ND; +BS x4  Extremities: WWP, 2+ peripheral pulses b/l  Skin: pale  Neurological: ao4, confusion is now improving    LABS:                        10.5   6.70  )-----------( 147      ( 12 May 2022 13:54 )             33.1     05-12    136  |  93<L>  |  36<H>  ----------------------------<  91  3.7   |  23  |  5.86<H>    Ca    9.0      12 May 2022 07:15  Phos  5.7     05-12  Mg     2.40     05-12    TPro  5.9<L>  /  Alb  2.9<L>  /  TBili  0.3  /  DBili  x   /  AST  17  /  ALT  13  /  AlkPhos  145<H>  05-11                RADIOLOGY & ADDITIONAL TESTS:  Results Reviewed:   Imaging Personally Reviewed:  Electrocardiogram Personally Reviewed:    COORDINATION OF CARE:  Care Discussed with Consultants/Other Providers [Y/N]: Cards, nephro  Prior or Outpatient Records Reviewed [Y/N]:

## 2022-05-12 NOTE — PROGRESS NOTE ADULT - ASSESSMENT
IMPRESSION: 86M w/ HTN, CAD, AS, AFib, and ESRD-HD, 5/6/22 p/w GIB/acute pulmonary edema/COVID    (1)Renal - ESRD - usually gets HD TTS; last dialyzed Tuesday, due today   (2)Anemia - in setting of GIB - H/H slightly lower   (3)ID - enterococcal bacteremia 4/2022 - on 32-day course of IV Ampicillin/Rocephin     RECOMMEND:  (1)HD today: 3hrs, 2.5kg UF as able; Retacrit with HD    Maria Elena Minyaa NP-C  [facility]  [phone]    IMPRESSION: 86M w/ HTN, CAD, AS, AFib, and ESRD-HD, 5/6/22 p/w GIB/acute pulmonary edema/COVID    (1)Renal - ESRD - usually gets HD TTS; last dialyzed Tuesday, due today   (2)Anemia - in setting of GIB - H/H slightly lower   (3)ID - enterococcal bacteremia 4/2022 - on 32-day course of IV Ampicillin/Rocephin     RECOMMEND:  (1)HD today: 3hrs, 2.5kg UF as able; Retacrit with HD  (2)Next HD Saturday    Maria Elena Minaya NP-C  [facility]  [phone]    IMPRESSION: 86M w/ HTN, CAD, AS, AFib, and ESRD-HD, 5/6/22 p/w GIB/acute pulmonary edema/COVID    (1)Renal - ESRD - usually gets HD TTS; last dialyzed Tuesday, due today   (2)Anemia - in setting of GIB - H/H slightly lower   (3)ID - enterococcal bacteremia 4/2022 - on 32-day course of IV Ampicillin/Rocephin     RECOMMEND:  (1)HD today: 3hrs, 2.5kg UF as able; Retacrit with HD  (2)Next HD Saturday    Maria Elena Minaya NP-C  [facility]  [phone]       RENAL ATTENDING NOTE  Patient seen and examined with NP. Agree with assessment and plan as above.    Jonathan Cardona MD  [facility]  [phone]

## 2022-05-12 NOTE — PROGRESS NOTE ADULT - ASSESSMENT
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      HD on 5/9 and dose lasix at 120 mg IV x one given concern for hypervol as diuretics have been held since 5/6. Still with active GIB, last hematechezia on am of 5/9, transfuse 1u PRBC. IR and GI without plans for intervention. 1 u pRBC on 5/9. Pulm consulted, no thora planned as suspected to be transudative, chronic, related to hypervol. Had thora at [facility].    #Diverticular GI Bleed, active, improving   5/10- Diverticular bleed appears to have stabilized without intervention by IR. On 5/11, discussed with outpt cards, resumed his 2.5 mg BID eliquis and continuing to HOLD plavix (pt had cath at [facility] for TAVR work up, per Cards Dr. Conteh, coronaries patent, has never required stents. Previously was on plavix for PAD (with stent).   - CTA Abdomen, Pelvis with brisk sigmoid colon bleed  - IR consulted, want to f/u coags after Kcentra--> no intervention, resolved spontaneously  - GI consulted, deferring to IR, IR signed off  - pantoprazole 40mg BID--> d/c  - c/w holding NSAID's, DVT prophylaxis, bowel regimen i/s/o active bleed  - transfuse to Hg >8  - Hgb stable  - hold BB iso of active bleed--> resume metop 5/11  - Resume Eliquis 2.5 mg BID on 5/11  - cont to HOLD plavix (was on it for PAD), Cards in agreement to hold plavix    #ESRD, HD Tues, Thurs, Sat  - last HD missed Thurs 5/5 i/s/o active bleed  - Nephrology consulted  - s/p HD (5/6_5/7) next on 5/9  - BMP Q12, replete lytes prn  - strict I's/O's  - c/w Renvela 1600mg TID  - normally on torsemide 100 mg daily--> held on admission, then got 120 mg IV lasix x once on 5/9. on 5/11 Resumed daily torsemide 100 mg.    #Severe Aortic Stenosis  #Hx Congestive Heart Failure  # Cyanotic upper extremities on 5/12, new  - from records from [facility], was worked up and planned for TAVR at [facility] with patent coronaries  - Dr. Ryan Conteh-- [phone], Cardiologist outpt  - F/u VBG/lactate, concern he could be in low flow state given aggressive diuresis and UF with HD this week as well as downtrending Hgb suggestive of possible recurrent bleed \  - Consult Cardiology 5/12    #Afib, chronic  - rate controlled  - hold Diltiazem i/s/o normotension while actively bleeding  can restart as tolerated   - resume eliquis 5/11, discussed with cards  - resume metop 12.5 mg BID on 5/11    #CAD  Patent coronaries on [facility].   - c/w Atorvastatin 10mg  - Pt was not on aspirin prior toa dmisison, was on plavix for peripheral artery disease only    #Acute Hypoxic Respiratory failure  - i/s/o worsening acute blood loss anemia, w/ bilateral pleural effusions, and COVID (s/p MAB treatment)  - c/w Nasal cannula, titrate to Sp02>88- 93%   - consider dex if pt continues to be hypoxic after fluid removal via dialysis and is unable to have O2 weaned    #Bilateral pleural effusions   - CXR with interstitial vascular opacification and bilat pleural effusions; s/p thora at [facility] in April   - likely V overloaded i/s/o missed dialysis and torsemide had been held  - patient s/p HD on (5/6) for fluid removal   - Pulm consulted, no plan for thora given chronicity and improved 02 sats    #Hx COPD   c/w Albuterol HFA inhaler prn   Resume home symbicort    #Macrocytic Anemia   #Acute Blood Loss Anemia   - MCV elevated, Baseline Hgb around 8 per daughter  - f/u serum B12 and thiamine   - CBC's Q12  - transfuse if Hgb<7    #Pancytopenia, improved  - likely bone marrow supression i/s/o Enterococcus bacteremia and now, lower GI bleed      #Hypothyroidism   - repeat TSH  - c/w Levothyroxine 112mcg daily       #Enterococcus Bacteremia   - diagnosed, treated at [facility] in 4/2022 w/ unclear source (Per son, GI source was not considered, colonoscopy was deferred fo f/u outpatient)   - c/w IV Ceftriaxone 2gm BI, stop after 32 days  - c/w IV Ampicillin 2gm BID, stop after 32 days   - f/u repeat cultures  - F/u [facility] records, specifically regarding prior imaging    #COVID   - s/p Monocloal antibodies, bebetelovimab while at [place]  - hold Dexamethasone i/s/o prior treatment course at [facility], and respiratory symptoms better explained by pulmonary edema, pleural effusions   - CTM    DVT Prophylaxis - hold Anticoagulation i/s/o active bleed. Start SCDs    updated family 5/8, 5/10, 5/11

## 2022-05-12 NOTE — CHART NOTE - NSCHARTNOTEFT_GEN_A_CORE
Called [facility] to obtain patient's records from previous admission in [facility] for bacteremia. Faxed letter with patient's name, , and reason for medical records request to [phone]. Awaiting paperwork to be faxed back to  fax machine.

## 2022-05-13 NOTE — PROGRESS NOTE ADULT - SUBJECTIVE AND OBJECTIVE BOX
[facility] Division of Hospital Medicine  Michell Simon MD  Pager (M-F, 8A-5P): [phone]  Other Times:  [phone]      SUBJECTIVE / OVERNIGHT EVENTS: Pt had brown BM this am. Overall feels about the same asyesterday. Confusion improving, coloring improving.Telemetry notable for a fib w RVR overnihgt w rates in the 140s.    MEDICATIONS  (STANDING):  ampicillin  IVPB 2 Gram(s) IV Intermittent every 12 hours  apixaban 2.5 milliGRAM(s) Oral two times a day  ascorbic acid 500 milliGRAM(s) Oral daily  atorvastatin 10 milliGRAM(s) Oral at bedtime  budesonide  80 MICROgram(s)/formoterol 4.5 MICROgram(s) Inhaler 2 Puff(s) Inhalation two times a day  cefTRIAXone   IVPB 2000 milliGRAM(s) IV Intermittent every 12 hours  chlorhexidine 2% Cloths 1 Application(s) Topical daily  epoetin ruthann-epbx (RETACRIT) Injectable 10360 Unit(s) IV Push <User Schedule>  folic acid 1 milliGRAM(s) Oral daily  levothyroxine Injectable 84 MICROGram(s) IV Push at bedtime  lidocaine/prilocaine Cream 1 Application(s) Topical <User Schedule>  metoprolol tartrate 25 milliGRAM(s) Oral two times a day  sevelamer carbonate 1600 milliGRAM(s) Oral three times a day with meals  torsemide 100 milliGRAM(s) Oral daily  zinc oxide 20% Ointment 1 Application(s) Topical daily  zinc sulfate 220 milliGRAM(s) Oral daily    MEDICATIONS  (PRN):  acetaminophen     Tablet .. 650 milliGRAM(s) Oral every 6 hours PRN Temp greater or equal to 38C (100.4F), Mild Pain (1 - 3)  ALBUTerol    90 MICROgram(s) HFA Inhaler 2 Puff(s) Inhalation every 6 hours PRN Bronchospasm  aluminum hydroxide/magnesium hydroxide/simethicone Suspension 30 milliLiter(s) Oral every 4 hours PRN Dyspepsia  melatonin 3 milliGRAM(s) Oral at bedtime PRN Insomnia  ondansetron Injectable 4 milliGRAM(s) IV Push every 8 hours PRN Nausea and/or Vomiting      I&O's Summary    12 May 2022 07:01  -  13 May 2022 07:00  --------------------------------------------------------  IN: 940 mL / OUT: 3450 mL / NET: -2510 mL        PHYSICAL EXAM:  Vital Signs Last 24 Hrs  T(C): 36.9 (13 May 2022 11:31), Max: 36.9 (13 May 2022 11:31)  T(F): 98.5 (13 May 2022 11:31), Max: 98.5 (13 May 2022 11:31)  HR: 85 (13 May 2022 11:31) (78 - 108)  BP: 122/63 (13 May 2022 11:31) (100/62 - 122/63)  BP(mean): --  RR: 18 (13 May 2022 11:31) (17 - 18)  SpO2: 98% (13 May 2022 11:31) (97% - 100%)  General: NAD, dyspnea improv, now on 1L  HEENT: NC/AT; PERRL, clear conjunctiva, pallor improving  Neck: supple  Respiratory: crackles bilaterally posterior lung fields on 1L  Cardiovascular: +S1/S2; RRR, extremities no longer cyanotic (distal fingertips) bilaterally (compared to exam 5/12), extremities warm and well perfursed  Abdomen: soft, NT/ND; +BS x4  Extremities: WWP, 2+ peripheral pulses b/l  Skin: pale  Neurological: ao4, confusion is now improving    LABS:                        9.6    5.53  )-----------( 122      ( 13 May 2022 06:23 )             30.0     05-13    136  |  95<L>  |  28<H>  ----------------------------<  83  3.7   |  25  |  5.12<H>    Ca    9.0      13 May 2022 06:23  Phos  4.8     05-13  Mg     2.40     05-13                  RADIOLOGY & ADDITIONAL TESTS:  Results Reviewed:   Imaging Personally Reviewed:  Electrocardiogram Personally Reviewed:    COORDINATION OF CARE:  Care Discussed with Consultants/Other Providers [Y/N]: y  Prior or Outpatient Records Reviewed [Y/N]:

## 2022-05-13 NOTE — PROGRESS NOTE ADULT - ASSESSMENT
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      HD on 5/9 and dose lasix at 120 mg IV x one given concern for hypervol as diuretics have been held since 5/6. Still with active GIB, last hematechezia on am of 5/9, transfuse 1u PRBC. IR and GI without plans for intervention. 1 u pRBC on 5/9. Pulm consulted, no thora planned as suspected to be transudative, chronic, related to hypervol. Had thora at [facility].    #Diverticular GI Bleed, active, improving   5/10- Diverticular bleed appears to have stabilized without intervention by IR. On 5/11, discussed with outpt cards, resumed his 2.5 mg BID eliquis and continuing to HOLD plavix (pt had cath at [facility] for TAVR work up, per Cards Dr. Conteh, coronaries patent, has never required stents. Previously was on plavix for PAD (with stent).   - CTA Abdomen, Pelvis with brisk sigmoid colon bleed  - IR consulted, want to f/u coags after Kcentra--> no intervention, resolved spontaneously  - GI consulted, deferring to IR, IR signed off  - pantoprazole 40mg BID--> d/c  - c/w holding NSAID's, DVT prophylaxis, bowel regimen i/s/o active bleed  - transfuse to Hg >8  - Hgb stable  - hold BB iso of active bleed--> resume metop 5/11--> incr on 5/13  - Resume Eliquis 2.5 mg BID on 5/11  - cont to HOLD plavix (was on it for PAD), Cards in agreement to hold plavix    #ESRD, HD Tues, Thurs, Sat  - last HD missed Thurs 5/5 i/s/o active bleed  - Nephrology consulted  - s/p HD (5/6_5/7) next on 5/9  - BMP Q12, replete lytes prn  - strict I's/O's  - c/w Renvela 1600mg TID  - normally on torsemide 100 mg daily--> held on admission, then got 120 mg IV lasix x once on 5/9. on 5/11 Resumed daily torsemide 100 mg.    #Severe Aortic Stenosis  #Hx Congestive Heart Failure  # Cyanotic upper extremities on 5/12, new, resolved  - from records from [facility], was worked up and planned for TAVR at [facility] with patent coronaries  - Dr. Ryan Conteh-- [phone], Cardiologist outpt  - F/u VBG/lactate, concern he could be in low flow state given aggressive diuresis and UF with HD this week as well as downtrending Hgb suggestive of possible recurrent bleed -->Consult Cardiology 5/12, they did not eval given resolution of lactate    #Afib, chronic  # A fib w RVR, episodic on 5/13  - rate controlled  - hold Diltiazem i/s/o normotension while actively bleeding  can restart as tolerated   - resume eliquis 5/11, discussed with cards  - resume metop 12.5 mg BID on 5/11--> incr to 25mg BID on 5/13    #CAD  Patent coronaries on [facility].   - c/w Atorvastatin 10mg  - Pt was not on aspirin prior toa dmisison, was on plavix for peripheral artery disease only    #Acute Hypoxic Respiratory failure  - i/s/o worsening acute blood loss anemia, w/ bilateral pleural effusions, and COVID (s/p MAB treatment)  - c/w Nasal cannula, titrate to Sp02>88- 93%   - consider dex if pt continues to be hypoxic after fluid removal via dialysis and is unable to have O2 weaned    #Bilateral pleural effusions   - CXR with interstitial vascular opacification and bilat pleural effusions; s/p thora at [facility] in April   - likely V overloaded i/s/o missed dialysis and torsemide had been held  - patient s/p HD on (5/6) for fluid removal   - Pulm consulted, no plan for thora given chronicity and improved 02 sats    #Hx COPD   c/w Albuterol HFA inhaler prn   Resume home symbicort    #Macrocytic Anemia   #Acute Blood Loss Anemia   - MCV elevated, Baseline Hgb around 8 per daughter  - f/u serum B12 and thiamine   - CBC's Q12  - transfuse if Hgb<8    #Pancytopenia, improved  - likely bone marrow supression i/s/o Enterococcus bacteremia and now, lower GI bleed      #Hypothyroidism   - repeat TSH  - c/w Levothyroxine 112mcg daily       #Enterococcus Bacteremia   - diagnosed, treated at [facility] in 4/2022 w/ unclear source (Per son, GI source was not considered, colonoscopy was deferred fo f/u outpatient)   - c/w IV Ceftriaxone 2gm BI, stop after 32 days  - c/w IV Ampicillin 2gm BID, stop after 32 days   - f/u repeat cultures  - F/u [facility] records, specifically regarding prior imaging    #COVID   - s/p Monocloal antibodies, bebetelovimab while at [place]  - hold Dexamethasone i/s/o prior treatment course at [facility], and respiratory symptoms better explained by pulmonary edema, pleural effusions   - CTM    DVT Prophylaxis - hold Anticoagulation i/s/o active bleed. Start SCDs    updated family 5/8, 5/10, 5/11, 5/13

## 2022-05-13 NOTE — PROGRESS NOTE ADULT - SUBJECTIVE AND OBJECTIVE BOX
Overnight events noted      VITAL:  T(C): , Max: 36.9 (05-13-22 @ 11:31)  T(F): , Max: 98.5 (05-13-22 @ 11:31)  HR: 85 (05-13-22 @ 11:31)  BP: 122/63 (05-13-22 @ 11:31)  BP(mean): --  RR: 18 (05-13-22 @ 11:31)  SpO2: 98% (05-13-22 @ 11:31)      PHYSICAL EXAM:  Constitutional: NAD, mildly demented  HEENT: NCAT, MMM  Neck: Supple, No JVD  Respiratory: decreased BS b/l bases  Cardiovascular: irreg tachy s1s2  Gastrointestinal: BS+, soft, NT/ND  Extremities: No peripheral edema   Neurological: reduced generalized strength  Back: no CVAT b/l  Skin: No rashes, no nevi  Access: LUE AVG (+)thrill      LABS:                        9.6    5.53  )-----------( 122      ( 13 May 2022 06:23 )             30.0     Na(136)/K(3.7)/Cl(95)/HCO3(25)/BUN(28)/Cr(5.12)Glu(83)/Ca(9.0)/Mg(2.40)/PO4(4.8)    05-13 @ 06:23  Na(136)/K(3.7)/Cl(93)/HCO3(23)/BUN(36)/Cr(5.86)Glu(91)/Ca(9.0)/Mg(2.40)/PO4(5.7)    05-12 @ 07:15  Na(136)/K(3.4)/Cl(96)/HCO3(25)/BUN(26)/Cr(4.74)Glu(89)/Ca(8.9)/Mg(2.20)/PO4(4.6)    05-11 @ 05:34  Na(139)/K(3.5)/Cl(97)/HCO3(28)/BUN(23)/Cr(4.09)Glu(90)/Ca(9.1)/Mg(--)/PO4(--)    05-10 @ 18:49      IMPRESSION: 86M w/ HTN, CAD, AS, AFib, and ESRD-HD, 5/6/22 p/w GIB/acute pulmonary edema/COVID    (1)Renal - ESRD - HD TTS  (2)Anemia - in setting of GIB - H/H low but stable   (3)ID - enterococcal bacteremia 4/2022 - on 32-day course of IV Ampicillin/Rocephin     RECOMMEND:  (1)HD tomorrow: 3hrs, 3kg UF as able; Retacrit with HD        Jonathan Cardona MD  [facility]  Office: [phone]  Cell: [phone]       No pain, no sob    VITAL:  T(C): , Max: 36.9 (05-13-22 @ 11:31)  T(F): , Max: 98.5 (05-13-22 @ 11:31)  HR: 85 (05-13-22 @ 11:31)  BP: 122/63 (05-13-22 @ 11:31)  BP(mean): --  RR: 18 (05-13-22 @ 11:31)  SpO2: 98% (05-13-22 @ 11:31)      PHYSICAL EXAM:  Constitutional: NAD, mildly demented  HEENT: NCAT, MMM  Neck: Supple, No JVD  Respiratory: decreased BS b/l bases  Cardiovascular: irreg tachy s1s2  Gastrointestinal: BS+, soft, NT/ND  Extremities: No peripheral edema   Neurological: reduced generalized strength  Back: no CVAT b/l  Skin: No rashes, no nevi  Access: LUE AVG (+)thrill      LABS:                        9.6    5.53  )-----------( 122      ( 13 May 2022 06:23 )             30.0     Na(136)/K(3.7)/Cl(95)/HCO3(25)/BUN(28)/Cr(5.12)Glu(83)/Ca(9.0)/Mg(2.40)/PO4(4.8)    05-13 @ 06:23  Na(136)/K(3.7)/Cl(93)/HCO3(23)/BUN(36)/Cr(5.86)Glu(91)/Ca(9.0)/Mg(2.40)/PO4(5.7)    05-12 @ 07:15  Na(136)/K(3.4)/Cl(96)/HCO3(25)/BUN(26)/Cr(4.74)Glu(89)/Ca(8.9)/Mg(2.20)/PO4(4.6)    05-11 @ 05:34  Na(139)/K(3.5)/Cl(97)/HCO3(28)/BUN(23)/Cr(4.09)Glu(90)/Ca(9.1)/Mg(--)/PO4(--)    05-10 @ 18:49      IMPRESSION: 86M w/ HTN, CAD, AS, AFib, and ESRD-HD, 5/6/22 p/w GIB/acute pulmonary edema/COVID    (1)Renal - ESRD - HD TTS  (2)Anemia - in setting of GIB - H/H low but stable   (3)ID - enterococcal bacteremia 4/2022 - on 32-day course of IV Ampicillin/Rocephin     RECOMMEND:  (1)HD tomorrow: 3hrs, 3kg UF as able; Retacrit with HD        Jonathan Cardona MD  [facility]  Office: [phone]  Cell: [phone]

## 2022-05-14 NOTE — PROGRESS NOTE ADULT - ASSESSMENT
87 y/o M pmh HTN, CAD< ESRD on HD (Tu, Thurs, Sat), Afib on eliquis, CHF w severe AS (w plans for TAVR at [facility]), COVID positive 4/30 s/p mAB, enterococcus bacteremia (on ceft/amipcillin, end date of ***), recurrent pleural effusions presenting from [place] with bright red blood per rectum,  2/2 brisk lower GI bleed (diverticular) s/p transfusion, AMS and worsening hypoxic respiratory failure 5/9-5/11, now improved.      HD on 5/9 and dose lasix at 120 mg IV x one given concern for hypervol as diuretics have been held since 5/6. Still with active GIB, last hematechezia on am of 5/9, transfuse 1u PRBC. IR and GI without plans for intervention. 1 u pRBC on 5/9. Pulm consulted, no thora planned as suspected to be transudative, chronic, related to hypervol. Had thora at [facility].    #Diverticular GI Bleed, active, improving   5/10- Diverticular bleed appears to have stabilized without intervention by IR. On 5/11, discussed with outpt cards, resumed his 2.5 mg BID eliquis and continuing to HOLD plavix (pt had cath at [facility] for TAVR work up, per Cards Dr. Conteh, coronaries patent, has never required stents. Previously was on plavix for PAD (with stent). This bleed could be his source for bacteremia, however need [facility] records to confirm the timeline.  - CTA Abdomen, Pelvis with brisk sigmoid colon bleed  - IR consulted, want to f/u coags after Kcentra--> no intervention, resolved spontaneously  - GI consulted, deferring to IR, IR signed off  - pantoprazole 40mg BID--> d/c  - c/w holding NSAID's, DVT prophylaxis, bowel regimen i/s/o active bleed  - transfuse to Hg >8  - Hgb stable  - hold BB iso of active bleed--> resume metop 5/11--> incr on 5/13  - Resume Eliquis 2.5 mg BID on 5/11  - cont to HOLD plavix (was on it for PAD), Cards in agreement to hold plavix    #ESRD, HD Tues, Thurs, Sat  - last HD missed Thurs 5/5 i/s/o active bleed  - Nephrology consulted  - s/p HD (5/6_5/7) next on 5/9  - BMP Q12, replete lytes prn  - strict I's/O's  - c/w Renvela 1600mg TID  - normally on torsemide 100 mg daily--> held on admission, then got 120 mg IV lasix x once on 5/9. on 5/11 Resumed daily torsemide 100 mg.    #CHF w severe Aortic Stenosis, POA  # Cyanotic upper extremities on 5/12, new, resolved  - from records from [facility], was worked up and planned for TAVR at [facility] with patent coronaries  - Dr. Ryan Conteh-- [phone], Cardiologist outpt  - F/u VBG/lactate, concern he could be in low flow state given aggressive diuresis and UF with HD this week as well as downtrending Hgb suggestive of possible recurrent bleed -->Consult Cardiology 5/12, they did not eval given resolution of lactate    #Afib, chronic  # A fib w RVR, episodic on 5/13-14  - rate controlled  - resume diltiazem 5/14 at 120 mg daily ER. Previously on 240 daily, can uptitrate  can restart as tolerated   - resume eliquis 5/11, discussed with cards  - resume metop 12.5 mg BID on 5/11--> incr to 25mg BID on 5/13    #CAD  Patent coronaries on [facility].   - c/w Atorvastatin 10mg  - Pt was not on aspirin prior toa dmisison, was on plavix for peripheral artery disease only    #Acute Hypoxic Respiratory failure, improved  - i/s/o worsening acute blood loss anemia, w/ bilateral pleural effusions, and COVID (s/p MAB treatment)  - c/w Nasal cannula, titrate to Sp02>88- 93%   - consider dex if pt continues to be hypoxic after fluid removal via dialysis and is unable to have O2 weaned    #Bilateral pleural effusions   - CXR with interstitial vascular opacification and bilat pleural effusions; s/p thora at [facility] in April   - likely V overloaded i/s/o missed dialysis and torsemide had been held  - patient s/p HD on (5/6) for fluid removal   - Pulm consulted, no plan for thora given chronicity and improved 02 sats    #Hx COPD   c/w Albuterol HFA inhaler prn   Resume home symbicort    #Macrocytic Anemia   #Acute Blood Loss Anemia   - MCV elevated, Baseline Hgb around 8 per daughter  - f/u serum B12 and thiamine   - CBC's Q12  - transfuse if Hgb<8    #Pancytopenia, improved  - likely bone marrow supression i/s/o Enterococcus bacteremia and now, lower GI bleed      #Hypothyroidism   - repeat TSH  - c/w Levothyroxine 112mcg daily     #Enterococcus Bacteremia, POA   - diagnosed, treated at [facility] in 4/2022 w/ unclear source (Per son, GI source was not clear, colonoscopy was deferred fo f/u outpatient)   - c/w IV Ceftriaxone 2gm BI, stop after 32 days-- *** Clarify dose with ID. Pharmacy not sure either.  - c/w IV Ampicillin 2gm BID, stop after 32 days   - f/u repeat cultures, NGTD  - ID consulted, emailed 5/14  - F/u [facility] records    #COVID   - s/p Monocloal antibodies, bebetelovimab while at [place]  - hold Dexamethasone i/s/o prior treatment course at [facility], and respiratory symptoms better explained by pulmonary edema, pleural effusions   - CTM    DVT Prophylaxis - hold Anticoagulation i/s/o active bleed. Start SCDs    updated family 5/8, 5/10, 5/11, 5/13

## 2022-05-14 NOTE — PROGRESS NOTE ADULT - ASSESSMENT
on nasal cannula  s/p HD today   no complaints    acetaminophen     Tablet .. 650 milliGRAM(s) Oral every 6 hours PRN  ALBUTerol    90 MICROgram(s) HFA Inhaler 2 Puff(s) Inhalation every 6 hours PRN  aluminum hydroxide/magnesium hydroxide/simethicone Suspension 30 milliLiter(s) Oral every 4 hours PRN  ampicillin  IVPB 2 Gram(s) IV Intermittent every 12 hours  apixaban 2.5 milliGRAM(s) Oral two times a day  ascorbic acid 500 milliGRAM(s) Oral daily  atorvastatin 10 milliGRAM(s) Oral at bedtime  budesonide  80 MICROgram(s)/formoterol 4.5 MICROgram(s) Inhaler 2 Puff(s) Inhalation two times a day  cefTRIAXone   IVPB 2000 milliGRAM(s) IV Intermittent every 12 hours  chlorhexidine 2% Cloths 1 Application(s) Topical daily  diltiazem    milliGRAM(s) Oral daily  epoetin ruthann-epbx (RETACRIT) Injectable 47596 Unit(s) IV Push <User Schedule>  folic acid 1 milliGRAM(s) Oral daily  levothyroxine Injectable 84 MICROGram(s) IV Push at bedtime  lidocaine/prilocaine Cream 1 Application(s) Topical <User Schedule>  melatonin 3 milliGRAM(s) Oral at bedtime PRN  metoprolol tartrate 25 milliGRAM(s) Oral two times a day  ondansetron Injectable 4 milliGRAM(s) IV Push every 8 hours PRN  sevelamer carbonate 1600 milliGRAM(s) Oral three times a day with meals  torsemide 100 milliGRAM(s) Oral daily  zinc oxide 20% Ointment 1 Application(s) Topical daily  zinc sulfate 220 milliGRAM(s) Oral daily      VITAL:  T(C): , Max: 36.8 (05-14-22 @ 05:32)  T(F): , Max: 98.3 (05-14-22 @ 05:32)  HR: 93 (05-14-22 @ 13:30)  BP: 125/64 (05-14-22 @ 13:30)  BP(mean): --  RR: 16 (05-14-22 @ 13:30)  SpO2: 98% (05-14-22 @ 11:20)  Wt(kg): --    05-13-22 @ 07:01 - 05-14-22 @ 07:00  --------------------------------------------------------  IN: 800 mL / OUT: 705 mL / NET: 95 mL    05-14-22 @ 07:01  -  05-14-22 @ 16:49  --------------------------------------------------------  IN: 200 mL / OUT: 300 mL / NET: -100 mL        PHYSICAL EXAM:  Constitutional: NAD, mildly demented  HEENT: NCAT, MMM  Neck: Supple, No JVD  Respiratory: decreased BS b/l bases  Cardiovascular:   s1s2  Gastrointestinal: BS+, soft, NT/ND  Extremities: No peripheral edema   Neurological: reduced generalized strength  Back: no CVAT b/l  Skin: No rashes, no nevi  Access: LUE AVG (+)thrill    LABS:                          9.6    6.95  )-----------( 109      ( 14 May 2022 07:25 )             29.9     Na(133)/K(4.3)/Cl(92)/HCO3(22)/BUN(40)/Cr(6.15)Glu(76)/Ca(8.8)/Mg(2.40)/PO4(5.4)    05-14 @ 07:25  Na(136)/K(3.7)/Cl(95)/HCO3(25)/BUN(28)/Cr(5.12)Glu(83)/Ca(9.0)/Mg(2.40)/PO4(4.8)    05-13 @ 06:23  Na(136)/K(3.7)/Cl(93)/HCO3(23)/BUN(36)/Cr(5.86)Glu(91)/Ca(9.0)/Mg(2.40)/PO4(5.7)    05-12 @ 07:15              IMPRESSION: 86M w/ HTN, CAD, AS, AFib, and ESRD-HD, 5/6/22 p/w GIB/acute pulmonary edema/COVID    (1)Renal - ESRD - HD TTS- s/p HD today  (2)Anemia - in setting of GIB - H/H low but stable   (3)ID - enterococcal bacteremia 4/2022 - on 32-day course of IV Ampicillin/Rocephin     RECOMMEND:  (1)Next HD Tuesday        Leo Kulkarni NP-BC  [employer]  [phone]

## 2022-05-14 NOTE — PROGRESS NOTE ADULT - SUBJECTIVE AND OBJECTIVE BOX
[facility] Division of Hospital Medicine  Michell Simon MD  Pager (M-F, 8A-5P): [phone]  Other Times:  [phone]      SUBJECTIVE / OVERNIGHT EVENTS: Feels like his breathing is worse. Due for HD today 5/14. A fib w RVR on tele in the 110s.     MEDICATIONS  (STANDING):  ampicillin  IVPB 2 Gram(s) IV Intermittent every 12 hours  apixaban 2.5 milliGRAM(s) Oral two times a day  ascorbic acid 500 milliGRAM(s) Oral daily  atorvastatin 10 milliGRAM(s) Oral at bedtime  budesonide  80 MICROgram(s)/formoterol 4.5 MICROgram(s) Inhaler 2 Puff(s) Inhalation two times a day  cefTRIAXone   IVPB 2000 milliGRAM(s) IV Intermittent every 12 hours  chlorhexidine 2% Cloths 1 Application(s) Topical daily  diltiazem    milliGRAM(s) Oral daily  epoetin ruthann-epbx (RETACRIT) Injectable 64834 Unit(s) IV Push <User Schedule>  folic acid 1 milliGRAM(s) Oral daily  levothyroxine Injectable 84 MICROGram(s) IV Push at bedtime  lidocaine/prilocaine Cream 1 Application(s) Topical <User Schedule>  metoprolol tartrate 25 milliGRAM(s) Oral two times a day  sevelamer carbonate 1600 milliGRAM(s) Oral three times a day with meals  torsemide 100 milliGRAM(s) Oral daily  zinc oxide 20% Ointment 1 Application(s) Topical daily  zinc sulfate 220 milliGRAM(s) Oral daily    MEDICATIONS  (PRN):  acetaminophen     Tablet .. 650 milliGRAM(s) Oral every 6 hours PRN Temp greater or equal to 38C (100.4F), Mild Pain (1 - 3)  ALBUTerol    90 MICROgram(s) HFA Inhaler 2 Puff(s) Inhalation every 6 hours PRN Bronchospasm  aluminum hydroxide/magnesium hydroxide/simethicone Suspension 30 milliLiter(s) Oral every 4 hours PRN Dyspepsia  melatonin 3 milliGRAM(s) Oral at bedtime PRN Insomnia  ondansetron Injectable 4 milliGRAM(s) IV Push every 8 hours PRN Nausea and/or Vomiting      I&O's Summary    13 May 2022 07:01  -  14 May 2022 07:00  --------------------------------------------------------  IN: 800 mL / OUT: 705 mL / NET: 95 mL    14 May 2022 07:01  -  14 May 2022 15:40  --------------------------------------------------------  IN: 200 mL / OUT: 300 mL / NET: -100 mL        PHYSICAL EXAM:  Vital Signs Last 24 Hrs  T(C): 36.5 (14 May 2022 13:30), Max: 36.8 (14 May 2022 05:32)  T(F): 97.7 (14 May 2022 13:30), Max: 98.3 (14 May 2022 05:32)  HR: 93 (14 May 2022 13:30) (75 - 96)  BP: 125/64 (14 May 2022 13:30) (104/54 - 134/67)  BP(mean): --  RR: 16 (14 May 2022 13:30) (16 - 18)  SpO2: 98% (14 May 2022 11:20) (98% - 100%)  General: NAD, dyspnea improv, now on 1L  HEENT: NC/AT; PERRL, clear conjunctiva, pallor improving  Neck: supple  Respiratory: crackles bilaterally posterior lung fields on 1L  Cardiovascular: +S1/S2; RRR, extremities no longer cyanotic (distal fingertips) bilaterally (compared to exam 5/12), extremities warm and well perfursed  Abdomen: soft, NT/ND; +BS x4  Extremities: WWP, 2+ peripheral pulses b/l  Skin: pale  Neurological: ao4, confusion is now improving    LABS:                        9.6    6.95  )-----------( 109      ( 14 May 2022 07:25 )             29.9     05-14    133<L>  |  92<L>  |  40<H>  ----------------------------<  76  4.3   |  22  |  6.15<H>    Ca    8.8      14 May 2022 07:25  Phos  5.4     05-14  Mg     2.40     05-14                  RADIOLOGY & ADDITIONAL TESTS:  Results Reviewed:   Imaging Personally Reviewed:  Electrocardiogram Personally Reviewed:    COORDINATION OF CARE:  Care Discussed with Consultants/Other Providers [Y/N]:  Prior or Outpatient Records Reviewed [Y/N]:

## 2022-05-15 NOTE — PROGRESS NOTE ADULT - ASSESSMENT
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      HD on 5/9 and dose lasix at 120 mg IV x one given concern for hypervol as diuretics have been held since 5/6. Still with active GIB, last hematechezia on am of 5/9, transfuse 1u PRBC. IR and GI without plans for intervention. 1 u pRBC on 5/9. Pulm consulted, no thora planned as suspected to be transudative, chronic, related to hypervol. Had thora at [facility].    #Diverticular GI Bleed, improving   5/10- Diverticular bleed appears to have stabilized without intervention by IR. On 5/11, discussed with outpt cards, resumed his 2.5 mg BID eliquis and continuing to HOLD plavix (pt had cath at [facility] for TAVR work up, per Cards Dr. Conteh, coronaries patent, has never required cardiac stents. Previously was on plavix for PAD (with stent). An intermittent diverticular bleed could be his source for bacteremia, however need [facility] records to confirm the timeline.  - CTA Abdomen, Pelvis with brisk sigmoid colon bleed  - IR consulted, want to f/u coags after Kcentra--> no intervention, resolved spontaneously  - GI consulted, deferring to IR, IR signed off  - pantoprazole 40mg BID--> d/c  - c/w holding NSAID's, DVT prophylaxis, bowel regimen i/s/o active bleed  - transfuse to Hg >8  - Hgb stable  - hold BB iso of active bleed--> resume metop 5/11--> incr on 5/13  - Resume Eliquis 2.5 mg BID on 5/11  - cont to HOLD plavix (was on it for PAD), Cards in agreement to hold plavix    #ESRD, HD Tues, Thurs, Sat  - last HD missed Thurs 5/5 i/s/o active bleed  - Nephrology consulted  - s/p HD (5/6_5/7) next on 5/9  - BMP Q12, replete lytes prn  - strict I's/O's  - c/w Renvela 1600mg TID  - normally on torsemide 100 mg daily--> held on admission, then got 120 mg IV lasix x once on 5/9. on 5/11 Resumed daily torsemide 100 mg.    #CHF w severe Aortic Stenosis, POA  # Cyanotic upper extremities on 5/12, new, resolved  - from records from [facility], was worked up and planned for TAVR at [facility] with patent coronaries  - Dr. Ryan Conteh-- [phone], Cardiologist outpt  - F/u VBG/lactate, concern he could be in low flow state given aggressive diuresis and UF with HD this week as well as downtrending Hgb suggestive of possible recurrent bleed -->Consult Cardiology 5/12, they did not eval given resolution of lactate    #Afib, chronic  # A fib w RVR, episodic on 5/13-14  - rate controlled  - resume diltiazem 5/14 at 120 mg daily ER. Previously on 240 daily, can uptitrate  - resume eliquis 5/11, discussed with cards  - resume metop 12.5 mg BID on 5/11--> incr to 25mg BID on 5/13    #CAD  Patent coronaries on [facility].   - c/w Atorvastatin 10mg  - Pt was not on aspirin prior toa dmisison, was on plavix for peripheral artery disease only    #Acute Hypoxic Respiratory failure, improved  - i/s/o worsening acute blood loss anemia, w/ bilateral pleural effusions, and COVID (s/p MAB treatment)  - c/w Nasal cannula, titrate to Sp02>88- 93%   - consider dex if pt continues to be hypoxic after fluid removal via dialysis and is unable to have O2 weaned    #Bilateral pleural effusions   - CXR with interstitial vascular opacification and bilat pleural effusions; s/p thora at [facility] in April   - likely V overloaded i/s/o missed dialysis and torsemide had been held  - patient s/p HD on (5/6) for fluid removal   - Pulm consulted, no plan for thora given chronicity and improved 02 sats    #Hx COPD   c/w Albuterol HFA inhaler prn   Resume home symbicort    #Macrocytic Anemia   #Acute Blood Loss Anemia   - MCV elevated, Baseline Hgb around 8 per daughter  - f/u serum B12 and thiamine   - CBC's Q12  - transfuse if Hgb<8    #Pancytopenia, improved  - likely bone marrow supression i/s/o Enterococcus bacteremia and now, lower GI bleed      #Hypothyroidism   - repeat TSH  - c/w Levothyroxine 112mcg daily     #Enterococcus Bacteremia, POA   - diagnosed, treated at [facility] in 4/2022 w/ unclear source (Per son, GI source was not clear, colonoscopy was deferred fo f/u outpatient)   - c/w IV Ceftriaxone 2gm BID, stop after 32 days-- *** Clarify dose with ID. Pharmacy not sure either.  - c/w IV Ampicillin 2gm BID, stop after 32 days   - f/u repeat cultures, NGTD  - ID consulted, emailed 5/14  - F/u [facility] records    #COVID   - s/p Monocloal antibodies, bebetelovimab while at [place]  - hold Dexamethasone i/s/o prior treatment course at [facility], and respiratory symptoms better explained by pulmonary edema, pleural effusions   - CTM    DVT Prophylaxis - hold Anticoagulation i/s/o active bleed. Start SCDs    updated family 5/8, 5/10, 5/11, 5/13, 5/15

## 2022-05-15 NOTE — PROGRESS NOTE ADULT - SUBJECTIVE AND OBJECTIVE BOX
[facility] Division of Hospital Medicine  Michell Simon MD  Pager (M-F, 8A-5P): [phone]  Other Times:  [phone]      SUBJECTIVE / OVERNIGHT EVENTS: NAEON, continues to have productive cough. BMs are brown. HR in the 70s, a fib overnight. Ambulated with PT yesterday.    MEDICATIONS  (STANDING):  ampicillin  IVPB 2 Gram(s) IV Intermittent every 12 hours  apixaban 2.5 milliGRAM(s) Oral two times a day  ascorbic acid 500 milliGRAM(s) Oral daily  atorvastatin 10 milliGRAM(s) Oral at bedtime  budesonide  80 MICROgram(s)/formoterol 4.5 MICROgram(s) Inhaler 2 Puff(s) Inhalation two times a day  cefTRIAXone   IVPB 2000 milliGRAM(s) IV Intermittent every 12 hours  chlorhexidine 2% Cloths 1 Application(s) Topical daily  diltiazem    milliGRAM(s) Oral daily  epoetin ruthann-epbx (RETACRIT) Injectable 34398 Unit(s) IV Push <User Schedule>  folic acid 1 milliGRAM(s) Oral daily  levothyroxine Injectable 84 MICROGram(s) IV Push at bedtime  lidocaine/prilocaine Cream 1 Application(s) Topical <User Schedule>  metoprolol tartrate 25 milliGRAM(s) Oral two times a day  sevelamer carbonate 1600 milliGRAM(s) Oral three times a day with meals  torsemide 100 milliGRAM(s) Oral daily  zinc oxide 20% Ointment 1 Application(s) Topical daily  zinc sulfate 220 milliGRAM(s) Oral daily    MEDICATIONS  (PRN):  acetaminophen     Tablet .. 650 milliGRAM(s) Oral every 6 hours PRN Temp greater or equal to 38C (100.4F), Mild Pain (1 - 3)  ALBUTerol    90 MICROgram(s) HFA Inhaler 2 Puff(s) Inhalation every 6 hours PRN Bronchospasm  aluminum hydroxide/magnesium hydroxide/simethicone Suspension 30 milliLiter(s) Oral every 4 hours PRN Dyspepsia  melatonin 3 milliGRAM(s) Oral at bedtime PRN Insomnia  ondansetron Injectable 4 milliGRAM(s) IV Push every 8 hours PRN Nausea and/or Vomiting      I&O's Summary    14 May 2022 07:01  -  15 May 2022 07:00  --------------------------------------------------------  IN: 900 mL / OUT: 3150 mL / NET: -2250 mL    15 May 2022 07:01  -  15 May 2022 14:15  --------------------------------------------------------  IN: 100 mL / OUT: 300 mL / NET: -200 mL        PHYSICAL EXAM:  Vital Signs Last 24 Hrs  T(C): 36.5 (15 May 2022 11:48), Max: 37 (15 May 2022 05:27)  T(F): 97.7 (15 May 2022 11:48), Max: 98.6 (15 May 2022 05:27)  HR: 81 (15 May 2022 13:40) (77 - 99)  BP: 126/64 (15 May 2022 13:40) (101/76 - 132/70)  BP(mean): --  RR: 18 (15 May 2022 11:48) (16 - 18)  SpO2: 97% (15 May 2022 11:48) (97% - 98%)  General: NAD, dyspnea improv, now on 1L  HEENT: NC/AT; PERRL, clear conjunctiva, pallor improving  Neck: supple  Respiratory: crackles bilaterally posterior lung fields on 1L  Cardiovascular: +S1/S2; RRR, extremities no longer cyanotic (distal fingertips) bilaterally (compared to exam 5/12), extremities warm and well perfursed  Abdomen: soft, NT/ND; +BS x4  Extremities: WWP, 2+ peripheral pulses b/l  Skin: pale  Neurological: ao4, confusion is now improving    LABS:                        9.9    6.91  )-----------( 116      ( 15 May 2022 06:50 )             30.7     05-15    135  |  94<L>  |  29<H>  ----------------------------<  74  3.9   |  27  |  4.76<H>    Ca    9.2      15 May 2022 06:50  Phos  4.7     05-15  Mg     2.20     05-15                  RADIOLOGY & ADDITIONAL TESTS:  Results Reviewed:   Imaging Personally Reviewed:  Electrocardiogram Personally Reviewed:    COORDINATION OF CARE:  Care Discussed with Consultants/Other Providers [Y/N]:  Prior or Outpatient Records Reviewed [Y/N]:

## 2022-05-16 NOTE — CHART NOTE - NSCHARTNOTESELECT_GEN_ALL_CORE
Event Note
Event Note
Follow Up/Nutrition Services
IR follow up note/Event Note
POCUS Note
Transfer Note
Event Note
MAR Accept note/Transfer Note

## 2022-05-16 NOTE — PROGRESS NOTE ADULT - ASSESSMENT
IMPRESSION: 86M w/ HTN, CAD, AS, AFib, and ESRD-HD, 5/6/22 p/w GIB/acute pulmonary edema/COVID    (1)Renal - ESRD - HD TTS- s/p HD Saturday   (2)Anemia - in setting of GIB - H/H at goal    (3)ID - enterococcal bacteremia 4/2022 - on 32-day course of IV Ampicillin/Rocephin     RECOMMEND:  (1)PUF today 2hrs 2kg   (2)Check chest xray   (3)HD tomorrow   (4)A/w lasix 20 mg IV x1         Rema Aguillon NP   [employer]  [phone]   IMPRESSION: 86M w/ HTN, CAD, AS, AFib, and ESRD-HD, 5/6/22 p/w GIB/acute pulmonary edema/COVID    (1)Renal - ESRD - HD TTS- s/p HD Saturday   (2)Anemia - in setting of GIB - H/H at goal    (3)ID - enterococcal bacteremia 4/2022 - on 32-day course of IV Ampicillin/Rocephin     RECOMMEND:  (1)PUF today 2hrs 2kg   (2)Check chest xray   (3)HD tomorrow   (4)A/w lasix 20 mg IV x1     D/w Dr. Maria A Aguillon NP   [facility]  [phone]

## 2022-05-16 NOTE — PROGRESS NOTE ADULT - SUBJECTIVE AND OBJECTIVE BOX
NEPHROLOGY-[facility] [phone]        Patient seen and examined appears SOB and congested. He had HD Saturday only 2L removed due to hypotension.         MEDICATIONS  (STANDING):  ampicillin  IVPB 2 Gram(s) IV Intermittent every 12 hours  apixaban 2.5 milliGRAM(s) Oral two times a day  ascorbic acid 500 milliGRAM(s) Oral daily  atorvastatin 10 milliGRAM(s) Oral at bedtime  budesonide  80 MICROgram(s)/formoterol 4.5 MICROgram(s) Inhaler 2 Puff(s) Inhalation two times a day  cefTRIAXone   IVPB 2000 milliGRAM(s) IV Intermittent every 12 hours  chlorhexidine 2% Cloths 1 Application(s) Topical daily  diltiazem    milliGRAM(s) Oral daily  epoetin ruthann-epbx (RETACRIT) Injectable 77453 Unit(s) IV Push <User Schedule>  folic acid 1 milliGRAM(s) Oral daily  furosemide   Injectable 20 milliGRAM(s) IV Push once  levothyroxine Injectable 84 MICROGram(s) IV Push at bedtime  lidocaine/prilocaine Cream 1 Application(s) Topical <User Schedule>  metoprolol tartrate 25 milliGRAM(s) Oral two times a day  sevelamer carbonate 1600 milliGRAM(s) Oral three times a day with meals  torsemide 100 milliGRAM(s) Oral daily  zinc oxide 20% Ointment 1 Application(s) Topical daily  zinc sulfate 220 milliGRAM(s) Oral daily      VITAL:  T(C): , Max: 36.6 (05-16-22 @ 05:49)  T(F): , Max: 97.9 (05-16-22 @ 05:49)  HR: 75 (05-16-22 @ 05:49)  BP: 121/61 (05-16-22 @ 05:49)  BP(mean): --  RR: 18 (05-16-22 @ 05:49)  SpO2: 100% (05-16-22 @ 05:49)  Wt(kg): --    I and O's:    05-15 @ 07:01  -  05-16 @ 07:00  --------------------------------------------------------  IN: 500 mL / OUT: 300 mL / NET: 200 mL          PHYSICAL EXAM:    Constitutional: + SOB   Neck:  No JVD  Respiratory: +rhonchi   Cardiovascular: S1 and S2  Gastrointestinal: BS+, soft, NT/ND  Extremities: No peripheral edema  Neurological: no focal deficits  Psychiatric: Normal mood, normal affect  : +external catheter   Skin: No rashes  Access: LUE AVG     LABS:                        10.1   7.91  )-----------( 115      ( 16 May 2022 07:40 )             32.6     05-16    134<L>  |  92<L>  |  38<H>  ----------------------------<  96  4.6   |  25  |  5.95<H>    Ca    9.3      16 May 2022 07:40  Phos  6.4     05-16  Mg     2.60     05-16

## 2022-05-16 NOTE — CHART NOTE - NSCHARTNOTEFT_GEN_A_CORE
Patient with shortness of breath this morning. Fluid overloaded on exam with increased JVP, coarse lung sounds on auscultation. CXR ordered, Lasix 20mg IVP ordered. Per discussion with nephrology, may require additional HD treatment today. Will follow.

## 2022-05-16 NOTE — PROGRESS NOTE ADULT - SUBJECTIVE AND OBJECTIVE BOX
PROGRESS NOTE:     Patient is a 86y old  Male who presents with a chief complaint of GI bleed (16 May 2022 10:12)      SUBJECTIVE / OVERNIGHT EVENTS: Pt c/o shortness of breath.     ADDITIONAL REVIEW OF SYSTEMS:    MEDICATIONS  (STANDING):  ampicillin  IVPB 2 Gram(s) IV Intermittent every 12 hours  apixaban 2.5 milliGRAM(s) Oral two times a day  ascorbic acid 500 milliGRAM(s) Oral daily  atorvastatin 10 milliGRAM(s) Oral at bedtime  budesonide  80 MICROgram(s)/formoterol 4.5 MICROgram(s) Inhaler 2 Puff(s) Inhalation two times a day  cefTRIAXone   IVPB 2000 milliGRAM(s) IV Intermittent every 12 hours  chlorhexidine 2% Cloths 1 Application(s) Topical daily  diltiazem    milliGRAM(s) Oral daily  epoetin ruthann-epbx (RETACRIT) Injectable 74582 Unit(s) IV Push <User Schedule>  folic acid 1 milliGRAM(s) Oral daily  levothyroxine Injectable 84 MICROGram(s) IV Push at bedtime  lidocaine/prilocaine Cream 1 Application(s) Topical <User Schedule>  metoprolol tartrate 25 milliGRAM(s) Oral two times a day  sevelamer carbonate 1600 milliGRAM(s) Oral three times a day with meals  torsemide 100 milliGRAM(s) Oral daily  zinc oxide 20% Ointment 1 Application(s) Topical daily  zinc sulfate 220 milliGRAM(s) Oral daily    MEDICATIONS  (PRN):  acetaminophen     Tablet .. 650 milliGRAM(s) Oral every 6 hours PRN Temp greater or equal to 38C (100.4F), Mild Pain (1 - 3)  ALBUTerol    90 MICROgram(s) HFA Inhaler 2 Puff(s) Inhalation every 6 hours PRN Bronchospasm  aluminum hydroxide/magnesium hydroxide/simethicone Suspension 30 milliLiter(s) Oral every 4 hours PRN Dyspepsia  melatonin 3 milliGRAM(s) Oral at bedtime PRN Insomnia  ondansetron Injectable 4 milliGRAM(s) IV Push every 8 hours PRN Nausea and/or Vomiting      CAPILLARY BLOOD GLUCOSE        I&O's Summary    15 May 2022 07:01  -  16 May 2022 07:00  --------------------------------------------------------  IN: 500 mL / OUT: 300 mL / NET: 200 mL        PHYSICAL EXAM:  Vital Signs Last 24 Hrs  T(C): 36.9 (16 May 2022 12:00), Max: 36.9 (16 May 2022 12:00)  T(F): 98.4 (16 May 2022 12:00), Max: 98.4 (16 May 2022 12:00)  HR: 96 (16 May 2022 12:00) (75 - 96)  BP: 107/84 (16 May 2022 12:00) (107/84 - 121/61)  BP(mean): --  RR: 18 (16 May 2022 12:00) (18 - 18)  SpO2: 99% (16 May 2022 12:00) (98% - 100%)    CONSTITUTIONAL: NAD  NECK: Supple, +JVD   RESPIRATORY: Bilateral crackles   CARDIOVASCULAR: Regular rate and rhythm, normal S1 and S2, no murmur/rub/gallop; No lower extremity edema; Peripheral pulses are 2+ bilaterally  ABDOMEN: Nontender to palpation, normoactive bowel sounds, no rebound/guarding; No hepatosplenomegaly  MUSCLOSKELETAL: no clubbing or cyanosis of digits; no joint swelling or tenderness to palpation  PSYCH: A+O to person, place, and time; affect appropriate      LABS:                        10.1   7.91  )-----------( 115      ( 16 May 2022 07:40 )             32.6     05-16    134<L>  |  92<L>  |  38<H>  ----------------------------<  96  4.6   |  25  |  5.95<H>    Ca    9.3      16 May 2022 07:40  Phos  6.4     05-16  Mg     2.60     05-16                  RADIOLOGY & ADDITIONAL TESTS:  Results Reviewed:   Imaging Personally Reviewed:  Electrocardiogram Personally Reviewed:    COORDINATION OF CARE:  Care Discussed with Consultants/Other Providers [Y/N]:  Prior or Outpatient Records Reviewed [Y/N]:

## 2022-05-16 NOTE — PROGRESS NOTE ADULT - ASSESSMENT
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

## 2022-05-17 NOTE — PROGRESS NOTE ADULT - ASSESSMENT
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

## 2022-05-17 NOTE — PROGRESS NOTE ADULT - ASSESSMENT
IMPRESSION: 86M w/ HTN, CAD, AS, AFib, and ESRD-HD, 5/6/22 p/w GIB/acute pulmonary edema/COVID    (1)Renal - ESRD - HD TTS- HD now   (2)Anemia - in setting of GIB - hgb < goal   (3)ID - enterococcal bacteremia 4/2022 - on 32-day course of IV Ampicillin/Rocephin     RECOMMEND:  (1)HD now  (2)Antibiotics as ordered   (3)Continue torsemide 100mg daily     Rema Aguillon NP   [employer]  [phone]

## 2022-05-17 NOTE — PROGRESS NOTE ADULT - SUBJECTIVE AND OBJECTIVE BOX
PROGRESS NOTE:     Patient is a 86y old  Male who presents with a chief complaint of GI bleed (17 May 2022 09:46)      SUBJECTIVE / OVERNIGHT EVENTS: Less shortness of breath.     ADDITIONAL REVIEW OF SYSTEMS:    MEDICATIONS  (STANDING):  ampicillin  IVPB 2 Gram(s) IV Intermittent every 12 hours  apixaban 2.5 milliGRAM(s) Oral two times a day  ascorbic acid 500 milliGRAM(s) Oral daily  atorvastatin 10 milliGRAM(s) Oral at bedtime  budesonide  80 MICROgram(s)/formoterol 4.5 MICROgram(s) Inhaler 2 Puff(s) Inhalation two times a day  cefTRIAXone   IVPB 2000 milliGRAM(s) IV Intermittent every 12 hours  chlorhexidine 2% Cloths 1 Application(s) Topical daily  diltiazem    milliGRAM(s) Oral daily  epoetin ruthann-epbx (RETACRIT) Injectable 93665 Unit(s) IV Push <User Schedule>  folic acid 1 milliGRAM(s) Oral daily  levothyroxine Injectable 84 MICROGram(s) IV Push at bedtime  lidocaine/prilocaine Cream 1 Application(s) Topical <User Schedule>  metoprolol tartrate 25 milliGRAM(s) Oral two times a day  sevelamer carbonate 1600 milliGRAM(s) Oral three times a day with meals  sodium chloride 3%  Inhalation 4 milliLiter(s) Inhalation every 12 hours  torsemide 100 milliGRAM(s) Oral daily  zinc oxide 20% Ointment 1 Application(s) Topical daily  zinc sulfate 220 milliGRAM(s) Oral daily    MEDICATIONS  (PRN):  acetaminophen     Tablet .. 650 milliGRAM(s) Oral every 6 hours PRN Temp greater or equal to 38C (100.4F), Mild Pain (1 - 3)  ALBUTerol    90 MICROgram(s) HFA Inhaler 2 Puff(s) Inhalation every 6 hours PRN Bronchospasm  aluminum hydroxide/magnesium hydroxide/simethicone Suspension 30 milliLiter(s) Oral every 4 hours PRN Dyspepsia  melatonin 3 milliGRAM(s) Oral at bedtime PRN Insomnia  ondansetron Injectable 4 milliGRAM(s) IV Push every 8 hours PRN Nausea and/or Vomiting      CAPILLARY BLOOD GLUCOSE        I&O's Summary    16 May 2022 07:01  -  17 May 2022 07:00  --------------------------------------------------------  IN: 700 mL / OUT: 2400 mL / NET: -1700 mL    17 May 2022 07:01  -  17 May 2022 13:35  --------------------------------------------------------  IN: 400 mL / OUT: 3400 mL / NET: -3000 mL        PHYSICAL EXAM:  Vital Signs Last 24 Hrs  T(C): 36 (17 May 2022 10:40), Max: 36.9 (16 May 2022 19:44)  T(F): 96.8 (17 May 2022 10:40), Max: 98.4 (16 May 2022 19:44)  HR: 76 (17 May 2022 10:40) (64 - 78)  BP: 119/60 (17 May 2022 10:40) (119/60 - 148/70)  BP(mean): --  RR: 17 (17 May 2022 10:40) (17 - 19)  SpO2: 100% (17 May 2022 05:05) (98% - 100%)    CONSTITUTIONAL: NAD  NECK: Supple  RESPIRATORY: Clear to auscultation b/l   CARDIOVASCULAR: Regular rate and rhythm, normal S1 and S2, no murmur/rub/gallop; No lower extremity edema; Peripheral pulses are 2+ bilaterally  ABDOMEN: Nontender to palpation, normoactive bowel sounds, no rebound/guarding; No hepatosplenomegaly  MUSCLOSKELETAL: no clubbing or cyanosis of digits; no joint swelling or tenderness to palpation  PSYCH: A+O to person, place, and time; affect appropriate    LABS:                        9.9    7.05  )-----------( 128      ( 17 May 2022 07:00 )             31.5     05-17    133<L>  |  91<L>  |  48<H>  ----------------------------<  85  4.2   |  22  |  6.68<H>    Ca    9.5      17 May 2022 07:00  Phos  6.9     05-17  Mg     2.60     05-17                  RADIOLOGY & ADDITIONAL TESTS:  Results Reviewed:   Imaging Personally Reviewed:  Electrocardiogram Personally Reviewed:    COORDINATION OF CARE:  Care Discussed with Consultants/Other Providers [Y/N]:  Prior or Outpatient Records Reviewed [Y/N]:

## 2022-05-17 NOTE — PROGRESS NOTE ADULT - SUBJECTIVE AND OBJECTIVE BOX
NEPHROLOGY-[facility] [phone]        Patient seen and examined on HD, reports his breathing is better today. He had PUF yesterday 2L removed.         MEDICATIONS  (STANDING):  ampicillin  IVPB 2 Gram(s) IV Intermittent every 12 hours  apixaban 2.5 milliGRAM(s) Oral two times a day  ascorbic acid 500 milliGRAM(s) Oral daily  atorvastatin 10 milliGRAM(s) Oral at bedtime  budesonide  80 MICROgram(s)/formoterol 4.5 MICROgram(s) Inhaler 2 Puff(s) Inhalation two times a day  cefTRIAXone   IVPB 2000 milliGRAM(s) IV Intermittent every 12 hours  chlorhexidine 2% Cloths 1 Application(s) Topical daily  diltiazem    milliGRAM(s) Oral daily  epoetin ruthann-epbx (RETACRIT) Injectable 87854 Unit(s) IV Push <User Schedule>  folic acid 1 milliGRAM(s) Oral daily  levothyroxine Injectable 84 MICROGram(s) IV Push at bedtime  lidocaine/prilocaine Cream 1 Application(s) Topical <User Schedule>  metoprolol tartrate 25 milliGRAM(s) Oral two times a day  sevelamer carbonate 1600 milliGRAM(s) Oral three times a day with meals  sodium chloride 3%  Inhalation 4 milliLiter(s) Inhalation every 12 hours  torsemide 100 milliGRAM(s) Oral daily  zinc oxide 20% Ointment 1 Application(s) Topical daily  zinc sulfate 220 milliGRAM(s) Oral daily      VITAL:  T(C): , Max: 36.9 (05-16-22 @ 12:00)  T(F): , Max: 98.4 (05-16-22 @ 12:00)  HR: 71 (05-17-22 @ 07:27)  BP: 137/62 (05-17-22 @ 07:27)  BP(mean): --  RR: 17 (05-17-22 @ 07:27)  SpO2: 100% (05-17-22 @ 05:05)  Wt(kg): --    I and O's:    05-16 @ 07:01  -  05-17 @ 07:00  --------------------------------------------------------  IN: 700 mL / OUT: 2400 mL / NET: -1700 mL          PHYSICAL EXAM:    Constitutional: NAD  Neck:  No JVD  Respiratory: CTAB/L  Cardiovascular: S1 and S2  Gastrointestinal: BS+, soft, NT/ND  Extremities: No peripheral edema  Neurological: no focal deficits  Psychiatric: Normal mood, normal affect  : +external catheter   Skin: No rashes  Access: HASMUKH BLANDON (accessed)     LABS:                        9.9    7.05  )-----------( 128      ( 17 May 2022 07:00 )             31.5     05-17    133<L>  |  91<L>  |  48<H>  ----------------------------<  85  4.2   |  22  |  6.68<H>    Ca    9.5      17 May 2022 07:00  Phos  6.9     05-17  Mg     2.60     05-17          RADIOLOGY & ADDITIONAL STUDIES:    < from: Xray Chest 1 View- PORTABLE-Urgent (Xray Chest 1 View- PORTABLE-Urgent .) (05.16.22 @ 11:04) >    COMPARISON:  May 9      IMPRESSION:  Follow-up right pleural effusion with underlying atelectasis   slightly improved.    --- End of Report ---    < end of copied text >

## 2022-05-18 NOTE — PROGRESS NOTE ADULT - ASSESSMENT
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

## 2022-05-18 NOTE — PROGRESS NOTE ADULT - SUBJECTIVE AND OBJECTIVE BOX
PROGRESS NOTE:     Patient is a 86y old  Male who presents with a chief complaint of GI bleed (17 May 2022 13:34)      SUBJECTIVE / OVERNIGHT EVENTS: Shortness of breath has improved.     ADDITIONAL REVIEW OF SYSTEMS:    MEDICATIONS  (STANDING):  ampicillin  IVPB 2 Gram(s) IV Intermittent every 12 hours  apixaban 2.5 milliGRAM(s) Oral two times a day  ascorbic acid 500 milliGRAM(s) Oral daily  atorvastatin 10 milliGRAM(s) Oral at bedtime  budesonide  80 MICROgram(s)/formoterol 4.5 MICROgram(s) Inhaler 2 Puff(s) Inhalation two times a day  cefTRIAXone   IVPB 2000 milliGRAM(s) IV Intermittent every 12 hours  chlorhexidine 2% Cloths 1 Application(s) Topical daily  diltiazem    milliGRAM(s) Oral daily  epoetin ruthann-epbx (RETACRIT) Injectable 99674 Unit(s) IV Push <User Schedule>  folic acid 1 milliGRAM(s) Oral daily  levothyroxine Injectable 84 MICROGram(s) IV Push at bedtime  lidocaine/prilocaine Cream 1 Application(s) Topical <User Schedule>  metoprolol tartrate 25 milliGRAM(s) Oral two times a day  sevelamer carbonate 1600 milliGRAM(s) Oral three times a day with meals  sodium chloride 3%  Inhalation 4 milliLiter(s) Inhalation every 12 hours  torsemide 100 milliGRAM(s) Oral daily  zinc oxide 20% Ointment 1 Application(s) Topical daily  zinc sulfate 220 milliGRAM(s) Oral daily    MEDICATIONS  (PRN):  acetaminophen     Tablet .. 650 milliGRAM(s) Oral every 6 hours PRN Temp greater or equal to 38C (100.4F), Mild Pain (1 - 3)  ALBUTerol    90 MICROgram(s) HFA Inhaler 2 Puff(s) Inhalation every 6 hours PRN Bronchospasm  aluminum hydroxide/magnesium hydroxide/simethicone Suspension 30 milliLiter(s) Oral every 4 hours PRN Dyspepsia  melatonin 3 milliGRAM(s) Oral at bedtime PRN Insomnia  ondansetron Injectable 4 milliGRAM(s) IV Push every 8 hours PRN Nausea and/or Vomiting      CAPILLARY BLOOD GLUCOSE        I&O's Summary    17 May 2022 07:01  -  18 May 2022 07:00  --------------------------------------------------------  IN: 600 mL / OUT: 3850 mL / NET: -3250 mL        PHYSICAL EXAM:  Vital Signs Last 24 Hrs  T(C): 36.2 (18 May 2022 11:19), Max: 36.8 (17 May 2022 18:12)  T(F): 97.2 (18 May 2022 11:19), Max: 98.2 (17 May 2022 18:12)  HR: 73 (18 May 2022 11:19) (66 - 89)  BP: 122/81 (18 May 2022 05:01) (101/56 - 126/68)  BP(mean): --  RR: 18 (18 May 2022 11:19) (18 - 18)  SpO2: 100% (18 May 2022 11:19) (98% - 100%)    CONSTITUTIONAL: NAD  NECK: Supple  RESPIRATORY: Clear to auscultation b/l   CARDIOVASCULAR: Regular rate and rhythm, normal S1 and S2, no murmur/rub/gallop; No lower extremity edema; Peripheral pulses are 2+ bilaterally  ABDOMEN: Nontender to palpation, normoactive bowel sounds, no rebound/guarding; No hepatosplenomegaly  MUSCLOSKELETAL: no clubbing or cyanosis of digits; no joint swelling or tenderness to palpation  PSYCH: A+O to person, place, and time; affect appropriate    LABS:                        10.6   6.54  )-----------( 140      ( 18 May 2022 07:30 )             34.2     05-18    132<L>  |  93<L>  |  33<H>  ----------------------------<  86  4.3   |  22  |  5.23<H>    Ca    9.2      18 May 2022 07:30  Phos  5.3     05-18  Mg     2.40     05-18                  RADIOLOGY & ADDITIONAL TESTS:  Results Reviewed:   Imaging Personally Reviewed:  Electrocardiogram Personally Reviewed:    COORDINATION OF CARE:  Care Discussed with Consultants/Other Providers [Y/N]:  Prior or Outpatient Records Reviewed [Y/N]:

## 2022-05-19 NOTE — PROGRESS NOTE ADULT - SUBJECTIVE AND OBJECTIVE BOX
NEPHROLOGY     Patient seen and examined. Pt resting comfortably reports feeling ok, breathing improving, states that still feels sob at times, but currently comfortable at present on NC, denies pain, in no acute distress.     MEDICATIONS  (STANDING):  ampicillin  IVPB 2 Gram(s) IV Intermittent every 12 hours  apixaban 2.5 milliGRAM(s) Oral two times a day  ascorbic acid 500 milliGRAM(s) Oral daily  atorvastatin 10 milliGRAM(s) Oral at bedtime  budesonide  80 MICROgram(s)/formoterol 4.5 MICROgram(s) Inhaler 2 Puff(s) Inhalation two times a day  cefTRIAXone   IVPB 2000 milliGRAM(s) IV Intermittent every 12 hours  chlorhexidine 2% Cloths 1 Application(s) Topical daily  diltiazem    milliGRAM(s) Oral daily  epoetin ruthann-epbx (RETACRIT) Injectable 09415 Unit(s) IV Push <User Schedule>  folic acid 1 milliGRAM(s) Oral daily  levothyroxine Injectable 84 MICROGram(s) IV Push at bedtime  lidocaine/prilocaine Cream 1 Application(s) Topical <User Schedule>  metoprolol tartrate 25 milliGRAM(s) Oral two times a day  sevelamer carbonate 1600 milliGRAM(s) Oral three times a day with meals  sodium chloride 3%  Inhalation 4 milliLiter(s) Inhalation every 12 hours  torsemide 100 milliGRAM(s) Oral daily  zinc oxide 20% Ointment 1 Application(s) Topical daily  zinc sulfate 220 milliGRAM(s) Oral daily    VITALS:  T(C): , Max: 36.6 (05-18-22 @ 22:00)  T(F): , Max: 97.8 (05-18-22 @ 22:00)  HR: 66 (05-19-22 @ 10:50)  BP: 97/40 (05-19-22 @ 10:50)  RR: 16 (05-19-22 @ 10:50)  SpO2: 100% (05-19-22 @ 10:50)    I and O's:    05-18 @ 07:01  -  05-19 @ 07:00  --------------------------------------------------------  IN: 800 mL / OUT: 450 mL / NET: 350 mL    PHYSICAL EXAM:  Constitutional: NAD  Neck:  No JVD  Respiratory: CTAB/L  Cardiovascular: S1 and S2  Gastrointestinal: BS+, soft, NT/ND  Extremities: No peripheral edema  Neurological: no focal deficits  Psychiatric: Normal mood, normal affect  : +external catheter   Skin: No rashes  Access: LUE AVG (+thrill)    LABS:                        9.7    6.70  )-----------( 145      ( 19 May 2022 07:45 )             31.0     05-19    133<L>  |  92<L>  |  43<H>  ----------------------------<  84  4.3   |  26  |  6.67<H>    Ca    9.3      19 May 2022 07:45  Phos  5.9     05-19  Mg     2.60     05-19

## 2022-05-19 NOTE — PROGRESS NOTE ADULT - ASSESSMENT
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

## 2022-05-19 NOTE — PROGRESS NOTE ADULT - ASSESSMENT
IMPRESSION: 86M w/ HTN, CAD, AS, AFib, and ESRD-HD, 5/6/22 p/w GIB/acute pulmonary edema/COVID    (1)Renal - ESRD - HD TTS- last dialyzed Tuesday, due today  (2)Anemia - in setting of GIB - hgb < goal   (3)ID - enterococcal bacteremia 4/2022 - on 32-day course of IV Ampicillin/Rocephin     RECOMMEND:  (1)HD today   (2)Antibiotics as ordered   (3)Continue torsemide 100mg daily   D/w Dr. Maria A Minaya, NP-C  [facility]  [phone]

## 2022-05-19 NOTE — PROGRESS NOTE ADULT - SUBJECTIVE AND OBJECTIVE BOX
PROGRESS NOTE:     Patient is a 86y old  Male who presents with a chief complaint of GI bleed (18 May 2022 12:03)      SUBJECTIVE / OVERNIGHT EVENTS: No acute events.     ADDITIONAL REVIEW OF SYSTEMS:    MEDICATIONS  (STANDING):  ampicillin  IVPB 2 Gram(s) IV Intermittent every 12 hours  apixaban 2.5 milliGRAM(s) Oral two times a day  ascorbic acid 500 milliGRAM(s) Oral daily  atorvastatin 10 milliGRAM(s) Oral at bedtime  budesonide  80 MICROgram(s)/formoterol 4.5 MICROgram(s) Inhaler 2 Puff(s) Inhalation two times a day  cefTRIAXone   IVPB 2000 milliGRAM(s) IV Intermittent every 12 hours  chlorhexidine 2% Cloths 1 Application(s) Topical daily  diltiazem    milliGRAM(s) Oral daily  epoetin ruthann-epbx (RETACRIT) Injectable 44593 Unit(s) IV Push <User Schedule>  folic acid 1 milliGRAM(s) Oral daily  levothyroxine Injectable 84 MICROGram(s) IV Push at bedtime  lidocaine/prilocaine Cream 1 Application(s) Topical <User Schedule>  metoprolol tartrate 25 milliGRAM(s) Oral two times a day  sevelamer carbonate 1600 milliGRAM(s) Oral three times a day with meals  sodium chloride 3%  Inhalation 4 milliLiter(s) Inhalation every 12 hours  torsemide 100 milliGRAM(s) Oral daily  zinc oxide 20% Ointment 1 Application(s) Topical daily  zinc sulfate 220 milliGRAM(s) Oral daily    MEDICATIONS  (PRN):  acetaminophen     Tablet .. 650 milliGRAM(s) Oral every 6 hours PRN Temp greater or equal to 38C (100.4F), Mild Pain (1 - 3)  ALBUTerol    90 MICROgram(s) HFA Inhaler 2 Puff(s) Inhalation every 6 hours PRN Bronchospasm  aluminum hydroxide/magnesium hydroxide/simethicone Suspension 30 milliLiter(s) Oral every 4 hours PRN Dyspepsia  melatonin 3 milliGRAM(s) Oral at bedtime PRN Insomnia  ondansetron Injectable 4 milliGRAM(s) IV Push every 8 hours PRN Nausea and/or Vomiting      CAPILLARY BLOOD GLUCOSE        I&O's Summary    18 May 2022 07:01  -  19 May 2022 07:00  --------------------------------------------------------  IN: 800 mL / OUT: 450 mL / NET: 350 mL        PHYSICAL EXAM:  Vital Signs Last 24 Hrs  T(C): 36.4 (19 May 2022 10:50), Max: 36.6 (18 May 2022 22:00)  T(F): 97.5 (19 May 2022 10:50), Max: 97.8 (18 May 2022 22:00)  HR: 66 (19 May 2022 10:50) (66 - 89)  BP: 97/40 (19 May 2022 10:50) (97/40 - 115/63)  BP(mean): 68 (18 May 2022 15:23) (68 - 68)  RR: 16 (19 May 2022 10:50) (16 - 18)  SpO2: 100% (19 May 2022 10:50) (98% - 100%)    CONSTITUTIONAL: NAD  NECK: Supple  RESPIRATORY: Clear to auscultation b/l   CARDIOVASCULAR: Regular rate and rhythm, normal S1 and S2, no murmur/rub/gallop; No lower extremity edema; Peripheral pulses are 2+ bilaterally  ABDOMEN: Nontender to palpation, normoactive bowel sounds, no rebound/guarding; No hepatosplenomegaly  MUSCLOSKELETAL: no clubbing or cyanosis of digits; no joint swelling or tenderness to palpation  PSYCH: A+O to person, place, and time; affect appropriate    LABS:                        9.7    6.70  )-----------( 145      ( 19 May 2022 07:45 )             31.0     05-19    133<L>  |  92<L>  |  43<H>  ----------------------------<  84  4.3   |  26  |  6.67<H>    Ca    9.3      19 May 2022 07:45  Phos  5.9     05-19  Mg     2.60     05-19                  RADIOLOGY & ADDITIONAL TESTS:  Results Reviewed:   Imaging Personally Reviewed:  Electrocardiogram Personally Reviewed:    COORDINATION OF CARE:  Care Discussed with Consultants/Other Providers [Y/N]:  Prior or Outpatient Records Reviewed [Y/N]:

## 2022-05-20 NOTE — DISCHARGE NOTE NURSING/CASE MANAGEMENT/SOCIAL WORK - PATIENT PORTAL LINK FT
You can access the FollowMyHealth Patient Portal offered by [facility] by registering at the following website: [email or web address]. By joining [facility]’s FollowMyHealth portal, you will also be able to view your health information using other applications (apps) compatible with our system.

## 2022-05-20 NOTE — DISCHARGE NOTE NURSING/CASE MANAGEMENT/SOCIAL WORK - NSDCPEFALRISK_GEN_ALL_CORE
For information on Fall & Injury Prevention, visit: [email or web address] OR  [email or web address] OR  https://www.cdc.gov/steadi/patient.html

## 2022-05-20 NOTE — DISCHARGE NOTE NURSING/CASE MANAGEMENT/SOCIAL WORK - NSDCPNINST_GEN_ALL_CORE
Gently clean scrotum and penis and bilateral buttocks with skin cleanser and pat dry.  Apply nystatin powder and thin coat of triad to  buttocks and scrotum and groin, drape interdry around scrotum.    apply interdry between groin and scrotum, cover buttocks with foam with border.  change daily and with episodes of incontinence.

## 2022-05-20 NOTE — DISCHARGE NOTE PROVIDER - NSDCMRMEDTOKEN_GEN_ALL_CORE_FT
ampicillin 2 g injection: 2 gram(s) injectable every 12 hours  atorvastatin 10 mg oral tablet: 1 tab(s) orally once a day  cefTRIAXone 2 g injection: 2 gram(s) injectable every 12 hours  clopidogrel 75 mg oral tablet: 1 tab(s) orally once a day  dilTIAZem 240 mg/24 hours oral tablet, extended release: 1 tab(s) orally once a day  Eliquis 2.5 mg oral tablet: 1 tab(s) orally 2 times a day  folic acid 1 mg oral tablet: 1 tab(s) orally once a day  levothyroxine 112 mcg (0.112 mg) oral tablet: 1 tab(s) orally once a day  MiraLax oral powder for reconstitution: 1 application orally once a day  Renvela 800 mg oral tablet: 2 tab(s) orally 3 times a day (with meals)  senna 8.6 mg oral tablet: 2 tab(s) orally once a day (at bedtime)  torsemide 100 mg oral tablet: 1 tab(s) orally once a day  Tylenol 325 mg oral tablet: 2 tab(s) orally every 6 hours, As Needed  Ventolin HFA 90 mcg/inh inhalation aerosol: 2 puff(s) inhaled every 6 hours, As Needed  Vitamin C 500 mg oral tablet, chewable: 1 tab(s) orally once a day  Wixela Inhub 100 mcg-50 mcg inhalation powder: 1 puff(s) inhaled 2 times a day  zinc oxide topical ointment: Apply topically to affected area once a day  zinc sulfate 220 mg oral tablet: 1 tab(s) orally once a day   ampicillin 2 g injection: 2 gram(s) injectable every 12 hours  atorvastatin 10 mg oral tablet: 1 tab(s) orally once a day  cefTRIAXone 2 g injection: 2 gram(s) injectable every 12 hours  clopidogrel 75 mg oral tablet: 1 tab(s) orally once a day  dilTIAZem 240 mg/24 hours oral tablet, extended release: 1 tab(s) orally once a day  Eliquis 2.5 mg oral tablet: 1 tab(s) orally 2 times a day  folic acid 1 mg oral tablet: 1 tab(s) orally once a day  levothyroxine 112 mcg (0.112 mg) oral tablet: 1 tab(s) orally once a day  Renvela 800 mg oral tablet: 2 tab(s) orally 3 times a day (with meals)  torsemide 100 mg oral tablet: 1 tab(s) orally once a day  Tylenol 325 mg oral tablet: 2 tab(s) orally every 6 hours, As Needed  Ventolin HFA 90 mcg/inh inhalation aerosol: 2 puff(s) inhaled every 6 hours, As Needed  Vitamin C 500 mg oral tablet, chewable: 1 tab(s) orally once a day  Wixela Inhub 100 mcg-50 mcg inhalation powder: 1 puff(s) inhaled 2 times a day  zinc oxide topical ointment: Apply topically to affected area once a day  zinc sulfate 220 mg oral tablet: 1 tab(s) orally once a day   acetaminophen 325 mg oral tablet: 2 tab(s) orally every 6 hours, As needed, Temp greater or equal to 38C (100.4F), Mild Pain (1 - 3)  ampicillin: 2 gram(s) intravenous 2 times a day until. 5/28 **   atorvastatin 10 mg oral tablet: 1 tab(s) orally once a day  budesonide-formoterol 80 mcg-4.5 mcg/inh inhalation aerosol: 2 puff(s) inhaled 2 times a day  dilTIAZem 120 mg/24 hours oral capsule, extended release: 1 cap(s) orally once a day  Eliquis 2.5 mg oral tablet: 1 tab(s) orally 2 times a day  folic acid 1 mg oral tablet: 1 tab(s) orally once a day  levothyroxine 112 mcg (0.112 mg) oral tablet: 1 tab(s) orally once a day  lidocaine-prilocaine 2.5%-2.5% topical cream: Apply topically to affected area 3 times a week on monday, wednesday, friday   melatonin 3 mg oral tablet: 1 tab(s) orally once a day (at bedtime), As needed, Insomnia  metoprolol tartrate 25 mg oral tablet: 1 tab(s) orally 2 times a day  Renvela 800 mg oral tablet: 2 tab(s) orally 3 times a day (with meals)  torsemide 100 mg oral tablet: 1 tab(s) orally once a day  Ventolin HFA 90 mcg/inh inhalation aerosol: 2 puff(s) inhaled every 6 hours, As Needed  Vitamin C 500 mg oral tablet, chewable: 1 tab(s) orally once a day  zinc oxide 20% topical ointment: 1 application topically once a day  zinc sulfate 220 mg oral tablet: 1 tab(s) orally once a day

## 2022-05-20 NOTE — PROGRESS NOTE ADULT - ASSESSMENT
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

## 2022-05-20 NOTE — DISCHARGE NOTE PROVIDER - HOSPITAL COURSE
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      Acute respiratory failure with hypoxia.   - due to fluid overload in setting of ESRD, now improved    - CXR 5/16 w/ R pleural effusion and underlying atelectasis   - s/p dose of IV Lasix 5/16, c/w PO Torsemide   - s/p extra dialysis 5/16, c/w HD T/TH/St    - No plan for thora as per Pulm given chronicity and improved 02 sats  - supplemental O2 prn.    Bacteremia.   - Enterococcus Bacteremia  - diagnosed, treated at [facility] in 4/2022 w/ unclear source   - c/w IV Ceftriaxone 2gm BID, stop after 32 days  - c/w IV Ampicillin 2gm BID, stop after 32 days   - repeat cultures NGTD.    End-stage renal disease (ESRD).   - Nephrology consulted  - HD as per renal, s/p extra dialysis 5/16   - strict I's/O's  - c/w Renvela 1600mg TID  - c/w Torsemide 100mg daily.     GI bleed.    5/10- Diverticular bleed appears to have stabilized without intervention by IR. On 5/11, discussed with outpt cards, resumed his 2.5 mg BID eliquis and continuing to HOLD plavix (pt had cath at [facility] for TAVR work up, per Cards Dr. Conteh, coronaries patent, has never required cardiac stents. Previously was on plavix for PAD (with stent).   - CTA Abdomen, Pelvis with brisk sigmoid colon bleed  - IR consulted, no intervention, resolved spontaneously  - GI consulted, deferring to IR, IR signed off  - Hgb stable, monitor   - Resumed Eliquis 2.5 mg BID on 5/11  - cont to HOLD Plavix (was on it for PAD), Cards in agreement to hold plavix.     Chronic atrial fibrillation.   - A fib w RVR, episodic on 5/13-14  - rate now controlled  - resumed diltiazem 5/14 at 120 mg daily ER. Previously on 240 daily, can uptitrate  - c/w metoprolol 25mg BID   - resume eliquis 5/11.    CAD (coronary artery disease).   - Patent coronaries on [facility].   - c/w Atorvastatin 10mg  - Pt was not on aspirin prior to admission, was on plavix for peripheral artery disease only.    Aortic stenosis.   - from records from [facility], was worked up and planned for TAVR at [facility] with patent coronaries  - Dr. Ryan Conteh-- [phone], Cardiologist outpt.      On 5/20/22, case was discussed with __________, patient is medically cleared and optimized for discharge today. All medications were reviewed with attending, and sent to mutually agreed upon pharmacy   85 y/o M pmh HTN, CAD< ESRD on HD (Tu, Thurs, Sat), Afib on eliquis, CHF w severe AS (w plans for TAVR at 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      Acute respiratory failure with hypoxia.   - due to fluid overload in setting of ESRD, now improved    - CXR 5/16 w/ R pleural effusion and underlying atelectasis   - s/p dose of IV Lasix 5/16, c/w PO Torsemide   - s/p extra dialysis 5/16, c/w HD T/TH/St    - No plan for thora as per Pulm given chronicity and improved 02 sats  - supplemental O2 prn.    Bacteremia.   - Enterococcus Bacteremia  - diagnosed, treated at [facility] in 4/2022 w/ unclear source   - c/w IV Ceftriaxone 2gm BID  until 5/28  - c/w IV Ampicillin 2gm BID  until 5/28  - repeat cultures NGTD.    End-stage renal disease (ESRD).   - Nephrology consulted  - HD as per renal, s/p extra dialysis 5/16   - strict I's/O's  - c/w Renvela 1600mg TID  - c/w Torsemide 100mg daily.     GI bleed.    5/10- Diverticular bleed appears to have stabilized without intervention by IR. On 5/11, discussed with outpt cards, resumed his 2.5 mg BID eliquis and continuing to HOLD plavix (pt had cath at [facility] for TAVR work up, per Cards Dr. Conteh, coronaries patent, has never required cardiac stents. Previously was on plavix for PAD (with stent).   - CTA Abdomen, Pelvis with brisk sigmoid colon bleed  - IR consulted, no intervention, resolved spontaneously  - GI consulted, deferring to IR, IR signed off  - Hgb stable, monitor   - Resumed Eliquis 2.5 mg BID on 5/11  - cont to HOLD Plavix (was on it for PAD), Cards in agreement to hold plavix.     Chronic atrial fibrillation.   - A fib w RVR, episodic on 5/13-14  - rate now controlled  - resumed diltiazem 5/14 at 120 mg daily ER. Previously on 240 daily, can uptitrate  - c/w metoprolol 25mg BID   - resume eliquis 5/11.    CAD (coronary artery disease).   - Patent coronaries on [facility].   - c/w Atorvastatin 10mg  - Pt was not on aspirin prior to admission, was on plavix for peripheral artery disease only.    Aortic stenosis.   - from records from [facility], was worked up and planned for TAVR at [facility] with patent coronaries  - Dr. Ryan Conteh-- [phone], Cardiologist outpt.      On 5/20/22, case was discussed with Dr. Christiansen, patient is medically cleared and optimized for discharge today. 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      Acute respiratory failure with hypoxia.   - due to fluid overload in setting of ESRD, now improved    - CXR 5/16 w/ R pleural effusion and underlying atelectasis   - s/p dose of IV Lasix 5/16, c/w PO Torsemide   - s/p extra dialysis 5/16, c/w HD T/TH/St    - No plan for thora as per Pulm given chronicity and improved 02 sats  - supplemental O2 prn.    Bacteremia.   - Enterococcus Bacteremia  - diagnosed, treated at [facility] in 4/2022 w/ unclear source   - c/w IV Ceftriaxone 2gm BID  until 5/28  - c/w IV Ampicillin 2gm BID  until 5/28  - repeat cultures NGTD.    End-stage renal disease (ESRD).   - Nephrology consulted  - HD as per renal, s/p extra dialysis 5/16   - strict I's/O's  - c/w Renvela 1600mg TID  - c/w Torsemide 100mg daily.     GI bleed.    5/10- Diverticular bleed appears to have stabilized without intervention by IR. On 5/11, discussed with outpt cards, resumed his 2.5 mg BID eliquis and continuing to HOLD plavix (pt had cath at [facility] for TAVR work up, per Cards Dr. Conteh, coronaries patent, has never required cardiac stents. Previously was on plavix for PAD (with stent).   - CTA Abdomen, Pelvis with brisk sigmoid colon bleed  - IR consulted, no intervention, resolved spontaneously  - GI consulted, deferring to IR, IR signed off  - Hgb stable, monitor   - Resumed Eliquis 2.5 mg BID on 5/11  - cont to HOLD Plavix (was on it for PAD), Cards in agreement to hold plavix.     Chronic atrial fibrillation.   - A fib w RVR, episodic on 5/13-14  - rate now controlled  - resumed diltiazem 5/14 at 120 mg daily ER. Previously on 240 daily, can uptitrate  - c/w metoprolol 25mg BID   - resume eliquis 5/11.    CAD (coronary artery disease).   - Patent coronaries on [facility].   - c/w Atorvastatin 10mg  - Pt was not on aspirin prior to admission, was on plavix for peripheral artery disease only.    Aortic stenosis.   - from records from [facility], was worked up and planned for TAVR at [facility] with patent coronaries  - Dr. Ryan Conteh-- [phone], Cardiologist outpt.      On 5/21/22, case was discussed with Dr. Valerio, patient is medically cleared and optimized for discharge today. All medications were reviewed with attending, and sent to mutually agreed upon pharmacy

## 2022-05-20 NOTE — DISCHARGE NOTE PROVIDER - PROVIDER TOKENS
FREE:[LAST:[cardiology],PHONE:[(   )    -],FAX:[(   )    -],ADDRESS:[follow up with cardiology as directed for your TAVR]],FREE:[LAST:[primary care provider],PHONE:[(   )    -],FAX:[(   )    -],ADDRESS:[follow up in 1-2 weeks as needed]] PROVIDER:[TOKEN:[[id number]]],PROVIDER:[TOKEN:[[id number]]]

## 2022-05-20 NOTE — DISCHARGE NOTE PROVIDER - CARE PROVIDERS DIRECT ADDRESSES
,DirectAddress_Unknown,DirectAddress_Unknown [email or web address],DirectAddress_Unknown

## 2022-05-20 NOTE — PROGRESS NOTE ADULT - SUBJECTIVE AND OBJECTIVE BOX
PROGRESS NOTE:     Patient is a 86y old  Male who presents with a chief complaint of GI bleed (20 May 2022 11:44)      SUBJECTIVE / OVERNIGHT EVENTS: No new complaints.     ADDITIONAL REVIEW OF SYSTEMS:    MEDICATIONS  (STANDING):  ampicillin  IVPB 2 Gram(s) IV Intermittent every 12 hours  apixaban 2.5 milliGRAM(s) Oral two times a day  ascorbic acid 500 milliGRAM(s) Oral daily  atorvastatin 10 milliGRAM(s) Oral at bedtime  budesonide  80 MICROgram(s)/formoterol 4.5 MICROgram(s) Inhaler 2 Puff(s) Inhalation two times a day  cefTRIAXone   IVPB 2000 milliGRAM(s) IV Intermittent every 12 hours  chlorhexidine 2% Cloths 1 Application(s) Topical daily  diltiazem    milliGRAM(s) Oral daily  epoetin ruthann-epbx (RETACRIT) Injectable 66947 Unit(s) IV Push <User Schedule>  folic acid 1 milliGRAM(s) Oral daily  levothyroxine Injectable 84 MICROGram(s) IV Push at bedtime  lidocaine/prilocaine Cream 1 Application(s) Topical <User Schedule>  metoprolol tartrate 25 milliGRAM(s) Oral two times a day  sevelamer carbonate 1600 milliGRAM(s) Oral three times a day with meals  sodium chloride 3%  Inhalation 4 milliLiter(s) Inhalation every 12 hours  torsemide 100 milliGRAM(s) Oral daily  zinc oxide 20% Ointment 1 Application(s) Topical daily  zinc sulfate 220 milliGRAM(s) Oral daily    MEDICATIONS  (PRN):  acetaminophen     Tablet .. 650 milliGRAM(s) Oral every 6 hours PRN Temp greater or equal to 38C (100.4F), Mild Pain (1 - 3)  ALBUTerol    90 MICROgram(s) HFA Inhaler 2 Puff(s) Inhalation every 6 hours PRN Bronchospasm  aluminum hydroxide/magnesium hydroxide/simethicone Suspension 30 milliLiter(s) Oral every 4 hours PRN Dyspepsia  melatonin 3 milliGRAM(s) Oral at bedtime PRN Insomnia  ondansetron Injectable 4 milliGRAM(s) IV Push every 8 hours PRN Nausea and/or Vomiting      CAPILLARY BLOOD GLUCOSE        I&O's Summary    19 May 2022 07:01  -  20 May 2022 07:00  --------------------------------------------------------  IN: 750 mL / OUT: 3750 mL / NET: -3000 mL        PHYSICAL EXAM:  Vital Signs Last 24 Hrs  T(C): 36.4 (20 May 2022 11:07), Max: 37 (19 May 2022 19:10)  T(F): 97.6 (20 May 2022 11:07), Max: 98.6 (19 May 2022 19:10)  HR: 69 (20 May 2022 11:07) (69 - 88)  BP: 114/66 (20 May 2022 11:07) (94/51 - 129/64)  BP(mean): --  RR: 18 (20 May 2022 11:07) (18 - 18)  SpO2: 99% (20 May 2022 11:07) (98% - 99%)    CONSTITUTIONAL: NAD  NECK: Supple  RESPIRATORY: Clear to auscultation b/l   CARDIOVASCULAR: Regular rate and rhythm, normal S1 and S2, no murmur/rub/gallop; No lower extremity edema; Peripheral pulses are 2+ bilaterally  ABDOMEN: Nontender to palpation, normoactive bowel sounds, no rebound/guarding; No hepatosplenomegaly  MUSCLOSKELETAL: no clubbing or cyanosis of digits; no joint swelling or tenderness to palpation  PSYCH: A+O to person, place, and time; affect appropriate    LABS:                        10.1   6.21  )-----------( 168      ( 20 May 2022 06:44 )             31.7     05-20    135  |  93<L>  |  27<H>  ----------------------------<  85  4.0   |  27  |  4.89<H>    Ca    9.1      20 May 2022 06:44  Phos  4.8     05-20  Mg     2.30     05-20                  RADIOLOGY & ADDITIONAL TESTS:  Results Reviewed:   Imaging Personally Reviewed:  Electrocardiogram Personally Reviewed:    COORDINATION OF CARE:  Care Discussed with Consultants/Other Providers [Y/N]:  Prior or Outpatient Records Reviewed [Y/N]:

## 2022-05-20 NOTE — DISCHARGE NOTE PROVIDER - NSDCCPCAREPLAN_GEN_ALL_CORE_FT
PRINCIPAL DISCHARGE DIAGNOSIS  Diagnosis: GI bleed  Assessment and Plan of Treatment: You were seen by GI for evaluation of a GI bleed and found to have bleeding in the sigmoid colon that resolved without intervention.   Follow up with your primary care physician for further monitoring in 1-2 weeks. Please call to arrange appointment.        SECONDARY DISCHARGE DIAGNOSES  Diagnosis: Aortic stenosis  Assessment and Plan of Treatment: Follow up with your cardiologist re: future TAVR.  You are on antibiotics until 5/28/22 for a previously diagnosed infection.  Follow up with your primary care physician for further monitoring in 1-2 weeks. Please call to arrange appointment.  Low cholesterol diet. Salt restriction.      Diagnosis: End-stage renal disease (ESRD)  Assessment and Plan of Treatment: Resume hemodialysis as directed.  Follow up with your primary care physician for further monitoring in 1-2 weeks. Please call to arrange appointment.       PRINCIPAL DISCHARGE DIAGNOSIS  Diagnosis: GI bleed  Assessment and Plan of Treatment: You were seen by GI for evaluation of a GI bleed and found to have bleeding in the sigmoid colon that resolved without intervention.   Follow up with your primary care physician for further monitoring in 1-2 weeks. Please call to arrange appointment.        SECONDARY DISCHARGE DIAGNOSES  Diagnosis: Bacteremia  Assessment and Plan of Treatment: Continue with antibiotics until 5/28. Follow up with PCP as outpatient.    Diagnosis: End-stage renal disease (ESRD)  Assessment and Plan of Treatment: Resume hemodialysis as directed.  Follow up with your primary care physician for further monitoring in 1-2 weeks. Please call to arrange appointment.      Diagnosis: Chronic atrial fibrillation  Assessment and Plan of Treatment: Please take your medications as prescribed.  Continue to take your blood thinner as prescribed and follow with your physician to monitor your levels.  Low fat diet, reduce caffeine intake, and exercise at least 30 minutes daily.      Diagnosis: Aortic stenosis  Assessment and Plan of Treatment: Follow up with your cardiologist re: future TAVR.  You are on antibiotics until 5/28/22 for a previously diagnosed infection.  Follow up with your primary care physician for further monitoring in 1-2 weeks. Please call to arrange appointment.  Low cholesterol diet. Salt restriction.

## 2022-05-20 NOTE — PROGRESS NOTE ADULT - SUBJECTIVE AND OBJECTIVE BOX
NEPHROLOGY     Patient seen and examined.    MEDICATIONS  (STANDING):  ampicillin  IVPB 2 Gram(s) IV Intermittent every 12 hours  apixaban 2.5 milliGRAM(s) Oral two times a day  ascorbic acid 500 milliGRAM(s) Oral daily  atorvastatin 10 milliGRAM(s) Oral at bedtime  budesonide  80 MICROgram(s)/formoterol 4.5 MICROgram(s) Inhaler 2 Puff(s) Inhalation two times a day  cefTRIAXone   IVPB 2000 milliGRAM(s) IV Intermittent every 12 hours  chlorhexidine 2% Cloths 1 Application(s) Topical daily  diltiazem    milliGRAM(s) Oral daily  epoetin ruthann-epbx (RETACRIT) Injectable 43850 Unit(s) IV Push <User Schedule>  folic acid 1 milliGRAM(s) Oral daily  levothyroxine Injectable 84 MICROGram(s) IV Push at bedtime  lidocaine/prilocaine Cream 1 Application(s) Topical <User Schedule>  metoprolol tartrate 25 milliGRAM(s) Oral two times a day  sevelamer carbonate 1600 milliGRAM(s) Oral three times a day with meals  sodium chloride 3%  Inhalation 4 milliLiter(s) Inhalation every 12 hours  torsemide 100 milliGRAM(s) Oral daily  zinc oxide 20% Ointment 1 Application(s) Topical daily  zinc sulfate 220 milliGRAM(s) Oral daily    VITALS:  T(C): , Max: 37 (05-19-22 @ 19:10)  T(F): , Max: 98.6 (05-19-22 @ 19:10)  HR: 69 (05-20-22 @ 11:07)  BP: 114/66 (05-20-22 @ 11:07)  RR: 18 (05-20-22 @ 11:07)  SpO2: 99% (05-20-22 @ 11:07)    I and O's:    05-19 @ 07:01  -  05-20 @ 07:00  --------------------------------------------------------  IN: 750 mL / OUT: 3750 mL / NET: -3000 mL    PHYSICAL EXAM:  Constitutional: NAD  Neck:  No JVD  Respiratory: CTAB/L  Cardiovascular: S1 and S2  Gastrointestinal: BS+, soft, NT/ND  Extremities: No peripheral edema  Neurological: no focal deficits  Psychiatric: Normal mood, normal affect  : +external catheter   Skin: No rashes  Access: HASMUKH AVG (+thrill)      LABS:                        10.1   6.21  )-----------( 168      ( 20 May 2022 06:44 )             31.7     05-20    135  |  93<L>  |  27<H>  ----------------------------<  85  4.0   |  27  |  4.89<H>    Ca    9.1      20 May 2022 06:44  Phos  4.8     05-20  Mg     2.30     05-20   NEPHROLOGY     Patient seen and examined having lunch. Pt reports feeling better, occasional sob, though at present not sob, currently comfortable on NC, in no acute distress. Tolerated HD yesterday and removed 3L.     MEDICATIONS  (STANDING):  ampicillin  IVPB 2 Gram(s) IV Intermittent every 12 hours  apixaban 2.5 milliGRAM(s) Oral two times a day  ascorbic acid 500 milliGRAM(s) Oral daily  atorvastatin 10 milliGRAM(s) Oral at bedtime  budesonide  80 MICROgram(s)/formoterol 4.5 MICROgram(s) Inhaler 2 Puff(s) Inhalation two times a day  cefTRIAXone   IVPB 2000 milliGRAM(s) IV Intermittent every 12 hours  chlorhexidine 2% Cloths 1 Application(s) Topical daily  diltiazem    milliGRAM(s) Oral daily  epoetin ruthann-epbx (RETACRIT) Injectable 74287 Unit(s) IV Push <User Schedule>  folic acid 1 milliGRAM(s) Oral daily  levothyroxine Injectable 84 MICROGram(s) IV Push at bedtime  lidocaine/prilocaine Cream 1 Application(s) Topical <User Schedule>  metoprolol tartrate 25 milliGRAM(s) Oral two times a day  sevelamer carbonate 1600 milliGRAM(s) Oral three times a day with meals  sodium chloride 3%  Inhalation 4 milliLiter(s) Inhalation every 12 hours  torsemide 100 milliGRAM(s) Oral daily  zinc oxide 20% Ointment 1 Application(s) Topical daily  zinc sulfate 220 milliGRAM(s) Oral daily    VITALS:  T(C): , Max: 37 (05-19-22 @ 19:10)  T(F): , Max: 98.6 (05-19-22 @ 19:10)  HR: 69 (05-20-22 @ 11:07)  BP: 114/66 (05-20-22 @ 11:07)  RR: 18 (05-20-22 @ 11:07)  SpO2: 99% (05-20-22 @ 11:07)    I and O's:    05-19 @ 07:01  -  05-20 @ 07:00  --------------------------------------------------------  IN: 750 mL / OUT: 3750 mL / NET: -3000 mL    PHYSICAL EXAM:  Constitutional: NAD  Neck:  No JVD  Respiratory: + inspiratory wheeze   Cardiovascular: S1 and S2  Gastrointestinal: BS+, soft, NT/ND  Extremities: No peripheral edema  Neurological: no focal deficits  Psychiatric: Normal mood, normal affect  : +external catheter   Skin: No rashes  Access: LUE AVG (+thrill)      LABS:                        10.1   6.21  )-----------( 168      ( 20 May 2022 06:44 )             31.7     05-20    135  |  93<L>  |  27<H>  ----------------------------<  85  4.0   |  27  |  4.89<H>    Ca    9.1      20 May 2022 06:44  Phos  4.8     05-20  Mg     2.30     05-20   NEPHROLOGY     Patient seen and examined having lunch. Pt reports feeling better, occasional sob, though at present not sob, currently comfortable on NC, in no acute distress. Tolerated HD yesterday and removed 3L.     MEDICATIONS  (STANDING):  ampicillin  IVPB 2 Gram(s) IV Intermittent every 12 hours  apixaban 2.5 milliGRAM(s) Oral two times a day  ascorbic acid 500 milliGRAM(s) Oral daily  atorvastatin 10 milliGRAM(s) Oral at bedtime  budesonide  80 MICROgram(s)/formoterol 4.5 MICROgram(s) Inhaler 2 Puff(s) Inhalation two times a day  cefTRIAXone   IVPB 2000 milliGRAM(s) IV Intermittent every 12 hours  chlorhexidine 2% Cloths 1 Application(s) Topical daily  diltiazem    milliGRAM(s) Oral daily  epoetin ruthann-epbx (RETACRIT) Injectable 27847 Unit(s) IV Push <User Schedule>  folic acid 1 milliGRAM(s) Oral daily  levothyroxine Injectable 84 MICROGram(s) IV Push at bedtime  lidocaine/prilocaine Cream 1 Application(s) Topical <User Schedule>  metoprolol tartrate 25 milliGRAM(s) Oral two times a day  sevelamer carbonate 1600 milliGRAM(s) Oral three times a day with meals  sodium chloride 3%  Inhalation 4 milliLiter(s) Inhalation every 12 hours  torsemide 100 milliGRAM(s) Oral daily  zinc oxide 20% Ointment 1 Application(s) Topical daily  zinc sulfate 220 milliGRAM(s) Oral daily    VITALS:  T(C): , Max: 37 (05-19-22 @ 19:10)  T(F): , Max: 98.6 (05-19-22 @ 19:10)  HR: 69 (05-20-22 @ 11:07)  BP: 114/66 (05-20-22 @ 11:07)  RR: 18 (05-20-22 @ 11:07)  SpO2: 99% (05-20-22 @ 11:07).    I and O's:    05-19 @ 07:01  -  05-20 @ 07:00  --------------------------------------------------------  IN: 750 mL / OUT: 3750 mL / NET: -3000 mL    PHYSICAL EXAM:  Constitutional: NAD  Neck:  No JVD  Respiratory: + inspiratory wheeze   Cardiovascular: S1 and S2  Gastrointestinal: BS+, soft, NT/ND  Extremities: No peripheral edema  Neurological: no focal deficits  Psychiatric: Normal mood, normal affect  : +external catheter   Skin: No rashes  Access: LUE AVG (+thrill)      LABS:                        10.1   6.21  )-----------( 168      ( 20 May 2022 06:44 )             31.7     05-20    135  |  93<L>  |  27<H>  ----------------------------<  85  4.0   |  27  |  4.89<H>    Ca    9.1      20 May 2022 06:44  Phos  4.8     05-20  Mg     2.30     05-20

## 2022-05-20 NOTE — PROGRESS NOTE ADULT - ASSESSMENT
IMPRESSION: 86M w/ HTN, CAD, AS, AFib, and ESRD-HD, 5/6/22 p/w GIB/acute pulmonary edema/COVID    (1)Renal - ESRD - HD TTS- dialyzed yesterday; due tomorrow   (2)Anemia - in setting of GIB - hgb improving   (3)ID - enterococcal bacteremia 4/2022 - on 32-day course of IV Ampicillin/Rocephin     RECOMMEND:  (1)HD tomorrow  (2)Antibiotics as ordered   (3)Continue torsemide 100mg daily     Maria Elena Minaya NP-C  [facility]  [phone]

## 2022-05-20 NOTE — DISCHARGE NOTE PROVIDER - CARE PROVIDER_API CALL
cardiology,   follow up with cardiology as directed for your TAVR  Phone: (   )    -  Fax: (   )    -  Follow Up Time:     primary care provider,   follow up in 1-2 weeks as needed  Phone: (   )    -  Fax: (   )    -  Follow Up Time:    Cam Conteh)  Cardiovascular Disease  [address] [address]  Phone: [phone]  Fax: [phone]  Follow Up Time:     CARLOS HENSON  Internal Medicine  [phone] [place] [address]  Phone: [phone]  Fax: [phone]  Follow Up Time:

## 2022-05-21 NOTE — PROGRESS NOTE ADULT - PROBLEM SELECTOR PROBLEM 3
End-stage renal disease (ESRD)

## 2022-05-21 NOTE — PROVIDER CONTACT NOTE (OTHER) - NAME OF MD/NP/PA/DO NOTIFIED:
ACP Nighat Buckley
matteo parnell
JOSUE Rodgers
Xena SALAS
JOSUE Aggarwal
JOSUE Barbosa Vasquez
ACP Nighat Buckley
JOSUE Barreto
Sadie BURDICK
JOSUE HO
JOSUE Mendoza

## 2022-05-21 NOTE — PROVIDER CONTACT NOTE (OTHER) - SITUATION
3.76 pause on telemetry monitor. Tele tech report to RN at 10:20 am.
patient had BM with blood.
Pt HR went to 37 on tele monitor
2.4 second pause
patient -130, NAD, denies chest pain, dizziness.
patient diastolic pressure is outside defined parameters, 104/54
Patients bp 99/60 upon discharge with senior ride
patient bp 97/40
patient had BM with blood.
Patient with complaints of generalized feeling unwell
Patient with hr 130's-140's afib

## 2022-05-21 NOTE — PROGRESS NOTE ADULT - PROBLEM SELECTOR PLAN 3
- Nephrology consulted  - HD as per renal, s/p extra dialysis 5/16   - strict I's/O's  - c/w Renvela 1600mg TID  - c/w Torsemide 100mg daily
- Nephrology consulted  - HD as per renal, extra dialysis 5/16   - strict I's/O's  - c/w Renvela 1600mg TID  - c/w Torsemide 100mg daily
- last HD missed Thurs 5/5 i/s/o active bleed  - Nephrology consulted  - HD as per renal, extra dialysis today   - strict I's/O's  - c/w Renvela 1600mg TID  - c/w Torsemide 100mg daily
- Nephrology consulted  - HD as per renal, s/p extra dialysis 5/16   - strict I's/O's  - c/w Renvela 1600mg TID  - c/w Torsemide 100mg daily

## 2022-05-21 NOTE — PROVIDER CONTACT NOTE (OTHER) - BACKGROUND
patient scheduled for HD today and received BP meds this AM
PHX AFIB
Patient admitted 5/6 for GI hemorrhage. Continues to have bloody bm. Last bm this AM
Pt admitted with GI bleed; Hx of Afib & pauses on tele during hospital stay.
Patient admitted 5/6 f9or GI hemorrhage
Patient admitted for GI bleed, worsening cough- fluid overload, afib
adm for GIB, pmh HTN, CAD, HD

## 2022-05-21 NOTE — PROVIDER CONTACT NOTE (OTHER) - ACTION/TREATMENT ORDERED:
ACP aware, patient will get extra dialysis session today, chest xray done at bedside
ACP made aware, CBC ordered.
ACP made aware, will check rectal temp to r/o fever.
ACP made aware, will continue to monitor CBC, H&H, stool for signs of blood. day shift RN made aware to monitor stools.
provider notified, HD notified about bp patient asymptomatic
will continue to monitor
ACP notified, no interventions ordered at this time.
Notified provider stated it was okay for patient to go to rehab. Patient drank some water.
Plan remains for dialysis today, monitor breathing, will continue to monitor
Will continue to monitor hr

## 2022-05-21 NOTE — PROGRESS NOTE ADULT - PROBLEM SELECTOR PROBLEM 9
Anemia of chronic disease

## 2022-05-21 NOTE — PROGRESS NOTE ADULT - PROBLEM SELECTOR PLAN 1
- due to fluid overload in setting of ESRD, now improved    - CXR 5/16 w/ R pleural effusion and underlying atelectasis   - s/p dose of IV Lasix 5/16, c/w PO Torsemide   - s/p extra dialysis 5/16, c/w HD T/TH/St    - No plan for thora as per Pulm given chronicity and improved 02 sats  - supplemental O2 prn
- due to fluid overload in setting of ESRD, now improved    - CXR 5/16 w/ R pleural effusion and underlying atelectasis   - s/p dose of IV Lasix 5/16, c/w PO Torsemide   - s/p extra dialysis 5/16, c/w HD T/TH/St    - No plan for thora as per Pulm given chronicity and improved 02 sats  - supplemental O2 prn
- due to fluid overload in setting of ESRD, now improved    - CXR 5/16 w/ R pleural effusion and underlying atelectasis   - s/p dose of IV Lasix 5/16, c/w PO Torsemide   - s/p extra dialysis 5/16   - No plan for thora as per Pulm given chronicity and improved 02 sats  - supplemental O2 prn
- due to fluid overload in setting of ESRD, now improved    - CXR 5/16 w/ R pleural effusion and underlying atelectasis   - s/p dose of IV Lasix 5/16, c/w PO Torsemide   - s/p extra dialysis 5/16, c/w HD T/TH/St    - No plan for thora as per Pulm given chronicity and improved 02 sats  - supplemental O2 prn
- due to fluid overload in setting of ESRD, now improved    - CXR 5/16 w/ R pleural effusion and underlying atelectasis   - s/p dose of IV Lasix 5/16, c/w PO Torsemide   - s/p extra dialysis 5/16, c/w HD T/TH/St    - No plan for thora as per Pulm given chronicity and improved 02 sats  - supplemental O2 prn
- due to fluid overload in setting of ESRD   - CXR 5/16 w/ R pleural effusion and underlying atelectasis   - dosed Lasix 20mg IV x 1 this AM   - plan for extra session of dialysis today   - No plan for thora as per Pulm given chronicity and improved 02 sats  - supplemental O2, wean down as tolerated

## 2022-05-21 NOTE — PROGRESS NOTE ADULT - PROBLEM SELECTOR PLAN 4
5/10- Diverticular bleed appears to have stabilized without intervention by IR. On 5/11, discussed with outpt cards, resumed his 2.5 mg BID eliquis and continuing to HOLD plavix (pt had cath at [facility] for TAVR work up, per Cards Dr. Conteh, coronaries patent, has never required cardiac stents. Previously was on plavix for PAD (with stent).   - CTA Abdomen, Pelvis with brisk sigmoid colon bleed  - IR consulted, no intervention, resolved spontaneously  - GI consulted, deferring to IR, IR signed off  - Hgb stable, monitor   - Resumed Eliquis 2.5 mg BID on 5/11  - cont to HOLD Plavix (was on it for PAD), Cards in agreement to hold plavix
5/10- Diverticular bleed appears to have stabilized without intervention by IR. On 5/11, discussed with outpt cards, resumed his 2.5 mg BID eliquis and continuing to HOLD plavix (pt had cath at [facility] for TAVR work up, per Cards Dr. Conteh, coronaries patent, has never required cardiac stents. Previously was on plavix for PAD (with stent).   - CTA Abdomen, Pelvis with brisk sigmoid colon bleed  - IR consulted, no intervention, resolved spontaneously  - GI consulted, deferring to IR, IR signed off  - Hgb stable, monitor   - Resumed Eliquis 2.5 mg BID on 5/11  - cont to HOLD Plavix (was on it for PAD), Cards in agreement to hold plavix
5/10- Diverticular bleed appears to have stabilized without intervention by IR. On 5/11, discussed with outpt cards, resumed his 2.5 mg BID eliquis and continuing to HOLD plavix (pt had cath at [facility] for TAVR work up, per Cards Dr. Conteh, coronaries patent, has never required cardiac stents. Previously was on plavix for PAD (with stent).   - CTA Abdomen, Pelvis with brisk sigmoid colon bleed  - IR consulted, no intervention, resolved spontaneously  - GI consulted, deferring to IR, IR signed off  - Hgb stable, monitor   - Resumed Eliquis 2.5 mg BID on 5/11  - cont to HOLD Plavix (was on it for PAD), Cards in agreement to hold plavix
5/10- Diverticular bleed appears to have stabilized without intervention by IR. On 5/11, discussed with outpt cards, resumed his 2.5 mg BID eliquis and continuing to HOLD plavix (pt had cath at [facility] for TAVR work up, per Cards Dr. Conteh, coronaries patent, has never required cardiac stents. Previously was on plavix for PAD (with stent).   - CTA Abdomen, Pelvis with brisk sigmoid colon bleed  - IR consulted, no intervention, resolved spontaneously  - GI consulted, deferring to IR, IR signed off  - Hgb stable, monitor   - Resumed Eliquis 2.5 mg BID on 5/11  - cont to HOLD Plavix (was on it for PAD), Cards in agreement to hold plavix
5/10- Diverticular bleed appears to have stabilized without intervention by IR. On 5/11, discussed with outpt cards, resumed his 2.5 mg BID eliquis and continuing to HOLD plavix (pt had cath at [facility] for TAVR work up, per Cards Dr. Conteh, coronaries patent, has never required cardiac stents. Previously was on plavix for PAD (with stent).   - CTA Abdomen, Pelvis with brisk sigmoid colon bleed  - IR consulted, no intervention, resolved spontaneously  - GI consulted, deferring to IR, IR signed off  - Hgb stable, monitor   - Resumed Eliquis 2.5 mg BID on 5/11  - cont to HOLD Plavix (was on it for PAD), Cards in agreement to hold plavix
5/10- Diverticular bleed appears to have stabilized without intervention by IR. On 5/11, discussed with outpt cards, resumed his 2.5 mg BID eliquis and continuing to HOLD plavix (pt had cath at [facility] for TAVR work up, per Cards Dr. Conteh, coronaries patent, has never required cardiac stents. Previously was on plavix for PAD (with stent).   - CTA Abdomen, Pelvis with brisk sigmoid colon bleed  - IR consulted, no intervention, resolved spontaneously  - GI consulted, deferring to IR, IR signed off  - Hgb stable, monitor   - Resumed Eliquis 2.5 mg BID on 5/11  - cont to HOLD Plavix (was on it for PAD), Cards in agreement to hold plavix

## 2022-05-21 NOTE — PROGRESS NOTE ADULT - PROBLEM SELECTOR PLAN 7
- from records from [facility], was worked up and planned for TAVR at [facility] with patent coronaries  - Dr. Ryan Conteh-- [phone], Cardiologist outpt
- from records from [facility], was worked up and planned for TAVR at [facility] with patent coronaries  - Dr. Ryan Conteh-- [phone], Cardiologist outpt
- from records from [facility], was worked up and planned for TAVR at [facility] with patent coronaries  - Dr. Ryan Conteh-- [phone], Cardiologist outpt
- from records from [facility], was worked up and planned for TAVR at [facility] with patent coronaries  - Dr. Ryan Conteh-- [phone], Cardiologist outpt
- from records from [facility], was worked up and planned for TAVR at [facility] with patent coronaries  - Dr. Ryan Conteh-- [phone], Cardiologist outpt
- from records from [facility], was worked up and planned for TAVR at [facility] with patent coronaries  - Dr. Ryan Conteh-- [phone], Cardiologist outpt

## 2022-05-21 NOTE — PROGRESS NOTE ADULT - PROBLEM SELECTOR PLAN 8
c/w Albuterol HFA inhaler prn   c/w Symbicort

## 2022-05-21 NOTE — PROGRESS NOTE ADULT - SUBJECTIVE AND OBJECTIVE BOX
PROGRESS NOTE:     Patient is a 86y old  Male who presents with a chief complaint of GI bleed (20 May 2022 12:15)      SUBJECTIVE / OVERNIGHT EVENTS: No acute events. Intermittent cough.     ADDITIONAL REVIEW OF SYSTEMS:    MEDICATIONS  (STANDING):  ampicillin  IVPB 2 Gram(s) IV Intermittent every 12 hours  apixaban 2.5 milliGRAM(s) Oral two times a day  ascorbic acid 500 milliGRAM(s) Oral daily  atorvastatin 10 milliGRAM(s) Oral at bedtime  budesonide  80 MICROgram(s)/formoterol 4.5 MICROgram(s) Inhaler 2 Puff(s) Inhalation two times a day  cefTRIAXone   IVPB 2000 milliGRAM(s) IV Intermittent every 12 hours  chlorhexidine 2% Cloths 1 Application(s) Topical daily  diltiazem    milliGRAM(s) Oral daily  epoetin ruthann-epbx (RETACRIT) Injectable 26359 Unit(s) IV Push <User Schedule>  folic acid 1 milliGRAM(s) Oral daily  levothyroxine Injectable 84 MICROGram(s) IV Push at bedtime  lidocaine/prilocaine Cream 1 Application(s) Topical <User Schedule>  metoprolol tartrate 25 milliGRAM(s) Oral two times a day  sevelamer carbonate 1600 milliGRAM(s) Oral three times a day with meals  sodium chloride 3%  Inhalation 4 milliLiter(s) Inhalation every 12 hours  torsemide 100 milliGRAM(s) Oral daily  zinc oxide 20% Ointment 1 Application(s) Topical daily  zinc sulfate 220 milliGRAM(s) Oral daily    MEDICATIONS  (PRN):  acetaminophen     Tablet .. 650 milliGRAM(s) Oral every 6 hours PRN Temp greater or equal to 38C (100.4F), Mild Pain (1 - 3)  ALBUTerol    90 MICROgram(s) HFA Inhaler 2 Puff(s) Inhalation every 6 hours PRN Bronchospasm  aluminum hydroxide/magnesium hydroxide/simethicone Suspension 30 milliLiter(s) Oral every 4 hours PRN Dyspepsia  melatonin 3 milliGRAM(s) Oral at bedtime PRN Insomnia  ondansetron Injectable 4 milliGRAM(s) IV Push every 8 hours PRN Nausea and/or Vomiting      CAPILLARY BLOOD GLUCOSE      POCT Blood Glucose.: 142 mg/dL (20 May 2022 12:37)    I&O's Summary    20 May 2022 07:01  -  21 May 2022 07:00  --------------------------------------------------------  IN: 200 mL / OUT: 100 mL / NET: 100 mL        PHYSICAL EXAM:  Vital Signs Last 24 Hrs  T(C): 36.4 (21 May 2022 10:40), Max: 36.7 (20 May 2022 22:41)  T(F): 97.6 (21 May 2022 10:40), Max: 98.1 (21 May 2022 04:45)  HR: 67 (21 May 2022 10:40) (60 - 79)  BP: 125/61 (21 May 2022 10:40) (105/60 - 140/55)  BP(mean): --  RR: 18 (21 May 2022 10:40) (18 - 18)  SpO2: 96% (21 May 2022 10:40) (96% - 98%)    CONSTITUTIONAL: NAD  NECK: Supple  RESPIRATORY: Clear to auscultation b/l   CARDIOVASCULAR: Regular rate and rhythm, normal S1 and S2, no murmur/rub/gallop; No lower extremity edema; Peripheral pulses are 2+ bilaterally  ABDOMEN: Nontender to palpation, normoactive bowel sounds, no rebound/guarding; No hepatosplenomegaly  MUSCLOSKELETAL: no clubbing or cyanosis of digits; no joint swelling or tenderness to palpation  PSYCH: A+O to person, place, and time; affect appropriate    LABS:                        9.2    6.57  )-----------( 154      ( 21 May 2022 06:40 )             29.1     05-21    135  |  93<L>  |  42<H>  ----------------------------<  89  4.2   |  25  |  6.20<H>    Ca    9.4      21 May 2022 06:40  Phos  5.4     05-21  Mg     2.40     05-21    TPro  5.8<L>  /  Alb  2.9<L>  /  TBili  0.3  /  DBili  x   /  AST  20  /  ALT  13  /  AlkPhos  173<H>  05-21                RADIOLOGY & ADDITIONAL TESTS:  Results Reviewed:   Imaging Personally Reviewed:  Electrocardiogram Personally Reviewed:    COORDINATION OF CARE:  Care Discussed with Consultants/Other Providers [Y/N]:  Prior or Outpatient Records Reviewed [Y/N]:

## 2022-05-21 NOTE — PROVIDER CONTACT NOTE (OTHER) - DATE AND TIME:
08-May-2022 18:59
09-May-2022 09:30
09-May-2022 22:30
16-May-2022 11:31
18-May-2022 10:25
09-May-2022 07:00
19-May-2022 10:53
20-May-2022 22:15
21-May-2022 13:11
09-May-2022 21:30
14-May-2022 12:58

## 2022-05-21 NOTE — PROVIDER CONTACT NOTE (OTHER) - REASON
Patients bp 99/60 upon discharge with senior ride.
Pt HR went to 37 on tele monitor
Patient with complaints of feeling unwell
Patient with hr 130's-140's afib
patient -130
2.4 second pause
3.76 pause
patient bp 97/40
patient diastolic pressure is outside defined parameters
patient had BM with blood.
patient had BM with blood.

## 2022-05-21 NOTE — PROGRESS NOTE ADULT - PROBLEM SELECTOR PLAN 9
Acute Blood Loss Anemia d/t recent GI bleed and also anemia of CKD   - monitor Hgb

## 2022-05-21 NOTE — PROGRESS NOTE ADULT - PROBLEM SELECTOR PROBLEM 8
COPD without exacerbation

## 2022-05-21 NOTE — PROGRESS NOTE ADULT - PROBLEM SELECTOR PLAN 2
Enterococcus Bacteremia  - diagnosed, treated at [facility] in 4/2022 w/ unclear source   - c/w IV Ceftriaxone 2gm BID, stop after 32 days  - c/w IV Ampicillin 2gm BID, stop after 32 days   - repeat cultures NGTD  - F/u [facility] records
Enterococcus Bacteremia  - diagnosed, treated at [facility] in 4/2022 w/ unclear source   - c/w IV Ceftriaxone 2gm BID till 5/28  - c/w IV Ampicillin 2gm BID till 5/28   - repeat cultures NGTD
Enterococcus Bacteremia  - diagnosed, treated at [facility] in 4/2022 w/ unclear source   - c/w IV Ceftriaxone 2gm BID, stop after 32 days  - c/w IV Ampicillin 2gm BID, stop after 32 days   - repeat cultures NGTD  - ID consulted  - F/u [facility] records
Enterococcus Bacteremia  - diagnosed, treated at [facility] in 4/2022 w/ unclear source   - c/w IV Ceftriaxone 2gm BID, stop after 32 days  - c/w IV Ampicillin 2gm BID, stop after 32 days   - repeat cultures NGTD  - F/u [facility] records
Enterococcus Bacteremia  - diagnosed, treated at [facility] in 4/2022 w/ unclear source   - c/w IV Ceftriaxone 2gm BID, stop after 32 days  - c/w IV Ampicillin 2gm BID, stop after 32 days   - repeat cultures NGTD
Enterococcus Bacteremia  - diagnosed, treated at [facility] in 4/2022 w/ unclear source   - c/w IV Ceftriaxone 2gm BID, stop after 32 days  - c/w IV Ampicillin 2gm BID, stop after 32 days   - repeat cultures NGTD  - F/u [facility] records

## 2022-05-21 NOTE — PROGRESS NOTE ADULT - PROBLEM SELECTOR PLAN 5
A fib w RVR, episodic on 5/13-14  - rate now controlled  - resume diltiazem 5/14 at 120 mg daily ER. Previously on 240 daily, can uptitrate  - c/w metoprolol 25mgmg BID   - resume eliquis 5/11
A fib w RVR, episodic on 5/13-14  - rate now controlled  - resumed diltiazem 5/14 at 120 mg daily ER. Previously on 240 daily, can uptitrate  - c/w metoprolol 25mg BID   - resume eliquis 5/11
A fib w RVR, episodic on 5/13-14  - rate now controlled  - resumed diltiazem 5/14 at 120 mg daily ER. Previously on 240 daily, can uptitrate  - c/w metoprolol 25mg BID   - resume eliquis 5/11
A fib w RVR, episodic on 5/13-14  - rate now controlled  - resumed diltiazem 5/14 at 120 mg daily ER. Previously on 240 daily, can uptitrate  - c/w metoprolol 25mgmg BID   - resume eliquis 5/11
A fib w RVR, episodic on 5/13-14  - rate now controlled  - resumed diltiazem 5/14 at 120 mg daily ER. Previously on 240 daily, can uptitrate  - c/w metoprolol 25mg BID   - resume eliquis 5/11
A fib w RVR, episodic on 5/13-14  - rate now controlled  - resumed diltiazem 5/14 at 120 mg daily ER. Previously on 240 daily, can uptitrate  - c/w metoprolol 25mg BID   - resume eliquis 5/11

## 2022-05-21 NOTE — PROGRESS NOTE ADULT - PROVIDER SPECIALTY LIST ADULT
Hospitalist
Hospitalist
Nephrology
Hospitalist
MICU
Nephrology
Nephrology
Hospitalist
Hospitalist
MICU
MICU
Nephrology
Gastroenterology
Gastroenterology
Hospitalist
Hospitalist
Nephrology
Hospitalist

## 2022-05-21 NOTE — PROGRESS NOTE ADULT - ASSESSMENT
87 y/o M pmh HTN, CAD, ESRD on HD (Tu, Thurs, Sat), Afib on eliquis, CHF w severe AS (w plans for TAVR at [facility]), COVID positive 4/30 s/p mAB, enterococcus bacteremia (on ceft/amipcillin) recurrent pleural effusions presenting from [place] with bright red blood per rectum,  2/2 brisk lower GI bleed (diverticular) s/p transfusion, AMS and worsening hypoxic respiratory failure 5/9-5/11, now improved.

## 2022-05-21 NOTE — PROGRESS NOTE ADULT - PROBLEM SELECTOR PLAN 11
hold Anticoagulation i/s/o active bleed. SCDs
Eliquis     DC planning to rehab, d/c time 38 minutes
hold Anticoagulation i/s/o active bleed. SCDs    Patient does not want to go back to [place]. DC planning to a different rehab.
hold Anticoagulation i/s/o active bleed. SCDs
hold Anticoagulation i/s/o active bleed. SCDs    Patient does not want to go back to [place]. DC planning to a different rehab.
hold Anticoagulation i/s/o active bleed. Start SCDs

## 2022-05-21 NOTE — PROVIDER CONTACT NOTE (OTHER) - RECOMMENDATIONS
JOSUE Barbosa made aware. Metoprolol PO XL provided
VSS per flowsheet. JOSUE Barreto made aware. Albuterol HFA provided
will continue to monitor
Patient just got back from HD. Denying any discomfort.

## 2022-05-21 NOTE — PROVIDER CONTACT NOTE (OTHER) - ASSESSMENT
patient -130, NAD, denies chest pain, dizziness. BP and temp documented in flowsheet.
Patient asymptomatic laying in bed.
Pt a&Ox4, asymptomatic, denies any complaints at this time. patient had loose BM with blood, dark red color stool.
no acute changes noted patient currently resting in bed comfortably
patient is asymptomatic; no distress noted.
Pt HR went to 37 on tele monitor; VSS, pt sleeping in bed
patient is A&Ox4, on 2L NC, no signs of dizziness, SOB or chest pain noted, VS noted in flowsheet
Pt a&Ox4, asymptomatic, deneis any complaints at this time
Pt a&Ox4, reports feeling unwell, VSS per flowsheet, wheezing & coughing
Patient feels SOB, no significant symptoms during pause
Pt a&Ox4, asymptomatic, denies any complaints at this time. patient had loose BM with blood, dark red color stool.

## 2022-05-21 NOTE — PROGRESS NOTE ADULT - PROBLEM SELECTOR PLAN 10
- s/p Monocloal antibodies, bebetelovimab while at [place]  - hold Dexamethasone i/s/o prior treatment course at [facility], and respiratory symptoms better explained by pulmonary edema, pleural effusions
- s/p Monocloal antibodies, bebetelovimab while at [place]  - hold Dexamethasone i/s/o prior treatment course at [facility], and respiratory symptoms better explained by pulmonary edema, pleural effusions
- s/p Monocloal antibodies, bebetelovimab while at [place]  - hold Dexamethasone i/s/o prior treatment course at [facility], and respiratory symptoms better explained by pulmonary edema, pleural effusions
- s/p Monocloal antibodies, bebetelovimab while at [place]  - hold Dexamethasone i/s/o prior treatment course at [facility], and respiratory symptoms better explained by pulmonary edema, pleural effusions
- s/p Monocloal antibodies, bebetelovimab while at [place]  - hold Dexamethasone i/s/o prior treatment course at [facility], and respiratory symptoms better explained by pulmonary edema, pleural effusions
- s/p Monocloal antibodies, bebetelovimab while at [place]  - hold Dexamethasone i/s/o prior treatment course at [facility], and respiratory symptoms better explained by pulmonary edema, pleural effusions

## 2022-05-21 NOTE — PROGRESS NOTE ADULT - PROBLEM SELECTOR PLAN 6
Patent coronaries on LHC.   - c/w Atorvastatin 10mg  - Pt was not on aspirin prior to admission, was on plavix for peripheral artery disease only
Patent coronaries on [facility].   - c/w Atorvastatin 10mg  - Pt was not on aspirin prior to admission, was on plavix for peripheral artery disease only
Patent coronaries on C.   - c/w Atorvastatin 10mg  - Pt was not on aspirin prior to admisison, was on plavix for peripheral artery disease only
Patent coronaries on LHC.   - c/w Atorvastatin 10mg  - Pt was not on aspirin prior to admission, was on plavix for peripheral artery disease only

## 2022-06-08 NOTE — CONSULT NOTE ADULT - ATTENDING COMMENTS
This is an 86-year-old male with significant past medical history of smoking, coronary artery disease, end-stage renal disease on hemodialysis, atrial fibrillation on Eliquis, heart failure, and recent history of COVID 19 status post Mab with Enterococcus bacteremia who was admitted to [facility] for a GI bleed.  Consult was for acute hypoxic respiratory failure.    Patient seen and examined at bedside.  Patient is under no acute distress and receiving hemodialysis during interview.  Patient was able to speak in full sentences and he was taken off oxygen for more than 10 minutes.  During this time, the patient never desaturated more than 96%.  The patient also was known to miss hemodialysis prior to coming into the hospital.  Therefore his acute hypoxic respiratory failure is likely secondary to volume overload from missed dialysis.  Agreed with continue aggressive dialysis with fluid removal.  No indication for thoracentesis at this time given chronicity of effusions and the likelihood that these are secondary to end-stage renal disease.    No further recommendations are needed at this time.  Thank you for your consult.  Please call back if there is any further questions. This is an 86-year-old male with significant past medical history of smoking, coronary artery disease, end-stage renal disease on hemodialysis, atrial fibrillation on Eliquis, heart failure, and recent history of COVID 19 status post Mab with Enterococcus bacteremia who was admitted to [facility] for a GI bleed.  Consult was for acute hypoxic respiratory failure.    Patient seen and examined at bedside.  Patient is under no acute distress and receiving hemodialysis during interview.  Patient was able to speak in full sentences and he was taken off oxygen for more than 10 minutes.  During this time, the patient never desaturated more than 96%.  The patient also was known to miss hemodialysis prior to coming into the hospital.  Therefore his acute hypoxic respiratory failure is likely secondary to volume overload from missed dialysis and recent blood products from active GI bleed.  Agreed with continue aggressive dialysis with fluid removal.  No indication for thoracentesis at this time given chronicity of effusions and the likelihood that these are secondary to end-stage renal disease.    No further recommendations are needed at this time.  Thank you for your consult.  Please call back if there is any further questions. (1) Oriented to own ability

## 2022-07-21 PROBLEM — Z87.09 PERSONAL HISTORY OF OTHER DISEASES OF THE RESPIRATORY SYSTEM: Chronic | Status: ACTIVE | Noted: 2022-01-01

## 2022-07-21 PROBLEM — I50.9 HEART FAILURE, UNSPECIFIED: Chronic | Status: ACTIVE | Noted: 2017-02-20

## 2022-07-21 PROBLEM — I73.9 PERIPHERAL VASCULAR DISEASE, UNSPECIFIED: Chronic | Status: ACTIVE | Noted: 2022-01-01

## 2022-07-21 NOTE — ED PROVIDER NOTE - CLINICAL SUMMARY MEDICAL DECISION MAKING FREE TEXT BOX
87 year old gentleman with history of CHF/HFpEF, COPD, PAD b/l with prior RLE revascularization (PCI) and claudication, hypothyroidism, ESRD on HD (T/TH,Sat)/ aortic stenosis (severe) and persistent atrial fibrillation maintained on Eliquis and cardizem with sob and chest pain, found to be in rapid afib, will check labs, trop, cardizem, reassess, likely admit

## 2022-07-21 NOTE — ED ADULT NURSE NOTE - OBJECTIVE STATEMENT
87 yr old male c/o chest pain since yesterday and sob since today. A+O x 4 from home. Daughter at the bedside. Pt reports CP since yesterday and sob since today. Pt reports pain resolved with O2. Pt O2 sat stable on 15 L via non rebreather mask. Lung sounds diminished at the bases bilaterally. Rapid afib on bedside ekg and monitor. Cardizem administered for HR. Pt is hypotensive. Dr. St aware and at bedside. Abdomen rounded but non tender as reported by patient. skin cool to the touch but intact. will continue to monitor.

## 2022-07-21 NOTE — PROVIDER CONTACT NOTE (CRITICAL VALUE NOTIFICATION) - PERSON GIVING RESULT:
lab
Stephen-Franco
Lab-Margaret Brooks
Lab-[facility]
Halley Aguilar - rodney
Stephen-Franco
Kaelyn from the lab

## 2022-07-21 NOTE — DISCHARGE NOTE PROVIDER - NSDCCPCAREPLAN_GEN_ALL_CORE_FT
PRINCIPAL DISCHARGE DIAGNOSIS  Diagnosis: Atrial fibrillation with RVR  Assessment and Plan of Treatment: Rates difficult to control, s/p digoxin 125mcg on 7/21 prior to transfer. Home cardizem being held due to hypotension. Further management per [facility].      SECONDARY DISCHARGE DIAGNOSES  Diagnosis: Severe aortic stenosis  Assessment and Plan of Treatment: Patient with severe aortic stenosis, evalated by cardiology, requires urgent transfer to [facility] for urgent TAVR. Further management per [facility].    Diagnosis: Hypotension  Assessment and Plan of Treatment: Patient started on phenylephrine drip given afib rvr with hypotension 2/2 septic shock.

## 2022-07-21 NOTE — H&P ADULT - PROBLEM SELECTOR PLAN 4
Patient on dialysis (T/Th/Sat)  - Nephro (Dr. Rivera) consulted, f/u recs Hx of chronic anemia, H/H lower than baseline  - likely large component secondary to renal failure  - check iron studies, type and screen  - check FOBT - on IV PPI per ICU  - transfuse if needed

## 2022-07-21 NOTE — PROGRESS NOTE ADULT - PROBLEM SELECTOR PLAN 5
Patient on dialysis (T/Th/Sat)  - c/w with HD TIW as tolerated by BP  - Nephro (Dr. Rivera) consulted, f/u recs

## 2022-07-21 NOTE — H&P ADULT - ATTENDING COMMENTS
87 year old male with PMHx of chronic atrial fibrillation s/p SHU/DCCV 1/2022 (on eliquis), severe aortic stenosis (planned for TAVR at [facility]), HFpEF, Streptococcus faecalis bacteremia (4/2022 - suspected endocarditis & treated with 6 weeks of antibiotics, ESRD on HD (T/Th/Sat), COPD, HTN, PAD with prior RLE revascularization, hypothyroidism, anemia p/w exertional chest pain and dyspnea admitted for rapid atrial fibrillation, anemia, hypotension and volume overload. Admit to ICU. Started on phenylephrine gtt given hypotension. Continue rate control as tolerated. Caution with IV fluids. Volume management with HD. Check iron studies, FOBT. Started on IV protonix. Sepsis workup sent - follow up blood cultures. Recent septicemia secondary to endocarditis in April 2022. Management per [facility]U.    Agree with H&P as outlined above, edited where appropriate.

## 2022-07-21 NOTE — CONSULT NOTE ADULT - SUBJECTIVE AND OBJECTIVE BOX
Patient is a 87y old  Male who presents with a chief complaint of     BRIEF HOSPITAL COURSE: Pt is an 86 y/o M pmhx of HFpEF (EF 55-60%) COPD, PAD w/ prior RLE revascularization and claudication, ESRD on HD (T/Th/Sat), AS (severe) and persistent Afib on Eliquis presents from rehab for SOB and AGUDELO. CXR concerning for R sided pleural effusion, found to be in rapid Afib in ED given 10mg IVP w/ resolution, subsequently became hypotensive 70/40 and given 500L bolus of NS fluid. ICU consulted for further management.     Events last 24 hours: ***    PAST MEDICAL & SURGICAL HISTORY:  CHF (congestive heart failure)  HFpEF      HTN (hypertension)      Atrial fibrillation      Aortic stenosis  ? severe      History of COPD      PAD (peripheral artery disease)  s/p RLE stenting      H/O hernia repair      H/O varicose vein stripping  right leg        Allergies    codeine (Unknown)    Intolerances      FAMILY HISTORY:  FHx: heart disease        Review of Systems:  CONSTITUTIONAL: No fever, chills, or fatigue  EYES: No eye pain, visual disturbances, or discharge  ENMT:  No difficulty hearing, tinnitus, vertigo; No sinus or throat pain  NECK: No pain or stiffness  RESPIRATORY: No cough, wheezing, chills or hemoptysis; No shortness of breath  CARDIOVASCULAR: No chest pain, palpitations, dizziness, or leg swelling  GASTROINTESTINAL: No abdominal or epigastric pain. No nausea, vomiting, or hematemesis; No diarrhea or constipation. No melena or hematochezia.  GENITOURINARY: No dysuria, frequency, hematuria, or incontinence  NEUROLOGICAL: No headaches, memory loss, loss of strength, numbness, or tremors  SKIN: No itching, burning, rashes, or lesions   MUSCULOSKELETAL: No joint pain or swelling; No muscle, back, or extremity pain  PSYCHIATRIC: No depression, anxiety, mood swings, or difficulty sleeping      Medications:                                  ICU Vital Signs Last 24 Hrs  T(C): 36.7 (21 Jul 2022 02:29), Max: 36.7 (21 Jul 2022 02:29)  T(F): 98.1 (21 Jul 2022 02:29), Max: 98.1 (21 Jul 2022 02:29)  HR: 94 (21 Jul 2022 02:29) (72 - 143)  BP: 99/54 (21 Jul 2022 02:29) (92/68 - 99/54)  BP(mean): --  ABP: --  ABP(mean): --  RR: 20 (21 Jul 2022 02:29) (18 - 20)  SpO2: 99% (21 Jul 2022 02:29) (98% - 99%)    O2 Parameters below as of 21 Jul 2022 02:29  Patient On (Oxygen Delivery Method): mask, nonrebreather  O2 Flow (L/min): 10        Vital Signs Last 24 Hrs  T(C): 36.7 (21 Jul 2022 02:29), Max: 36.7 (21 Jul 2022 02:29)  T(F): 98.1 (21 Jul 2022 02:29), Max: 98.1 (21 Jul 2022 02:29)  HR: 94 (21 Jul 2022 02:29) (72 - 143)  BP: 99/54 (21 Jul 2022 02:29) (92/68 - 99/54)  BP(mean): --  RR: 20 (21 Jul 2022 02:29) (18 - 20)  SpO2: 99% (21 Jul 2022 02:29) (98% - 99%)    Parameters below as of 21 Jul 2022 02:29  Patient On (Oxygen Delivery Method): mask, nonrebreather  O2 Flow (L/min): 10          I&O's Detail        LABS:                        7.3    5.98  )-----------( 184      ( 21 Jul 2022 01:00 )             22.4     07-21    130<L>  |  89<L>  |  58<H>  ----------------------------<  99  5.1   |  28  |  5.30<H>    Ca    9.0      21 Jul 2022 01:00    TPro  6.5  /  Alb  3.0<L>  /  TBili  0.6  /  DBili  x   /  AST  27  /  ALT  28  /  AlkPhos  120  07-21          CAPILLARY BLOOD GLUCOSE        PT/INR - ( 21 Jul 2022 01:00 )   PT: 17.4 sec;   INR: 1.48 ratio         PTT - ( 21 Jul 2022 01:00 )  PTT:31.2 sec    CULTURES:      Physical Examination:    General: Pt laying in hospital bed in no acute distress.  Alert, oriented, interactive.     HEENT: Pupils equal, reactive to light.  Symmetric.    PULM: Clear to auscultation bilaterally, no significant sputum production    CVS: Irregular rate and rhythm , no murmurs, rubs, or gallops    ABD: Soft, nondistended, nontender, normoactive bowel sounds, no masses    EXT: No edema, nontender    SKIN: Warm and well perfused    RADIOLOGY: CXR wet read shows R sided pleural effusion     CRITICAL CARE TIME SPENT: 54 minutes assessing presenting problems of acute illness, which pose high probability of life threatening deterioration or end organ damage/dysfunction, as well as medical decision making including initiating plan of care, reviewing data, reviewing radiologic exams, discussing with multidisciplinary team,  discussing goals of care with patient/family, and writing this note.  Non-inclusive of procedures performed,   
NEPHROLOGY CONSULTATION    CHIEF COMPLAINT:  ESRD      HPI:  Presents with CP and SOB last night both of which have improved.  He has ESRD on dialyzes at [facility] under care of Dr. Galindo.  He has known severe AS and is awaiting a TAVR.  He states he dialyzes 3.5 hrs via AVF and can take off up to 3 liters with SBP 's.        ROS:  denies dizziness, syncope, CP, SOB      PAST MEDICAL & SURGICAL HISTORY:  CHF (congestive heart failure)  HFpEF  HTN (hypertension)  Atrial fibrillation  Aortic stenosis, severe, pending TAVR  History of COPD  PAD (peripheral artery disease)  H/O hernia repair  H/O varicose vein stripping  right leg    SOCIAL HISTORY:  NA    FAMILY HISTORY:  NA      MEDICATIONS  (STANDING):  chlorhexidine 2% Cloths 1 Application(s) Topical <User Schedule>  digoxin     Tablet 125 MICROGram(s) Oral once  ergocalciferol 35157 Unit(s) Oral <User Schedule>  heparin   Injectable 5000 Unit(s) SubCutaneous every 12 hours  levothyroxine 112 MICROGram(s) Oral daily  mometasone 220 MICROgram(s) Inhaler 1 Puff(s) Inhalation two times a day  pantoprazole  Injectable 40 milliGRAM(s) IV Push two times a day  phenylephrine    Infusion 0.05 MICROgram(s)/kG/Min (0.71 mL/Hr) IV Continuous <Continuous>  sevelamer carbonate 1600 milliGRAM(s) Oral three times a day with meals      PHYSICAL EXAMINATION:  T(F): 97.2 (07-21-22 @ 07:54)  HR: 132 (07-21-22 @ 10:00)  BP: 90/55 (07-21-22 @ 10:00)  RR: 27 (07-21-22 @ 10:00)  SpO2: 100% (07-21-22 @ 10:00)  Conversant, no apparent distress  PERRLA, pink conjunctivae, no ptosis  Good dentition, no pharyngeal erythema  Neck non tender, no mass, no thyromegaly or nodules  Normal respiratory effort, lungs clear to auscultation  Heart with RRR, systolic murmur, mild peripheral edema  Abdomen soft, no masses, no organomegaly  Skin no rashes, ulcers or lesions, normal turgor and temperature  Appropriate affect, AO x 3    LABS:                        6.9    6.41  )-----------( 180      ( 21 Jul 2022 09:11 )             21.8     07-21    130<L>  |  91<L>  |  66<H>  ----------------------------<  95  5.1   |  26  |  5.40<H>    Ca    8.8      21 Jul 2022 06:40  Phos  6.6     07-21  Mg     2.7     07-21    TPro  6.3  /  Alb  x   /  TBili  0.6  /  DBili  x   /  AST  41<H>  /  ALT  36  /  AlkPhos  116  07-21      RADIOLOGY:  Chest X-Ray personally reviewed and shows right pleural effusion, mild PVC      ASSESSMENT:  1.  ESRD on hemodialysis TTS  2.  Acute on chronic diastolic heart failure  3.  Severe AS  4.  Chest pain NOS    PLAN:  Will arrange HD if doesn't transfer to [facility]              
CARDIOLOGY CONSULT NOTE    Patient is a 87y Male with a known history of :  ESRD on dialysis [N18.6]    Need for prophylactic measure [Z29.9]    Rapid atrial fibrillation [I48.91]    Hypotension [I95.9]    CHF exacerbation [I50.9]    TERRANCE (acute kidney injury) [N17.9]    Hyperlipidemia [E78.5]    Hypothyroidism [E03.9]    Anemia [D64.9]      HPI:  87 year old male with PMHx of chronic atrial fibrillation s/p SHU/DCCV 1/2022 (on eliquis), severe aortic stenosis (planned for TAVR at [facility]), HFpEF, Streptococcus faecalis bacteremia (4/2022 - suspected endocarditis & treated with 6 weeks of antibiotics, ESRD on HD (T/Th/Sat), COPD, HTN, PAD with prior RLE revascularization, hypothyroidism, anemia presented to the ED for the evaluation of chest pain and dsypnea. Patient has chronic sob on exertion. This evening, patient c/o sob and substernal chest pain upon getting into bed. He states the chest pain and sob persisted for ~1 hour when he called his daughter (who lives upstairs). The chest pain was localized to the substernal area without radiation to arm, back or jaw. His daughter called EMS and patient was brought to the ED. Patient admits to lightheadedness and nausea, without vomiting, which has resolved since presentation to the ED. He denies fever/chills, headache, palpitations, abdominal pain.    ED course:  Vitals: T 97.9F, HR 72, BP 92/68, RR 18, SpO2 98% on NRB  Labs: RBC 1.93, Hgb 7.3, Hct 22.4, PT 17.4, INR 1.48, Na 130, Cl 89, BUN 58, Cr 5.3  CXR: c/w right sided pleural effusion  EKG: atrial fibrillation with RVR, RBBB  Given: IV Cardizem 10mg x1, 0.5L IV NS x1 (21 Jul 2022 02:36)      REVIEW OF SYSTEMS:    CONSTITUTIONAL: No fever, weight loss, +fatigue  NECK: No pain or stiffness  RESPIRATORY: No cough, wheezing, chills or hemoptysis;  +shortness of breath  CARDIOVASCULAR:  +chest pain, no palpitations, dizziness,  +leg swelling  GASTROINTESTINAL: No abdominal or epigastric pain. No nausea, vomiting, or hematemesis;  +melena  NEUROLOGICAL: No headaches, memory loss   MUSCULOSKELETAL: No joint pain or swelling; No muscle, back, or extremity pain  PSYCHIATRIC: No depression, anxiety, mood swings, or difficulty sleeping  HEME/LYMPH: No easy bruising, or bleeding gums  ALLERGY AND IMMUNOLOGIC: No hives or eczema    MEDICATIONS  (STANDING):  chlorhexidine 2% Cloths 1 Application(s) Topical <User Schedule>  diltiazem    Tablet 30 milliGRAM(s) Oral every 6 hours  heparin   Injectable 5000 Unit(s) SubCutaneous every 12 hours  levothyroxine 112 MICROGram(s) Oral daily  pantoprazole  Injectable 40 milliGRAM(s) IV Push two times a day  phenylephrine    Infusion 0.05 MICROgram(s)/kG/Min (0.71 mL/Hr) IV Continuous <Continuous>  sevelamer carbonate 1600 milliGRAM(s) Oral three times a day  sevelamer carbonate 1600 milliGRAM(s) Oral three times a day with meals    MEDICATIONS  (PRN):  metoprolol tartrate Injectable 5 milliGRAM(s) IV Push every 6 hours PRN HR>135      ALLERGIES: codeine (Unknown)      FAMILY HISTORY:  FHx: heart disease        PHYSICAL EXAMINATION:  -----------------------------  T(C): 36.2 (07-21-22 @ 07:54), Max: 37.1 (07-21-22 @ 04:30)  HR: 131 (07-21-22 @ 09:00) (72 - 143)  BP: 99/61 (07-21-22 @ 08:30) (67/46 - 114/59)  RR: 35 (07-21-22 @ 09:00) (15 - 35)  SpO2: 100% (07-21-22 @ 09:00) (78% - 100%)  Wt(kg): --    Height (cm): 167.6 (07-21 @ 00:49)  Weight (kg): 76.2 (07-21 @ 00:49)  BMI (kg/m2): 27.1 (07-21 @ 00:49)  BSA (m2): 1.86 (07-21 @ 00:49)    Constitutional: well developed, normal appearance, and no acute distress.   HEENT: normal oral mucosa, no oral pallor and no oral cyanosis.   Neck: normal jugular venous  Pulmonary: no respiratory distress, lungs with scattered ronchi and diminished breath sounds  Cardiovascular: irreg S1 S2 rapid rate with 3/6 systolic mur  Abdomen: soft, non-tender, no hepato-splenomegaly and no abdominal mass palpated.   Musculoskeletal: the gait could not be assessed..   Extremities: 1-2+ edema  Psychiatric: oriented to person, place, and time, the affect was normal, the mood was normal and not feeling anxious.     LABS:   --------  07-21    130<L>  |  91<L>  |  66<H>  ----------------------------<  95  5.1   |  26  |  5.40<H>    Ca    8.8      21 Jul 2022 06:40  Phos  6.6     07-21  Mg     2.7     07-21    TPro  6.3  /  Alb  x   /  TBili  0.6  /  DBili  x   /  AST  41<H>  /  ALT  36  /  AlkPhos  116  07-21                         6.8    5.69  )-----------( 175      ( 21 Jul 2022 06:40 )             20.9     PT/INR - ( 21 Jul 2022 01:00 )   PT: 17.4 sec;   INR: 1.48 ratio         PTT - ( 21 Jul 2022 01:00 )  PTT:31.2 sec  07-21 @ 01:00 BNP: 07333 pg/mL            RADIOLOGY:  -----------------        ECG:

## 2022-07-21 NOTE — PROVIDER CONTACT NOTE (CRITICAL VALUE NOTIFICATION) - ACTION/TREATMENT ORDERED:
Will repeat lactate later
Pt to be transfused 1 unit prbc, monitor for s.s of active bleeding. No active bleeding noted at this time
Will f/u  with ICU MD and put in repeat labs, continue to monitor.
No new order at this time
no new orders given
repeat lactate after fluid bolus
no active bleeding noted at this time, will f/u with pt

## 2022-07-21 NOTE — CONSULT NOTE ADULT - CONVERSATION DETAILS
Discussed w/ pt and daughter Francoise pt condition and need for ICU admission. Discussed possibility of further decompensation and if pt would want CPR but is unsure about intubation. Explained to pt how when we perform CPR on a pt it often comes with a need to intubate pt as well. Pt and daughter understand, will remain full code for now, continue GOC conversations.

## 2022-07-21 NOTE — PROGRESS NOTE ADULT - PROBLEM SELECTOR PLAN 3
Patient with Hx of Hypertension found to be Hypotensive 92/68 on admission  - S/p 0.5L IV NS x1 in ED  - started on pressors with phenylephrine gtt  - Management per ICU

## 2022-07-21 NOTE — H&P ADULT - PROBLEM SELECTOR PLAN 2
Patient with sob on exertion and b/l LE edema on exam  - Given 0.5L IV NS x1 in the ED given pt hypotensive  - Use IVF with caution  - Supplemental O2 to maintain O2 sat >90%  - Monitor and replace electrolytes  - Management per ICU Patient with sob on exertion and b/l LE edema on exam; history of HFpEF and ESRD  - Given 0.5L IV NS x1 in the ED given pt hypotensive  - Use IVF with caution  - Supplemental O2 to maintain O2 sat >90%  - Monitor and replace electrolytes  - Management per ICU

## 2022-07-21 NOTE — H&P ADULT - HISTORY OF PRESENT ILLNESS
87 year old male with PMHx of chronic atrial fibrillation s/p SHU/DCCV 1/2022 (on eliquis), severe aortic stenosis (planned for TAVR at [facility]), HFpEF, Streptococcus faecalis bacteremia (4/2022 - suspected endocarditis & treated with 6 weeks of antibiotics, ESRD on HD (T/Th/Sat), COPD, HTN, PAD with prior RLE revascularization, hypothyroidism, anemia presented to the ED for the evaluation of chest pain and dsypnea. 87 year old male with PMHx of chronic atrial fibrillation s/p SHU/DCCV 1/2022 (on eliquis), severe aortic stenosis (planned for TAVR at [facility]), HFpEF, Streptococcus faecalis bacteremia (4/2022 - suspected endocarditis & treated with 6 weeks of antibiotics, ESRD on HD (T/Th/Sat), COPD, HTN, PAD with prior RLE revascularization, hypothyroidism, anemia presented to the ED for the evaluation of chest pain and dsypnea. Patient has chronic sob on exertion. This evening, patient c/o sob and substernal chest pain upon getting into bed. He states the chest pain and sob persisted for ~1 hour when he called his daughter (who lives upstairs). The chest pain was localized to the substernal area without radiation to arm, back or jaw. His daughter called EMS and patient was brought to the ED. Patient admits to lightheadedness and nausea, without vomiting, which has resolved since presentation to the ED. He denies fever/chills, headache, palpitations, abdominal pain.    ED course:  Vitals: T 97.9F, HR 72, BP 92/68, RR 18, SpO2 98% on NRB  Labs: RBC 1.93, Hgb 7.3, Hct 22.4, PT 17.4, INR 1.48, Na 130, Cl 89, BUN 58, Cr 5.3  Given: IV Cardizem 10mg x1, 0.5L IV NS x1 87 year old male with PMHx of chronic atrial fibrillation s/p SHU/DCCV 1/2022 (on eliquis), severe aortic stenosis (planned for TAVR at [facility]), HFpEF, Streptococcus faecalis bacteremia (4/2022 - suspected endocarditis & treated with 6 weeks of antibiotics, ESRD on HD (T/Th/Sat), COPD, HTN, PAD with prior RLE revascularization, hypothyroidism, anemia presented to the ED for the evaluation of chest pain and dsypnea. Patient has chronic sob on exertion. This evening, patient c/o sob and substernal chest pain upon getting into bed. He states the chest pain and sob persisted for ~1 hour when he called his daughter (who lives upstairs). The chest pain was localized to the substernal area without radiation to arm, back or jaw. His daughter called EMS and patient was brought to the ED. Patient admits to lightheadedness and nausea, without vomiting, which has resolved since presentation to the ED. He denies fever/chills, headache, palpitations, abdominal pain.    ED course:  Vitals: T 97.9F, HR 72, BP 92/68, RR 18, SpO2 98% on NRB  Labs: RBC 1.93, Hgb 7.3, Hct 22.4, PT 17.4, INR 1.48, Na 130, Cl 89, BUN 58, Cr 5.3  CXR: c/w right sided pleural effusion  EKG: atrial fibrillation with RVR, RBBB  Given: IV Cardizem 10mg x1, 0.5L IV NS x1

## 2022-07-21 NOTE — PROGRESS NOTE ADULT - ATTENDING COMMENTS
87 year old male with PMHx of chronic atrial fibrillation s/p SHU/DCCV 1/2022 (on eliquis), severe aortic stenosis (planned for TAVR at [facility]), HFpEF, Streptococcus faecalis bacteremia (4/2022 - suspected endocarditis & treated with 6 weeks of antibiotics, ESRD on HD (T/Th/Sat), COPD, HTN, PAD with prior RLE revascularization, hypothyroidism, anemia p/w exertional chest pain and dyspnea admitted for rapid atrial fibrillation, hypotension and volume overload.    Patient seen and examined at bedside. Patient states he is feeling better today. States his chest pain has completely resolved. Denies any SOB or palpitations. Discussed with Cardio, patient to be transferred to his Cardiologist in [place] for consideration of TAVR, transfer initiated, awaiting bed. Patient scheduled for dialysis today, to be dialyzed here if not transferred today, otherwise can be dialyzed at [place]. 87 year old male with PMHx of chronic atrial fibrillation s/p SHU/DCCV 1/2022 (on eliquis), severe aortic stenosis (planned for TAVR at [facility]), HFpEF, Streptococcus faecalis bacteremia (4/2022 - suspected endocarditis & treated with 6 weeks of antibiotics, ESRD on HD (T/Th/Sat), COPD, HTN, PAD with prior RLE revascularization, hypothyroidism, anemia p/w exertional chest pain and dyspnea admitted for rapid atrial fibrillation, hypotension and volume overload.    Patient seen and examined at bedside. Patient states he is feeling better today. States his chest pain has completely resolved. Denies any SOB or palpitations. Discussed with Cardio, patient to be transferred to his Cardiologist in [place] for consideration of TAVR, transfer initiated, awaiting bed. Patient scheduled for dialysis today, to be dialyzed here if not transferred today, otherwise can be dialyzed at [place]. Hgb 6.9 this AM, to be transfused 1 unit today.

## 2022-07-21 NOTE — H&P ADULT - NSHPSOCIALHISTORY_GEN_ALL_CORE
, from home  Former smoker Tobacco: smoked 1 ppd from 18 yo to 54 yo; quit smoking >30 years ago  EtOH: 1 glass of wine with dinner qd  Recreational drug use: denies  Lives with: daughter at home  Ambulates: with a walker  ADLs: with assistance of daughter  Vaccinations: COVID Moderna 2/2

## 2022-07-21 NOTE — PROVIDER CONTACT NOTE (EICU) - RECOMMENDATIONS
?etiology of rapid a.fib  EKG, troponins, pro BNP  rvp, urinalysis, blood cx  Case discussed with ICU PA     Called into rm for a.fib with RVR with -130s with hypotension  Ordered lopressor 5 mg ivp prn x 3 doses, after 1 dose HR decreased  with slight improvement in BP   Recommend initiating po home meds for rate control

## 2022-07-21 NOTE — ED ADULT NURSE NOTE - BREATH SOUNDS, MLM
return to the ED if symptoms worsen, fever.chills, nausea/vomiting cough shorness of breath. Stay quarantined for 14 days.
Diminished

## 2022-07-21 NOTE — H&P ADULT - ASSESSMENT
87 year old male with PMHx of chronic atrial fibrillation s/p SHU/DCCV 1/2022 (on eliquis), severe aortic stenosis (planned for TAVR at [facility]), HFpEF, Streptococcus faecalis bacteremia (4/2022 - suspected endocarditis & treated with 6 weeks of antibiotics, ESRD on HD (T/Th/Sat), COPD, HTN, PAD with prior RLE revascularization, hypothyroidism, anemia p/w exertional chest pain and dyspnea admitted for rapid atrial fibrillation, hypotension and CHF exacerbation. 87 year old male with PMHx of chronic atrial fibrillation s/p SHU/DCCV 1/2022 (on eliquis), severe aortic stenosis (planned for TAVR at [facility]), HFpEF, Streptococcus faecalis bacteremia (4/2022 - suspected endocarditis & treated with 6 weeks of antibiotics, ESRD on HD (T/Th/Sat), COPD, HTN, PAD with prior RLE revascularization, hypothyroidism, anemia p/w exertional chest pain and dyspnea admitted for rapid atrial fibrillation, hypotension and volume overload.

## 2022-07-21 NOTE — H&P ADULT - PROBLEM SELECTOR PLAN 3
Patient with Hx of Hypertension found to be Hypotensive 92/68 on admission  - S/p 0.5L IV NS x1 in ED   - Management per ICU Patient with Hx of Hypertension found to be Hypotensive 92/68 on admission  - S/p 0.5L IV NS x1 in ED  - started on pressors with phenylephrine gtt  - Management per ICU

## 2022-07-21 NOTE — CHART NOTE - NSCHARTNOTEFT_GEN_A_CORE
: Shameka    INDICATION: hypotension, rapid AFib    PROCEDURE:  [x] LIMITED ECHO  [x] LIMITED CHEST  [ ] LIMITED RETROPERITONEAL  [ ] LIMITED ABDOMINAL  [ ] LIMITED DVT  [ ] NEEDLE GUIDANCE VASCULAR  [ ] NEEDLE GUIDANCE THORACENTESIS  [ ] NEEDLE GUIDANCE PARACENTESIS  [ ] NEEDLE GUIDANCE PERICARDIOCENTESIS  [ ] OTHER    FINDINGS:  - grossly preserved LV systolic function  - RV = LV  - scattered b-lines with moderate R pleural effusion  - sclerotic aortic valve    INTERPRETATION:  - severe AS, mild pulm edema

## 2022-07-21 NOTE — H&P ADULT - NSHPREVIEWOFSYSTEMS_GEN_ALL_CORE
General: no fever, chills  HEENT: admits lightheadedness  Cardio: admits non-radiating substernal chest pain, no palpitations  Pulm: admits shortness of breath on exertion  GI: admits nausea, no vomiting, no abdominal pain General: no fever, chills  Eyes: no vision changes  HEENT: admits lightheadedness  Cardio: admits non-radiating substernal chest pain, no palpitations  Pulm: admits shortness of breath on exertion  GI: admits nausea, no vomiting, no abdominal pain  : no dysuria, hematuria, urinary frequency  MSK: no joint pain or myalgias  Neuro: no syncope, dizziness, focal weakness  Psych: no anxiety or depression  Endo: no heat or cold intolerance

## 2022-07-21 NOTE — ED PROVIDER NOTE - OBJECTIVE STATEMENT
87 year old gentleman with history of CHF/HFpEF, COPD, PAD b/l with prior RLE revascularization (PCI) and claudication, hypothyroidism, ESRD on HD (T/TH,Sat)/ aortic stenosis (severe) and persistent atrial fibrillation maintained on Eliquis and cardizem pw exertional cp and sob tonight, pt was recently discharged from rehab and was very minimally active in rehab, now has sob and oliveira with any minimal exertion but tonight was worse, cp and sob worse and persistent. no cough or fevers, has chronic rle edema. pt is pneding tavr with cards at [facility]  pcp ruth 87 year old gentleman with history of CHF/HFpEF, COPD, PAD b/l with prior RLE revascularization (PCI) and claudication, hypothyroidism, ESRD on HD (T/TH,Sat)/ aortic stenosis (severe) and persistent atrial fibrillation maintained on Eliquis and cardizem pw exertional cp and sob tonight, pt was recently discharged from rehab and was very minimally active in rehab, now has sob and oliveira with any minimal exertion but tonight was worse, cp and sob worse and persistent. no cough or fevers, has chronic rle edema. pt is pneding tavr with cards at [facility]. (pt was admitted to [place] ouple months ago for bacteremia)  sridevi miranda

## 2022-07-21 NOTE — H&P ADULT - NSHPOUTPATIENTPROVIDERS_GEN_ALL_CORE
PMD Dr. Anibal Jernigan  Cardiology: Dr. Lopez/[facility] team (Ryan Conteh) PMD Dr. Anibal Jernigan  Cardiology: Dr. Lopez/[facility] team (Ryan Conteh)  Nephro: Dr. Juan Pablo Galindo

## 2022-07-21 NOTE — ED PROVIDER NOTE - CRITICAL CARE ATTENDING CONTRIBUTION TO CARE
Upon my evaluation, this patient had a high probability of imminent or life-threatening deterioration due to _rapid afib ________, which required my direct attention, intervention, and personal management.  The patient has a  medical condition that impairs one or more vital organ systems.  Frequent personal assessment and adjustment of medical interventions was performed.      I have personally provided _60__ minutes of critical care time exclusive of time spent on separately billable procedures. Time includes review of laboratory data, radiology results, discussion with consultants, patient and family; monitoring for potential decompensation, as well as time spent retrieving data and reviewing the chart and documenting the visit. Interventions were performed as documented above.

## 2022-07-21 NOTE — DISCHARGE NOTE PROVIDER - CARE PROVIDER_API CALL
ZHAO HADDAD  Cardiovascular Diseases  [address] [place] [address]  Phone: [phone]  Fax: [phone]  Follow Up Time:

## 2022-07-21 NOTE — H&P ADULT - NSHPPHYSICALEXAM_GEN_ALL_CORE
VITALS:   T(C): 36.6 (07-21-22 @ 03:33), Max: 36.7 (07-21-22 @ 02:29)  HR: 121 (07-21-22 @ 03:33) (72 - 143)  BP: 87/54 (07-21-22 @ 03:33) (87/54 - 99/54)  RR: 20 (07-21-22 @ 03:33) (18 - 20)  SpO2: 94% (07-21-22 @ 03:33) (94% - 99%)    GENERAL: NAD, lying in bed comfortably  HEAD:  Atraumatic, Normocephalic  EYES: EOMI, conjunctiva and sclera clear  ENT: Moist mucous membranes  CHEST/LUNG: Clear to auscultation bilaterally, on NRB, Unlabored respirations  HEART: Irregular rate and rhythm; No murmurs, rubs, or gallops  ABDOMEN: Soft, nontender, nondistended  EXTREMITIES: chronic skin changes b/l, b/l LE pitting edema of ankles and feet   NERVOUS SYSTEM:  AAOx3, appropriately responsive to questions and commands VITALS:   T(C): 36.6 (07-21-22 @ 03:33), Max: 36.7 (07-21-22 @ 02:29)  HR: 121 (07-21-22 @ 03:33) (72 - 143)  BP: 87/54 (07-21-22 @ 03:33) (87/54 - 99/54)  RR: 20 (07-21-22 @ 03:33) (18 - 20)  SpO2: 94% (07-21-22 @ 03:33) (94% - 99%)    GENERAL: NAD, lying in bed comfortably  HEAD:  Atraumatic, Normocephalic  EYES: EOMI, conjunctiva and sclera clear  ENT: Moist mucous membranes  CHEST/LUNG: Clear to auscultation bilaterally, on NRB, Unlabored respirations  HEART: S1,S2; Irregular rate and rhythm  ABDOMEN: Soft, nontender, nondistended  EXTREMITIES: chronic skin changes b/l, b/l LE pitting edema of ankles and feet   NERVOUS SYSTEM:  AAOx3, appropriately responsive to questions and commands VITALS:   T(C): 36.6 (07-21-22 @ 03:33), Max: 36.7 (07-21-22 @ 02:29)  HR: 121 (07-21-22 @ 03:33) (72 - 143)  BP: 87/54 (07-21-22 @ 03:33) (87/54 - 99/54)  RR: 20 (07-21-22 @ 03:33) (18 - 20)  SpO2: 94% (07-21-22 @ 03:33) (94% - 99%)    GENERAL: NAD, lying in bed comfortably  HEAD:  Atraumatic, Normocephalic  EYES: EOMI, conjunctiva and sclera clear  ENT: Moist mucous membranes  CHEST/LUNG: Clear to auscultation bilaterally, on NRB, Unlabored respirations  HEART: S1,S2; Irregular rate and rhythm; systolic murmur  ABDOMEN: Soft, nontender, nondistended  EXTREMITIES: chronic skin changes b/l, b/l LE pitting edema of ankles and feet   NERVOUS SYSTEM:  AAOx3, appropriately responsive to questions and commands  Psych: normal affect

## 2022-07-21 NOTE — H&P ADULT - PROBLEM SELECTOR PLAN 5
Patient on home Lovastatin Patient on dialysis (T/Th/Sat)  - c/w with HD TIW as tolerated by BP  - Nephro (Dr. Rivera) consulted, f/u recs

## 2022-07-21 NOTE — PROVIDER CONTACT NOTE (EICU) - BACKGROUND
86 y/o male with extensive PMH inc CHF with pEF 55-60%, COPD, ESRD on HD TThSat, severe AS, a.fib on eliquis, PVD sent from rehab for shortness of breath. Pt was found to be in rapid a. fib. In the ER received cardizem 10 mg after which the pt became hypotensive to 70/40. He was then ordered a 500 ml bolus. He received 100 ml of it with concerns for volume overload with a right pleural effusion.

## 2022-07-21 NOTE — CONSULT NOTE ADULT - ASSESSMENT
Pt is an 88 y/o M pmhx of HFpEF (EF 55-60%) COPD, PAD w/ prior RLE revascularization and claudication, ESRD on HD (T/Th/Sat), AS (severe) and persistent Afib on Eliquis presents from rehab for SOB and AGUDELO. CXR concerning for R sided pleural effusion, found to be in rapid Afib in ED given 10mg IVP w/ resolution, subsequently became hypotensive 70/40 and given 500L bolus of NS fluid.     1. Rapid Afib   2. Hypotension   3. Sepsis   4. TERRANCE      Plan:   - Admit to ICU for further eval and treatment     Neuro: A/O X3 at baseline, avoid sedating medications     CV: Rapid A-fib given 10 of IV Cardizem in ED, continue to monitor and will push IV Cardizem as needed, plan to restart home dose in AM. Hypotensive in ED following IVP Cardizem, receiving enedina hydration as per ED, continue to monitor will use phenylephrine as needed to maintain MAP >65.     Pulm: Satting well on NC, continue to monitor and supplement as needed to maintain SpO2>92%.     GI: Diet: DASH DIET, Protonix for GI PPX     Renal: TERRANCE secondary to low CO, support HD as tolerated, and monitor I/O, Avoid nephrotoxic meds.     Endo: Glucose<180, K>4, Mag>2    Heme: Eliquis for DVT PPX and A-fib     ID: Cultures sent given possibility of sepsis, continue to trend markers of infection.     Case discussed w/ EICU attending 
87 year old male with PMHx of chronic atrial fibrillation s/p SHU/DCCV 1/2022 (on eliquis), severe aortic stenosis (planned for TAVR at [facility]), HFpEF, Streptococcus faecalis bacteremia (4/2022 - suspected endocarditis & treated with 6 weeks of antibiotics.  GI work up for strept fecaelis with virtual colonoscopy was negative.  ESRD on HD (T/Th/Sat), COPD, HTN, PAD with prior RLE revascularization, hypothyroidism, anemia presented to the ED for the evaluation of chest pain and dsypnea.    Patient has chronic sob on exertion.  As per pt and dtr, he was improving and stable since discharge from rehab, able to ambulate at home.  For past 2-3 days he has had more SOB and substernal chest discomfort with activity.   Trop bump 50 --> 545.  ProBNP 26k.     -AF with rapid VR  Resting HR usually in the 80s, now @120-130.  S/p 1 dose cardizem 10 mg IV with severe hypotensive response to 70s systolic.  On phenylephrine with SBP~100.  Can add IV amiodarone for rate control.    -Critical AS  Severe AS with peak velocity of 4.1 m/s and peak gradient in the mid 60s.   Pt already seen at TAVR clinic, was awaiting GI clearance due to recent strept fecaelis bacteremia.  Virtual colonoscopy with no significant findings.  Pending follow up with TAVR clinic.  Pt hemodynamically unstable at this point with decompensated CHF likely from critical AS.  Recommend transfer to [facility] for urgent TAVR.  Spoke with pt's dtr and she is in agreement.  Dr Robertson to accept pt at [facility].

## 2022-07-21 NOTE — PROGRESS NOTE ADULT - ATTENDING COMMENTS
87M h/o HFpEF (EF 55-60%) COPD, PAD w/ prior RLE revascularization, ESRD on HD (T/T/S), severe AS undergoing TAVR workup, persistent AFib on Eliquis, hypothyroidism sent from rehab for SOB and AGUDELO in setting of R pleural effusion and AFib with RVR s/p Cardizem 10mg IVP with improved HR but persistent hypotension now on amio and phenylephrine. Assessed by cards in AM and plan for transfer for emergent TAVR.     Neuro: no acute issues  CV: AFib with RVR and shock state - received Cardizem 10mg IVP x1 in ED, now hypotensive precluding addditional cintia blockers - will start amio gtt for better rate control; pro-BNP 26,137, trop 50--> 543 with no EKG changes - likely demand, trend to peak  - shock state likely in setting of poor forward flow from rapid AFib, on low dose phenylephrine  - f/u official TTE (SHU from 1/2022  with mod moderate MVR and TVR, mod pHTN, severe aortic stenosis  - due to instability, plan for transfer for urgent TAVR, to be transferred to [place] under outpt provider Dr. Ailyn Ortega: continue NC to maintain SpO2 > 92%, continue mometasone for COPD  GI: continue renal restricted diet; check FOBT given downtrending H/H (Hb 9.8 in January 2022)  Renal: ESRD on HD Tue/Thu/Sat, for next session today, continue home sevelamer   ID: f/u blood and urine cultures, monitor off abx at this time  Endo: elevated TSH, will check T3/T4, continue home dose synthroid for now  Heme: Hb 6.8 on AM labs, per pt has required transfusions in the past, only prior Hb in [place] 9.8 in January  - to be transfused 1unit pRBC today, check FOBT as above; goal Hb > 8  - hold home eliquis for elevatd INR, repeat coags in AM    To be transferred to [facility]. 87M h/o HFpEF (EF 55-60%) COPD, PAD w/ prior RLE revascularization, ESRD on HD (T/T/S), severe AS undergoing TAVR workup, persistent AFib on Eliquis, hypothyroidism sent from rehab for SOB and AGUDELO in setting of R pleural effusion and AFib with RVR s/p Cardizem 10mg IVP with improved HR but persistent hypotension now on amio and phenylephrine. Assessed by cards in AM and plan for transfer for emergent TAVR.     Neuro: no acute issues  CV: AFib with RVR and shock state - received Cardizem 10mg IVP x1 in ED, now hypotensive precluding addditional cintia blockers - will start amio gtt for better rate control; pro-BNP 26,137, trop 50--> 543 with no EKG changes - likely demand, trend to peak  - shock state likely in setting of poor forward flow from rapid AFib, on low dose phenylephrine  - f/u official TTE (SHU from 1/2022  with mod moderate MVR and TVR, mod pHTN, severe aortic stenosis  - due to instability, plan for transfer for urgent TAVR, to be transferred to [place] under outpt provider Dr. Ailyn Ortega: continue NC to maintain SpO2 > 92%, continue mometasone for COPD  GI: continue renal restricted diet; check FOBT given downtrending H/H (Hb 9.8 in January 2022)  Renal: ESRD on HD Tue/Thu/Sat, for next session today, continue home sevelamer   ID: f/u blood and urine cultures, monitor off abx at this time  Endo: elevated TSH, will check T3/T4, continue home dose synthroid for now  Heme: Hb 6.8 on AM labs, per pt has required transfusions in the past, only prior Hb in [place] 9.8 in January  - to be transfused 1unit pRBC today, check FOBT as above; goal Hb > 8  - hold home eliquis in preparation for planned procedure, can start heparin gtt if no bleed  To be transferred to [facility].

## 2022-07-21 NOTE — CHART NOTE - NSCHARTNOTESELECT_GEN_ALL_CORE
Nurse's Notes                                                                                     

 [facility]                                                                 

Name: Benitez Booker                                                                               

Age: 5 yrs                                                                                        

Sex: Male                                                                                         

: 2016                                                                                   

MRN: [id number]                                                                                   

Arrival Date: 2022                                                                          

Time: 22:39                                                                                       

Account#: [id number]                                                                            

Bed 2                                                                                             

Private MD:                                                                                       

Diagnosis: Laceration without foreign body of knee                                                

                                                                                                  

Presentation:                                                                                     

                                                                                             

22:40 Chief complaint: Parent and/or Guardian states: laceration to right knee. Coronavirus   kimberly  

      screen: Vaccine status: Patient reports being unvaccinated. Ebola Screen: Patient           

      negative for fever greater than or equal to 101.5 degrees Fahrenheit, and additional        

      compatible Ebola Virus Disease symptoms Patient denies exposure to infectious person.       

      Patient denies travel to an Ebola-affected area in the 21 days before illness onset.        

      Onset of symptoms was 2022.                                                       

22:40 Method Of Arrival: Carried                                                              kimberly  

22:40 Acuity: STEPH 3                                                                           kimberly  

22:45 Note The pt was taken back immediately to the treatment room, with his mother and       kimberly  

      grandmother.                                                                                

                                                                                                  

Triage Assessment:                                                                                

22:42 General: Appears distressed, Pt's mother and grandmother came running in carrying the   kimberly  

      pt with a pressure dressing to his right knee. . General: Behavior is anxious. Pain:        

      Complains of pain in right leg. Musculoskeletal: Approximate 8 cm lac, irregular to         

      right knee, not well approximated. Injury Description: Laceration sustained to right        

      leg is contaminated, full thickness, not bleeding.                                          

                                                                                                  

Historical:                                                                                       

- Allergies:                                                                                      

22:42 No Known Allergies;                                                                     kimberly  

                                                                                                  

- Immunization history:: Childhood immunizations are up to date.                                  

                                                                                                  

                                                                                                  

Screenin:41 Abuse screen: Denies threats or abuse. Denies injuries from another. Nutritional        kd3 

      screening: No deficits noted. Tuberculosis screening: No symptoms or risk factors           

      identified.                                                                                 

                                                                                             

01:03 Pedi Fall Risk Total Score: 0-1 Points : Low Risk for Falls.                            lp1 

                                                                                                  

      Fall Risk Scale Score:                                                                      

01:03 Mobility: Ambulatory with no gait disturbance (0); Mentation: Developmentally           lp1 

      appropriate and alert (0); Elimination: Independent (0); Hx of Falls: No (0); Current       

      Meds: No (0); Total Score: 0                                                                

Assessment:                                                                                       

                                                                                             

22:53 General: Appears uncomfortable, Behavior is anxious, crying. Pain: Complains of pain in lp1 

      right knee. Neuro: Level of Consciousness is awake, alert, obeys commands, Oriented to      

      person, place, time, situation. Cardiovascular: Patient's skin is warm and dry.             

      Respiratory: Respiratory effort is even, Respiratory pattern is regular. Derm: Skin is      

      pink, warm \T\ dry. Wound noted right knee Wound is Jagged laceration above knee.           

      Musculoskeletal: Circulation, motion, and sensation intact.                                 

                                                                                             

00:00 Reassessment: Patient resting, eyes closed, respirations even, mother at bedside.       lp1 

00:44 Reassessment: PROVIDER AT BEDSIDE.                                                      kd3 

                                                                                                  

Vital Signs:                                                                                      

                                                                                             

22:40  / 92; Pulse 148; Resp 24; Temp 98.2; Pulse Ox 100% on R/A; Pain 10/10;           kimberly  

22:44  / 92; Pulse 148; Resp 24; Pulse Ox 100% on R/A; Pain 10/10;                      kibmerly  

23:19 Weight 19.65 kg;                                                                        kd3 

                                                                                             

01:02  / 62; Pulse 126; Resp 24; Pulse Ox 98% on R/A;                                   lp1 

                                                                                                  

ED Course:                                                                                        

                                                                                             

22:39 Patient arrived in ED.                                                                  bb  

22:41 Triage completed.                                                                       kimberly  

22:45 Arm band placed on.                                                                     kimberly  

22:49 Josh Gray MD is Attending Physician.                                             mh7 

22:53 Feli Lundy, RN is Primary Nurse.                                                       lp1 

23:37 Knee Right 3 View XRAY In Process Unspecified.                                          EDMS

                                                                                             

00:44 Assist provider with laceration repair on right knee.                                   kd3 

00:50 Patient has correct armband on for positive identification. Adult w/ patient. Child     lp1 

      being held by parent.                                                                       

00:50 Patient did not have IV access during this emergency room visit.                        lp1 

01:02 Wound care: was dressed with Neosporin, 4X4s, Kerlix, Triple antibiotic, non-adherent   lp1 

      pad, 4x4 gauze, ACE bandage to right knee.                                                  

                                                                                                  

Administered Medications:                                                                         

                                                                                             

23:41 Drug: Ibuprofen Suspension 10 mg/kg Route: PO;                                          kd3 

                                                                                             

01:03 Follow up: Response: No adverse reaction                                                lp1 

00:50 Drug: Lidocaine (1 %) 5 ml Volume: 5 ml; Route: Infiltration;                           lp1 

                                                                                                  

                                                                                                  

Outcome:                                                                                          

00:52 Discharge ordered by MD.                                                                kb  

01:10 Discharged to home with family.                                                         lp1 

01:10 Condition: good                                                                             

01:10 Discharge instructions given to caretaker, Instructed on discharge instructions, follow     

      up and referral plans. wound care, Demonstrated understanding of instructions,              

      follow-up care, wound care.                                                                 

01:14 Patient left the ED.                                                                    lp1 

                                                                                                  

Signatures:                                                                                       

Dispatcher [employer]                           EDMS                                                 

Flaquita Parod, FNP-C                 FNP-Emma Jaramillo, RN                     RN   bb                                                   

Feli Lundy RN                         RN   lp1                                                  

Josh Gray MD MD   7                                                  

Magy Cole RN RN   kd3                                                  

Emma Oates RN RN   kimberly                                                   

                                                                                                  

**************************************************************************************************
Point of Care Ultrasound

## 2022-07-21 NOTE — DIETITIAN INITIAL EVALUATION ADULT - PERTINENT MEDS FT
MEDICATIONS  (STANDING):  chlorhexidine 2% Cloths 1 Application(s) Topical <User Schedule>  heparin   Injectable 5000 Unit(s) SubCutaneous every 12 hours  pantoprazole  Injectable 40 milliGRAM(s) IV Push two times a day  phenylephrine    Infusion 0.05 MICROgram(s)/kG/Min (0.71 mL/Hr) IV Continuous <Continuous>    MEDICATIONS  (PRN):  metoprolol tartrate Injectable 5 milliGRAM(s) IV Push every 6 hours PRN HR>135

## 2022-07-21 NOTE — PROGRESS NOTE ADULT - PROBLEM SELECTOR PLAN 1
Patient presents with rapid Afib RVR up to 140s likely 2/2 CHF exacerbation  - Given IV Cardizem in ED  - On phenylephrine gtt to maintain BP  - Continue rate control as tolerated  - Patient on home Eliquis - continue if no bleeding  - Management per ICU  - Recommend cardiology consult- patient to be transferred to [place] for possible TAVR

## 2022-07-21 NOTE — PROGRESS NOTE ADULT - ASSESSMENT
Assessment and Plan:   · Assessment	  Pt is an 86 y/o M pmhx of HFpEF (EF 55-60%) COPD, PAD w/ prior RLE revascularization and claudication, ESRD on HD (T/Th/Sat), AS (severe) and persistent Afib on Eliquis presents from rehab for SOB and AGUDELO. CXR concerning for R sided pleural effusion, found to be in rapid Afib in ED given 10mg IVP w/ resolution, subsequently became hypotensive 70/40 and given 500L bolus of NS fluid. Admitted to ICU w/rapid A. Fib, Hypotension, sepsis and TERRANCE.     1. Rapid Afib   2. Hypotension   3. Sepsis   4. TERRANCE      Neuro:   - A/O X3  - Avoid sedating medications     CV:   - Rapid AFib w/RVR likely 2/2 CHF exacerbation; Pro-BNP 52335; limit fluids  - Troponin 50.3 trend to peak  - Continue metoprolol tartrate 5mg IVP q6h PRN for HR >135  - Continue phenylephrine as needed to maintain MAP >65  - Restart home Cardizem 120mg qd   - [facility] 01/2022: EF 55-60%. Moderate mitral valve regurgitation. Moderate tricuspid regurgitation. Moderate pulmonary hypertension. Severe aortic stenosis.       Pulm:   - Satting well on NC @ _______L/min, continue to monitor and supplement as needed to maintain SpO2>92%.     GI:   - Diet: DASH DIET  - Dietician consult for unintentional weight loss prior to admission  - Protonix for GI PPX     Renal:   - TERRANCE secondary to low CO, support HD as tolerated, and monitor I/O, Avoid nephrotoxic meds.     Endo:   - Glucose<180, K>4, Mag>2    Heme:  - Eliquis for DVT PPX and A-fib     ID:   - Lactate resolving 4.5 --> 2.2   - F/U blood culture and UCx given possibility of sepsis    Case discussed w/ EICU attending      Assessment and Plan:   · Assessment	  Pt is an 86 y/o M pmhx of HFpEF (EF 55-60%) COPD, PAD w/ prior RLE revascularization and claudication, ESRD on HD (T/Th/Sat), AS (severe) and persistent Afib on Eliquis presents from rehab for SOB and AGUDELO. CXR concerning for R sided pleural effusion, found to be in rapid Afib in ED given 10mg IVP w/ resolution, subsequently became hypotensive 70/40 and given 500L bolus of NS fluid. Admitted to ICU w/rapid A. Fib, Hypotension, sepsis and TERRANCE.     Neuro:   - A/O X3; mentating well  - Avoid sedating medications given age    CV:   - Rapid AFib w/RVR likely 2/2 decompensated CHF from severe AS; Pro-BNP 81812; limit fluids  - Troponin 50.3 --> 545.3; potentially elevated 2/2 ESRD  - S/p digoxin 125mcg X1 dose for Afib RVR  - Continue metoprolol tartrate 5mg IVP q6h PRN for HR >135  - Continue phenylephrine as needed to maintain MAP >65  - Restart home Cardizem 120mg qd   - F/U TTE official results  - [facility] 01/2022: EF 55-60%. Moderate mitral valve regurgitation. Moderate tricuspid regurgitation. Moderate pulmonary hypertension. Severe aortic stenosis  - Transfer to [facility] for urgent TAVR per cardio Dr. Cook and accepting cardio Dr. Ailyn Ortega:   - Satting well on NC, continue to monitor and supplement as needed to maintain SpO2>92%.   - Start Mometasone 220mcg inhaler for COPD    GI:   - Diet: renal restricted  - Dietician consult for unintentional weight loss prior to admission  - F/u FOBT for concern for GIB  - Protonix for GI PPX   - Start home ergocalciferol     Renal:   - TERRANCE secondary to low CO  - due for dialysis today (TTS)  - Start home sevelamer 1600mg  3X/day for hyperphosphatemia  - monitor I/Os  - Avoid nephrotoxic meds    Endo:   - TSH 37.40; F/U T3 and T4 results  - Start home levothyroxine 112 mcg qd for hypothyroidism  - Glucose<180, K>4, Mag>2    Heme:  - Megaloblastic anemia Hgb 7.3 --> 6.8; transfuse 1UpRBCs for Hgb <8 in the setting of severe AS. F/u CBC results after transfusion.  - Active type and screen. Transfusion consent obtained.  - hold home Eliquis for DVT PPX and A-fib 2/2 anemia until r/o GIB    ID:   - Lactate resolving 4.5 --> 2.2 --> 2.3  - Afebrile; WBC WNL  - Monitor off Abx  - F/U blood culture and UCx    Pt is an 88 y/o M pmhx of HFpEF (EF 55-60%) COPD, PAD w/ prior RLE revascularization and claudication, ESRD on HD (T/Th/Sat), AS (severe) and persistent Afib on Eliquis presents from rehab for SOB and AGUDELO. CXR concerning for R sided pleural effusion, found to be in rapid Afib in ED given 10mg IVP w/ resolution, subsequently became hypotensive 70/40 and given 500L bolus of NS fluid. Admitted to ICU w/rapid A. Fib, Hypotension, sepsis and TERRANCE.     Neuro:   - A/O X3; mentating well  - Avoid sedating medications given age    CV:   - Rapid AFib w/RVR likely 2/2 decompensated CHF from severe AS; Pro-BNP 40948; limit fluids  - Troponin 50.3 --> 545.3; potentially elevated 2/2 ESRD  - S/p digoxin 125mcg X1 dose for Afib RVR  - Continue metoprolol tartrate 5mg IVP q6h PRN for HR >135  - Continue phenylephrine as needed to maintain MAP >65  - Restart home Cardizem 120mg qd   - F/U TTE official results  - [facility] 01/2022: EF 55-60%. Moderate mitral valve regurgitation. Moderate tricuspid regurgitation. Moderate pulmonary hypertension. Severe aortic stenosis  - Transfer to [facility] for urgent TAVR per cardio Dr. Cook and accepting cardio Dr. Ailyn Hardym:   - Satting well on NC, continue to monitor and supplement as needed to maintain SpO2>92%.   - Start Mometasone 220mcg inhaler for COPD    GI:   - Diet: renal restricted  - Dietician consult for unintentional weight loss prior to admission  - F/u FOBT for concern for GIB  - Protonix for GI PPX   - Start home ergocalciferol     Renal:   - TERRANCE secondary to low CO  - due for dialysis today (TTS)  - Start home sevelamer 1600mg  3X/day for hyperphosphatemia  - monitor I/Os  - Avoid nephrotoxic meds    Endo:   - TSH 37.40; F/U T3 and T4 results  - Start home levothyroxine 112 mcg qd for hypothyroidism  - Glucose<180, K>4, Mag>2    Heme:  - Megaloblastic anemia Hgb 7.3 --> 6.8; transfuse 1UpRBCs for Hgb <8 in the setting of severe AS. F/u CBC results after transfusion.  - Active type and screen. Transfusion consent obtained.  - hold home Eliquis for DVT PPX and A-fib 2/2 anemia until r/o GIB    ID:   - Lactate resolving 4.5 --> 2.2 --> 2.3  - Afebrile; WBC WNL  - Monitor off Abx  - F/U blood culture and UCx

## 2022-07-21 NOTE — DIETITIAN INITIAL EVALUATION ADULT - PERTINENT LABORATORY DATA
07-21    130<L>  |  91<L>  |  66<H>  ----------------------------<  95  5.1   |  26  |  5.40<H>    Ca    8.8      21 Jul 2022 06:40  Phos  6.6     07-21  Mg     2.7     07-21    TPro  6.3  /  Alb  x   /  TBili  0.6  /  DBili  x   /  AST  41<H>  /  ALT  36  /  AlkPhos  116  07-21

## 2022-07-21 NOTE — H&P ADULT - NSICDXPASTMEDICALHX_GEN_ALL_CORE_FT
PAST MEDICAL HISTORY:  Aortic stenosis ? severe    Atrial fibrillation     CHF (congestive heart failure) HFpEF    History of COPD     HTN (hypertension)     PAD (peripheral artery disease) s/p RLE stenting     PAST MEDICAL HISTORY:  Aortic stenosis pending TAVR    Atrial fibrillation     CHF (congestive heart failure) HFpEF    History of COPD     HTN (hypertension)     PAD (peripheral artery disease) s/p RLE stenting

## 2022-07-21 NOTE — ED ADULT TRIAGE NOTE - CHIEF COMPLAINT QUOTE
Pt BIBA from home c.o non-radiating chest pain and shortness of breath after ambulating to the bathroom. Pt denies pain at this time. Pt states oxygen made the pain resolve.

## 2022-07-21 NOTE — DISCHARGE NOTE PROVIDER - HOSPITAL COURSE
ADMISSION DATE:  07-21-22    ---  FROM ADMISSION H+P:   HPI:  87 year old male with PMHx of chronic atrial fibrillation s/p SHU/DCCV 1/2022 (on eliquis), severe aortic stenosis (planned for TAVR at [facility]), HFpEF, Streptococcus faecalis bacteremia (4/2022 - suspected endocarditis & treated with 6 weeks of antibiotics, ESRD on HD (T/Th/Sat), COPD, HTN, PAD with prior RLE revascularization, hypothyroidism, anemia presented to the ED for the evaluation of chest pain and dsypnea. Patient has chronic sob on exertion. This evening, patient c/o sob and substernal chest pain upon getting into bed. He states the chest pain and sob persisted for ~1 hour when he called his daughter (who lives upstairs). The chest pain was localized to the substernal area without radiation to arm, back or jaw. His daughter called EMS and patient was brought to the ED. Patient admits to lightheadedness and nausea, without vomiting, which has resolved since presentation to the ED. He denies fever/chills, headache, palpitations, abdominal pain.    ED course:  Vitals: T 97.9F, HR 72, BP 92/68, RR 18, SpO2 98% on NRB  Labs: RBC 1.93, Hgb 7.3, Hct 22.4, PT 17.4, INR 1.48, Na 130, Cl 89, BUN 58, Cr 5.3  CXR: c/w right sided pleural effusion  EKG: atrial fibrillation with RVR, RBBB  Given: IV Cardizem 10mg x1, 0.5L IV NS x1 (21 Jul 2022 02:36)      ---  HOSPITAL COURSE/PERTINENT LABS/PROCEDURES PERFORMED/PENDING TESTS:  Patient was admitted to MICU with rapid afibhypotension, sepsis and TERRANCE. The patient   ---  PATIENT CONDITION:  - stable    ---  PHYSICAL EXAM ON DAY OF DISCHARGE:    ---  CONSULTANTS:     ---  ADVANCED CARE PLANNING:  - Code status:      - MOLST completed:      [  ] NO     [  ] YES    ---  TIME SPENT:  I, the attending physician, was physically present for the key portions of the evaluation and management (E/M) service provided. The total amount of time spent reviewing the hospital notes, laboratory values, imaging findings, assessing/counseling the patient, discussing with consultant physicians, social work, nursing staff was -- minutes ADMISSION DATE:  07-21-22    ---  FROM ADMISSION H+P:   HPI:  87 year old male with PMHx of chronic atrial fibrillation s/p SHU/DCCV 1/2022 (on eliquis), severe aortic stenosis (planned for TAVR at [facility]), HFpEF, Streptococcus faecalis bacteremia (4/2022 - suspected endocarditis & treated with 6 weeks of antibiotics, ESRD on HD (T/Th/Sat), COPD, HTN, PAD with prior RLE revascularization, hypothyroidism, anemia presented to the ED for the evaluation of chest pain and dsypnea. Patient has chronic sob on exertion. This evening, patient c/o sob and substernal chest pain upon getting into bed. He states the chest pain and sob persisted for ~1 hour when he called his daughter (who lives upstairs). The chest pain was localized to the substernal area without radiation to arm, back or jaw. His daughter called EMS and patient was brought to the ED. Patient admits to lightheadedness and nausea, without vomiting, which has resolved since presentation to the ED. He denies fever/chills, headache, palpitations, abdominal pain.    ED course:  Vitals: T 97.9F, HR 72, BP 92/68, RR 18, SpO2 98% on NRB  Labs: RBC 1.93, Hgb 7.3, Hct 22.4, PT 17.4, INR 1.48, Na 130, Cl 89, BUN 58, Cr 5.3  CXR: c/w right sided pleural effusion  EKG: atrial fibrillation with RVR, RBBB  Given: IV Cardizem 10mg x1, 0.5L IV NS x1 (21 Jul 2022 02:36)      ---  HOSPITAL COURSE/PERTINENT LABS/PROCEDURES PERFORMED/PENDING TESTS:  Patient was admitted to MICU with rapid afibhypotension, sepsis and TERRANCE. The patient   ---  PATIENT CONDITION:  - stable    ---  PHYSICAL EXAM ON DAY OF DISCHARGE:    ---  CONSULTANTS:   Cardiology - Dr Cook  Nephrology - Dr Chambers    ---  ADVANCED CARE PLANNING:  - Code status:    FULL CODE  - MOLST completed:      [  X ] NO     [  ] YES    ---  TIME SPENT:  I, the attending physician, was physically present for the key portions of the evaluation and management (E/M) service provided. The total amount of time spent reviewing the hospital notes, laboratory values, imaging findings, assessing/counseling the patient, discussing with consultant physicians, social work, nursing staff was -- minutes ADMISSION DATE:  07-21-22    ---  FROM ADMISSION H+P:   HPI:  87 year old male with PMHx of chronic atrial fibrillation s/p SHU/DCCV 1/2022 (on eliquis), severe aortic stenosis (planned for TAVR at [facility]), HFpEF, Streptococcus faecalis bacteremia (4/2022 - suspected endocarditis & treated with 6 weeks of antibiotics, ESRD on HD (T/Th/Sat), COPD, HTN, PAD with prior RLE revascularization, hypothyroidism, anemia presented to the ED for the evaluation of chest pain and dsypnea. Patient has chronic sob on exertion. This evening, patient c/o sob and substernal chest pain upon getting into bed. He states the chest pain and sob persisted for ~1 hour when he called his daughter (who lives upstairs). The chest pain was localized to the substernal area without radiation to arm, back or jaw. His daughter called EMS and patient was brought to the ED. Patient admits to lightheadedness and nausea, without vomiting, which has resolved since presentation to the ED. He denies fever/chills, headache, palpitations, abdominal pain.    ED course:  Vitals: T 97.9F, HR 72, BP 92/68, RR 18, SpO2 98% on NRB  Labs: RBC 1.93, Hgb 7.3, Hct 22.4, PT 17.4, INR 1.48, Na 130, Cl 89, BUN 58, Cr 5.3  CXR: c/w right sided pleural effusion  EKG: atrial fibrillation with RVR, RBBB  Given: IV Cardizem 10mg x1, 0.5L IV NS x1 (21 Jul 2022 02:36)      ---  HOSPITAL COURSE/PERTINENT LABS/PROCEDURES PERFORMED/PENDING TESTS:  Patient was admitted to MICU with rapid afib/hypotension 2/2 decompensated CHF from severe AS and TERRANCE. The patient received   Cardizem, amiodarone and digoxin to obtain rate control. Required phenylephrine for hypotension. Cardio consulted and recommended transfer to [facility] for urgent TAVR.  Pt was also found to have megaloblastic anemia requiring transfusion of 1U pRBC. Nephro saw pt, recommended HD today if not transferred to [facility]. Further mgmt of dialysis per transfer hospital.   ---  PATIENT CONDITION:  - stable    ---  PHYSICAL EXAM ON DAY OF DISCHARGE:    ---  CONSULTANTS:   Cardiology - Dr Cook  Nephrology - Dr Chambers    ---  ADVANCED CARE PLANNING:  - Code status:    FULL CODE  - MOLST completed:      [  X ] NO     [  ] YES    ---  TIME SPENT:  I, the attending physician, was physically present for the key portions of the evaluation and management (E/M) service provided. The total amount of time spent reviewing the hospital notes, laboratory values, imaging findings, assessing/counseling the patient, discussing with consultant physicians, social work, nursing staff was -- minutes ADMISSION DATE:  07-21-22    ---  FROM ADMISSION H+P:   HPI:  87 year old male with PMHx of chronic atrial fibrillation s/p SHU/DCCV 1/2022 (on eliquis), severe aortic stenosis (planned for TAVR at [facility]), HFpEF, Streptococcus faecalis bacteremia (4/2022 - suspected endocarditis & treated with 6 weeks of antibiotics, ESRD on HD (T/Th/Sat), COPD, HTN, PAD with prior RLE revascularization, hypothyroidism, anemia presented to the ED for the evaluation of chest pain and dsypnea. Patient has chronic sob on exertion. This evening, patient c/o sob and substernal chest pain upon getting into bed. He states the chest pain and sob persisted for ~1 hour when he called his daughter (who lives upstairs). The chest pain was localized to the substernal area without radiation to arm, back or jaw. His daughter called EMS and patient was brought to the ED. Patient admits to lightheadedness and nausea, without vomiting, which has resolved since presentation to the ED. He denies fever/chills, headache, palpitations, abdominal pain.    ED course:  Vitals: T 97.9F, HR 72, BP 92/68, RR 18, SpO2 98% on NRB  Labs: RBC 1.93, Hgb 7.3, Hct 22.4, PT 17.4, INR 1.48, Na 130, Cl 89, BUN 58, Cr 5.3  CXR: c/w right sided pleural effusion  EKG: atrial fibrillation with RVR, RBBB  Given: IV Cardizem 10mg x1, 0.5L IV NS x1 (21 Jul 2022 02:36)      ---  HOSPITAL COURSE/PERTINENT LABS/PROCEDURES PERFORMED/PENDING TESTS:  Patient was admitted to MICU with rapid afib/hypotension 2/2 decompensated CHF from severe AS and TERRANCE. The patient received   Cardizem, amiodarone and digoxin to obtain rate control. Required phenylephrine for hypotension. Cardio consulted and recommended transfer to [facility] for urgent TAVR.  Pt was also found to have megaloblastic anemia requiring transfusion of 1U pRBC. Nephro saw pt, recommended HD today if not transferred to [facility]. Further mgmt of dialysis per transfer hospital.   ---  PATIENT CONDITION:  - stable    ---  PHYSICAL EXAM ON DAY OF DISCHARGE:  General: NAD, comfortable,   HEENT: NCAT, PERRL, clear conjunctiva, no scleral icterus  Neck: supple, no JVD  Respiratory: CTA b/l, no wheezing, rhonchi, rales  Cardiovascular: irregularly irregular; normal S1S2, no M/R/G  Abdomen: soft, NT/ND, bowel sounds in all four quadrants, no palpable masses  Extremities: + BL LE pitting edema, WWP, no clubbing, or cyanosis.  Neurology: A&Ox3, alert, oriented, interactive      ---  CONSULTANTS:   Cardiology - Dr Cook  Nephrology - Dr Chambers    ---  ADVANCED CARE PLANNING:  - Code status:    FULL CODE  - MOLST completed:      [  X ] NO     [  ] YES    ---  TIME SPENT:  I, the attending physician, was physically present for the key portions of the evaluation and management (E/M) service provided. The total amount of time spent reviewing the hospital notes, laboratory values, imaging findings, assessing/counseling the patient, discussing with consultant physicians, social work, nursing staff was -- minutes ADMISSION DATE:  07-21-22    ---  FROM ADMISSION H+P:   HPI:  87 year old male with PMHx of chronic atrial fibrillation s/p SHU/DCCV 1/2022 (on eliquis), severe aortic stenosis (planned for TAVR at [facility]), HFpEF, Streptococcus faecalis bacteremia (4/2022 - suspected endocarditis & treated with 6 weeks of antibiotics, ESRD on HD (T/Th/Sat), COPD, HTN, PAD with prior RLE revascularization, hypothyroidism, anemia presented to the ED for the evaluation of chest pain and dsypnea. Patient has chronic sob on exertion. This evening, patient c/o sob and substernal chest pain upon getting into bed. He states the chest pain and sob persisted for ~1 hour when he called his daughter (who lives upstairs). The chest pain was localized to the substernal area without radiation to arm, back or jaw. His daughter called EMS and patient was brought to the ED. Patient admits to lightheadedness and nausea, without vomiting, which has resolved since presentation to the ED. He denies fever/chills, headache, palpitations, abdominal pain.    ED course:  Vitals: T 97.9F, HR 72, BP 92/68, RR 18, SpO2 98% on NRB  Labs: RBC 1.93, Hgb 7.3, Hct 22.4, PT 17.4, INR 1.48, Na 130, Cl 89, BUN 58, Cr 5.3  CXR: c/w right sided pleural effusion  EKG: atrial fibrillation with RVR, RBBB  Given: IV Cardizem 10mg x1, 0.5L IV NS x1 (21 Jul 2022 02:36)      ---  HOSPITAL COURSE/PERTINENT LABS/PROCEDURES PERFORMED/PENDING TESTS:  Patient was admitted to MICU with rapid afib/hypotension 2/2 decompensated CHF from severe AS and TERRANCE. The patient received   Cardizem, amiodarone and digoxin to obtain rate control. Required phenylephrine for hypotension. Cardio consulted and recommended transfer to [facility] for urgent TAVR.  Pt was also found to have megaloblastic anemia requiring transfusion of 1U pRBC. Nephro saw pt, recommended HD today if not transferred to [facility]. Further mgmt of dialysis per transfer hospital.   ---  PATIENT CONDITION:  - stable    ---  PHYSICAL EXAM ON DAY OF DISCHARGE:  General: NAD, comfortable,   HEENT: NCAT, PERRL, clear conjunctiva, no scleral icterus  Neck: supple, no JVD  Respiratory: non-labored breathing, saturating well on NC ,CTA b/l, no wheezing, rhonchi, rales  Cardiovascular: irregularly irregular; normal S1S2, no M/R/G  Abdomen: soft, NT/ND, bowel sounds in all four quadrants, no palpable masses  Extremities: + BL LE pitting edema, WWP, no clubbing, or cyanosis.  Neurology: A&Ox3, alert, oriented, interactive      ---  CONSULTANTS:   Cardiology - Dr Cook  Nephrology - Dr Chambers    ---  ADVANCED CARE PLANNING:  - Code status:    FULL CODE  - MOLST completed:      [  X ] NO     [  ] YES    ---  TIME SPENT:  I, the attending physician, was physically present for the key portions of the evaluation and management (E/M) service provided. The total amount of time spent reviewing the hospital notes, laboratory values, imaging findings, assessing/counseling the patient, discussing with consultant physicians, social work, nursing staff was -- minutes

## 2022-07-21 NOTE — PROGRESS NOTE ADULT - PROBLEM SELECTOR PLAN 7
Patient on home Levothyroxine  - Continue synthroid and check TSH- elevated at 37, may need to increase levothyroxine

## 2022-07-21 NOTE — PROGRESS NOTE ADULT - PROBLEM SELECTOR PLAN 4
Hx of chronic anemia, H/H lower than baseline  - likely large component secondary to renal failure  - check iron studies, type and screen  - check FOBT - on IV PPI per ICU  - transfuse if needed

## 2022-07-21 NOTE — PROGRESS NOTE ADULT - PROBLEM SELECTOR PLAN 2
Patient with sob on exertion and b/l LE edema on exam; history of HFpEF and ESRD  - Given 0.5L IV NS x1 in the ED given pt hypotensive  - Use IVF with caution  - Supplemental O2 to maintain O2 sat >90%  - Monitor and replace electrolytes  - Management per ICU

## 2022-07-21 NOTE — DISCHARGE NOTE NURSING/CASE MANAGEMENT/SOCIAL WORK - NSDCPEFALRISK_GEN_ALL_CORE
For information on Fall & Injury Prevention, visit: [email or web address] OR  [email or web address] OR  https://www.cdc.gov/steadi/patient.html

## 2022-07-21 NOTE — ED PROVIDER NOTE - NSICDXPASTMEDICALHX_GEN_ALL_CORE_FT
PAST MEDICAL HISTORY:  Aortic stenosis ? severe    Atrial fibrillation     CHF (congestive heart failure) HFpEF    History of COPD     HTN (hypertension)     PAD (peripheral artery disease) s/p RLE stenting

## 2022-07-21 NOTE — DISCHARGE NOTE PROVIDER - NSDCMRMEDTOKEN_GEN_ALL_CORE_FT
Albuterol (Eqv-ProAir HFA) 90 mcg/inh inhalation aerosol: 2 puff(s) inhaled every 4 hours, As Needed  Breo Ellipta 100 mcg-25 mcg/inh inhalation powder: 1 puff(s) inhaled once a day  Cardizem  mg/24 hours oral capsule, extended release: 1 cap(s) orally once a day  Eliquis 2.5 mg oral tablet: 1 tab(s) orally 2 times a day  ergocalciferol: 1250 microgram(s) orally once a week  levothyroxine 112 mcg (0.112 mg) oral tablet: 1 tab(s) orally once a day  lovastatin 40 mg oral tablet: 1 tab(s) orally once a day  sevelamer carbonate 800 mg oral tablet: 2 tab(s) orally 3 times a day  torsemide 100 mg oral tablet: 1 tab(s) orally once a day   Albuterol (Eqv-ProAir HFA) 90 mcg/inh inhalation aerosol: 2 puff(s) inhaled every 4 hours, As Needed  Breo Ellipta 100 mcg-25 mcg/inh inhalation powder: 1 puff(s) inhaled once a day  Cardizem  mg/24 hours oral capsule, extended release: 1 cap(s) orally once a day  digoxin 125 mcg (0.125 mg) oral tablet: 1 tab(s) orally once  Eliquis 2.5 mg oral tablet: 1 tab(s) orally 2 times a day  ergocalciferol: 1250 microgram(s) orally once a week  heparin: once a day  levothyroxine 112 mcg (0.112 mg) oral tablet: 1 tab(s) orally once a day  lovastatin 40 mg oral tablet: 1 tab(s) orally once a day  mometasone 220 mcg/inh inhalation aerosol powder: inhaled once a day  pantoprazole 40 mg intravenous injection: 40 milligram(s) intravenous 2 times a day  phenylephrine 0.4 mg/10 mL-NaCl 0.9% intravenous solution: 0.71  intravenous once a day  sevelamer carbonate 800 mg oral tablet: 2 tab(s) orally 3 times a day (with meals)  torsemide 100 mg oral tablet: 1 tab(s) orally once a day   Albuterol (Eqv-ProAir HFA) 90 mcg/inh inhalation aerosol: 2 puff(s) inhaled every 4 hours, As Needed  amiodarone: 1 milligram(s) by continuous intravenous infusion once a day  Breo Ellipta 100 mcg-25 mcg/inh inhalation powder: 1 puff(s) inhaled once a day  Cardizem  mg/24 hours oral capsule, extended release: 1 cap(s) orally once a day  digoxin 125 mcg (0.125 mg) oral tablet: 1 tab(s) orally once  Eliquis 2.5 mg oral tablet: 1 tab(s) orally 2 times a day  ergocalciferol: 1250 microgram(s) orally once a week  heparin: once a day  levothyroxine 112 mcg (0.112 mg) oral tablet: 1 tab(s) orally once a day  lovastatin 40 mg oral tablet: 1 tab(s) orally once a day  mometasone 220 mcg/inh inhalation aerosol powder: inhaled once a day  pantoprazole 40 mg intravenous injection: 40 milligram(s) intravenous 2 times a day  phenylephrine 0.4 mg/10 mL-NaCl 0.9% intravenous solution: 0.71  intravenous once a day  sevelamer carbonate 800 mg oral tablet: 2 tab(s) orally 3 times a day (with meals)  torsemide 100 mg oral tablet: 1 tab(s) orally once a day   Albuterol (Eqv-ProAir HFA) 90 mcg/inh inhalation aerosol: 2 puff(s) inhaled every 4 hours, As Needed  amiodarone: 1 milligram(s) by continuous intravenous infusion once a day  Breo Ellipta 100 mcg-25 mcg/inh inhalation powder: 1 puff(s) inhaled once a day  Cardizem  mg/24 hours oral capsule, extended release: 1 cap(s) orally once a day  digoxin 125 mcg (0.125 mg) oral tablet: 1 tab(s) orally once  Eliquis 2.5 mg oral tablet: 1 tab(s) orally 2 times a day  ergocalciferol: 1250 microgram(s) orally once a week  heparin: once a day  levothyroxine 125 mcg (0.125 mg) oral tablet: 1 tab(s) orally once a day  lovastatin 40 mg oral tablet: 1 tab(s) orally once a day  mometasone 220 mcg/inh inhalation aerosol powder: inhaled once a day  pantoprazole 40 mg intravenous injection: 40 milligram(s) intravenous 2 times a day  phenylephrine 0.4 mg/10 mL-NaCl 0.9% intravenous solution: 0.71  intravenous once a day  sevelamer carbonate 800 mg oral tablet: 2 tab(s) orally 3 times a day (with meals)  torsemide 100 mg oral tablet: 1 tab(s) orally once a day

## 2022-07-21 NOTE — PROGRESS NOTE ADULT - SUBJECTIVE AND OBJECTIVE BOX
****** CHARTING IN PROGRESS *******    INTERVAL HPI/OVERNIGHT EVENTS: Presented to the ED from rehab for shortness of breath. Pt was found to be in rapid A. fib. a.fib with RVR with -130s with hypotension. In the ER, he received cardizem 10 mg after which the pt became hypotensive to 70/40. He was then ordered a 500 ml bolus. He received 100 ml of it with concerns for volume overload with a right pleural effusion. While in ICU, rec'd lopressor 5 mg ivp prn x 3 doses, after 1 dose HR decreased  with slight improvement in BP.       SUBJECTIVE: Patient seen and examined at bedside.     ROS:  CV: Denies chest pain  Resp: Denies SOB  GI: Denies abdominal pain, constipation, diarrhea, nausea, vomiting  : Denies dysuria  ID: Denies fevers, chills  MSK: Denies joint pain     OBJECTIVE:    VITAL SIGNS:  ICU Vital Signs Last 24 Hrs  T(C): 37.1 (21 Jul 2022 04:30), Max: 37.1 (21 Jul 2022 04:30)  T(F): 98.7 (21 Jul 2022 04:30), Max: 98.7 (21 Jul 2022 04:30)  HR: 86 (21 Jul 2022 06:00) (72 - 143)  BP: 89/59 (21 Jul 2022 06:00) (67/46 - 99/54)  BP(mean): 69 (21 Jul 2022 06:00) (52 - 71)  ABP: --  ABP(mean): --  RR: 15 (21 Jul 2022 06:00) (15 - 30)  SpO2: 78% (21 Jul 2022 06:00) (78% - 99%)    O2 Parameters below as of 21 Jul 2022 04:30  Patient On (Oxygen Delivery Method): mask, nonrebreather              CAPILLARY BLOOD GLUCOSE          PHYSICAL EXAM:  General: NAD, comfortable  HEENT: NCAT, PERRL, clear conjunctiva, no scleral icterus  Neck: supple, no JVD  Respiratory: CTA b/l, no wheezing, rhonchi, rales  Cardiovascular: RRR, normal S1S2, no M/R/G  Abdomen: soft, NT/ND, bowel sounds in all four quadrants, no palpable masses  Extremities: WWP, no clubbing, cyanosis, or edema  Neurology: A&Ox3, nonfocal, sensation intact     MEDICATIONS:  MEDICATIONS  (STANDING):  chlorhexidine 2% Cloths 1 Application(s) Topical <User Schedule>  heparin   Injectable 5000 Unit(s) SubCutaneous every 12 hours  pantoprazole  Injectable 40 milliGRAM(s) IV Push two times a day  phenylephrine    Infusion 0.05 MICROgram(s)/kG/Min (0.71 mL/Hr) IV Continuous <Continuous>    MEDICATIONS  (PRN):  metoprolol tartrate Injectable 5 milliGRAM(s) IV Push every 6 hours PRN HR>135      ALLERGIES:  Allergies    codeine (Unknown)    Intolerances        LABS:                        7.3    5.98  )-----------( 184      ( 21 Jul 2022 01:00 )             22.4     07-21    130<L>  |  89<L>  |  58<H>  ----------------------------<  99  5.1   |  28  |  5.30<H>    Ca    9.0      21 Jul 2022 01:00    TPro  6.5  /  Alb  3.0<L>  /  TBili  0.6  /  DBili  x   /  AST  27  /  ALT  28  /  AlkPhos  120  07-21    PT/INR - ( 21 Jul 2022 01:00 )   PT: 17.4 sec;   INR: 1.48 ratio         PTT - ( 21 Jul 2022 01:00 )  PTT:31.2 sec      RADIOLOGY & ADDITIONAL TESTS: Reviewed.   < from: [facility] Echo Doppler (01.24.22 @ 11:09) >    Summary:   1. Left ventricular ejection fraction, by visual estimation, is 55 to   60%.   2. Normal global left ventricular systolic function.   3. The mitral in-flow pattern reveals no discernable A-wave, therefore   no comment on diastolic function can be made.   4. No left atrial appendage thrombus and decreased left atrial appendage   velocities.   5. Moderately enlarged right ventricle.   6. Normal left atrial size.   7. Mild mitral annular calcification.   8. Moderate mitral valve regurgitation.   9. Moderate tricuspid regurgitation.  10. Estimated pulmonary artery systolic pressure is 51.0 mmHg assuming a   right atrial pressure of 3 mmHg, which is consistent with moderate   pulmonary hypertension.  11. Sclerotic aortic valve with decreased opening.  12. Peak transaortic gradient equals 58.1 mmHg, mean transaortic gradient   equals 33.1 mmHg, the calculated aortic valve area equals 0.54 cm² by the   continuity equation consistent with severe aortic stenosis. The   Dimesionless Index value is 0.14, which is consistent with severe aortic   stenosis.  13. Assessed aortic valve via planimetry, aortic valve area obtained is   0.96 sq cm.  14. There is no evidence of pericardial effusion.  15. Post SHU patient underwentsynchronized DCCV @ 200J. and convereted   to sinus rhythm. This was performed by Dr. Isaac.  16. There are no prior studies on this patient for comparison purposes.    Carol Hart D.O. Electronically signed on 1/24/2022 at 2:06:20 PM    < end of copied text >        BEDSIDE LUNG ULTRASOUND: ***  BEDSIDE ECHO: ***    CENTRAL LINE: N        DATE INSERTED:             REMOVE: N  JOAQUIN: Y                       DATE INSERTED:              REMOVE: Y/N  A-LINE: N                       DATE INSERTED:              REMOVE: Y/N      GLOBAL ISSUE/BEST PRACTICE  Analgesia: Y  Sedation: Y  HOB elevation: yes  Stress ulcer prophylaxis: Y  VTE prophylaxis: Y  Glycemic control: Y  Nutrition: Y    CODE STATUS: Full Code INTERVAL HPI/OVERNIGHT EVENTS: Presented to the ED from home (lives with daughter) for shortness of breath. Pt was found to be in rapid A. fib. a.fib with RVR with -130s with hypotension. In the ER, he received cardizem 10 mg after which the pt became hypotensive to 70/40. He was then ordered a 500 ml bolus. He received 100 ml of it with concerns for volume overload with a right pleural effusion. While in ICU, rec'd lopressor 5 mg ivp prn x 3 doses, after 1 dose HR decreased  with slight improvement in BP.       SUBJECTIVE: Patient seen and examined at bedside. Sitting up comfortably in bed eating eggs and oatmeal; in good spirits. Denies active SOB, CP, palpitations, dizziness Requests that his dentures be brought from home so that he can eat more.     ROS:  CV: Denies chest pain  Resp: Denies SOB  GI: Denies abdominal pain, constipation, diarrhea, nausea, vomiting  : Denies dysuria  ID: Denies fevers, chills  MSK: Denies joint pain     OBJECTIVE:    VITAL SIGNS:  ICU Vital Signs Last 24 Hrs  T(C): 37.1 (21 Jul 2022 04:30), Max: 37.1 (21 Jul 2022 04:30)  T(F): 98.7 (21 Jul 2022 04:30), Max: 98.7 (21 Jul 2022 04:30)  HR: 86 (21 Jul 2022 06:00) (72 - 143)  BP: 89/59 (21 Jul 2022 06:00) (67/46 - 99/54)  BP(mean): 69 (21 Jul 2022 06:00) (52 - 71)  RR: 15 (21 Jul 2022 06:00) (15 - 30)  SpO2: 78% (21 Jul 2022 06:00) (78% - 99%)    O2 Parameters below as of 21 Jul 2022 04:30  Patient On (Oxygen Delivery Method): mask, nonrebreather              CAPILLARY BLOOD GLUCOSE          PHYSICAL EXAM:  General: NAD, comfortable,   HEENT: NCAT, PERRL, clear conjunctiva, no scleral icterus  Neck: supple, no JVD  Respiratory: CTA b/l, no wheezing, rhonchi, rales  Cardiovascular: irregularly irregular; normal S1S2, no M/R/G  Abdomen: soft, NT/ND, bowel sounds in all four quadrants, no palpable masses  Extremities: + BL LE pitting edema, WWP, no clubbing, or cyanosis.  Neurology: A&Ox3, alert, oriented, interactive    MEDICATIONS:  MEDICATIONS  (STANDING):  chlorhexidine 2% Cloths 1 Application(s) Topical <User Schedule>  heparin   Injectable 5000 Unit(s) SubCutaneous every 12 hours  pantoprazole  Injectable 40 milliGRAM(s) IV Push two times a day  phenylephrine    Infusion 0.05 MICROgram(s)/kG/Min (0.71 mL/Hr) IV Continuous <Continuous>    MEDICATIONS  (PRN):  metoprolol tartrate Injectable 5 milliGRAM(s) IV Push every 6 hours PRN HR>135      ALLERGIES:  Allergies    codeine (Unknown)    Intolerances        LABS:                        7.3    5.98  )-----------( 184      ( 21 Jul 2022 01:00 )             22.4     07-21    130<L>  |  89<L>  |  58<H>  ----------------------------<  99  5.1   |  28  |  5.30<H>    Ca    9.0      21 Jul 2022 01:00    TPro  6.5  /  Alb  3.0<L>  /  TBili  0.6  /  DBili  x   /  AST  27  /  ALT  28  /  AlkPhos  120  07-21    PT/INR - ( 21 Jul 2022 01:00 )   PT: 17.4 sec;   INR: 1.48 ratio         PTT - ( 21 Jul 2022 01:00 )  PTT:31.2 sec      RADIOLOGY & ADDITIONAL TESTS: Reviewed.   < from: [facility] Echo Doppler (01.24.22 @ 11:09) >    Summary:   1. Left ventricular ejection fraction, by visual estimation, is 55 to   60%.   2. Normal global left ventricular systolic function.   3. The mitral in-flow pattern reveals no discernable A-wave, therefore   no comment on diastolic function can be made.   4. No left atrial appendage thrombus and decreased left atrial appendage   velocities.   5. Moderately enlarged right ventricle.   6. Normal left atrial size.   7. Mild mitral annular calcification.   8. Moderate mitral valve regurgitation.   9. Moderate tricuspid regurgitation.  10. Estimated pulmonary artery systolic pressure is 51.0 mmHg assuming a   right atrial pressure of 3 mmHg, which is consistent with moderate   pulmonary hypertension.  11. Sclerotic aortic valve with decreased opening.  12. Peak transaortic gradient equals 58.1 mmHg, mean transaortic gradient   equals 33.1 mmHg, the calculated aortic valve area equals 0.54 cm² by the   continuity equation consistent with severe aortic stenosis. The   Dimesionless Index value is 0.14, which is consistent with severe aortic   stenosis.  13. Assessed aortic valve via planimetry, aortic valve area obtained is   0.96 sq cm.  14. There is no evidence of pericardial effusion.  15. Post SHU patient underwentsynchronized DCCV @ 200J. and convereted   to sinus rhythm. This was performed by Dr. Isaac.  16. There are no prior studies on this patient for comparison purposes.    Carol Hart D.O. Electronically signed on 1/24/2022 at 2:06:20 PM    < end of copied text >      CENTRAL LINE: N        JOAQUIN: Y                  A-LINE: N                          GLOBAL ISSUE/BEST PRACTICE  Analgesia: N  Sedation: N  HOB elevation: yes  Stress ulcer prophylaxis: Pantoprazole 40mg IVP BID  VTE prophylaxis: No d/t concern for bleed; anemia  Glycemic control: N  Nutrition: Renal restricted diet    CODE STATUS: Full Code

## 2022-07-21 NOTE — H&P ADULT - PROBLEM SELECTOR PLAN 1
Patient presents with rapid Afib RVR up to 140s likely 2/2 CHF exacerbation  - Admit to ICU  - Given IV Cardizem in ED  - Patient on home Eliquis  - Management per ICU Patient presents with rapid Afib RVR up to 140s likely 2/2 CHF exacerbation  - Admit to ICU  - Given IV Cardizem in ED  - On phenylephrine gtt to maintain BP  - Continue rate control as tolerated  - Patient on home Eliquis - continue if no bleeding  - Management per ICU  - Recommend cardiology consult

## 2022-07-21 NOTE — DIETITIAN INITIAL EVALUATION ADULT - OTHER INFO
87 year old male with PMHx of chronic atrial fibrillation s/p SHU/DCCV 1/2022 (on eliquis), severe aortic stenosis (planned for TAVR at [facility]), HFpEF, Streptococcus faecalis bacteremia (4/2022 - suspected endocarditis & treated with 6 weeks of antibiotics, ESRD on HD (T/Th/Sat), COPD, HTN, PAD with prior RLE revascularization, hypothyroidism, anemia p/w exertional chest pain and dyspnea admitted for rapid atrial fibrillation, hypotension and volume overload.      Pt A+O4 during visit. Dash/TLC diet rx. Well tolerated with fair appetite/po intake; consumed only oatmeal for breakfast 7/21. No report difficulty chewing/swallowing. Pta lives with daughter. Follows low sodium diet. Consumes 3 meals/day. Typical 24hrs diet recall: breakfast- oatmeal &HB egg, sometimes greek yogurt, lunch: fruit HB egg or sandwich at times, dinner: salmon/white fish or other protein source with vegetable & starch. Hx ESRD on HD ~ 1year. Drinks nepro at times. Follows RD outpatient in HD center. Current weight 168lbs with manolo feet/legs/ankles 2+edema. Per pt had lost weight while at rehab however has since gained back. Per labs elevated phos 6.6, on renvela pta.

## 2022-07-21 NOTE — ED ADULT NURSE NOTE - CHPI ED NUR SEVERITY2
61 yo M with history of known epilepsy on VPA, Dilantin, and Phenobarb, who despite compliance with his medications has had at least two seizures within the last week.    Diagnosis: Epilepsy, worsening frequency of seizures    Plan:  Please transfer this patient to EMU at [facility] for Epilepsy consultation and vEEG monitoring for characterization of his seizures and medication adjustment. PAIN SCALE 8 OF 10.

## 2022-07-21 NOTE — PROGRESS NOTE ADULT - SUBJECTIVE AND OBJECTIVE BOX
Patient is a 87y old  Male who presents with a chief complaint of rapid atrial fibrillation, hypotension (21 Jul 2022 10:19)      INTERVAL HPI/OVERNIGHT EVENTS: Pt seen and examined at bedside. Pt in NAD, resting comfortably in bed. Pt complains of cp and sob. Denies fever, chills, abd pain, n/v/d, calf pain. Pt scheduled to be transferred today to [facility] for TAVR, discussed with cardio.      MEDICATIONS  (STANDING):  chlorhexidine 2% Cloths 1 Application(s) Topical <User Schedule>  digoxin     Tablet 125 MICROGram(s) Oral once  ergocalciferol 46202 Unit(s) Oral <User Schedule>  heparin   Injectable 5000 Unit(s) SubCutaneous every 12 hours  levothyroxine 112 MICROGram(s) Oral daily  mometasone 220 MICROgram(s) Inhaler 1 Puff(s) Inhalation two times a day  pantoprazole  Injectable 40 milliGRAM(s) IV Push two times a day  phenylephrine    Infusion 0.05 MICROgram(s)/kG/Min (0.71 mL/Hr) IV Continuous <Continuous>  sevelamer carbonate 1600 milliGRAM(s) Oral three times a day with meals    MEDICATIONS  (PRN):  metoprolol tartrate Injectable 5 milliGRAM(s) IV Push every 6 hours PRN HR>135      Allergies    codeine (Unknown)    Intolerances        REVIEW OF SYSTEMS:  CONSTITUTIONAL: No fever or chills  HEENT:  No headache, no sore throat  RESPIRATORY: No cough, wheezing, + shortness of breath  CARDIOVASCULAR: + chest pain, no palpitations  GASTROINTESTINAL: No abd pain, nausea, vomiting, or diarrhea  GENITOURINARY: No dysuria, frequency, or hematuria  NEUROLOGICAL: no focal weakness or dizziness  MUSCULOSKELETAL: no myalgias     Vital Signs Last 24 Hrs  T(C): 36.2 (21 Jul 2022 07:54), Max: 37.1 (21 Jul 2022 04:30)  T(F): 97.2 (21 Jul 2022 07:54), Max: 98.7 (21 Jul 2022 04:30)  HR: 132 (21 Jul 2022 10:00) (72 - 143)  BP: 90/55 (21 Jul 2022 10:00) (67/46 - 114/59)  BP(mean): 67 (21 Jul 2022 10:00) (52 - 83)  RR: 27 (21 Jul 2022 10:00) (15 - 35)  SpO2: 100% (21 Jul 2022 10:00) (78% - 100%)    Parameters below as of 21 Jul 2022 08:00  Patient On (Oxygen Delivery Method): nasal cannula    O2 Concentration (%): 4    PHYSICAL EXAM:  GENERAL: NAD, on NC  HEENT:  anicteric, moist mucous membranes  CHEST/LUNG:  decreased breath sounds b/l, no wheezes, or rhonchi  HEART:  irregular RR, systolic murmur   ABDOMEN:  BS+, soft, nontender, nondistended  EXTREMITIES: no edema, cyanosis, or calf tenderness  NERVOUS SYSTEM: answers questions and follows commands appropriately    LABS:                        6.9    6.41  )-----------( 180      ( 21 Jul 2022 09:11 )             21.8     CBC Full  -  ( 21 Jul 2022 09:11 )  WBC Count : 6.41 K/uL  Hemoglobin : 6.9 g/dL  Hematocrit : 21.8 %  Platelet Count - Automated : 180 K/uL  Mean Cell Volume : 119.1 fl  Mean Cell Hemoglobin : 37.7 pg  Mean Cell Hemoglobin Concentration : 31.7 gm/dL  Auto Neutrophil # : x  Auto Lymphocyte # : x  Auto Monocyte # : x  Auto Eosinophil # : x  Auto Basophil # : x  Auto Neutrophil % : x  Auto Lymphocyte % : x  Auto Monocyte % : x  Auto Eosinophil % : x  Auto Basophil % : x    21 Jul 2022 06:40    130    |  91     |  66     ----------------------------<  95     5.1     |  26     |  5.40     Ca    8.8        21 Jul 2022 06:40  Phos  6.6       21 Jul 2022 06:40  Mg     2.7       21 Jul 2022 06:40    TPro  6.3    /  Alb  x      /  TBili  0.6    /  DBili  x      /  AST  41     /  ALT  36     /  AlkPhos  116    21 Jul 2022 06:40    PT/INR - ( 21 Jul 2022 01:00 )   PT: 17.4 sec;   INR: 1.48 ratio         PTT - ( 21 Jul 2022 01:00 )  PTT:31.2 sec    CAPILLARY BLOOD GLUCOSE              RADIOLOGY & ADDITIONAL TESTS:    Personally reviewed.     Consultant(s) Notes Reviewed:  [x] YES  [ ] NO

## 2022-07-21 NOTE — ED ADULT NURSE NOTE - NSIMPLEMENTINTERV_GEN_ALL_ED
Implemented All Fall with Harm Risk Interventions:  [place] to call system. Call bell, personal items and telephone within reach. Instruct patient to call for assistance. Room bathroom lighting operational. Non-slip footwear when patient is off stretcher. Physically safe environment: no spills, clutter or unnecessary equipment. Stretcher in lowest position, wheels locked, appropriate side rails in place. Provide visual cue, wrist band, yellow gown, etc. Monitor gait and stability. Monitor for mental status changes and reorient to person, place, and time. Review medications for side effects contributing to fall risk. Reinforce activity limits and safety measures with patient and family. Provide visual clues: red socks.

## 2022-07-21 NOTE — PROGRESS NOTE ADULT - ASSESSMENT
Assessment and Plan:   · Assessment	  Pt is an 88 y/o M pmhx of HFpEF (EF 55-60%) COPD, PAD w/ prior RLE revascularization and claudication, ESRD on HD (T/Th/Sat), AS (severe) and persistent Afib on Eliquis presents from rehab for SOB and AGUDELO. CXR concerning for R sided pleural effusion, found to be in rapid Afib in ED given 10mg IVP w/ resolution, subsequently became hypotensive 70/40 and given 500L bolus of NS fluid. Admitted to ICU w/rapid A. Fib, Hypotension, sepsis and TERRANCE.     1. Rapid Afib   2. Hypotension   3. Sepsis   4. TERRANCE      Neuro:   - A/O X3  - Avoid sedating medications     CV:   - Rapid AFib w/RVR likely 2/2 CHF exacerbation; Pro-BNP 65203; limit fluids  - Troponin 545 trend to peak  - Continue metoprolol tartrate 5mg IVP q6h PRN for HR >135  - Continue phenylephrine as needed to maintain MAP >65  - Restart home Cardizem 120mg qd   - SHU 01/2022: EF 55-60%. Moderate mitral valve regurgitation. Moderate tricuspid regurgitation. Moderate pulmonary hypertension. Severe aortic stenosis.   - Scheduled for transfer to [facility] today for TAVR, discussed with cardio       Pulm:   - Satting well on NC @ 4L/min, continue to monitor and supplement as needed to maintain SpO2>92%.     GI:   - Diet: DASH DIET  - Dietician consult for unintentional weight loss prior to admission  - Protonix for GI PPX     Renal:   - TERRANCE secondary to low CO, support HD as tolerated, and monitor I/O, Avoid nephrotoxic meds.     Endo:   - Glucose<180, K>4, Mag>2    Heme:  - Eliquis for DVT PPX and A-fib     ID:   - Lactate resolving 4.5 --> 2.2   - F/U blood culture and UCx given possibility of sepsis    Case discussed w/ EICU attending      87 year old male with PMHx of chronic atrial fibrillation s/p SHU/DCCV 1/2022 (on eliquis), severe aortic stenosis (planned for TAVR at [facility]), HFpEF, Streptococcus faecalis bacteremia (4/2022 - suspected endocarditis & treated with 6 weeks of antibiotics, ESRD on HD (T/Th/Sat), COPD, HTN, PAD with prior RLE revascularization, hypothyroidism, anemia p/w exertional chest pain and dyspnea admitted for rapid atrial fibrillation, hypotension and volume overload.

## 2022-08-11 PROBLEM — I35.0 NONRHEUMATIC AORTIC (VALVE) STENOSIS: Chronic | Status: ACTIVE | Noted: 2022-01-01

## 2022-08-11 NOTE — ED PROVIDER NOTE - PHYSICAL EXAMINATION
Constitutional: Awake, Alert, non-toxic. No acute distress. Well appearing, well nourished.   HEAD: Normocephalic, atraumatic.   EYES: PERRL, EOM intact, conjunctiva and sclera are clear bilaterally. No raccoon eyes.   ENT: External ears normal. No rhinorrhea, no tracheal deviation   NECK: Supple, non-tender  CARDIOVASCULAR: regular rate and rhythm.  RESPIRATORY: intermittently tachypneic with speaking, CTAB, no wheezes, rhonchi, or rales. 100% on 2L NC.   ABDOMEN: Soft; non-tender, non-distended. No rebound or guarding.   EXTREMITIES:  b/l 1+ lower extremity edema. LUE fistula with soft but palpable thrill  SKIN: Warm, dry  NEURO: A&O x3. Sensory and motor functions are grossly intact. Speech is normal. No facial droop.  PSYCH: Appearance and judgement seem appropriate for gender and age. Constitutional: Awake, Alert, non-toxic. No acute distress. Well appearing, well nourished.   HEAD: Normocephalic, atraumatic.   EYES: PERRL, EOM intact, conjunctiva and sclera are clear bilaterally. No raccoon eyes.   ENT: External ears normal. No rhinorrhea, no tracheal deviation   NECK: Supple, non-tender  CARDIOVASCULAR: regular rate and rhythm. holosystolic murmur  RESPIRATORY: intermittently tachypneic with speaking, CTAB, no wheezes, rhonchi, or rales. 100% on 2L NC.   ABDOMEN: Soft; non-tender, non-distended. No rebound or guarding.   EXTREMITIES:  b/l 1+ lower extremity edema. LUE fistula with soft but palpable thrill  SKIN: Warm, dry  NEURO: A&O x3. Sensory and motor functions are grossly intact. Speech is normal. No facial droop.  PSYCH: Appearance and judgement seem appropriate for gender and age.

## 2022-08-11 NOTE — H&P ADULT - PROBLEM SELECTOR PLAN 1
Patient presenting from dialysis center after being found to have spot hemoglobin of 5.6  - Admit to Tele  - Baseline hemoglobin 7-8 per outpatient chart review, with well documented history of macrocytic anemia  - No signs or symptoms of bleeding at this time  - Check FOBT  - Ordered for 1 unit pRBCs  - Continue folic acid daily  - Monitor for bleeding  - Monitor hemoglobin with daily CBC  - Will continue Eliquis as patient with significant cardiac risk factors Patient presenting from dialysis center after being found to have spot hemoglobin of 5.6  - Admit to Tele  - Baseline hemoglobin 7-8 per outpatient chart review, with well documented history of macrocytic anemia  - No signs or symptoms of bleeding at this time  - Check FOBT  - Ordered for 1 unit pRBCs  - Continue folic acid daily  - Monitor for bleeding  - Monitor hemoglobin with daily CBC  - Will continue Eliquis as patient with significant cardiac risk factors and benefit outweighs risk

## 2022-08-11 NOTE — ED PROVIDER NOTE - RATE
90 [No Acute Distress] : no acute distress [Well Nourished] : well nourished [Well Developed] : well developed [Well-Appearing] : well-appearing [Normal Sclera/Conjunctiva] : normal sclera/conjunctiva [EOMI] : extraocular movements intact [Normal Outer Ear/Nose] : the outer ears and nose were normal in appearance [Normal Oropharynx] : the oropharynx was normal [No JVD] : no jugular venous distention [Supple] : supple [Thyroid Normal, No Nodules] : the thyroid was normal and there were no nodules present [No Respiratory Distress] : no respiratory distress  [No Accessory Muscle Use] : no accessory muscle use [Clear to Auscultation] : lungs were clear to auscultation bilaterally [Normal Rate] : normal rate  [Regular Rhythm] : with a regular rhythm [Normal S1, S2] : normal S1 and S2 [No Murmur] : no murmur heard [Pedal Pulses Present] : the pedal pulses are present [No Edema] : there was no peripheral edema [No Extremity Clubbing/Cyanosis] : no extremity clubbing/cyanosis [Soft] : abdomen soft [Non Tender] : non-tender [Non-distended] : non-distended [Normal Bowel Sounds] : normal bowel sounds [Normal Posterior Cervical Nodes] : no posterior cervical lymphadenopathy [Normal Anterior Cervical Nodes] : no anterior cervical lymphadenopathy [No CVA Tenderness] : no CVA  tenderness [No Spinal Tenderness] : no spinal tenderness [No Joint Swelling] : no joint swelling [Grossly Normal Strength/Tone] : grossly normal strength/tone [Coordination Grossly Intact] : coordination grossly intact [No Focal Deficits] : no focal deficits [Normal Gait] : normal gait [Normal Affect] : the affect was normal [Normal Insight/Judgement] : insight and judgment were intact [de-identified] : erythematous patches noted on posterior portion of neck

## 2022-08-11 NOTE — ED PROVIDER NOTE - OBJECTIVE STATEMENT
Patient with a pmh of CHF/HFpEF, COPD, PAD b/l with prior RLE revascularization (PCI) and claudication, hypothyroidism, ESRD on HD (T/TH,Sat)/ aortic stenosis (severe) and persistent atrial fibrillation maintained on Eliquis presenting with edema and unable to complete dialysis. hx obtained from patients daughter. scheduled for dialysis today, however found to have hemoglobin in the 5s and they said unable to obtain good flow from his LUE fistula so sent to ed for eval. patient has been on 2L NC at baseline pending his T[facility]R through [facility]. at this time, states he feels short of breath and swollen. constant over past few days. no associated cp. no n/v or fevers. nothing has made him feel worse. feels better with inhaler. still makes urine. lives with daughter. mobile at baseline but more fatigued over past few days. has had blood transfusion before. scheduled for tavr next week. pcp kal starks.

## 2022-08-11 NOTE — H&P ADULT - PROBLEM SELECTOR PLAN 6
Chronic, with history of recent acute on chronic exacerbation  - TTE from 4/2022 showing EF 59%, mild LVH, severe AS, moderate MR, TR  - Continue Torsemide 100mg BID on nondialysis days (Sun/Mon/Wed/Fri)  - Strict Is/Os, daily weights, 1L fluid restriction Chronic, with history of recent acute on chronic exacerbation  - TTE from 4/2022 showing EF 59%, mild LVH, severe AS, moderate MR, TR  - Continue Torsemide 100mg BID on nondialysis days (Sun/Mon/Wed/Fri)  - Strict Is/Os, daily weights, 1L fluid restriction  - Patient has been discharged home on 2L NC supplemental oxygen PRN from [facility], monitor continuous pulse ox and can add supplemental oxygen as needed Chronic, with history of recent acute on chronic exacerbation  - TTE from 4/2022 showing EF 59%, mild LVH, severe AS, moderate MR, TR  - Continue Torsemide 100mg BID on nondialysis days (Sun/Mon/Wed/Fri)  - Strict Is/Os, daily weights, 1L fluid restriction  - Patient has been discharged home on 2L NC supplemental oxygen PRN from [facility], will continue inpatient

## 2022-08-11 NOTE — ED ADULT NURSE REASSESSMENT NOTE - NS ED NURSE REASSESS COMMENT FT1
Packed red blood cells transfusion started at 1955. Respirations even and not labored. Paced on cardiac monitor. Kimo LOPEZ

## 2022-08-11 NOTE — ED ADULT NURSE NOTE - OBJECTIVE STATEMENT
Received pt in bed alert and oriented x4.  SANTIAGO from [facility] from dialysis.  Pt reports going for dialysis today but was not completed because of his blood pressure and was told his hemoglobin was low.  Pt sent to er for evaluation and blood work.  pt reports being SOB and weakness.  Denies chest pain.  Pt has left AV fistula.

## 2022-08-11 NOTE — CONSULT NOTE ADULT - SUBJECTIVE AND OBJECTIVE BOX
[facility] Cardiology    CHIEF COMPLAINT: Patient is a 87y old  Male PMH HTN AFIB, ESRD on HD, anemia, severe AS s/p Aortic valvuloplasty, PPM, awaiting TAVR,  PAD, HefPef a/w severe anemia discovered at HD earlier today. Pt denies Cp or SOB.    HPI:    PAST MEDICAL & SURGICAL HISTORY:  CHF (congestive heart failure)  HFpEF      HTN (hypertension)      Atrial fibrillation      Aortic stenosis  pending TAVR      History of COPD      PAD (peripheral artery disease)  s/p RLE stenting      H/O hernia repair      H/O varicose vein stripping  right leg        SOCIAL HISTORY: no tobacco  FAMILY HISTORY:  FHx: heart disease     HTN  MEDICATIONS  (STANDING):    MEDICATIONS  (PRN):    Allergies    codeine (Unknown)    Intolerances        REVIEW OF SYSTEMS:  CONSTITUTIONAL: No weakness, no fevers   EYES/ENT: No visual changes  NECK: No pain or stiffness  RESPIRATORY: No shortness of breath  CARDIOVASCULAR: No chest pain or palpitations  GASTROINTESTINAL: No abdominal pain  GENITOURINARY: No hematuria  NEUROLOGICAL: No weakness  SKIN: No rash  All other review of systems is negative unless indicated above    VITAL SIGNS:   Vital Signs Last 24 Hrs  T(C): 36.6 (11 Aug 2022 18:49), Max: 36.6 (11 Aug 2022 18:49)  T(F): 97.8 (11 Aug 2022 18:49), Max: 97.8 (11 Aug 2022 18:49)  HR: 71 (11 Aug 2022 18:49) (69 - 71)  BP: 90/57 (11 Aug 2022 18:49) (90/57 - 91/55)  BP(mean): --  RR: 18 (11 Aug 2022 18:49) (16 - 18)  SpO2: 97% (11 Aug 2022 18:49) (97% - 100%)    Parameters below as of 11 Aug 2022 18:49  Patient On (Oxygen Delivery Method): room air      I&O's Summary    PHYSICAL EXAM:  Constitutional: NAD  Neurological: Alert and oriented  HEENT: EOMI, no JVD  Cardiovascular: S1 and S2, 2/6 sys murmur  Pulmonary: breath sounds bilaterally  Gastrointestinal: Bowel Sounds present, soft, nontender  Ext: peripheral edema B/L LE  Skin: No rashes, No cyanosis.  Psych:  Mood calm    LABS: All Labs Reviewed:                        6.2    10.06 )-----------( 145      ( 11 Aug 2022 16:54 )             19.9     08-11    133<L>  |  95<L>  |  35<H>  ----------------------------<  74  4.1   |  29  |  2.60<H>    Ca    8.5      11 Aug 2022 16:54  Phos  2.8     08-11  Mg     2.3     08-11    TPro  6.2  /  Alb  3.0<L>  /  TBili  1.1  /  DBili  x   /  AST  29  /  ALT  42  /  AlkPhos  134<H>  08-11    PT/INR - ( 11 Aug 2022 16:54 )   PT: 16.7 sec;   INR: 1.42 ratio           CARDIAC MARKERS ( 11 Aug 2022 16:54 )  x     / x     / x     / x     / 6.3 ng/mL    Patient is a 87y old  Male PMH HTN AFIB, CAD, COPD, ESRD on HD, anemia, severe AS s/p Aortic valvuloplasty, PPM, awaiting TAVR,  PAD, HefPef a/w severe anemia discovered at HD earlier today. Pt denies Cp or SOB.  Admit to tele  for PRBC tonight    pt sees Dr Conteh outpt  Plan for TAVR upcomig weeks with Dr Mitchell wang outpt torsemide 100 (ON NON HD DAYS)  HD per renal  elliquis 2.5 BID  will follow

## 2022-08-11 NOTE — H&P ADULT - PROBLEM SELECTOR PLAN 3
Patient with troponin of 424.7  - ECG now with V paced rhythm s/p pacemaker  - Troponin rechecked, now downtrended to 407.4  - Suspect elevated troponin in the setting of demand from anemia  - Cardiology (Dr. Salazar) consulted, recs appreciated Patient with troponin of 424.7  - ECG now with V paced rhythm s/p pacemaker  - Troponin rechecked, now downtrended to 407.4  - Suspect elevated troponin in the setting of demand from anemia & ESRD status  - Cardiology (Dr. Salazar) consulted, recs appreciated

## 2022-08-11 NOTE — H&P ADULT - NSICDXPASTSURGICALHX_GEN_ALL_CORE_FT
PAST SURGICAL HISTORY:  H/O hernia repair     H/O varicose vein stripping right leg    History of pacemaker

## 2022-08-11 NOTE — ED ADULT NURSE NOTE - NURSING NEURO LEVEL OF CONSCIOUSNESS
Patient:   FIONA RODRIGUEZ            MRN: [id number]            FIN: [id number]               Age:   63 years     Sex:  FEMALE     :  [birth year]   Associated Diagnoses:   None   Author:   ANABELLA MEJIA      OPERATIVE REPORT        PREOPERATIVE DIAGNOSIS:  Endometrial cancer grade 1      POSTOPERATIVE DIAGNOSIS:  Endometrial cancer grade 1      PROCEDURE:  Robot-assisted total laparoscopic hysterectomy with bilateral salpingo-oophorectomy  Pelvic washings  Pelvic sentinel lymph node biopsies      SURGEON:  Anabella Villasenor MD      ASSISTANT:  Niyah Lopes MD      FINDINGS:  Exam under anesthesia revealed a small uterus mobile.  At time of laparoscopy bilateral tubes and ovaries and uterus appeared normal.  Small bowel large bowel appeared normal.  Peritoneal surfaces of note omentum looked normal.  At time of frozen section residual malignancy was noted at the left cornua of the uterus.       SPECIMENS REMOVED:  Pelvic washings  Left external iliac and left parametria sentinel nodes  Right hypogastric sentinel nodes  Uterus and cervix with fallopian tubes and ovaries      ESTIMATED BLOOD LOSS:  25 mL      BLOOD ADMINISTATION:  None      COMPLICATIONS:  None      GRAFTS/IMPLANTS:  None      ANESTHESIA:  General      INDICATIONS:  This is a 63-year-old multiparous female with recent history of postmenopausal bleeding.  Endometrial biopsy confirmed a grade 1 endometrial cancer.  Patient also has a strong family history of colon and uterine cancer.  During her clinic visit risks benefits alternatives and adverse outcomes of above-mentioned procedure were reviewed in detail.  Patient gave written consent.      DESCRIPTION OF PROCEDURE:  The patient was met in the preoperative hold area.  Her consent was signed.  The procedure was reviewed with the patient.  She was then brought to the operating room and placed in supine position.  General endotracheal tube anesthesia was obtained without  difficulty.  Oral gastric tube was placed.  She was prepped and draped in a sterile fashion.  A timeout was performed.    Attention was to placement of the uterine manipulator.  Initially a Samayoa catheter was inserted into her bladder.  A stitch of 0 Vicryl was placed on the anterior lip of the cervix.  ICG dye was then injected at 3 and 9 on the cervix.  The Yael manipulator was then placed.  Patient was directed to the abdomen.  Approximately 5 cm above her umbilicus a 10 mm skin incision was made after medicating with Marcaine.  Veress needle was placed through the incision and abdomen was insufflated with CO2 gas.  Opening pressure of 2 mmHg was noted and abdomen was filled with approximately 2.7 L of CO2.  The Veress needle was removed and a 8 mm robot trocar was placed directly into the abdominal cavity.  A 30° robot scope was used verify placement.  Additional ports were placed.  28 mm robotic trochars were placed on the right and a fourth on the left.  The 8 mm assist port was placed on the lateral left.  All sites were premedicated and trochars were placed under direct visualization.     The patient was placed in steep Trendelenburg.  Pelvic washings were obtained.  The robot was brought bedside and the arms were docked to the ports.  Instruments were placed a console was manned.    Initially we directed our attention to performing the sentinel lymph node biopsies.  On the left side the peritoneum over the psoas muscle was lifted and entered using cautery.  The peritoneum lateral to the ovarian vessels was divided to the round ligament and the pararectal and paravesical spaces were fully developed.  The ureter was identified.  Using the near infrared vision left external iliac and left parametria sentinel node was identified and biopsied.  The procedure was done similarly on the patient's right side with 2 hypogastric sentinel lymph nodes identified.  These were removed and sent as specimens.    Attention  was then directed to performing the hysterectomy.  On the left side a space above the ureter in the posterior leaf the broad ligament was entered using cautery.  The incision was opened and the ovarian vessels skeletonized coapted and transected.  The round ligament was coapted and transected.  The anterior leaf the broad ligament was dissected along the vesicouterine fold and a bladder flap was created.  The uterine arteries were skeletonized coapted and transected.  Arnold ligaments were coapted and transected below the manipulator cup.  This procedure was done similarly on the right side.  At this point a colpotomy was performed on the cup.  The uterus tubes and ovaries were removed transvaginally.  Instruments were then exchanged and the vaginal cuff was closed with 0 Vicryl in figure 8 fashion.     Pathology was available to perform frozen section with above noted findings.    At this point the operation was deemed complete and the pelvis was copiously irrigated and suctioned with normal saline.  All robotic instruments were then removed and the robot was undocked.  The patient was taken out of Trendelenburg, CO2 gas was evacuated, and the ports were removed.  All needle counts and Ray-Katherine counts and lap counts and instrument counts were correct at the end of the case.  The patient tolerated the procedure well was extubated and taken to PACU in stable condition.            Electronically Signed On 05/25/2017 12:28  __________________________________________________   LEO MEJIA     alert and awake

## 2022-08-11 NOTE — H&P ADULT - HISTORY OF PRESENT ILLNESS
86yo male with PMH of chronic atrial fibrillation s/p SHU/DCCV 1/2022 (on Eliquis), now s/p PPM, severe aortic stenosis (planned for TAVR at [facility]), HFpEF, Streptococcus faecalis bacteremia (4/2022 - suspected endocarditis & treated with 6 weeks of antibiotics, ESRD on HD (T/Th/Sat), COPD, HTN, PAD with prior RLE revascularization, hypothyroidism, anemia presented to the ED sent in from dialysis for anemia. Patient was being set up for dialysis, spot hemoglobin performed reveal hemoglobin of 5.     Of note, patient was recently admitted to [facility] in July for AFib with RVR, hypotension. He was transferred to [facility] for urgent TAVR. At [facility], patient did not undergo TAVR but a pacemaker was placed.     In the ED:  Vitals: Temp 97.8F | HR 69 | BP 91/55 | RR 16 | SpO2 100% on RA  Labs: H/H 6.2/19.9 , platelets 145, Na 133, troponin 424.7, CK 6.3, BNP 73475  CXR: no acute lung findings  ECG: Vpaced rhythm, 71  Received: 1 unit pRBCs   88yo male with PMH of chronic atrial fibrillation s/p SHU/DCCV 1/2022 (on Eliquis), severe aortic stenosis (planned for TAVR 8/17/22 at [facility]) s/p balloon valvuloplasty complicated by CHB now s/p PPM, HFpEF (EF 59%), Streptococcus faecalis bacteremia (4/2022 - suspected endocarditis & treated with 6 weeks of antibiotics, ESRD on HD (T/Th/Sat), COPD, HTN, PAD with prior RLE revascularization, hypothyroidism, anemia presented to the ED sent in from dialysis for anemia. Patient was being set up for dialysis, spot hemoglobin performed revealed hemoglobin of 5.6 and he was referred to the emergency room for transfusion. Patient states that this morning he was feeling weaker than normal, with difficulty getting out of his bed by himself. He denies any grossly bleeding. Of note, patient was recently admitted to [facility] in July for AFib with RVR, hypotension, requiring ICU admission for rate control and pressors. He was transferred to [facility] for urgent TAVR. At [facility], patient continued to be hypotensive precluding dialysis. He was also found to have new RBBB. Patient underwent balloon valvuloplasty to help facilitate dialysis and treat his acute decompensated heart failure, however, shortly after developed complete heart block requiring placement of Medtronic single chamber PPM on 7/22/22. He was eventually discharged from the hospital, with plans to pursue TAVR on 8/17/22 with Dr. Medrano.     In the ED:  Vitals: Temp 97.8F | HR 69 | BP 91/55 | RR 16 | SpO2 100% on RA  Labs: H/H 6.2/19.9 , platelets 145, Na 133, troponin 424.7, CK 6.3, BNP 34356  CXR: no acute lung findings  ECG: Vpaced rhythm, 71  Ordered for 1 unit pRBCs   88yo male with PMH of chronic atrial fibrillation s/p SHU/DCCV 1/2022 (on Eliquis), severe aortic stenosis (planned for TAVR 8/17/22 at [facility]) s/p balloon valvuloplasty complicated by CHB now s/p PPM, HFpEF (EF 59%), Streptococcus faecalis bacteremia (4/2022 - suspected endocarditis & treated with 6 weeks of antibiotics, ESRD on HD (T/Th/Sat), COPD, HTN, PAD with prior RLE revascularization, hypothyroidism, anemia presented to the ED sent in from dialysis for anemia. Patient was being set up for dialysis, spot hemoglobin performed revealed hemoglobin of 5.6 and he was referred to the emergency room for transfusion. Daughter Francoise states that patient was too weak to get him out of his dialysis chair so they called EMS for transport. His dialysis session was completed, but there was no volume removal due to low blood pressure. Patient states that this morning he was feeling weaker than normal, with difficulty getting out of his bed by himself. He has been feeling dizzy for the past two days. No syncope, chest pain or worsening shortness of breath. He denies any grossly bleeding. Of note, patient was recently admitted to [facility] in July for AFib with RVR, hypotension, requiring ICU admission for rate control and pressors. He was transferred to [facility] for urgent TAVR. At [facility], patient continued to be hypotensive precluding dialysis. He was also found to have new RBBB. Patient underwent balloon valvuloplasty to help facilitate dialysis and treat his acute decompensated heart failure, however, shortly after developed complete heart block requiring placement of Medtronic single chamber PPM on 7/22/22. He was eventually discharged from the hospital, with plans to pursue TAVR on 8/17/22 with Dr. Medrano. Patient was discharged on supplemental oxygen 2L NC from his recent admission.     In the ED:  Vitals: Temp 97.8F | HR 69 | BP 91/55 | RR 16 | SpO2 100% on RA  Labs: H/H 6.2/19.9 , platelets 145, Na 133, troponin 424.7, CK 6.3, BNP 74545  CXR: no acute lung findings  ECG: Vpaced rhythm, 71  Ordered for 1 unit pRBCs

## 2022-08-11 NOTE — H&P ADULT - NSHPPHYSICALEXAM_GEN_ALL_CORE
T(C): 36.6 (08-11-22 @ 20:10), Max: 36.6 (08-11-22 @ 18:49)  HR: 69 (08-11-22 @ 20:10) (69 - 71)  BP: 99/57 (08-11-22 @ 20:10) (90/57 - 99/57)  RR: 16 (08-11-22 @ 20:10) (16 - 18)  SpO2: 100% (08-11-22 @ 20:10) (97% - 100%)    GENERAL: elderly male in NAD   EYES: sclera clear, no exudates  ENMT: oropharynx clear without erythema, no exudates, dry mucous membranes  NECK: supple, soft  LUNGS: good air entry bilaterally, clear to auscultation, symmetric breath sounds, no wheezing or rhonchi appreciated  HEART: soft S1/S2, regular rate and rhythm, 3/6 systolic murmur, trace bilateral pitting edema   GASTROINTESTINAL: abdomen is soft, nontender, nondistended, normoactive bowel sounds, no palpable masses  INTEGUMENT: good skin turgor, no lesions noted  MUSCULOSKELETAL: no clubbing or cyanosis, no obvious deformity  NEUROLOGIC: awake, alert, oriented x3, muscle strength 5/5 in bilateral upper extremities, 4/5 in bilateral lower extremities, good muscle tone in 4 extremities, no obvious sensory deficits

## 2022-08-11 NOTE — H&P ADULT - PROBLEM SELECTOR PLAN 7
Chronic  - On HD Tue/Thu/Sat  - Continue sevelamer  - Monitor Is/Os  - Nephrology (Dr. Rivera) consulted, f/u recs Chronic  - On HD Tue/Thu/Sat  - Underwent dialysis today, completed session however without fluid removal  - Continue sevelamer  - Monitor Is/Os  - Nephrology (Dr. Rivera) consulted, f/u recs

## 2022-08-11 NOTE — H&P ADULT - NSHPOUTPATIENTPROVIDERS_GEN_ALL_CORE
PMD: Dr. Anibal Jernigan  Cardiology: Dr. Lopez/[facility] team (Ryan Conteh)  Nephro: Dr. Juan Pablo Galindo

## 2022-08-11 NOTE — H&P ADULT - NSHPSOCIALHISTORY_GEN_ALL_CORE
Tobacco: smoked 1 ppd from 18 yo to 56 yo; quit smoking >30 years ago  EtOH: 1 glass of wine with dinner qd  Recreational drug use: denies    Lives with: daughter at home  Ambulates: with a walker  ADLs: with assistance of daughter    Vaccinations: COVID Moderna 2/2

## 2022-08-11 NOTE — H&P ADULT - PROBLEM SELECTOR PLAN 2
Patient with history of hypotension during previous admission  - BP 90/57 on admission  - Suspect hypotension in the setting of hypovolemia from anemia  - Avoid IVF given history of acute decompensated heart failure exacerbation in 7/2022  - Will monitor BP closely, hopefully will improve with volume from pRBC  - Monitor routine hemodynamics Patient with history of hypotension during previous admission  - BP 90/57 on admission  - Suspect hypotension in the setting of hypovolemia from anemia  - Avoid IVF given history of acute decompensated heart failure exacerbation in 7/2022  - Will monitor BP closely  - Start midodrine 5mg TID for BP support  - Check orthostatics  - Monitor routine hemodynamics  - PT consult

## 2022-08-11 NOTE — H&P ADULT - NSICDXPASTMEDICALHX_GEN_ALL_CORE_FT
PAST MEDICAL HISTORY:  Aortic stenosis pending TAVR    Atrial fibrillation s/p PPM    CHF (congestive heart failure) HFpEF    History of COPD     HTN (hypertension)     PAD (peripheral artery disease) s/p RLE stenting

## 2022-08-11 NOTE — H&P ADULT - NSHPREVIEWOFSYSTEMS_GEN_ALL_CORE
CONSTITUTIONAL: admits weakness and fatigue, denies fever, chills  HEENT: denies blurred vision, sore throat  SKIN: denies new lesions, rash  CARDIOVASCULAR: denies chest pain, chest pressure, palpitations  RESPIRATORY: denies shortness of breath, sputum production  GASTROINTESTINAL: denies nausea, vomiting, diarrhea, abdominal pain  GENITOURINARY: denies dysuria, discharge  NEUROLOGICAL: denies numbness, headache, focal weakness  MUSCULOSKELETAL: denies new joint pain, muscle aches  HEMATOLOGIC: denies gross bleeding, bruising CONSTITUTIONAL: admits weakness and fatigue, denies fever, chills  HEENT: denies blurred vision, sore throat  SKIN: denies new lesions, rash  CARDIOVASCULAR: denies chest pain, chest pressure, palpitations  RESPIRATORY: denies shortness of breath, sputum production  GASTROINTESTINAL: denies nausea, vomiting, diarrhea, abdominal pain  GENITOURINARY: denies dysuria, discharge  NEUROLOGICAL: admits dizziness, denies numbness, headache, focal weakness  MUSCULOSKELETAL: denies new joint pain, muscle aches  HEMATOLOGIC: denies gross bleeding, bruising
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
## 2022-08-11 NOTE — H&P ADULT - PROBLEM SELECTOR PLAN 4
Chronic  - Patient with history of severe AS s/p balloon valvuloplasty 7/22/22 which was complicated by complete heart block requiring PPM  - Plan for TAVR on 7/18/22 with Dr. Medrano at [facility]

## 2022-08-11 NOTE — H&P ADULT - PROBLEM SELECTOR PLAN 9
Chronic  - History of permanent AFib, now s/p PPM due to CHB after balloon valvuloplasty on 7/22/22  - Now currently with V paced rhythm  - Continue Eliquis 2.5mg BID

## 2022-08-11 NOTE — H&P ADULT - PROBLEM SELECTOR PLAN 5
Advanced Care Planning Note. Purpose of Encounter: Advanced care planning in light of melanoma  Parties In Attendance: Patient, wife, Shannan Madrigal  Decisional Capacity: Yes  Subjective: Patient is SOB  Objective: Cr 0.7  Goals of Care Determination: Patient/POA wants limited support (No CPR, no vent, no surgery, no HD, no trach, no PEG)  Plan:  Onc/Renal/Cardio/Endocrine/Palliative care consults. Immunotherapy for melanoma  Code Status: DNR-CCA/DNI   Time spent on Advanced care Plannin minutes  Advanced Care Planning Documents: Completed advanced directives on chart, wife is the POA.     Shemar Portillo MD  2021 2:33 PM Patient presenting with Na 133  - Chronic hyponatremia on prior admissions  - PO fluid restriction  - Monitor with daily CMP

## 2022-08-11 NOTE — ED PROVIDER NOTE - CLINICAL SUMMARY MEDICAL DECISION MAKING FREE TEXT BOX
patient presenting here with sob and edema from dialysis. unable to complete dialysis today. was found to be anemic, and I suspect his symptoms are likely from underlying symptomatic anemia as well as appearance of volume overload state. patient found to be v paced, in past has been in rapid afib. without chest pain, do not suspect underlying ischemia. may have demand ischemia in setting of anemia. will check electrolytes as well given not able to get dialysis today. no fever or cough to suggest pneumonia. patient will ultimately likely need blood transfusion and dialysis pending workup.

## 2022-08-11 NOTE — ED PROVIDER NOTE - PROGRESS NOTE DETAILS
still on 2L nc. found to be anemic. consented for blood. will give 1u. [facility] as well who will see patient. plan to admit. accepted by dr. ellis.

## 2022-08-11 NOTE — ED PROVIDER NOTE - NSICDXPASTMEDICALHX_GEN_ALL_CORE_FT
PAST MEDICAL HISTORY:  Aortic stenosis pending TAVR    Atrial fibrillation     CHF (congestive heart failure) HFpEF    History of COPD     HTN (hypertension)     PAD (peripheral artery disease) s/p RLE stenting

## 2022-08-11 NOTE — H&P ADULT - ATTENDING COMMENTS
87yr old M with  ESRD on hemodialysis TTS,  Acute on chronic diastolic heart failure, Severe AS awaiting TAVR  with chr fatigue, dizziness, hypotension, exertional dyspnea a/w symptomatic anemia Hb 6.2mg/dl in ED    -agree with 1 unit PRBC transfusion, follow up post transfusion CBC ,goal Hb=7, caution with fluid overload  -low threshhold to consult with MICU if BP further drops in view of severe AS, /suspected vol overload from transfusion  - recommend low dose midodrine for symptomatic hypotension, though Anemia could also be a contributing factor

## 2022-08-11 NOTE — H&P ADULT - ASSESSMENT
86yo male with PMH of chronic atrial fibrillation s/p SHU/DCCV 1/2022 (on Eliquis), severe aortic stenosis (planned for TAVR 8/17/22 at [facility]) s/p balloon valvuloplasty complicated by CHB now s/p PPM, HFpEF (EF 59%), Streptococcus faecalis bacteremia (4/2022 - suspected endocarditis & treated with 6 weeks of antibiotics, ESRD on HD (T/Th/Sat), COPD, HTN, PAD with prior RLE revascularization, hypothyroidism, anemia presented to the ED sent in from dialysis for anemia, admitted for management of anemia.

## 2022-08-12 NOTE — DIETITIAN INITIAL EVALUATION ADULT - PERTINENT LABORATORY DATA
08-12    134<L>  |  96  |  43<H>  ----------------------------<  87  4.9   |  28  |  3.10<H>    Ca    8.3<L>      12 Aug 2022 03:07  Phos  4.1     08-12  Mg     2.4     08-12    TPro  5.4<L>  /  Alb  2.6<L>  /  TBili  3.1<H>  /  DBili  x   /  AST  32  /  ALT  38  /  AlkPhos  114  08-12

## 2022-08-12 NOTE — PHYSICAL THERAPY INITIAL EVALUATION ADULT - PERTINENT HX OF CURRENT PROBLEM, REHAB EVAL
86yo male with PMH of chronic atrial fibrillation s/p SHU/DCCV 1/2022 (on Eliquis), severe aortic stenosis (planned for TAVR 8/17/22 at [facility]) s/p balloon valvuloplasty complicated by CHB now s/p PPM, HFpEF (EF 59%), presented to the ED from dialysis for anemia, admitted for management of anemia.

## 2022-08-12 NOTE — PROGRESS NOTE ADULT - PROBLEM SELECTOR PLAN 5
Patient presenting with Na 133  - Chronic hyponatremia on prior admissions  - PO fluid restriction  - Monitor with daily CMP Patient Na stable in 130s.  - Chronic hyponatremia on prior admissions  - PO fluid restriction  - continue monitoring with daily CMP

## 2022-08-12 NOTE — PHYSICAL THERAPY INITIAL EVALUATION ADULT - ADDITIONAL COMMENTS
Pt. lives in 2 story house with his daughter's family upstairs and pt. downstairs. The house has 2 [place]IN which pt. states that his daughter and grandson help him with. Pt.'s daughter is main caretaker who works from home, schedules appointments, and assists her father with ADL's. Pt. has a RW, O2, and commode at home. Pt. states he wears a diaper to sleep as he is aware of his energy and strength at night. Previous to admission pt. states that he has OT+PT at home 2-3x/week.

## 2022-08-12 NOTE — PROGRESS NOTE ADULT - PROBLEM SELECTOR PLAN 7
Chronic  - On HD Tue/Thu/Sat  - Underwent dialysis today, completed session however without fluid removal  - Continue sevelamer  - Monitor Is/Os  - Nephrology (Dr. Rivera) consulted, f/u recs Chronic  - On HD Tue/Thu/Sat  - Underwent dialysis today (8/12) w/ 1U pRBC  - Continue sevelamer  - Monitor Is/Os  - Nephrology (Dr. Rivera) consulted, f/u recs

## 2022-08-12 NOTE — PROGRESS NOTE ADULT - PROBLEM SELECTOR PLAN 9
Chronic  - History of permanent AFib, now s/p PPM due to CHB after balloon valvuloplasty on 7/22/22  - Now currently with V paced rhythm  - Continue Eliquis 2.5mg BID Chronic  - Now currently with V paced rhythm  - Continue Eliquis 2.5mg BID

## 2022-08-12 NOTE — PROGRESS NOTE ADULT - PROBLEM SELECTOR PLAN 1
Patient presenting from dialysis center after being found to have spot hemoglobin of 5.6  - Baseline hemoglobin 7-8 per outpatient chart review, with well documented history of macrocytic anemia  - No signs or symptoms of bleeding at this time  - f/u FOBT  - s/p 1 unit pRBCs   - infuse additional 1U pRBCs intradialysis, per Nephro recs  - Continue folic acid daily  - Monitor for bleeding  - Monitor hemoglobin with daily CBC  - Will continue Eliquis as patient with significant cardiac risk factors and benefit outweighs risk Baseline hemoglobin 7-8 per outpatient chart review, with well documented history of macrocytic anemia  - H/H is 7.1/22.4 (today 8/12) after 1U pRBC, continue monitoring w/ daily CBC.  - No signs or symptoms of bleeding at this time, continue monitoring for bleeding.  - f/u FOBT  - infuse additional 1U pRBCs intradialysis, per Nephro recs  - Continue folic acid daily  - continue Eliquis as patient with significant cardiac risk factors and benefit outweighs risk  - nephro is following, recs appreciated.

## 2022-08-12 NOTE — DIETITIAN INITIAL EVALUATION ADULT - ORAL INTAKE PTA/DIET HISTORY
patient reports with good PO PTA follow SHLOMO style diet . eats 3 meals / day. PO good up until a couple of days PTA   now ate breakfast well oatmeal hc egg and coffee. may have slept through lunch . new tray ordered. patient asked if   he likes zenon wants to try.

## 2022-08-12 NOTE — CONSULT NOTE ADULT - SUBJECTIVE AND OBJECTIVE BOX
Patient is a 87y old  Male who presents with a chief complaint of Anemia (12 Aug 2022 09:18)    HPI:  88yo male with PMH of chronic atrial fibrillation s/p SHU/DCCV 1/2022 (on Eliquis), severe aortic stenosis (planned for TAVR 8/17/22 at [facility]) s/p balloon valvuloplasty complicated by CHB now s/p PPM, HFpEF (EF 59%), Streptococcus faecalis bacteremia (4/2022 - suspected endocarditis & treated with 6 weeks of antibiotics, ESRD on HD (T/Th/Sat), COPD, HTN, PAD with prior RLE revascularization, hypothyroidism, anemia presented to the ED sent in from dialysis for anemia. Patient was being set up for dialysis, spot hemoglobin performed revealed hemoglobin of 5.6 and he was referred to the emergency room for transfusion. Daughter Francoise states that patient was too weak to get him out of his dialysis chair so they called EMS for transport. His dialysis session was completed, but there was no volume removal due to low blood pressure. Patient states that this morning he was feeling weaker than normal, with difficulty getting out of his bed by himself. He has been feeling dizzy for the past two days. No syncope, chest pain or worsening shortness of breath. He denies any grossly bleeding. Of note, patient was recently admitted to [facility] in July for AFib with RVR, hypotension, requiring ICU admission for rate control and pressors. He was transferred to [facility] for urgent TAVR. At [place], patient continued to be hypotensive precluding dialysis. He was also found to have new RBBB. Patient underwent balloon valvuloplasty to help facilitate dialysis and treat his acute decompensated heart failure, however, shortly after developed complete heart block requiring placement of Medtronic single chamber PPM on 7/22/22. He was eventually discharged from the hospital, with plans to pursue TAVR on 8/17/22 with Dr. Medrano. Patient was discharged on supplemental oxygen 2L NC from his recent admission.     In the ED:  Vitals: Temp 97.8F | HR 69 | BP 91/55 | RR 16 | SpO2 100% on RA  Labs: H/H 6.2/19.9 , platelets 145, Na 133, troponin 424.7, CK 6.3, BNP 39219  CXR: no acute lung findings  ECG: Vpaced rhythm, 71  Ordered for 1 unit pRBCs   (11 Aug 2022 19:14)      PAST MEDICAL HISTORY:  CHF (congestive heart failure)    HTN (hypertension)    Atrial fibrillation    Aortic stenosis    History of COPD    PAD (peripheral artery disease)        PAST SURGICAL HISTORY:  H/O hernia repair    H/O varicose vein stripping    History of pacemaker        FAMILY HISTORY:  FHx: heart disease        SOCIAL HISTORY:    Allergies    codeine (Unknown)    Intolerances      Home Medications:  Albuterol (Eqv-ProAir HFA) 90 mcg/inh inhalation aerosol: 2 puff(s) inhaled every 4 hours, As Needed (11 Aug 2022 19:56)  Breo Ellipta 100 mcg-25 mcg/inh inhalation powder: 1 puff(s) inhaled once a day (11 Aug 2022 19:56)  Eliquis 2.5 mg oral tablet: 1 tab(s) orally 2 times a day (11 Aug 2022 19:56)  ergocalciferol: 1250 microgram(s) orally once a week (11 Aug 2022 19:56)  folic acid 1 mg oral tablet: 1 tab(s) orally once a day (11 Aug 2022 19:56)  levothyroxine 112 mcg (0.112 mg) oral tablet: 1 tab(s) orally once a day (11 Aug 2022 19:56)  lidocaine 4% topical cream: Apply topically to affected area 3 times a day (11 Aug 2022 19:56)  lovastatin 40 mg oral tablet: 1 tab(s) orally once a day (11 Aug 2022 19:56)  Nephro-Marcelino oral tablet: 1 tab(s) orally once a day (11 Aug 2022 19:56)  sevelamer carbonate 800 mg oral tablet: 2 tab(s) orally 3 times a day (with meals) (11 Aug 2022 19:56)  torsemide 100 mg oral tablet: 1 tab(s) orally twice a day Monday, Wednesday, Friday and Sunday (non dialysis days) (11 Aug 2022 19:56)  Tylenol 500 mg oral tablet: 2 tab(s) orally every 4 hours, As Needed (11 Aug 2022 19:56)    MEDICATIONS  (STANDING):  apixaban 2.5 milliGRAM(s) Oral two times a day  atorvastatin 10 milliGRAM(s) Oral at bedtime  budesonide  80 MICROgram(s)/formoterol 4.5 MICROgram(s) Inhaler 2 Puff(s) Inhalation two times a day  folic acid 1 milliGRAM(s) Oral daily  levothyroxine 112 MICROGram(s) Oral daily  midodrine. 5 milliGRAM(s) Oral three times a day  Nephro-marcelino 1 Tablet(s) Oral daily  sevelamer carbonate 1600 milliGRAM(s) Oral three times a day with meals  torsemide 100 milliGRAM(s) Oral <User Schedule>    MEDICATIONS  (PRN):  acetaminophen     Tablet .. 650 milliGRAM(s) Oral every 6 hours PRN Temp greater or equal to 38C (100.4F), Mild Pain (1 - 3)  ALBUTerol    90 MICROgram(s) HFA Inhaler 2 Puff(s) Inhalation every 6 hours PRN Shortness of Breath and/or Wheezing  melatonin 3 milliGRAM(s) Oral at bedtime PRN Insomnia      REVIEW OF SYSTEMS:  General:   Respiratory: No cough, SOB  Cardiovascular: No CP or Palpitations	  Gastrointestinal: No nausea, Vomiting. No diarrhea  Genitourinary: No urinary complaints	  Musculoskeletal: No leg swelling, No new rash or lesions	  Neurological: 	  all other systems negative    T(F): 97.7 (08-12-22 @ 04:30), Max: 97.8 (08-11-22 @ 18:49)  HR: 71 (08-12-22 @ 04:30) (69 - 72)  BP: 106/60 (08-12-22 @ 04:30) (90/57 - 106/60)  RR: 17 (08-12-22 @ 04:30) (16 - 18)  SpO2: 99% (08-12-22 @ 04:30) (94% - 100%)  Wt(kg): --    PHYSICAL EXAM:  General: NAD  Respiratory: b/l air entry  Cardiovascular: S1 S2  Gastrointestinal: soft  Extremities: edema        08-12    134<L>  |  96  |  43<H>  ----------------------------<  87  4.9   |  28  |  3.10<H>    Ca    8.3<L>      12 Aug 2022 03:07  Phos  4.1     08-12  Mg     2.4     08-12    TPro  5.4<L>  /  Alb  2.6<L>  /  TBili  3.1<H>  /  DBili  x   /  AST  32  /  ALT  38  /  AlkPhos  114  08-12                          7.1    10.19 )-----------( 129      ( 12 Aug 2022 03:07 )             22.4       Potassium, Serum: 4.9 mmol/L (08-12 @ 03:07)  Blood Urea Nitrogen, Serum: 43 mg/dL (08-12 @ 03:07)  Calcium, Total Serum: 8.3 mg/dL (08-12 @ 03:07)  Hemoglobin: 7.1 g/dL (08-12 @ 03:07)      Creatinine, Serum: 3.10 (08-12 @ 03:07)  Creatinine, Serum: 2.60 (08-11 @ 16:54)        LIVER FUNCTIONS - ( 12 Aug 2022 03:07 )  Alb: 2.6 g/dL / Pro: 5.4 g/dL / ALK PHOS: 114 U/L / ALT: 38 U/L / AST: 32 U/L / GGT: x           CARDIAC MARKERS ( 11 Aug 2022 16:54 )  x     / x     / x     / x     / 6.3 ng/mL              I&O's Detail         Patient is a 87y old  Male who presents with a chief complaint of Anemia (12 Aug 2022 09:18)    HPI:  86yo male with PMH of chronic atrial fibrillation s/p SHU/DCCV 1/2022 (on Eliquis), severe aortic stenosis (planned for TAVR 8/17/22 at [facility]) s/p balloon valvuloplasty complicated by CHB now s/p PPM, HFpEF (EF 59%), Streptococcus faecalis bacteremia (4/2022 - suspected endocarditis & treated with 6 weeks of antibiotics, ESRD on HD (T/Th/Sat), COPD, HTN, PAD with prior RLE revascularization, hypothyroidism, anemia presented to the ED sent in from dialysis for anemia. Patient was being set up for dialysis, spot hemoglobin performed revealed hemoglobin of 5.6 and he was referred to the emergency room for transfusion. Daughter Francoise states that patient was too weak to get him out of his dialysis chair so they called EMS for transport. His dialysis session was completed, but there was no volume removal due to low blood pressure. Patient states that this morning he was feeling weaker than normal, with difficulty getting out of his bed by himself. He has been feeling dizzy for the past two days. No syncope, chest pain or worsening shortness of breath. He denies any grossly bleeding. Of note, patient was recently admitted to [facility] in July for AFib with RVR, hypotension, requiring ICU admission for rate control and pressors. He was transferred to [facility] for urgent TAVR. At [place], patient continued to be hypotensive precluding dialysis. He was also found to have new RBBB. Patient underwent balloon valvuloplasty to help facilitate dialysis and treat his acute decompensated heart failure, however, shortly after developed complete heart block requiring placement of Medtronic single chamber PPM on 7/22/22. He was eventually discharged from the hospital, with plans to pursue TAVR on 8/17/22 with Dr. Medrano. Patient was discharged on supplemental oxygen 2L NC from his recent admission.     In the ED:  Vitals: Temp 97.8F | HR 69 | BP 91/55 | RR 16 | SpO2 100% on RA  Labs: H/H 6.2/19.9 , platelets 145, Na 133, troponin 424.7, CK 6.3, BNP 69752  CXR: no acute lung findings  ECG: Vpaced rhythm, 71  Ordered for 1 unit pRBCs   (11 Aug 2022 19:14)    Renal consult called for ESRD on hemodialysis. History obtained from chart and patient.       PAST MEDICAL HISTORY:  CHF (congestive heart failure)    HTN (hypertension)    Atrial fibrillation    Aortic stenosis    History of COPD    PAD (peripheral artery disease)        PAST SURGICAL HISTORY:  H/O hernia repair    H/O varicose vein stripping    History of pacemaker        FAMILY HISTORY:  FHx: heart disease        SOCIAL HISTORY: No smoking or alcohol use     Allergies    codeine (Unknown)    Intolerances      Home Medications:  Albuterol (Eqv-ProAir HFA) 90 mcg/inh inhalation aerosol: 2 puff(s) inhaled every 4 hours, As Needed (11 Aug 2022 19:56)  Breo Ellipta 100 mcg-25 mcg/inh inhalation powder: 1 puff(s) inhaled once a day (11 Aug 2022 19:56)  Eliquis 2.5 mg oral tablet: 1 tab(s) orally 2 times a day (11 Aug 2022 19:56)  ergocalciferol: 1250 microgram(s) orally once a week (11 Aug 2022 19:56)  folic acid 1 mg oral tablet: 1 tab(s) orally once a day (11 Aug 2022 19:56)  levothyroxine 112 mcg (0.112 mg) oral tablet: 1 tab(s) orally once a day (11 Aug 2022 19:56)  lidocaine 4% topical cream: Apply topically to affected area 3 times a day (11 Aug 2022 19:56)  lovastatin 40 mg oral tablet: 1 tab(s) orally once a day (11 Aug 2022 19:56)  Nephro-Marcelino oral tablet: 1 tab(s) orally once a day (11 Aug 2022 19:56)  sevelamer carbonate 800 mg oral tablet: 2 tab(s) orally 3 times a day (with meals) (11 Aug 2022 19:56)  torsemide 100 mg oral tablet: 1 tab(s) orally twice a day Monday, Wednesday, Friday and Sunday (non dialysis days) (11 Aug 2022 19:56)  Tylenol 500 mg oral tablet: 2 tab(s) orally every 4 hours, As Needed (11 Aug 2022 19:56)    MEDICATIONS  (STANDING):  apixaban 2.5 milliGRAM(s) Oral two times a day  atorvastatin 10 milliGRAM(s) Oral at bedtime  budesonide  80 MICROgram(s)/formoterol 4.5 MICROgram(s) Inhaler 2 Puff(s) Inhalation two times a day  folic acid 1 milliGRAM(s) Oral daily  levothyroxine 112 MICROGram(s) Oral daily  midodrine. 5 milliGRAM(s) Oral three times a day  Nephro-marcelino 1 Tablet(s) Oral daily  sevelamer carbonate 1600 milliGRAM(s) Oral three times a day with meals  torsemide 100 milliGRAM(s) Oral <User Schedule>    MEDICATIONS  (PRN):  acetaminophen     Tablet .. 650 milliGRAM(s) Oral every 6 hours PRN Temp greater or equal to 38C (100.4F), Mild Pain (1 - 3)  ALBUTerol    90 MICROgram(s) HFA Inhaler 2 Puff(s) Inhalation every 6 hours PRN Shortness of Breath and/or Wheezing  melatonin 3 milliGRAM(s) Oral at bedtime PRN Insomnia      REVIEW OF SYSTEMS:  General: no distress  Respiratory: No cough, SOB  Cardiovascular: No CP or Palpitations	  Gastrointestinal: No nausea, Vomiting. No diarrhea  Genitourinary: No urinary complaints	  Musculoskeletal: No new rash or lesions	  all other systems negative    T(F): 97.7 (08-12-22 @ 04:30), Max: 97.8 (08-11-22 @ 18:49)  HR: 71 (08-12-22 @ 04:30) (69 - 72)  BP: 106/60 (08-12-22 @ 04:30) (90/57 - 106/60)  RR: 17 (08-12-22 @ 04:30) (16 - 18)  SpO2: 99% (08-12-22 @ 04:30) (94% - 100%)  Wt(kg): --    PHYSICAL EXAM:  General: NAD  Respiratory: b/l air entry  Cardiovascular: S1 S2  Gastrointestinal: soft  Extremities: + edema, left AVF. + bruit        08-12    134<L>  |  96  |  43<H>  ----------------------------<  87  4.9   |  28  |  3.10<H>    Ca    8.3<L>      12 Aug 2022 03:07  Phos  4.1     08-12  Mg     2.4     08-12    TPro  5.4<L>  /  Alb  2.6<L>  /  TBili  3.1<H>  /  DBili  x   /  AST  32  /  ALT  38  /  AlkPhos  114  08-12                          7.1    10.19 )-----------( 129      ( 12 Aug 2022 03:07 )             22.4       Potassium, Serum: 4.9 mmol/L (08-12 @ 03:07)  Blood Urea Nitrogen, Serum: 43 mg/dL (08-12 @ 03:07)  Calcium, Total Serum: 8.3 mg/dL (08-12 @ 03:07)  Hemoglobin: 7.1 g/dL (08-12 @ 03:07)      Creatinine, Serum: 3.10 (08-12 @ 03:07)  Creatinine, Serum: 2.60 (08-11 @ 16:54)        LIVER FUNCTIONS - ( 12 Aug 2022 03:07 )  Alb: 2.6 g/dL / Pro: 5.4 g/dL / ALK PHOS: 114 U/L / ALT: 38 U/L / AST: 32 U/L / GGT: x           CARDIAC MARKERS ( 11 Aug 2022 16:54 )  x     / x     / x     / x     / 6.3 ng/mL      < from: Xray Chest 1 View- PORTABLE-Urgent (Xray Chest 1 View- PORTABLE-Urgent .) (08.11.22 @ 16:17) >    ACC: [id number] EXAM:  XR CHEST PORTABLE URGENT 1V                          PROCEDURE DATE:  08/11/2022          INTERPRETATION:  AP semierect chest on August 11, 2022 at 3:26 PM.   Patient is short of breath.    Heart magnified by technique.    Moderate right effusion with loculation again noted.    Slight infiltrate left lower lung field again seen.    Above findings are similar to July 21.    Present film shows a unipolar right-sided pacemaker which is new.    IMPRESSION: Insertion of right-sided pacemaker. Other findings stable.    --- End of Report ---            ORAL FONTAINE MD; Attending Radiologist  This document has been electronically signed. Aug 11 2022  4:39PM    < end of copied text >

## 2022-08-12 NOTE — PROGRESS NOTE ADULT - PROBLEM SELECTOR PLAN 3
Patient with troponin of 424.7-->407.4  - ECG now with V paced rhythm s/p pacemaker  - Suspect elevated troponin in the setting of demand from anemia & ESRD status  - Cardiology (Dr. Salazar) consulted, recs appreciated Patient with troponin of 424.7-->407.4  - ECG now with V paced rhythm s/p pacemaker  - Suspect elevated troponin in the setting of demand from anemia & ESRD status  - Cardiology (Dr. Salazar) following, recs appreciated

## 2022-08-12 NOTE — PROGRESS NOTE ADULT - PROBLEM SELECTOR PLAN 6
Chronic, with history of recent acute on chronic exacerbation  - TTE from 4/2022 showing EF 59%, mild LVH, severe AS, moderate MR, TR  - Continue Torsemide 100mg BID on nondialysis days (Sun/Mon/Wed/Fri)  - Strict Is/Os, daily weights, 1L fluid restriction  - Patient has been discharged home on 2L NC supplemental oxygen PRN from [facility], will continue inpatient Chronic, with history of recent acute on chronic exacerbation  - TTE from 4/2022 showing EF 59%, mild LVH, severe AS, moderate MR, TR  - Continue Torsemide 100mg BID on nondialysis days (Sun/Mon/Wed/Fri)  - Strict Is/Os, daily weights, 1L fluid restriction  - on 3L NC O2 sat in 100% today (8/12)

## 2022-08-12 NOTE — PROGRESS NOTE ADULT - SUBJECTIVE AND OBJECTIVE BOX
Patient is a 87y old  Male who presents with a chief complaint of Anemia (12 Aug 2022 09:44)      INTERVAL HPI/OVERNIGHT EVENTS: Patient seen and examined at bedside. No overnight events occurred. Patient has no complaints at this time. Denies fevers, chills, headache, lightheadedness, chest pain, dyspnea, abdominal pain, n/v/d/c.    MEDICATIONS  (STANDING):  apixaban 2.5 milliGRAM(s) Oral two times a day  atorvastatin 10 milliGRAM(s) Oral at bedtime  budesonide  80 MICROgram(s)/formoterol 4.5 MICROgram(s) Inhaler 2 Puff(s) Inhalation two times a day  folic acid 1 milliGRAM(s) Oral daily  levothyroxine 112 MICROGram(s) Oral daily  midodrine. 5 milliGRAM(s) Oral three times a day  Nephro-marcelino 1 Tablet(s) Oral daily  sevelamer carbonate 1600 milliGRAM(s) Oral three times a day with meals  torsemide 100 milliGRAM(s) Oral <User Schedule>    MEDICATIONS  (PRN):  acetaminophen     Tablet .. 650 milliGRAM(s) Oral every 6 hours PRN Temp greater or equal to 38C (100.4F), Mild Pain (1 - 3)  ALBUTerol    90 MICROgram(s) HFA Inhaler 2 Puff(s) Inhalation every 6 hours PRN Shortness of Breath and/or Wheezing  melatonin 3 milliGRAM(s) Oral at bedtime PRN Insomnia      Allergies    codeine (Unknown)    Intolerances        REVIEW OF SYSTEMS:  CONSTITUTIONAL: No fever or chills  HEENT:  No headache, no sore throat  RESPIRATORY: No cough, wheezing, or shortness of breath  CARDIOVASCULAR: No chest pain, palpitations  GASTROINTESTINAL: No abd pain, nausea, vomiting, or diarrhea  GENITOURINARY: No dysuria, frequency, or hematuria  NEUROLOGICAL: no focal weakness or dizziness  MUSCULOSKELETAL: no myalgias     Vital Signs Last 24 Hrs  T(C): 36.8 (12 Aug 2022 11:04), Max: 36.8 (12 Aug 2022 11:04)  T(F): 98.2 (12 Aug 2022 11:04), Max: 98.2 (12 Aug 2022 11:04)  HR: 70 (12 Aug 2022 11:04) (69 - 72)  BP: 116/68 (12 Aug 2022 11:04) (90/57 - 116/68)  BP(mean): --  RR: 19 (12 Aug 2022 11:04) (16 - 19)  SpO2: 100% (12 Aug 2022 11:04) (94% - 100%)    Parameters below as of 12 Aug 2022 11:04  Patient On (Oxygen Delivery Method): nasal cannula  O2 Flow (L/min): 3      PHYSICAL EXAM:  GENERAL: NAD  HEENT:  anicteric, moist mucous membranes  CHEST/LUNG:  CTA b/l, no rales, wheezes, or rhonchi  HEART:  RRR, S1, S2  ABDOMEN:  BS+, soft, nontender, nondistended  EXTREMITIES: no edema, cyanosis, or calf tenderness  NERVOUS SYSTEM: answers questions and follows commands appropriately    LABS:                        7.1    10.19 )-----------( 129      ( 12 Aug 2022 03:07 )             22.4     CBC Full  -  ( 12 Aug 2022 03:07 )  WBC Count : 10.19 K/uL  Hemoglobin : 7.1 g/dL  Hematocrit : 22.4 %  Platelet Count - Automated : 129 K/uL  Mean Cell Volume : 111.4 fl  Mean Cell Hemoglobin : 35.3 pg  Mean Cell Hemoglobin Concentration : 31.7 gm/dL  Auto Neutrophil # : 8.32 K/uL  Auto Lymphocyte # : 0.46 K/uL  Auto Monocyte # : 1.11 K/uL  Auto Eosinophil # : 0.03 K/uL  Auto Basophil # : 0.02 K/uL  Auto Neutrophil % : 81.6 %  Auto Lymphocyte % : 4.5 %  Auto Monocyte % : 10.9 %  Auto Eosinophil % : 0.3 %  Auto Basophil % : 0.2 %    12 Aug 2022 03:07    134    |  96     |  43     ----------------------------<  87     4.9     |  28     |  3.10     Ca    8.3        12 Aug 2022 03:07  Phos  4.1       12 Aug 2022 03:07  Mg     2.4       12 Aug 2022 03:07    TPro  5.4    /  Alb  2.6    /  TBili  3.1    /  DBili  x      /  AST  32     /  ALT  38     /  AlkPhos  114    12 Aug 2022 03:07    PT/INR - ( 11 Aug 2022 16:54 )   PT: 16.7 sec;   INR: 1.42 ratio             CAPILLARY BLOOD GLUCOSE              RADIOLOGY & ADDITIONAL TESTS:    Personally reviewed.     Consultant(s) Notes Reviewed:  [x] YES  [ ] NO     Patient is a 87y old  Male who presents with a chief complaint of Anemia (12 Aug 2022 09:44)      INTERVAL HPI/OVERNIGHT EVENTS: Patient seen and examined at bedside. Patient on 3L O2 NC.  received 1U of PRBCs in ED. Patient has no complaints at this time. Denies fevers, chills, headache, lightheadedness, chest pain, dyspnea, abdominal pain, n/v/d/c. Patient scheduled for TAVR 8/17/22 at [facility], must be admitted on 8/16/22. Patient reports previous hospitalization for low Hb, was given 2U at [facility], "several years ago".     MEDICATIONS  (STANDING):  apixaban 2.5 milliGRAM(s) Oral two times a day  atorvastatin 10 milliGRAM(s) Oral at bedtime  budesonide  80 MICROgram(s)/formoterol 4.5 MICROgram(s) Inhaler 2 Puff(s) Inhalation two times a day  folic acid 1 milliGRAM(s) Oral daily  levothyroxine 112 MICROGram(s) Oral daily  midodrine. 5 milliGRAM(s) Oral three times a day  Nephro-marcelino 1 Tablet(s) Oral daily  sevelamer carbonate 1600 milliGRAM(s) Oral three times a day with meals  torsemide 100 milliGRAM(s) Oral <User Schedule>    MEDICATIONS  (PRN):  acetaminophen     Tablet .. 650 milliGRAM(s) Oral every 6 hours PRN Temp greater or equal to 38C (100.4F), Mild Pain (1 - 3)  ALBUTerol    90 MICROgram(s) HFA Inhaler 2 Puff(s) Inhalation every 6 hours PRN Shortness of Breath and/or Wheezing  melatonin 3 milliGRAM(s) Oral at bedtime PRN Insomnia      Allergies    codeine (Unknown)    Intolerances        REVIEW OF SYSTEMS:  CONSTITUTIONAL: No fever or chills  HEENT:  No headache, no sore throat  RESPIRATORY: No cough, wheezing, or shortness of breath  CARDIOVASCULAR: No chest pain, palpitations  GASTROINTESTINAL: No abd pain, nausea, vomiting, or diarrhea  GENITOURINARY: No dysuria, frequency, or hematuria  NEUROLOGICAL: no focal weakness or dizziness  MUSCULOSKELETAL: no myalgias     Vital Signs Last 24 Hrs  T(C): 36.8 (12 Aug 2022 11:04), Max: 36.8 (12 Aug 2022 11:04)  T(F): 98.2 (12 Aug 2022 11:04), Max: 98.2 (12 Aug 2022 11:04)  HR: 70 (12 Aug 2022 11:04) (69 - 72)  BP: 116/68 (12 Aug 2022 11:04) (90/57 - 116/68)  BP(mean): --  RR: 19 (12 Aug 2022 11:04) (16 - 19)  SpO2: 100% (12 Aug 2022 11:04) (94% - 100%)    Parameters below as of 12 Aug 2022 11:04  Patient On (Oxygen Delivery Method): nasal cannula  O2 Flow (L/min): 3      PHYSICAL EXAM:  GENERAL: NAD  HEENT:  anicteric, moist mucous membranes  CHEST/LUNG: mild crackles noted in lower lung fields, no wheezes  HEART:  RRR, S1, S2  ABDOMEN:  BS+, soft, nontender, nondistended  EXTREMITIES: AV fistula, wrapped on LUE; 2+ pitting edema BLE  NERVOUS SYSTEM: answers questions and follows commands appropriately    LABS:                        7.1    10.19 )-----------( 129      ( 12 Aug 2022 03:07 )             22.4     CBC Full  -  ( 12 Aug 2022 03:07 )  WBC Count : 10.19 K/uL  Hemoglobin : 7.1 g/dL  Hematocrit : 22.4 %  Platelet Count - Automated : 129 K/uL  Mean Cell Volume : 111.4 fl  Mean Cell Hemoglobin : 35.3 pg  Mean Cell Hemoglobin Concentration : 31.7 gm/dL  Auto Neutrophil # : 8.32 K/uL  Auto Lymphocyte # : 0.46 K/uL  Auto Monocyte # : 1.11 K/uL  Auto Eosinophil # : 0.03 K/uL  Auto Basophil # : 0.02 K/uL  Auto Neutrophil % : 81.6 %  Auto Lymphocyte % : 4.5 %  Auto Monocyte % : 10.9 %  Auto Eosinophil % : 0.3 %  Auto Basophil % : 0.2 %    12 Aug 2022 03:07    134    |  96     |  43     ----------------------------<  87     4.9     |  28     |  3.10     Ca    8.3        12 Aug 2022 03:07  Phos  4.1       12 Aug 2022 03:07  Mg     2.4       12 Aug 2022 03:07    TPro  5.4    /  Alb  2.6    /  TBili  3.1    /  DBili  x      /  AST  32     /  ALT  38     /  AlkPhos  114    12 Aug 2022 03:07    PT/INR - ( 11 Aug 2022 16:54 )   PT: 16.7 sec;   INR: 1.42 ratio             CAPILLARY BLOOD GLUCOSE              RADIOLOGY & ADDITIONAL TESTS:    Personally reviewed.     Consultant(s) Notes Reviewed:  [x] YES  [ ] NO

## 2022-08-12 NOTE — DIETITIAN INITIAL EVALUATION ADULT - NS FNS DIET ORDER
Diet, Renal Restrictions:   For patients receiving Renal Replacement - No Protein Restr, No Conc K, No Conc Phos, Low Sodium  1000mL Fluid Restriction ([id number]) (08-11-22 @ 20:58)

## 2022-08-12 NOTE — CONSULT NOTE ADULT - ASSESSMENT
ESRD on HD  Hypotension  Anemia  Aortic stenosis, For TAVR    HD today. PRBC transfusion. To continue current meds. Fluid removal as tolerated by BP.   Cardiology follow up. Further recommendations pending clinical course. Thank you for the courtesy of this referral.

## 2022-08-12 NOTE — DIETITIAN INITIAL EVALUATION ADULT - OTHER INFO
Reason for Admission: Anemia  88yo male with PMH of chronic atrial fibrillation s/p SHU/DCCV 1/2022 (on Eliquis), severe aortic stenosis (planned for TAVR 8/17/22 at [facility]) s/p balloon valvuloplasty complicated by CHB now s/p PPM, HFpEF (EF 59%), Streptococcus faecalis bacteremia (4/2022 - suspected endocarditis & treated with 6 weeks of antibiotics, ESRD on HD (T/Th/Sat), COPD, HTN, PAD with prior RLE revascularization, hypothyroidism, anemia presented to the ED sent in from dialysis for anemia. Patient was being set up for dialysis, spot hemoglobin performed revealed hemoglobin of 5.6 and he was referred to the emergency room for transfusion  patient reports with stable wt July admit 168# bed scale today 162# dialysis patient with edema noted this admit  .

## 2022-08-12 NOTE — PROGRESS NOTE ADULT - ASSESSMENT
86yo male with PMH of chronic atrial fibrillation s/p SHU/DCCV 1/2022 (on Eliquis), severe aortic stenosis (planned for TAVR 8/17/22 at [facility]) s/p balloon valvuloplasty complicated by CHB now s/p PPM, HFpEF (EF 59%), Streptococcus faecalis bacteremia (4/2022 - suspected endocarditis & treated with 6 weeks of antibiotics, ESRD on HD (T/Th/Sat), COPD, HTN, PAD with prior RLE revascularization, hypothyroidism, anemia presented to the ED sent in from dialysis for anemia, admitted for management of anemia.  88yo male with PMH of chronic atrial fibrillation s/p SHU/DCCV 1/2022 (on Eliquis), severe aortic stenosis (planned for TAVR 8/17/22 at [facility]) s/p balloon valvuloplasty complicated by CHB now s/p PPM, HFpEF (EF 59%), Streptococcus faecalis bacteremia (4/2022 - suspected endocarditis & treated with 6 weeks of antibiotics, ESRD on HD (T/Th/Sat), COPD, HTN, PAD with prior RLE revascularization, hypothyroidism, anemia presented to the ED sent in from dialysis for anemia, admitted for management of anemia. Hb upon admission was 5.6.

## 2022-08-12 NOTE — PROGRESS NOTE ADULT - SUBJECTIVE AND OBJECTIVE BOX
[facility] Cardiology    CHIEF COMPLAINT: Patient is a 87y old  Male who presents with a chief complaint of Anemia (11 Aug 2022 19:14)      Follow Up: [ ] Chest Pain      [ ] Dyspnea     [ ] Palpitations    [ ] Atrial Fibrillation     [ ] Ventricular Dysrhythmia    [ ] Abnormal EKG                      [ ] Abnormal Cardiac Enzymes     [ ] Valvular Disease    HPI:  88yo male with PMH of chronic atrial fibrillation s/p SHU/DCCV 1/2022 (on Eliquis), severe aortic stenosis (planned for TAVR 8/17/22 at [facility]) s/p balloon valvuloplasty complicated by CHB now s/p PPM, HFpEF (EF 59%), Streptococcus faecalis bacteremia (4/2022 - suspected endocarditis & treated with 6 weeks of antibiotics, ESRD on HD (T/Th/Sat), COPD, HTN, PAD with prior RLE revascularization, hypothyroidism, anemia presented to the ED sent in from dialysis for anemia. Patient was being set up for dialysis, spot hemoglobin performed revealed hemoglobin of 5.6 and he was referred to the emergency room for transfusion. Daughter Francoise states that patient was too weak to get him out of his dialysis chair so they called EMS for transport. His dialysis session was completed, but there was no volume removal due to low blood pressure. Patient states that this morning he was feeling weaker than normal, with difficulty getting out of his bed by himself. He has been feeling dizzy for the past two days. No syncope, chest pain or worsening shortness of breath. He denies any grossly bleeding. Of note, patient was recently admitted to [facility] in July for AFib with RVR, hypotension, requiring ICU admission for rate control and pressors. He was transferred to [facility] for urgent TAVR. At [place], patient continued to be hypotensive precluding dialysis. He was also found to have new RBBB. Patient underwent balloon valvuloplasty to help facilitate dialysis and treat his acute decompensated heart failure, however, shortly after developed complete heart block requiring placement of Medtronic single chamber PPM on 7/22/22. He was eventually discharged from the hospital, with plans to pursue TAVR on 8/17/22 with Dr. Medrano. Patient was discharged on supplemental oxygen 2L NC from his recent admission.     In the ED:  Vitals: Temp 97.8F | HR 69 | BP 91/55 | RR 16 | SpO2 100% on RA  Labs: H/H 6.2/19.9 , platelets 145, Na 133, troponin 424.7, CK 6.3, BNP 43173  CXR: no acute lung findings  ECG: Vpaced rhythm, 71  Ordered for 1 unit pRBCs   (11 Aug 2022 19:14)    PAST MEDICAL & SURGICAL HISTORY:  CHF (congestive heart failure)  HFpEF      HTN (hypertension)      Atrial fibrillation  s/p PPM      Aortic stenosis  pending TAVR      History of COPD      PAD (peripheral artery disease)  s/p RLE stenting      H/O hernia repair      H/O varicose vein stripping  right leg      History of pacemaker        MEDICATIONS  (STANDING):  apixaban 2.5 milliGRAM(s) Oral two times a day  atorvastatin 10 milliGRAM(s) Oral at bedtime  budesonide  80 MICROgram(s)/formoterol 4.5 MICROgram(s) Inhaler 2 Puff(s) Inhalation two times a day  folic acid 1 milliGRAM(s) Oral daily  levothyroxine 112 MICROGram(s) Oral daily  midodrine. 5 milliGRAM(s) Oral three times a day  Nephro-marcelino 1 Tablet(s) Oral daily  sevelamer carbonate 1600 milliGRAM(s) Oral three times a day with meals  torsemide 100 milliGRAM(s) Oral <User Schedule>    MEDICATIONS  (PRN):  acetaminophen     Tablet .. 650 milliGRAM(s) Oral every 6 hours PRN Temp greater or equal to 38C (100.4F), Mild Pain (1 - 3)  ALBUTerol    90 MICROgram(s) HFA Inhaler 2 Puff(s) Inhalation every 6 hours PRN Shortness of Breath and/or Wheezing  melatonin 3 milliGRAM(s) Oral at bedtime PRN Insomnia    Allergies    codeine (Unknown)    Intolerances        REVIEW OF SYSTEMS:    CONSTITUTIONAL: No weakness, fevers or chills.   EYES/ENT: No visual changes;    NECK: No pain or stiffness  RESPIRATORY: No cough, wheezing, No shortness of breath  CARDIOVASCULAR: No chest pain or palpitations  GASTROINTESTINAL: No abdominal pain, or hematochezia.  GENITOURINARY: No dysuria orhematuria  NEUROLOGICAL: No numbness or weakness  SKIN: No itching, burning, rashes  All other review of systems is negative unless indicated above    Vital Signs Last 24 Hrs  T(C): 36.5 (12 Aug 2022 04:30), Max: 36.6 (11 Aug 2022 18:49)  T(F): 97.7 (12 Aug 2022 04:30), Max: 97.8 (11 Aug 2022 18:49)  HR: 71 (12 Aug 2022 04:30) (69 - 72)  BP: 106/60 (12 Aug 2022 04:30) (90/57 - 106/60)  BP(mean): --  RR: 17 (12 Aug 2022 04:30) (16 - 18)  SpO2: 99% (12 Aug 2022 04:30) (94% - 100%)    Parameters below as of 12 Aug 2022 04:30  Patient On (Oxygen Delivery Method): nasal cannula  O2 Flow (L/min): 3    I&O's Summary      PHYSICAL EXAM:  Constitutional: NAD  Neurological: Alert, no focal deficits  HEENT: no JVD, EOMI  Cardiovascular: Regular, S1 and S2, no murmur  Pulmonary: breath sounds bilaterally  Gastrointestinal: Bowel Sounds present, soft, nontender  EXT:  no peripheral edema  Skin: No rashes.  Psych:  Mood calm  LABS: All Labs Reviewed:                          7.1    10.19 )-----------( 129      ( 12 Aug 2022 03:07 )             22.4     08-12    134<L>  |  96  |  43<H>  ----------------------------<  87  4.9   |  28  |  3.10<H>    Ca    8.3<L>      12 Aug 2022 03:07  Phos  4.1     08-12  Mg     2.4     08-12    TPro  5.4<L>  /  Alb  2.6<L>  /  TBili  3.1<H>  /  DBili  x   /  AST  32  /  ALT  38  /  AlkPhos  114  08-12    PT/INR - ( 11 Aug 2022 16:54 )   PT: 16.7 sec;   INR: 1.42 ratio           CARDIAC MARKERS ( 11 Aug 2022 16:54 )  x     / x     / x     / x     / 6.3 ng/mL      Xray Chest 1 View- PORTABLE-Urgent:   ACC: [id number] EXAM:  XR CHEST PORTABLE URGENT 1V                          PROCEDURE DATE:  08/11/2022          INTERPRETATION:  AP semierect chest on August 11, 2022 at 3:26 PM.   Patient is short of breath.    Heart magnified by technique.    Moderate right effusion with loculation again noted.    Slight infiltrate left lower lung field again seen.    Above findings are similar to July 21.    Present film shows a unipolar right-sided pacemaker which is new.    IMPRESSION: Insertion of right-sided pacemaker. Other findings stable.    --- End of Report ---    ORAL FONTAINE MD; Attending Radiologist  This document has been electronically signed. Aug 11 2022  4:39PM (08-11-22 @ 16:17)  12 Lead ECG:   Ventricular Rate 71 BPM  Atrial Rate 340 BPM  QRS Duration 166 msQ-T Interval 506 ms  QTC Calculation(Bazett) 549 ms    P Axis 162 degrees    R Axis -70 degrees    T Axis 111 degrees    Diagnosis Line Ventricular-paced rhythm  Abnormal ECG  When compared with ECG of 21-JUL-2022 08:37,  Electronic ventricular pacemaker has replaced Atrial fibrillation  Confirmed by Latoya Parson ([id number]) on 8/12/2022 8:45:52 AM (08-11-22 @ 15:22)    Patient is a 87y old  Male PMH HTN AFIB, CAD, COPD, ESRD on HD, anemia, severe AS s/p Aortic valvuloplasty, PPM, awaiting TAVR,  PAD, HefPef a/w severe anemia discovered at HD earlier today. Pt denies Cp or SOB.    s/p  PRBC overnight  HGB 7.1  pt sees Dr Conteh outpt  Plan for TAVR upcoming weeks with Dr Mitchell wang outpt torsemide 100 (ON NON HD DAYS)  will defer HD (post PRBC) per renal  elliquis 2.5 BID  will follow

## 2022-08-12 NOTE — PHYSICAL THERAPY INITIAL EVALUATION ADULT - PLANNED THERAPY INTERVENTIONS, PT EVAL
Physical Therapy  Visit Type: treatment  Precautions:  Medical precautions:  fall risk; standard precautions.  Therapist wearing gloves, eye protection and surgical mask during session.    Patient was wearing mask throughout duration of therapy session.   Spine:     Lumbar: no twisting, no bending and no lifting greater than 10 #  Safety Measures: chair alarm, bed alarm and bed rails      SUBJECTIVE                                                                                                            Patient agreed to participate in therapy this date.  \"I think I did better than yesterday\"  Patient / Family Goal: return to previous functional status and return home    Pain   RN informed on pain level      OBJECTIVE                                                                                                                Oriented to person, place and time     Arousal alertness: appropriate responses to stimuli    Affect/Behavior: appropriate, calm, cooperative and alert  Patient activity tolerance: 1 to 1 activity to rest  Functional Communication/Cognition    Overall status:  Within functional limits    Form of communication:  Verbal   Attention span:  Appears intact    Commands: follows all commands and directions consistently.    Transition between tasks: transitions tasks without difficulty.    Safety judgement: good awareness of safety precautions.    Awareness of deficits: fully aware of deficits.    Bed Mobility:      Rolling right: moderate assist      Side-lying to sit: maximal assist and with verbal cues  Training completed:    Tasks: supine to sit and rolling right    Education details: patient safety and patient requires additional training  Transfers:    Assistive devices: 2-wheeled walker, 1 person and gait belt    Sit to stand: moderate assist and with verbal cues (from elevated bed height)    Stand to sit: minimal assist and with verbal cues (cues to reach for chair offered)  Training  completed:    Tasks: sit to stand and stand to sit    Education details: patient safety, body mechanics and patient requires additional training  Gait/Ambulation:     Assistance: moderate assist and with verbal cues (chair follow recommended)   Assistive device: 2-wheeled walker, 1 person and gait belt    Distance (ft): 40; 40    Pattern: step to    Type: decreased selvin and unsteady  Training Completed:    Tasks: gait training on level surfaces    Education details: patient safety and patient requires additional training    Gait is effortful with heavy reliance on RW with BUE.  Needs seated rest break between walks due to BLE fatigue.  Has a hard time making turns with RW        Interventions                                                                                                       Skilled input: Verbal instruction/cues  Verbal Consent: Writer verbally educated and received verbal consent for hand placement, positioning of patient, and techniques to be performed today from patient for clothing adjustments for techniques, hand placement and palpation for techniques and therapist position for techniques as described above and how they are pertinent to the patient's plan of care.        ASSESSMENT                                                                                                                Impairments: strength, balance deficits and endurance  Functional Limitations: all functional mobility     Discharge Recommendations   Recommendation for Discharge: PT IL: Patient needs intensive skilled therapy for a minimum of 3 hours daily by at least two disciplines with the oversight of a physical medicine and rehabilitation physician        PT/OT Mobility Equipment for Discharge: TBD   PT/OT ADL Equipment for Discharge: reacher, sock aide, LH shoe horn, RTS, shower chair      Skilled therapy is required to address these limitations in attempt to maximize the patient's independence.  Progress:  progressing toward goals    End of Session:   Location: in chair  Safety measures: alarm system in place/re-engaged and call light within reach  Handoff to: nurse            PLAN                                                                                                                            Suggestions for next session as indicated: PT Frequency: Once a day, 7 days/week  Frequency Comments: ORTHO AMG 6w? 1.6    Interventions: balance, bed mobility, functional transfer training, body mechanics, gait training and safety education  Agreement to plan and goals: patient agrees with goals and treatment plan        GOALS:  Review Date: 1/6/2021  Long Term Goals: (to be met by time of discharge from hospital)  Sit to stand: Patient will complete sit to stand transfer with 2-wheeled walker, contact guard or touching/steadying.   Status: progressing/ongoing  Stand to sit: Patient will complete stand to sit transfer with 2-wheeled walker, contact guard or touching/steadying.   Status: progressing/ongoing  Stand pivot: Patient will complete stand pivot transfer with 2-wheeled walker, contact guard or touching/steadying.   Status: progressing/ongoing  Ambulation (even): Patient will ambulate on even surface for 25 feet with 2-wheeled walker, contact guard or touching/steadying.   Status: progressing/ongoing    Pt will perform supine<>sidelying<>sit with 1 assistDocumented in the chart in the following areas: Pain. Assessment.         bed mobility training/gait training/transfer training

## 2022-08-12 NOTE — PROGRESS NOTE ADULT - PROBLEM SELECTOR PLAN 4
Chronic  - Patient with history of severe AS s/p balloon valvuloplasty 7/22/22 which was complicated by complete heart block requiring PPM  - Plan for TAVR on 8/17/22 with Dr. Medrano at [facility] - Plan for TAVR on 8/17/22 with Dr. Medrano at [facility]

## 2022-08-12 NOTE — PROGRESS NOTE ADULT - PROBLEM SELECTOR PLAN 2
Patient with history of hypotension during previous admission, likely in the setting of hypovolemia from anemia  - Avoid IVF given history of acute decompensated heart failure exacerbation in 7/2022  - Will monitor BP closely  - Midodrine 5mg TID for BP support  - Check orthostatics  - Monitor routine hemodynamics  - PT consult recommended home PT, pt already receives home PT Patient with history of hypotension during previous admission, likely in the setting of hypovolemia from anemia  - Avoid IVF given history of acute decompensated heart failure exacerbation in 7/2022  - continue monitoring BP closely  - continue Midodrine 5mg TID for BP support  - Check orthostatics  - PT consult recommended home PT, pt already receives home PT

## 2022-08-12 NOTE — DIETITIAN INITIAL EVALUATION ADULT - PERTINENT MEDS FT
MEDICATIONS  (STANDING):  apixaban 2.5 milliGRAM(s) Oral two times a day  atorvastatin 10 milliGRAM(s) Oral at bedtime  budesonide  80 MICROgram(s)/formoterol 4.5 MICROgram(s) Inhaler 2 Puff(s) Inhalation two times a day  folic acid 1 milliGRAM(s) Oral daily  levothyroxine 112 MICROGram(s) Oral daily  midodrine. 5 milliGRAM(s) Oral three times a day  Nephro-marcelino 1 Tablet(s) Oral daily  sevelamer carbonate 1600 milliGRAM(s) Oral three times a day with meals  torsemide 100 milliGRAM(s) Oral <User Schedule>    MEDICATIONS  (PRN):  acetaminophen     Tablet .. 650 milliGRAM(s) Oral every 6 hours PRN Temp greater or equal to 38C (100.4F), Mild Pain (1 - 3)  ALBUTerol    90 MICROgram(s) HFA Inhaler 2 Puff(s) Inhalation every 6 hours PRN Shortness of Breath and/or Wheezing  melatonin 3 milliGRAM(s) Oral at bedtime PRN Insomnia

## 2022-08-12 NOTE — PROGRESS NOTE ADULT - ATTENDING COMMENTS
symptomatic anemia HH 5.6  s/p 1 unit prbc HH 7.1, will give another unit of blood transfsion  to elevated above 8,  no acute evidence of bleeding, could be from severe AS?    Severe  Tavr  scheduled for valve replacment in [facility] on wensday  cardio f/u

## 2022-08-13 NOTE — PROGRESS NOTE ADULT - PROBLEM SELECTOR PLAN 3
Probably demand ischemia   - troponin 424.7-->407.4  - ECG now with V paced rhythm s/p pacemaker  - Cardiology (Dr. Salazar) following Probably demand ischemia   - troponin 424.7-->407.4  - ECG now with V paced rhythm s/p pacemaker  - Cardiology (Dr. Salazar) .

## 2022-08-13 NOTE — PROGRESS NOTE ADULT - PROBLEM SELECTOR PLAN 2
Soft BP this afternoon   - continue Midodrine 5mg TID for BP support  - PT consult recommended,  home PT

## 2022-08-13 NOTE — PROGRESS NOTE ADULT - ASSESSMENT
ESRD on HD  Hypotension  Anemia  Aortic stenosis, For TAVR    s/p UF yesterday. HD today. Monitor h/h trend. Transfuse PRBC's PRN. To continue current meds.   Fluid removal as tolerated by BP. Cardiology follow up.

## 2022-08-13 NOTE — PROGRESS NOTE ADULT - PROBLEM SELECTOR PLAN 6
Chronic, with history of recent acute on chronic exacerbation  - TTE from 4/2022 showing EF 59%, mild LVH, severe AS, moderate MR, TR  - Continue Torsemide 100mg BID on nondialysis days (Sun/Mon/Wed/Fri)  - Strict Is/Os, daily weights, 1L fluid restriction

## 2022-08-13 NOTE — PROGRESS NOTE ADULT - SUBJECTIVE AND OBJECTIVE BOX
Patient is a 87y Male with a known history of :  Anemia [D64.9]    Elevated troponin level [R77.8]    Hyponatremia [E87.1]    Severe aortic stenosis [I35.0]    ESRD on dialysis [N18.6]    COPD without exacerbation [J44.9]    Hypotension [I95.9]    Thrombocytopenia [D69.6]    Atrial fibrillation [I48.91]    Need for prophylactic measure [Z29.9]    Chronic heart failure with preserved ejection fraction (HFpEF) [I50.32]    Hypothyroidism [E03.9]    Hyperlipidemia [E78.5]      HPI:  86yo male with PMH of chronic atrial fibrillation s/p SHU/DCCV 1/2022 (on Eliquis), severe aortic stenosis (planned for TAVR 8/17/22 at [facility]) s/p balloon valvuloplasty complicated by CHB now s/p PPM, HFpEF (EF 59%), Streptococcus faecalis bacteremia (4/2022 - suspected endocarditis & treated with 6 weeks of antibiotics, ESRD on HD (T/Th/Sat), COPD, HTN, PAD with prior RLE revascularization, hypothyroidism, anemia presented to the ED sent in from dialysis for anemia. Patient was being set up for dialysis, spot hemoglobin performed revealed hemoglobin of 5.6 and he was referred to the emergency room for transfusion. Daughter Francoise states that patient was too weak to get him out of his dialysis chair so they called EMS for transport. His dialysis session was completed, but there was no volume removal due to low blood pressure. Patient states that this morning he was feeling weaker than normal, with difficulty getting out of his bed by himself. He has been feeling dizzy for the past two days. No syncope, chest pain or worsening shortness of breath. He denies any grossly bleeding. Of note, patient was recently admitted to [facility] in July for AFib with RVR, hypotension, requiring ICU admission for rate control and pressors. He was transferred to [facility] for urgent TAVR. At [place], patient continued to be hypotensive precluding dialysis. He was also found to have new RBBB. Patient underwent balloon valvuloplasty to help facilitate dialysis and treat his acute decompensated heart failure, however, shortly after developed complete heart block requiring placement of Medtronic single chamber PPM on 7/22/22. He was eventually discharged from the hospital, with plans to pursue TAVR on 8/17/22 with Dr. Medrano. Patient was discharged on supplemental oxygen 2L NC from his recent admission.     In the ED:  Vitals: Temp 97.8F | HR 69 | BP 91/55 | RR 16 | SpO2 100% on RA  Labs: H/H 6.2/19.9 , platelets 145, Na 133, troponin 424.7, CK 6.3, BNP 84154  CXR: no acute lung findings  ECG: Vpaced rhythm, 71  Ordered for 1 unit pRBCs   (11 Aug 2022 19:14)      REVIEW OF SYSTEMS:    CONSTITUTIONAL: No weakness, fevers or chills.   EYES/ENT: No visual changes;    NECK: No pain or stiffness  RESPIRATORY: No cough, wheezing, No shortness of breath  CARDIOVASCULAR: No chest pain or palpitations  GASTROINTESTINAL: No abdominal pain, or hematochezia.  GENITOURINARY: No dysuria orhematuria  NEUROLOGICAL: No numbness or weakness  SKIN: No itching, burning, rashes  All other review of systems is negative unless indicated above      MEDICATIONS  (STANDING):  apixaban 2.5 milliGRAM(s) Oral two times a day  atorvastatin 10 milliGRAM(s) Oral at bedtime  budesonide  80 MICROgram(s)/formoterol 4.5 MICROgram(s) Inhaler 2 Puff(s) Inhalation two times a day  epoetin ruthann-epbx (RETACRIT) Injectable 74909 Unit(s) IV Push <User Schedule>  folic acid 1 milliGRAM(s) Oral daily  levothyroxine 112 MICROGram(s) Oral daily  midodrine. 5 milliGRAM(s) Oral three times a day  Nephro-marcelino 1 Tablet(s) Oral daily  sevelamer carbonate 1600 milliGRAM(s) Oral three times a day with meals  torsemide 100 milliGRAM(s) Oral <User Schedule>    MEDICATIONS  (PRN):  acetaminophen     Tablet .. 650 milliGRAM(s) Oral every 6 hours PRN Temp greater or equal to 38C (100.4F), Mild Pain (1 - 3)  ALBUTerol    90 MICROgram(s) HFA Inhaler 2 Puff(s) Inhalation every 6 hours PRN Shortness of Breath and/or Wheezing  melatonin 3 milliGRAM(s) Oral at bedtime PRN Insomnia      ALLERGIES: codeine (Unknown)      FAMILY HISTORY:  FHx: heart disease        PHYSICAL EXAMINATION:  -----------------------------  T(C): 36.4 (08-13-22 @ 09:45), Max: 36.8 (08-12-22 @ 14:45)  HR: 70 (08-13-22 @ 09:50) (70 - 78)  BP: 97/57 (08-13-22 @ 09:50) (97/57 - 133/71)  RR: 18 (08-13-22 @ 09:45) (17 - 18)  SpO2: 95% (08-13-22 @ 09:45) (94% - 100%)  Wt(kg): --    08-12 @ 07:01  -  08-13 @ 07:00  --------------------------------------------------------  IN:  Total IN: 0 mL    OUT:    Other (mL): 2000 mL  Total OUT: 2000 mL    Total NET: -2000 mL      PHYSICAL EXAM:    Constitutional: NAD  Neurological: Alert, no focal deficits  HEENT: no JVD, EOMI  Cardiovascular: Regular, S1 and S2, no murmur  Pulmonary: breath sounds bilaterally  Gastrointestinal: Bowel Sounds present, soft, nontender  EXT:  no peripheral edema  Skin: No rashes.  Psych:  Mood calm  LABS: All Labs Reviewed:          LABS:   --------  08-13    131<L>  |  94<L>  |  68<H>  ----------------------------<  95  5.0   |  28  |  4.20<H>    Ca    8.7      13 Aug 2022 06:36  Phos  4.1     08-12  Mg     2.4     08-12    TPro  5.4<L>  /  Alb  2.6<L>  /  TBili  3.1<H>  /  DBili  x   /  AST  32  /  ALT  38  /  AlkPhos  114  08-12                         8.7    10.19 )-----------( 138      ( 13 Aug 2022 06:36 )             27.0     PT/INR - ( 11 Aug 2022 16:54 )   PT: 16.7 sec;   INR: 1.42 ratio

## 2022-08-13 NOTE — PROGRESS NOTE ADULT - PROBLEM SELECTOR PLAN 1
Acute on chronic macrocytic anemia   - Hb low stable 8.7->10.3  - s/p 2 pRBCs   - Baseline hemoglobin 7-8   - Iron studies wnl   - Will start retacrit once a week   - Continue folic acid daily  - Continue Eliquis as patient with significant cardiac risk factors and benefit outweighs risk

## 2022-08-13 NOTE — PROGRESS NOTE ADULT - PROBLEM SELECTOR PLAN 5
Chronic hyponatremia per chart review (Na baseline 130's)  - PO fluid restriction  - continue monitoring with daily CMP

## 2022-08-13 NOTE — PROGRESS NOTE ADULT - SUBJECTIVE AND OBJECTIVE BOX
Patient is a 87y old  Male who presents with a chief complaint of Anemia (13 Aug 2022 11:58)      ----  INTERVAL HPI/OVERNIGHT EVENTS: Pt seen and evaluated at the bedside. No acute overnight events occurred.     ----  PAST MEDICAL & SURGICAL HISTORY:  CHF (congestive heart failure)  HFpEF      HTN (hypertension)      Atrial fibrillation  s/p PPM      Aortic stenosis  pending TAVR      History of COPD      PAD (peripheral artery disease)  s/p RLE stenting      H/O hernia repair      H/O varicose vein stripping  right leg      History of pacemaker          FAMILY HISTORY:  FHx: heart disease        Allergies    codeine (Unknown)    Intolerances        ----  REVIEW OF SYSTEMS:  CONSTITUTIONAL: denies fever, chills, fatigue, weakness  HEENT: denies blurred vision, sore throat  CARDIOVASCULAR: denies chest pain, chest pressure, palpitations  RESPIRATORY: denies shortness of breath, sputum production  GASTROINTESTINAL: denies nausea, vomiting, diarrhea, abdominal pain  GENITOURINARY: denies dysuria, discharge  NEUROLOGICAL: denies numbness, headache, focal weakness  MUSCULOSKELETAL: denies new joint pain, muscle aches  HEMATOLOGIC: denies gross bleeding, bruising  LYMPHATICS: denies enlarged lymph nodes, extremity swelling  PSYCHIATRIC: denies recent changes in anxiety, depression  ENDOCRINOLOGIC: denies sweating, cold or heat intolerance    ----  PHYSICAL EXAM:  GENERAL: patient appears well, no acute distress, appropriately interactive  LUNGS: good air entry bilaterally, mild diffuse crackles, negative wheezing or rhonchi appreciated  HEART: S1/S2, systolic murmur   GASTROINTESTINAL: abdomen is soft, nontender, nondistended, normoactive bowel sounds, no palpable masses  INTEGUMENT: good skin turgor, appropriate for ethnicity, appears well perfused, no jaundice noted  EXTREMITIES: AV fistula, wrapped on LUE; 2+ pitting edema BLE  NEUROLOGIC: awake, alert, oriented x3,  PSYCHIATRIC: mood is good      T(C): 36.4 (08-13-22 @ 09:45), Max: 36.8 (08-12-22 @ 14:45)  HR: 70 (08-13-22 @ 09:50) (70 - 78)  BP: 97/57 (08-13-22 @ 09:50) (97/57 - 133/71)  RR: 18 (08-13-22 @ 09:45) (17 - 18)  SpO2: 95% (08-13-22 @ 09:45) (94% - 100%)  Wt(kg): --    ----  I&O's Summary    12 Aug 2022 07:01  -  13 Aug 2022 07:00  --------------------------------------------------------  IN: 0 mL / OUT: 2000 mL / NET: -2000 mL        LABS:                        8.7    10.19 )-----------( 138      ( 13 Aug 2022 06:36 )             27.0     08-13    131<L>  |  94<L>  |  68<H>  ----------------------------<  95  5.0   |  28  |  4.20<H>    Ca    8.7      13 Aug 2022 06:36  Phos  4.1     08-12  Mg     2.4     08-12    TPro  5.4<L>  /  Alb  2.6<L>  /  TBili  3.1<H>  /  DBili  x   /  AST  32  /  ALT  38  /  AlkPhos  114  08-12    PT/INR - ( 11 Aug 2022 16:54 )   PT: 16.7 sec;   INR: 1.42 ratio             CAPILLARY BLOOD GLUCOSE                           Patient is a 87y old  Male who presents with a chief complaint of Anemia (13 Aug 2022 11:58)      ----  INTERVAL HPI/OVERNIGHT EVENTS: Pt seen and evaluated at the bedside. No acute overnight events occurred    post transfusion hb stable .             ----  REVIEW OF SYSTEMS:  CONSTITUTIONAL: denies fever, chills, fatigue, weakness  HEENT: denies blurred vision, sore throat  CARDIOVASCULAR: denies chest pain, chest pressure, palpitations  RESPIRATORY: denies shortness of breath, sputum production  GASTROINTESTINAL: denies nausea, vomiting, diarrhea, abdominal pain  GENITOURINARY: denies dysuria, discharge  NEUROLOGICAL: denies numbness, headache, focal weakness  MUSCULOSKELETAL: denies new joint pain, muscle aches  HEMATOLOGIC: denies gross bleeding, bruising  LYMPHATICS: denies enlarged lymph nodes, extremity swelling  PSYCHIATRIC: denies recent changes in anxiety, depression  ENDOCRINOLOGIC: denies sweating, cold or heat intolerance    ----  PHYSICAL EXAM:  GENERAL: no acute distress,  LUNGS:  mild diffuse crackles,   negative wheezing or rhonchi  HEART: S1/S2, systolic murmur , no tachy   GASTROINTESTINAL: abdomen is soft, nontender, nondistended, normoactive bowel sounds, no palpable masses  INTEGUMENT: good skin turgor, appropriate for ethnicity, appears well perfused,   EXTREMITIES: AV fistula, wrapped on LUE; 2+ pitting edema BLE  NEUROLOGIC: awake, alert, oriented x3,  PSYCHIATRIC: mood is good  gu intact     T(C): 36.4 (08-13-22 @ 09:45), Max: 36.8 (08-12-22 @ 14:45)  HR: 70 (08-13-22 @ 09:50) (70 - 78)  BP: 97/57 (08-13-22 @ 09:50) (97/57 - 133/71)  RR: 18 (08-13-22 @ 09:45) (17 - 18)  SpO2: 95% (08-13-22 @ 09:45) (94% - 100%)  Wt(kg): --    ----  I&O's Summary    12 Aug 2022 07:01  -  13 Aug 2022 07:00  --------------------------------------------------------  IN: 0 mL / OUT: 2000 mL / NET: -2000 mL        LABS:                        8.7    10.19 )-----------( 138      ( 13 Aug 2022 06:36 )             27.0     08-13    131<L>  |  94<L>  |  68<H>  ----------------------------<  95  5.0   |  28  |  4.20<H>    Ca    8.7      13 Aug 2022 06:36  Phos  4.1     08-12  Mg     2.4     08-12    TPro  5.4<L>  /  Alb  2.6<L>  /  TBili  3.1<H>  /  DBili  x   /  AST  32  /  ALT  38  /  AlkPhos  114  08-12    PT/INR - ( 11 Aug 2022 16:54 )   PT: 16.7 sec;   INR: 1.42 ratio

## 2022-08-13 NOTE — PROGRESS NOTE ADULT - SUBJECTIVE AND OBJECTIVE BOX
Patient is a 87y old  Male who presents with a chief complaint of Anemia in other chronic diseases classified elsewhere     (12 Aug 2022 14:30)    Patient seen in follow up for ESRD on HD.        PAST MEDICAL HISTORY:  CHF (congestive heart failure)    HTN (hypertension)    Atrial fibrillation    Aortic stenosis    History of COPD    PAD (peripheral artery disease)      MEDICATIONS  (STANDING):  apixaban 2.5 milliGRAM(s) Oral two times a day  atorvastatin 10 milliGRAM(s) Oral at bedtime  budesonide  80 MICROgram(s)/formoterol 4.5 MICROgram(s) Inhaler 2 Puff(s) Inhalation two times a day  epoetin ruthann-epbx (RETACRIT) Injectable 89426 Unit(s) IV Push <User Schedule>  folic acid 1 milliGRAM(s) Oral daily  levothyroxine 112 MICROGram(s) Oral daily  midodrine. 5 milliGRAM(s) Oral three times a day  Nephro-marcelnio 1 Tablet(s) Oral daily  sevelamer carbonate 1600 milliGRAM(s) Oral three times a day with meals  torsemide 100 milliGRAM(s) Oral <User Schedule>    MEDICATIONS  (PRN):  acetaminophen     Tablet .. 650 milliGRAM(s) Oral every 6 hours PRN Temp greater or equal to 38C (100.4F), Mild Pain (1 - 3)  ALBUTerol    90 MICROgram(s) HFA Inhaler 2 Puff(s) Inhalation every 6 hours PRN Shortness of Breath and/or Wheezing  melatonin 3 milliGRAM(s) Oral at bedtime PRN Insomnia    T(C): 36.4 (08-13-22 @ 09:45), Max: 36.8 (08-12-22 @ 11:04)  HR: 70 (08-13-22 @ 09:50) (69 - 78)  BP: 97/57 (08-13-22 @ 09:50) (90/57 - 133/71)  RR: 18 (08-13-22 @ 09:45)  SpO2: 95% (08-13-22 @ 09:45)  Wt(kg): --  I&O's Detail    12 Aug 2022 07:01  -  13 Aug 2022 07:00  --------------------------------------------------------  IN:  Total IN: 0 mL    OUT:    Other (mL): 2000 mL  Total OUT: 2000 mL    Total NET: -2000 mL            PHYSICAL EXAM:  General: No distress  Respiratory: b/l air entry  Cardiovascular: S1 S2  Gastrointestinal: soft  Extremities:  edema                            LABORATORY:                        8.7    10.19 )-----------( 138      ( 13 Aug 2022 06:36 )             27.0     08-13    131<L>  |  94<L>  |  68<H>  ----------------------------<  95  5.0   |  28  |  4.20<H>    Ca    8.7      13 Aug 2022 06:36  Phos  4.1     08-12  Mg     2.4     08-12    TPro  5.4<L>  /  Alb  2.6<L>  /  TBili  3.1<H>  /  DBili  x   /  AST  32  /  ALT  38  /  AlkPhos  114  08-12    Sodium, Serum: 131 mmol/L (08-13 @ 06:36)  Sodium, Serum: 134 mmol/L (08-12 @ 03:07)  Sodium, Serum: 133 mmol/L (08-11 @ 16:54)    Potassium, Serum: 5.0 mmol/L (08-13 @ 06:36)  Potassium, Serum: 4.9 mmol/L (08-12 @ 03:07)  Potassium, Serum: 4.1 mmol/L (08-11 @ 16:54)    Hemoglobin: 8.7 g/dL (08-13 @ 06:36)  Hemoglobin: 10.3 g/dL (08-12 @ 18:40)  Hemoglobin: 7.1 g/dL (08-12 @ 03:07)  Hemoglobin: 6.2 g/dL (08-11 @ 16:54)    Creatinine, Serum 4.20 (08-13 @ 06:36)  Creatinine, Serum 3.10 (08-12 @ 03:07)  Creatinine, Serum 2.60 (08-11 @ 16:54)    CARDIAC MARKERS ( 11 Aug 2022 16:54 )  x     / x     / x     / x     / 6.3 ng/mL      LIVER FUNCTIONS - ( 12 Aug 2022 03:07 )  Alb: 2.6 g/dL / Pro: 5.4 g/dL / ALK PHOS: 114 U/L / ALT: 38 U/L / AST: 32 U/L / GGT: x

## 2022-08-13 NOTE — PROGRESS NOTE ADULT - ASSESSMENT
88yo male with PMH of chronic atrial fibrillation s/p SHU/DCCV 1/2022 (on Eliquis), severe aortic stenosis (planned for TAVR 8/17/22 at [facility]) s/p balloon valvuloplasty complicated by CHB now s/p PPM, HFpEF (EF 59%), Streptococcus faecalis bacteremia (4/2022 - suspected endocarditis & treated with 6 weeks of antibiotics, ESRD on HD (T/Th/Sat), COPD, HTN, PAD with prior RLE revascularization, hypothyroidism, anemia presented to the ED sent in from dialysis for anemia, admitted for management of anemia. Hb upon admission was 5.6.

## 2022-08-13 NOTE — PROGRESS NOTE ADULT - PROBLEM SELECTOR PLAN 7
Chronic  - On HD Tue/Thu/Sat  - Underwent dialysis today  - Continue sevelamer  - Monitor Is/Os  - Nephrology (Dr. Rivera) following

## 2022-08-13 NOTE — PROGRESS NOTE ADULT - ASSESSMENT
86yo male with PMH of chronic atrial fibrillation s/p SHU/DCCV 1/2022 (on Eliquis), severe aortic stenosis (planned for TAVR 8/17/22 at [facility]) s/p balloon valvuloplasty complicated by CHB now s/p PPM, HFpEF (EF 59%), Streptococcus faecalis bacteremia (4/2022 - suspected endocarditis & treated with 6 weeks of antibiotics, ESRD on HD (T/Th/Sat), COPD, HTN, PAD with prior RLE revascularization, hypothyroidism, anemia presented to the ED sent in from dialysis for anemia, admitted for management of anemia. Hb upon admission was 5.6.  Pt denies Cp or SOB.    -anemia  s/p  PRBC, HGB 7.1    -elevated trops  400s, likely demand ischemia.    -severe AS  S/p probable endocarditis, s/p valvuloplasty.  For TAVR 8/17  stable hemodynamics so far.     -HFpEF  on HD, torsemide 100 mg on nonHD days    -chronic AF  rate good, s/p PPM  on Eliquis 2.5 mg BID    -hypotension  BP holding on midodrine       pt sees cardiologist  Dr Ryan Conteh outpt.

## 2022-08-13 NOTE — PROGRESS NOTE ADULT - TIME BILLING
pt seen and examine today see above plan - esrd  on hd  / next hd  today  Acute on chronic macrocytic anemia   - Hb low stable 8.7->10.3  - s/p 2 pRBCs  hb stable.

## 2022-08-14 NOTE — DISCHARGE NOTE PROVIDER - NSDCCPCAREPLAN_GEN_ALL_CORE_FT
PRINCIPAL DISCHARGE DIAGNOSIS  Diagnosis: Anemia of chronic disease  Assessment and Plan of Treatment: You have history of anemia secondary to endstage renal disease. Hemoglobine on admission was 5.6, you recieved 2 units of blood and hb improved to your baseline      SECONDARY DISCHARGE DIAGNOSES  Diagnosis: ESRD on dialysis  Assessment and Plan of Treatment:     Diagnosis: Severe aortic stenosis  Assessment and Plan of Treatment: You have schedule  TAVR on 8/17/22 with Dr. Medrano at [facility].    Diagnosis: Shortness of breath  Assessment and Plan of Treatment:      PRINCIPAL DISCHARGE DIAGNOSIS  Diagnosis: Anemia of chronic disease  Assessment and Plan of Treatment: You have history of anemia secondary to endstage renal disease. Hemoglobine on admission was 5.6, you recieved 2 units of blood and hb improved to your baseline      SECONDARY DISCHARGE DIAGNOSES  Diagnosis: Shortness of breath  Assessment and Plan of Treatment: Your shortness of breath is most likely due to your low hemoglobin on admission. You received supplemental oxygen while in the hospital to help with your shortness of breath and to maintain your oxygen levels. You received 2 units of blood for your low hemoglobin and your hemoglobin improved to your baseline.  You also have history of COPD and your inhalers you take at home were continued: albuterol as needed and daily symbicort.    Diagnosis: ESRD on dialysis  Assessment and Plan of Treatment: You have a history of End stage kidney disease requiring dialysis. Your kidney function was monitored in the hospital and remained at baseline. You received dialysis while in the hospital on your regularly scheduled days. Please continue to follow up with dialysis at your dialysis center.    Diagnosis: Severe aortic stenosis  Assessment and Plan of Treatment: You have schedule  TAVR on 8/17/22 with Dr. Medrano at [facility].    Diagnosis: Hypotension  Assessment and Plan of Treatment: Your blood pressure was low during your hospital stay. You were started on midodrine while in the hopsital to help improve your blood pressure, which is now stable.    Diagnosis: Elevated troponin level  Assessment and Plan of Treatment: Your troponin levels were high in the hospital, most likely due to increased work of your heart with not enough blood flow to it. An EKG was done to check your heart which showed your pacemaker activity and was normal.    Diagnosis: Hyponatremia  Assessment and Plan of Treatment: You have a history of chronic low sodium levels. Your blood levels were monitored with labs in the hospital and remain at baseline.    Diagnosis: COPD without exacerbation  Assessment and Plan of Treatment: You have a history of COPD. Please continue home medications.    Diagnosis: Atrial fibrillation  Assessment and Plan of Treatment: You have a history of afib and you take Eliquis to prevent clots. Please continue home Eliquis.    Diagnosis: Chronic heart failure with preserved ejection fraction (HFpEF)  Assessment and Plan of Treatment: You have a history of chronic heart failure. Please continue home torsemide.    Diagnosis: Hypothyroidism  Assessment and Plan of Treatment: You have a history of hypothyroidism. Please continue home synthroid.    Diagnosis: Hyperlipidemia  Assessment and Plan of Treatment: You have a history of hyperlipidemia. Please continue home statin.     PRINCIPAL DISCHARGE DIAGNOSIS  Diagnosis: Anemia of chronic disease  Assessment and Plan of Treatment: You have history of anemia secondary to endstage renal disease. Hemoglobine on admission was 5.6, you recieved 2 units of blood and hb improved to your baseline      SECONDARY DISCHARGE DIAGNOSES  Diagnosis: Shortness of breath  Assessment and Plan of Treatment: Your shortness of breath is most likely due to your low hemoglobin on admission. You received supplemental oxygen while in the hospital to help with your shortness of breath and to maintain your oxygen levels. You received 2 units of blood for your low hemoglobin and your hemoglobin improved to your baseline.  You also have history of COPD and your inhalers you take at home were continued: albuterol as needed and daily symbicort.  Seen by cardiology, recommending transfer to [place] for plan for TAVR.    Diagnosis: ESRD on dialysis  Assessment and Plan of Treatment: You have a history of End stage kidney disease requiring dialysis. Your kidney function was monitored in the hospital and remained at baseline. You received dialysis while in the hospital on your regularly scheduled days. Please continue to follow up with dialysis at your dialysis center.    Diagnosis: Severe aortic stenosis  Assessment and Plan of Treatment: You have schedule  TAVR on 8/17/22 with Dr. Medrano at [facility].    Diagnosis: Hypotension  Assessment and Plan of Treatment: Your blood pressure was low during your hospital stay. You were started on midodrine while in the hopsital to help improve your blood pressure, which is now stable.    Diagnosis: Elevated troponin level  Assessment and Plan of Treatment: Your troponin levels were high in the hospital, most likely due to increased work of your heart with not enough blood flow to it. An EKG was done to check your heart which showed your pacemaker activity and was normal.    Diagnosis: Hyponatremia  Assessment and Plan of Treatment: You have a history of chronic low sodium levels. Your blood levels were monitored with labs in the hospital and remain at baseline.    Diagnosis: COPD without exacerbation  Assessment and Plan of Treatment: You have a history of COPD. Please continue home medications.    Diagnosis: Atrial fibrillation  Assessment and Plan of Treatment: You have a history of afib and you take Eliquis to prevent clots. Please continue home Eliquis.    Diagnosis: Chronic heart failure with preserved ejection fraction (HFpEF)  Assessment and Plan of Treatment: You have a history of chronic heart failure. Please continue home torsemide.    Diagnosis: Hypothyroidism  Assessment and Plan of Treatment: You have a history of hypothyroidism. Please continue home synthroid.    Diagnosis: Hyperlipidemia  Assessment and Plan of Treatment: You have a history of hyperlipidemia. Please continue home statin.

## 2022-08-14 NOTE — PROGRESS NOTE ADULT - PROBLEM SELECTOR PLAN 8
Chronic  - Continue PRN albuterol, Breo-Ellipta Inhaler with therapeutic interchange

## 2022-08-14 NOTE — PROGRESS NOTE ADULT - PROBLEM SELECTOR PLAN 1
Acute on chronic in the setting of CKD   - Hb low stable 8.1->8.7  - s/p 2 pRBCs   - Baseline hemoglobin 7-8   - Iron studies wnl   - On retacrit once a week   - Continue folic acid daily  - Continue Eliquis as patient with significant cardiac risk factors and benefit outweighs risk  - Plan to discharge given hb stable however pt's daughter is requesting transferred to [facility] for TAVR procedure on 08/16/22. Dr. Cook called daughter. Pending plan for transfer Acute on chronic in the setting of CKD   - Hb low stable 8.1->8.7  - s/p 2 pRBCs   - Baseline hemoglobin 7-8   - Iron studies wnl   - On retacrit once a week   - Continue folic acid daily  - Continue Eliquis as patient with significant cardiac risk factors and benefit outweighs risk  - Plan to discharge given hb stable however pt's daughter is requesting transferred to [facility] for TAVR procedure on 08/16/22. Dr. Montejo called daughter. Pending plan for transfer as per dr montejo willow will try to reach [facility] tomorrow.

## 2022-08-14 NOTE — PROGRESS NOTE ADULT - SUBJECTIVE AND OBJECTIVE BOX
Patient is a 87y old  Male who presents with a chief complaint of Anemia (14 Aug 2022 10:43)        ----  INTERVAL HPI/OVERNIGHT EVENTS: Pt seen and evaluated at the bedside. No acute overnight events occurred. Hemoglobin stable, weaned off to 2Lt NC. Patient doing well, denies chest pain, SOB, dizziness, fever, abdominal pain or other symptoms     ----  PAST MEDICAL & SURGICAL HISTORY:  CHF (congestive heart failure)  HFpEF      HTN (hypertension)      Atrial fibrillation  s/p PPM      Aortic stenosis  pending TAVR      History of COPD      PAD (peripheral artery disease)  s/p RLE stenting      H/O hernia repair      H/O varicose vein stripping  right leg      History of pacemaker          FAMILY HISTORY:  FHx: heart disease        Allergies    codeine (Unknown)    Intolerances        ----  REVIEW OF SYSTEMS:  CONSTITUTIONAL: denies fever, chills, fatigue, weakness  HEENT: denies blurred vision, sore throat  CARDIOVASCULAR: denies chest pain, chest pressure, palpitations  RESPIRATORY: denies shortness of breath, sputum production  GASTROINTESTINAL: denies nausea, vomiting, diarrhea, abdominal pain  GENITOURINARY: denies dysuria, discharge  NEUROLOGICAL: denies numbness, headache, focal weakness  MUSCULOSKELETAL: denies new joint pain, muscle aches  HEMATOLOGIC: denies gross bleeding, bruising  LYMPHATICS: denies enlarged lymph nodes, extremity swelling  PSYCHIATRIC: denies recent changes in anxiety, depression  ENDOCRINOLOGIC: denies sweating, cold or heat intolerance    ----  PHYSICAL EXAM:  GENERAL: no acute distress,  LUNGS:  mild diffuse crackles,   negative wheezing or rhonchi  HEART: S1/S2, systolic murmur , no tachy   GASTROINTESTINAL: abdomen is soft, nontender, nondistended, normoactive bowel sounds, no palpable masses  INTEGUMENT: good skin turgor, appropriate for ethnicity, appears well perfused,   EXTREMITIES: AV fistula, wrapped on LUE; 2+ pitting edema BLE  NEUROLOGIC: awake, alert, oriented x3,  PSYCHIATRIC: mood is good  : intact       T(C): 36.8 (08-14-22 @ 12:20), Max: 36.8 (08-14-22 @ 12:20)  HR: 73 (08-14-22 @ 12:20) (71 - 73)  BP: 95/48 (08-14-22 @ 12:20) (95/48 - 104/58)  RR: 18 (08-14-22 @ 12:20) (18 - 18)  SpO2: 95% (08-14-22 @ 12:20) (95% - 100%)  Wt(kg): --    ----  I&O's Summary    13 Aug 2022 07:01  -  14 Aug 2022 07:00  --------------------------------------------------------  IN: 0 mL / OUT: 1000 mL / NET: -1000 mL        LABS:                        8.1    8.62  )-----------( 129      ( 14 Aug 2022 06:53 )             26.2     08-14    136  |  97  |  47<H>  ----------------------------<  97  4.2   |  31  |  3.30<H>    Ca    8.7      14 Aug 2022 06:53          CAPILLARY BLOOD GLUCOSE      POCT Blood Glucose.: 117 mg/dL (13 Aug 2022 22:32)  POCT Blood Glucose.: 112 mg/dL (13 Aug 2022 22:02)  POCT Blood Glucose.: 73 mg/dL (13 Aug 2022 17:18)  POCT Blood Glucose.: 66 mg/dL (13 Aug 2022 17:15)                       Patient is a 87y old  Male who presents with a chief complaint of Anemia (14 Aug 2022 10:43)        ----  INTERVAL HPI/OVERNIGHT EVENTS: Pt seen and evaluated at the bedside. No acute overnight events occurred. Hemoglobin stable, weaned off to 2Lt NC [ pt use 2lit at home] . Patient doing well, denies chest pain, SOB, dizziness, fever, abdominal pain or other symptoms     ----  PAST MEDICAL & SURGICAL HISTORY:  CHF (congestive heart failure)  HFpEF      HTN (hypertension)      Atrial fibrillation  s/p PPM      Aortic stenosis  pending TAVR      History of COPD      PAD (peripheral artery disease)  s/p RLE stenting      H/O hernia repair      H/O varicose vein stripping  right leg      History of pacemaker          FAMILY HISTORY:  FHx: heart disease        Allergies    codeine (Unknown)    Intolerances        ----  REVIEW OF SYSTEMS:  CONSTITUTIONAL: denies fever, chills, fatigue, weakness  HEENT: denies blurred vision, sore throat  CARDIOVASCULAR: denies chest pain, chest pressure, palpitations  RESPIRATORY: denies shortness of breath, sputum production  GASTROINTESTINAL: denies nausea, vomiting, diarrhea, abdominal pain  GENITOURINARY: denies dysuria, discharge  NEUROLOGICAL: denies numbness, headache, focal weakness  MUSCULOSKELETAL: denies new joint pain, muscle aches      ----  PHYSICAL EXAM:  GENERAL: no acute distress,  LUNGS:  mild diffuse crackles,   negative wheezing or rhonchi  HEART: S1/S2, systolic murmur , no tachy   GASTROINTESTINAL: abdomen is soft, nontender, nondistended, normoactive bowel sounds, no palpable masses  INTEGUMENT: good skin turgor, appropriate for ethnicity, appears well perfused,   EXTREMITIES: AV fistula, wrapped on LUE; 2+ pitting edema BLE  NEUROLOGIC: awake, alert, oriented x3, motor /sensory intact   : intact       T(C): 36.8 (08-14-22 @ 12:20), Max: 36.8 (08-14-22 @ 12:20)  HR: 73 (08-14-22 @ 12:20) (71 - 73)  BP: 95/48 (08-14-22 @ 12:20) (95/48 - 104/58)  RR: 18 (08-14-22 @ 12:20) (18 - 18)  SpO2: 95% (08-14-22 @ 12:20) (95% - 100%)  Wt(kg): --    ----  I&O's Summary    13 Aug 2022 07:01  -  14 Aug 2022 07:00  --------------------------------------------------------  IN: 0 mL / OUT: 1000 mL / NET: -1000 mL        LABS:                        8.1    8.62  )-----------( 129      ( 14 Aug 2022 06:53 )             26.2     08-14    136  |  97  |  47<H>  ----------------------------<  97  4.2   |  31  |  3.30<H>    Ca    8.7      14 Aug 2022 06:53          CAPILLARY BLOOD GLUCOSE      POCT Blood Glucose.: 117 mg/dL (13 Aug 2022 22:32)  POCT Blood Glucose.: 112 mg/dL (13 Aug 2022 22:02)  POCT Blood Glucose.: 73 mg/dL (13 Aug 2022 17:18)  POCT Blood Glucose.: 66 mg/dL (13 Aug 2022 17:15)

## 2022-08-14 NOTE — DISCHARGE NOTE PROVIDER - HOSPITAL COURSE
ADMISSION DATE:  08-11-22    ---  FROM ADMISSION H+P:   HPI:  88yo male with PMH of chronic atrial fibrillation s/p SHU/DCCV 1/2022 (on Eliquis), severe aortic stenosis (planned for TAVR 8/17/22 at [facility]) s/p balloon valvuloplasty complicated by CHB now s/p PPM, HFpEF (EF 59%), Streptococcus faecalis bacteremia (4/2022 - suspected endocarditis & treated with 6 weeks of antibiotics, ESRD on HD (T/Th/Sat), COPD, HTN, PAD with prior RLE revascularization, hypothyroidism, anemia presented to the ED sent in from dialysis for anemia. Patient was being set up for dialysis, spot hemoglobin performed revealed hemoglobin of 5.6 and he was referred to the emergency room for transfusion. Daughter Francoise states that patient was too weak to get him out of his dialysis chair so they called EMS for transport. His dialysis session was completed, but there was no volume removal due to low blood pressure. Patient states that this morning he was feeling weaker than normal, with difficulty getting out of his bed by himself. He has been feeling dizzy for the past two days. No syncope, chest pain or worsening shortness of breath. He denies any grossly bleeding. Of note, patient was recently admitted to [facility] in July for AFib with RVR, hypotension, requiring ICU admission for rate control and pressors. He was transferred to [facility] for urgent TAVR. At [place], patient continued to be hypotensive precluding dialysis. He was also found to have new RBBB. Patient underwent balloon valvuloplasty to help facilitate dialysis and treat his acute decompensated heart failure, however, shortly after developed complete heart block requiring placement of Medtronic single chamber PPM on 7/22/22. He was eventually discharged from the hospital, with plans to pursue TAVR on 8/17/22 with Dr. Medrano. Patient was discharged on supplemental oxygen 2L NC from his recent admission.     In the ED:  Vitals: Temp 97.8F | HR 69 | BP 91/55 | RR 16 | SpO2 100% on RA  Labs: H/H 6.2/19.9 , platelets 145, Na 133, troponin 424.7, CK 6.3, BNP 29604  CXR: no acute lung findings  ECG: Vpaced rhythm, 71  Ordered for 1 unit pRBCs   (11 Aug 2022 19:14)      ---  HOSPITAL COURSE/PERTINENT LABS/PROCEDURES PERFORMED/PENDING TESTS: Patient admitted for acute on chronic anemia with hemoglobin of 5.6 on admission, given 2 pRBCs, hemoglobin improved to his baseline.  Eliquis as patient with significant cardiac risk factors and benefit outweighs risk.   Noticed soft BP during hospital stay, given midodrine. Pt had HD during hospital stay.   PT recommending home PT       ---  PATIENT CONDITION:  - stable    ---  PHYSICAL EXAM ON DAY OF DISCHARGE:    ---  CONSULTANTS:   Cardio, Dr. Cook group   Nephro, Dr. Rivera   ---  ADVANCED CARE PLANNING:  - Code status:    Full code   - MOLST completed:      [  ] NO     [  ] YES    ---  TIME SPENT:  I, the attending physician, was physically present for the key portions of the evaluation and management (E/M) service provided. The total amount of time spent reviewing the hospital notes, laboratory values, imaging findings, assessing/counseling the patient, discussing with consultant physicians, social work, nursing staff was -- minutes ADMISSION DATE:  08-11-22    ---  FROM ADMISSION H+P:   HPI:  86yo male with PMH of chronic atrial fibrillation s/p SHU/DCCV 1/2022 (on Eliquis), severe aortic stenosis (planned for TAVR 8/17/22 at [facility]) s/p balloon valvuloplasty complicated by CHB now s/p PPM, HFpEF (EF 59%), Streptococcus faecalis bacteremia (4/2022 - suspected endocarditis & treated with 6 weeks of antibiotics, ESRD on HD (T/Th/Sat), COPD, HTN, PAD with prior RLE revascularization, hypothyroidism, anemia presented to the ED sent in from dialysis for anemia. Patient was being set up for dialysis, spot hemoglobin performed revealed hemoglobin of 5.6 and he was referred to the emergency room for transfusion. Daughter Francoise states that patient was too weak to get him out of his dialysis chair so they called EMS for transport. His dialysis session was completed, but there was no volume removal due to low blood pressure. Patient states that this morning he was feeling weaker than normal, with difficulty getting out of his bed by himself. He has been feeling dizzy for the past two days. No syncope, chest pain or worsening shortness of breath. He denies any grossly bleeding. Of note, patient was recently admitted to [facility] in July for AFib with RVR, hypotension, requiring ICU admission for rate control and pressors. He was transferred to [facility] for urgent TAVR. At [place], patient continued to be hypotensive precluding dialysis. He was also found to have new RBBB. Patient underwent balloon valvuloplasty to help facilitate dialysis and treat his acute decompensated heart failure, however, shortly after developed complete heart block requiring placement of Medtronic single chamber PPM on 7/22/22. He was eventually discharged from the hospital, with plans to pursue TAVR on 8/17/22 with Dr. Medrano. Patient was discharged on supplemental oxygen 2L NC from his recent admission.     In the ED:  Vitals: Temp 97.8F | HR 69 | BP 91/55 | RR 16 | SpO2 100% on RA  Labs: H/H 6.2/19.9 , platelets 145, Na 133, troponin 424.7, CK 6.3, BNP 11554  CXR: no acute lung findings  ECG: Vpaced rhythm, 71  Ordered for 1 unit pRBCs   (11 Aug 2022 19:14)      ---  HOSPITAL COURSE/PERTINENT LABS/PROCEDURES PERFORMED/PENDING TESTS: Patient admitted for acute on chronic anemia with hemoglobin of 5.6 on admission, given 2 pRBCs, hemoglobin improved to his baseline.  Eliquis as patient with significant cardiac risk factors and benefit outweighs risk.   Noticed soft BP during hospital stay, given midodrine. Pt had HD during hospital stay.   Requiring nasal cannula during hospital stay, weaned to 2LNC.  PT recommending home PT       ---  PATIENT CONDITION:  - stable    ---  PHYSICAL EXAM ON DAY OF DISCHARGE:    ---  CONSULTANTS:   Cardio, Dr. Cook group   Nephro, Dr. Rivera   ---  ADVANCED CARE PLANNING:  - Code status:    Full code   - MOLST completed:      [  ] NO     [  ] YES    ---  TIME SPENT:  I, the attending physician, was physically present for the key portions of the evaluation and management (E/M) service provided. The total amount of time spent reviewing the hospital notes, laboratory values, imaging findings, assessing/counseling the patient, discussing with consultant physicians, social work, nursing staff was -- minutes ADMISSION DATE:  08-11-22    ---  FROM ADMISSION H+P:   HPI:  86yo male with PMH of chronic atrial fibrillation s/p SHU/DCCV 1/2022 (on Eliquis), severe aortic stenosis (planned for TAVR 8/17/22 at [facility]) s/p balloon valvuloplasty complicated by CHB now s/p PPM, HFpEF (EF 59%), Streptococcus faecalis bacteremia (4/2022 - suspected endocarditis & treated with 6 weeks of antibiotics, ESRD on HD (T/Th/Sat), COPD, HTN, PAD with prior RLE revascularization, hypothyroidism, anemia presented to the ED sent in from dialysis for anemia. Patient was being set up for dialysis, spot hemoglobin performed revealed hemoglobin of 5.6 and he was referred to the emergency room for transfusion. Daughter Francoise states that patient was too weak to get him out of his dialysis chair so they called EMS for transport. His dialysis session was completed, but there was no volume removal due to low blood pressure. Patient states that this morning he was feeling weaker than normal, with difficulty getting out of his bed by himself. He has been feeling dizzy for the past two days. No syncope, chest pain or worsening shortness of breath. He denies any grossly bleeding. Of note, patient was recently admitted to [facility] in July for AFib with RVR, hypotension, requiring ICU admission for rate control and pressors. He was transferred to [facility] for urgent TAVR. At [place], patient continued to be hypotensive precluding dialysis. He was also found to have new RBBB. Patient underwent balloon valvuloplasty to help facilitate dialysis and treat his acute decompensated heart failure, however, shortly after developed complete heart block requiring placement of Medtronic single chamber PPM on 7/22/22. He was eventually discharged from the hospital, with plans to pursue TAVR on 8/17/22 with Dr. Medrano. Patient was discharged on supplemental oxygen 2L NC from his recent admission.     In the ED:  Vitals: Temp 97.8F | HR 69 | BP 91/55 | RR 16 | SpO2 100% on RA  Labs: H/H 6.2/19.9 , platelets 145, Na 133, troponin 424.7, CK 6.3, BNP 35436  CXR: no acute lung findings  ECG: Vpaced rhythm, 71  Ordered for 1 unit pRBCs   (11 Aug 2022 19:14)      ---  HOSPITAL COURSE/PERTINENT LABS/PROCEDURES PERFORMED/PENDING TESTS: Patient admitted for acute on chronic anemia with hemoglobin of 5.6 on admission, given 2 pRBCs, hemoglobin improved to his baseline.  Eliquis as patient with significant cardiac risk factors and benefit outweighs risk.   Noticed soft BP during hospital stay, given midodrine. Pt had HD during hospital stay.   Requiring nasal cannula during hospital stay, weaned to 2LNC.  PT recommending home PT       ---  PATIENT CONDITION:  - stable    ---  PHYSICAL EXAM ON DAY OF DISCHARGE:  T(C): 36.4 (08-15-22 @ 04:20), Max: 36.8 (08-14-22 @ 12:20)  HR: 52 (08-15-22 @ 04:20) (52 - 85)  BP: 102/56 (08-15-22 @ 04:20) (95/48 - 103/62)  RR: 18 (08-15-22 @ 04:20) (18 - 18)  SpO2: 93% (08-15-22 @ 04:20) (93% - 95%)    Physical Exam:   GENERAL: awake and alert, NAD  HEENT: head NC/AT; EOM intact, conjunctiva & sclera clear  RESPIRATORY: CTA B/L, no wheezing, rales, rhonchi or rubs  CARDIOVASCULAR: S1&S2, RRR, no murmurs or gallops  ABDOMEN: soft, non-tender, non-distended  MUSCULOSKELETAL:  mild LE edema b/l  SKIN: warm and dry, color normal  NEUROLOGIC: alert and awake, grossly intact w/ no focal deficits  Psych: Normal mood and affect        ---  CONSULTANTS:   Cardio, Dr. Cook group   Nephro, Dr. Rivera   ---  ADVANCED CARE PLANNING:  - Code status:    Full code   - MOLST completed:      [  ] NO     [  ] YES    ---  TIME SPENT:  I, the attending physician, was physically present for the key portions of the evaluation and management (E/M) service provided. The total amount of time spent reviewing the hospital notes, laboratory values, imaging findings, assessing/counseling the patient, discussing with consultant physicians, social work, nursing staff was -- minutes ADMISSION DATE:  08-11-22    ---  FROM ADMISSION H+P:   HPI:  88yo male with PMH of chronic atrial fibrillation s/p SHU/DCCV 1/2022 (on Eliquis), severe aortic stenosis (planned for TAVR 8/17/22 at [facility]) s/p balloon valvuloplasty complicated by CHB now s/p PPM, HFpEF (EF 59%), Streptococcus faecalis bacteremia (4/2022 - suspected endocarditis & treated with 6 weeks of antibiotics, ESRD on HD (T/Th/Sat), COPD, HTN, PAD with prior RLE revascularization, hypothyroidism, anemia presented to the ED sent in from dialysis for anemia. Patient was being set up for dialysis, spot hemoglobin performed revealed hemoglobin of 5.6 and he was referred to the emergency room for transfusion. Daughter Francoise states that patient was too weak to get him out of his dialysis chair so they called EMS for transport. His dialysis session was completed, but there was no volume removal due to low blood pressure. Patient states that this morning he was feeling weaker than normal, with difficulty getting out of his bed by himself. He has been feeling dizzy for the past two days. No syncope, chest pain or worsening shortness of breath. He denies any grossly bleeding. Of note, patient was recently admitted to [facility] in July for AFib with RVR, hypotension, requiring ICU admission for rate control and pressors. He was transferred to [facility] for urgent TAVR. At [place], patient continued to be hypotensive precluding dialysis. He was also found to have new RBBB. Patient underwent balloon valvuloplasty to help facilitate dialysis and treat his acute decompensated heart failure, however, shortly after developed complete heart block requiring placement of Medtronic single chamber PPM on 7/22/22. He was eventually discharged from the hospital, with plans to pursue TAVR on 8/17/22 with Dr. Medrano. Patient was discharged on supplemental oxygen 2L NC from his recent admission.     In the ED:  Vitals: Temp 97.8F | HR 69 | BP 91/55 | RR 16 | SpO2 100% on RA  Labs: H/H 6.2/19.9 , platelets 145, Na 133, troponin 424.7, CK 6.3, BNP 02613  CXR: no acute lung findings  ECG: Vpaced rhythm, 71  Ordered for 1 unit pRBCs   (11 Aug 2022 19:14)      ---  HOSPITAL COURSE/PERTINENT LABS/PROCEDURES PERFORMED/PENDING TESTS: Patient admitted for acute on chronic anemia with hemoglobin of 5.6 on admission, given 2 pRBCs, hemoglobin improved to his baseline.  Eliquis as patient with significant cardiac risk factors and benefit outweighs risk.   Noticed soft BP during hospital stay, given midodrine. Pt had HD during hospital stay.   Requiring nasal cannula during hospital stay, weaned to 2LNC.  Pt to be discharged to [facility], will get TAVR there 8/17/22  PT recommending home PT       ---  PATIENT CONDITION:  - stable    ---  PHYSICAL EXAM ON DAY OF DISCHARGE:  T(C): 36.4 (08-15-22 @ 04:20), Max: 36.8 (08-14-22 @ 12:20)  HR: 52 (08-15-22 @ 04:20) (52 - 85)  BP: 102/56 (08-15-22 @ 04:20) (95/48 - 103/62)  RR: 18 (08-15-22 @ 04:20) (18 - 18)  SpO2: 93% (08-15-22 @ 04:20) (93% - 95%)    Physical Exam:   GENERAL: awake and alert, NAD  HEENT: head NC/AT; EOM intact, conjunctiva & sclera clear  RESPIRATORY: CTA B/L, no wheezing, rales, rhonchi or rubs  CARDIOVASCULAR: S1&S2, RRR, no murmurs or gallops  ABDOMEN: soft, non-tender, non-distended  MUSCULOSKELETAL:  mild LE edema b/l  SKIN: warm and dry, color normal  NEUROLOGIC: alert and awake, grossly intact w/ no focal deficits  Psych: Normal mood and affect        ---  CONSULTANTS:   Cardio, Dr. Cook group   Nephro, Dr. Rivera   ---  ADVANCED CARE PLANNING:  - Code status:    Full code   - MOLST completed:      [  ] NO     [  ] YES    ---  TIME SPENT:  I, the attending physician, was physically present for the key portions of the evaluation and management (E/M) service provided. The total amount of time spent reviewing the hospital notes, laboratory values, imaging findings, assessing/counseling the patient, discussing with consultant physicians, social work, nursing staff was -- minutes

## 2022-08-14 NOTE — PROGRESS NOTE ADULT - PROBLEM SELECTOR PLAN 7
Chronic  - On HD Tue/Thu/Sat  - Continue sevelamer  - Monitor Is/Os  - Nephrology (Dr. Rivera) following

## 2022-08-14 NOTE — PROGRESS NOTE ADULT - TIME BILLING
pt seen and examine today see above plan - esrd  on hd  via v fistula  admitted for acute on chronic anemia sec to esrd - post 2 unit prbc and on epogen  , hb remain 8 range-  pt have hx sever AS  Plan for TAVR on 8/17/22 with Dr. Medrano at [facility] , DR CARLENE alba will reach out [facility]  tomorrow if pt can be transfer .

## 2022-08-14 NOTE — PROGRESS NOTE ADULT - PROBLEM SELECTOR PLAN 3
Probably demand ischemia   - troponin 424.7-->407.4  - ECG now with V paced rhythm s/p pacemaker  - Cardiology (Dr. Salazar) .

## 2022-08-14 NOTE — PROGRESS NOTE ADULT - PROBLEM SELECTOR PLAN 11
Chronic  - Continue lovastatin with therapeutic interchange atorvastatin 10mg

## 2022-08-14 NOTE — DISCHARGE NOTE PROVIDER - NSDCMRMEDTOKEN_GEN_ALL_CORE_FT
Albuterol (Eqv-ProAir HFA) 90 mcg/inh inhalation aerosol: 2 puff(s) inhaled every 4 hours, As Needed  Breo Ellipta 100 mcg-25 mcg/inh inhalation powder: 1 puff(s) inhaled once a day  Eliquis 2.5 mg oral tablet: 1 tab(s) orally 2 times a day  ergocalciferol: 1250 microgram(s) orally once a week  folic acid 1 mg oral tablet: 1 tab(s) orally once a day  levothyroxine 112 mcg (0.112 mg) oral tablet: 1 tab(s) orally once a day  lidocaine 4% topical cream: Apply topically to affected area 3 times a day  lovastatin 40 mg oral tablet: 1 tab(s) orally once a day  Nephro-Ben oral tablet: 1 tab(s) orally once a day  sevelamer carbonate 800 mg oral tablet: 2 tab(s) orally 3 times a day (with meals)  torsemide 100 mg oral tablet: 1 tab(s) orally twice a day Monday, Wednesday, Friday and Sunday (non dialysis days)  Tylenol 500 mg oral tablet: 2 tab(s) orally every 4 hours, As Needed   Albuterol (Eqv-ProAir HFA) 90 mcg/inh inhalation aerosol: 2 puff(s) inhaled every 4 hours, As Needed  Breo Ellipta 100 mcg-25 mcg/inh inhalation powder: 1 puff(s) inhaled once a day  Eliquis 2.5 mg oral tablet: 1 tab(s) orally 2 times a day  ergocalciferol: 1250 microgram(s) orally once a week  folic acid 1 mg oral tablet: 1 tab(s) orally once a day  levothyroxine 112 mcg (0.112 mg) oral tablet: 1 tab(s) orally once a day  lidocaine 4% topical cream: Apply topically to affected area 3 times a day  lovastatin 40 mg oral tablet: 1 tab(s) orally once a day  midodrine 5 mg oral tablet: 1 tab(s) orally 3 times a day  Nephro-Ben oral tablet: 1 tab(s) orally once a day  sevelamer carbonate 800 mg oral tablet: 2 tab(s) orally 3 times a day (with meals)  torsemide 100 mg oral tablet: 1 tab(s) orally twice a day Monday, Wednesday, Friday and Sunday (non dialysis days)  Tylenol 500 mg oral tablet: 2 tab(s) orally every 4 hours, As Needed

## 2022-08-14 NOTE — PROGRESS NOTE ADULT - SUBJECTIVE AND OBJECTIVE BOX
Patient is a 87y Male with a known history of :  Anemia [D64.9]    Elevated troponin level [R77.8]    Hyponatremia [E87.1]    Severe aortic stenosis [I35.0]    ESRD on dialysis [N18.6]    COPD without exacerbation [J44.9]    Hypotension [I95.9]    Thrombocytopenia [D69.6]    Atrial fibrillation [I48.91]    Need for prophylactic measure [Z29.9]    Chronic heart failure with preserved ejection fraction (HFpEF) [I50.32]    Hypothyroidism [E03.9]    Hyperlipidemia [E78.5]      HPI:  86yo male with PMH of chronic atrial fibrillation s/p SHU/DCCV 1/2022 (on Eliquis), severe aortic stenosis (planned for TAVR 8/17/22 at [facility]) s/p balloon valvuloplasty complicated by CHB now s/p PPM, HFpEF (EF 59%), Streptococcus faecalis bacteremia (4/2022 - suspected endocarditis & treated with 6 weeks of antibiotics, ESRD on HD (T/Th/Sat), COPD, HTN, PAD with prior RLE revascularization, hypothyroidism, anemia presented to the ED sent in from dialysis for anemia. Patient was being set up for dialysis, spot hemoglobin performed revealed hemoglobin of 5.6 and he was referred to the emergency room for transfusion. Daughter Francoise states that patient was too weak to get him out of his dialysis chair so they called EMS for transport. His dialysis session was completed, but there was no volume removal due to low blood pressure. Patient states that this morning he was feeling weaker than normal, with difficulty getting out of his bed by himself. He has been feeling dizzy for the past two days. No syncope, chest pain or worsening shortness of breath. He denies any grossly bleeding. Of note, patient was recently admitted to [facility] in July for AFib with RVR, hypotension, requiring ICU admission for rate control and pressors. He was transferred to [facility] for urgent TAVR. At [place], patient continued to be hypotensive precluding dialysis. He was also found to have new RBBB. Patient underwent balloon valvuloplasty to help facilitate dialysis and treat his acute decompensated heart failure, however, shortly after developed complete heart block requiring placement of Medtronic single chamber PPM on 7/22/22. He was eventually discharged from the hospital, with plans to pursue TAVR on 8/17/22 with Dr. Medrano. Patient was discharged on supplemental oxygen 2L NC from his recent admission.     In the ED:  Vitals: Temp 97.8F | HR 69 | BP 91/55 | RR 16 | SpO2 100% on RA  Labs: H/H 6.2/19.9 , platelets 145, Na 133, troponin 424.7, CK 6.3, BNP 00453  CXR: no acute lung findings  ECG: Vpaced rhythm, 71  Ordered for 1 unit pRBCs   (11 Aug 2022 19:14)      REVIEW OF SYSTEMS:    CONSTITUTIONAL: No weakness, fevers or chills.   EYES/ENT: No visual changes;    NECK: No pain or stiffness  RESPIRATORY: No cough, wheezing, No shortness of breath  CARDIOVASCULAR: No chest pain or palpitations  GASTROINTESTINAL: No abdominal pain, or hematochezia.  GENITOURINARY: No dysuria orhematuria  NEUROLOGICAL: No numbness or weakness  SKIN: No itching, burning, rashes  All other review of systems is negative unless indicated above          MEDICATIONS  (STANDING):  apixaban 2.5 milliGRAM(s) Oral two times a day  atorvastatin 10 milliGRAM(s) Oral at bedtime  budesonide  80 MICROgram(s)/formoterol 4.5 MICROgram(s) Inhaler 2 Puff(s) Inhalation two times a day  epoetin ruthann-epbx (RETACRIT) Injectable 66702 Unit(s) IV Push <User Schedule>  folic acid 1 milliGRAM(s) Oral daily  levothyroxine 112 MICROGram(s) Oral daily  midodrine. 5 milliGRAM(s) Oral three times a day  Nephro-marcelino 1 Tablet(s) Oral daily  sevelamer carbonate 1600 milliGRAM(s) Oral three times a day with meals  torsemide 100 milliGRAM(s) Oral <User Schedule>    MEDICATIONS  (PRN):  acetaminophen     Tablet .. 650 milliGRAM(s) Oral every 6 hours PRN Temp greater or equal to 38C (100.4F), Mild Pain (1 - 3)  ALBUTerol    90 MICROgram(s) HFA Inhaler 2 Puff(s) Inhalation every 6 hours PRN Shortness of Breath and/or Wheezing  melatonin 3 milliGRAM(s) Oral at bedtime PRN Insomnia      ALLERGIES: codeine (Unknown)      FAMILY HISTORY:  FHx: heart disease        PHYSICAL EXAMINATION:  -----------------------------  T(C): 36.2 (08-14-22 @ 04:38), Max: 36.6 (08-13-22 @ 19:21)  HR: 72 (08-14-22 @ 04:38) (70 - 72)  BP: 103/51 (08-14-22 @ 04:38) (96/51 - 105/52)  RR: 18 (08-14-22 @ 04:38) (18 - 18)  SpO2: 99% (08-14-22 @ 04:38) (97% - 100%)  Wt(kg): --    08-13 @ 07:01  -  08-14 @ 07:00  --------------------------------------------------------  IN:  Total IN: 0 mL    OUT:    Other (mL): 1000 mL  Total OUT: 1000 mL    Total NET: -1000 mL        PHYSICAL EXAM:    Constitutional: NAD  Neurological: Alert, no focal deficits  HEENT: no JVD, EOMI  Cardiovascular: Regular, S1 and S2, no murmur  Pulmonary: breath sounds bilaterally  Gastrointestinal: Bowel Sounds present, soft, nontender  EXT:  no peripheral edema  Skin: No rashes.  Psych:  Mood calm  LABS: All Labs Reviewed:        LABS:   --------  08-14    136  |  97  |  47<H>  ----------------------------<  97  4.2   |  31  |  3.30<H>    Ca    8.7      14 Aug 2022 06:53                           8.1    8.62  )-----------( 129      ( 14 Aug 2022 06:53 )             26.2

## 2022-08-14 NOTE — PROGRESS NOTE ADULT - ASSESSMENT
86yo male with PMH of chronic atrial fibrillation s/p SHU/DCCV 1/2022 (on Eliquis), severe aortic stenosis (planned for TAVR 8/17/22 at [facility]) s/p balloon valvuloplasty complicated by CHB now s/p PPM, HFpEF (EF 59%), Streptococcus faecalis bacteremia (4/2022 - suspected endocarditis & treated with 6 weeks of antibiotics, ESRD on HD (T/Th/Sat), COPD, HTN, PAD with prior RLE revascularization, hypothyroidism, anemia presented to the ED sent in from dialysis for anemia, admitted for management of anemia. Hb upon admission was 5.6.  Pt denies Cp or SOB.    -anemia  s/p  PRBC, HGB 8.1 today s/p transfusion.     -elevated trops  400s, likely demand ischemia.    -severe AS  S/p probable endocarditis, s/p valvuloplasty.  For TAVR 8/17  stable hemodynamics so far.     -HFpEF  on HD, torsemide 100 mg on nonHD days    -chronic AF  rate good, s/p PPM  on Eliquis 2.5 mg BID    -hypotension  BP holding on midodrine     No further cardiac workup or treatment in this admission.   Recommends he proceed with TAVR scheduled for 8/17 at [place].     pt sees cardiologist  Dr Ryan Conteh outpt.    88yo male with PMH of chronic atrial fibrillation s/p SHU/DCCV 1/2022 (on Eliquis), severe aortic stenosis (planned for TAVR 8/17/22 at [facility]) s/p balloon valvuloplasty complicated by CHB now s/p PPM, HFpEF (EF 59%), Streptococcus faecalis bacteremia (4/2022 - suspected endocarditis & treated with 6 weeks of antibiotics, ESRD on HD (T/Th/Sat), COPD, HTN, PAD with prior RLE revascularization, hypothyroidism, anemia presented to the ED sent in from dialysis for anemia, admitted for management of anemia. Hb upon admission was 5.6.  Pt denies Cp or SOB.    -anemia  s/p  PRBC, HGB 8.1 today s/p transfusion.     -elevated trops  400s, likely demand ischemia.    -severe AS  S/p probable endocarditis, s/p valvuloplasty.  For TAVR 8/17  stable hemodynamics so far.     -HFpEF  on HD, torsemide 100 mg on nonHD days    -chronic AF  rate good, s/p PPM  on Eliquis 2.5 mg BID    -hypotension  BP holding on midodrine     No further cardiac workup or treatment in this admission.   Recommends he proceed with TAVR scheduled for 8/17 at [place].     pt sees cardiologist  Dr Ryan Conteh outpt.     Addendum:  Pt still weak, has not ambulated.  Unsure if pt is able to be d/g home.  Might need to transfer to [facility] prior to going to rehab.   Will d/w Dr Medrano in am.

## 2022-08-14 NOTE — PROGRESS NOTE ADULT - PROBLEM SELECTOR PLAN 10
Chronic  - Continue synthroid 112mcg daily

## 2022-08-14 NOTE — PROGRESS NOTE ADULT - ASSESSMENT
88yo male with PMH of chronic atrial fibrillation s/p SHU/DCCV 1/2022 (on Eliquis), severe aortic stenosis (planned for TAVR 8/17/22 at [facility]) s/p balloon valvuloplasty complicated by CHB now s/p PPM, HFpEF (EF 59%), Streptococcus faecalis bacteremia (4/2022 - suspected endocarditis & treated with 6 weeks of antibiotics, ESRD on HD (T/Th/Sat), COPD, HTN, PAD with prior RLE revascularization, hypothyroidism, anemia presented to the ED sent in from dialysis for anemia, admitted for management of anemia. Hb upon admission was 5.6. Hb stable plan to dc

## 2022-08-14 NOTE — DISCHARGE NOTE PROVIDER - CARE PROVIDER_API CALL
ZHAO HADDAD  Cardiovascular Diseases  [address] [place] [address]  Phone: [phone]  Fax: [phone]  Follow Up Time: 1-3 days

## 2022-08-15 NOTE — PROGRESS NOTE ADULT - SUBJECTIVE AND OBJECTIVE BOX
Patient is a 87y old  Male who presents with a chief complaint of Anemia in other chronic diseases classified elsewhere     (12 Aug 2022 14:30)    Patient seen in follow up for ESRD on HD.        PAST MEDICAL HISTORY:  CHF (congestive heart failure)    HTN (hypertension)    Atrial fibrillation    Aortic stenosis    History of COPD    PAD (peripheral artery disease)      MEDICATIONS  (STANDING):  apixaban 2.5 milliGRAM(s) Oral two times a day  atorvastatin 10 milliGRAM(s) Oral at bedtime  budesonide  80 MICROgram(s)/formoterol 4.5 MICROgram(s) Inhaler 2 Puff(s) Inhalation two times a day  epoetin ruthann-epbx (RETACRIT) Injectable 11181 Unit(s) IV Push <User Schedule>  folic acid 1 milliGRAM(s) Oral daily  levothyroxine 112 MICROGram(s) Oral daily  midodrine. 5 milliGRAM(s) Oral three times a day  Nephro-marcelino 1 Tablet(s) Oral daily  senna 2 Tablet(s) Oral at bedtime  sevelamer carbonate 1600 milliGRAM(s) Oral three times a day with meals  torsemide 100 milliGRAM(s) Oral <User Schedule>    MEDICATIONS  (PRN):  acetaminophen     Tablet .. 650 milliGRAM(s) Oral every 6 hours PRN Temp greater or equal to 38C (100.4F), Mild Pain (1 - 3)  ALBUTerol    90 MICROgram(s) HFA Inhaler 2 Puff(s) Inhalation every 6 hours PRN Shortness of Breath and/or Wheezing  melatonin 3 milliGRAM(s) Oral at bedtime PRN Insomnia    T(C): 36.4 (08-15-22 @ 04:20), Max: 36.8 (08-14-22 @ 12:20)  HR: 52 (08-15-22 @ 04:20) (52 - 85)  BP: 102/56 (08-15-22 @ 04:20) (95/48 - 105/52)  RR: 18 (08-15-22 @ 04:20)  SpO2: 93% (08-15-22 @ 04:20)  Wt(kg): --  I&O's Detail    14 Aug 2022 07:01  -  15 Aug 2022 07:00  --------------------------------------------------------  IN:  Total IN: 0 mL    OUT:    Voided (mL): 100 mL  Total OUT: 100 mL    Total NET: -100 mL              PHYSICAL EXAM:  General: No distress  Respiratory: b/l air entry  Cardiovascular: S1 S2  Gastrointestinal: soft  Extremities:  edema                LABORATORY:                        8.5    11.63 )-----------( 126      ( 15 Aug 2022 07:00 )             26.5     08-15    133<L>  |  94<L>  |  73<H>  ----------------------------<  97  4.5   |  30  |  4.90<H>    Ca    9.1      15 Aug 2022 07:00      Sodium, Serum: 133 mmol/L (08-15 @ 07:00)  Sodium, Serum: 136 mmol/L (08-14 @ 06:53)    Potassium, Serum: 4.5 mmol/L (08-15 @ 07:00)  Potassium, Serum: 4.2 mmol/L (08-14 @ 06:53)    Hemoglobin: 8.5 g/dL (08-15 @ 07:00)  Hemoglobin: 8.1 g/dL (08-14 @ 06:53)  Hemoglobin: 8.7 g/dL (08-13 @ 06:36)  Hemoglobin: 10.3 g/dL (08-12 @ 18:40)    Creatinine, Serum 4.90 (08-15 @ 07:00)  Creatinine, Serum 3.30 (08-14 @ 06:53)  Creatinine, Serum 4.20 (08-13 @ 06:36)

## 2022-08-15 NOTE — PROGRESS NOTE ADULT - SUBJECTIVE AND OBJECTIVE BOX
[facility] Progress Note [phone] (Dr. Aguilar, Joyce, Marie, Dorota)    CHIEF COMPLAINT: Patient is a 87y old  Male who presents with a chief complaint of Anemia (14 Aug 2022 17:36)      Follow Up Today: The patient denies any chest discomfort or shortness of breath.    HPI:  86yo male with PMH of chronic atrial fibrillation s/p SHU/DCCV 1/2022 (on Eliquis), severe aortic stenosis (planned for TAVR 8/17/22 at [facility]) s/p balloon valvuloplasty complicated by CHB now s/p PPM, HFpEF (EF 59%), Streptococcus faecalis bacteremia (4/2022 - suspected endocarditis & treated with 6 weeks of antibiotics, ESRD on HD (T/Th/Sat), COPD, HTN, PAD with prior RLE revascularization, hypothyroidism, anemia presented to the ED sent in from dialysis for anemia. Patient was being set up for dialysis, spot hemoglobin performed revealed hemoglobin of 5.6 and he was referred to the emergency room for transfusion. Daughter Francoise states that patient was too weak to get him out of his dialysis chair so they called EMS for transport. His dialysis session was completed, but there was no volume removal due to low blood pressure. Patient states that this morning he was feeling weaker than normal, with difficulty getting out of his bed by himself. He has been feeling dizzy for the past two days. No syncope, chest pain or worsening shortness of breath. He denies any grossly bleeding. Of note, patient was recently admitted to [facility] in July for AFib with RVR, hypotension, requiring ICU admission for rate control and pressors. He was transferred to [facility] for urgent TAVR. At [place], patient continued to be hypotensive precluding dialysis. He was also found to have new RBBB. Patient underwent balloon valvuloplasty to help facilitate dialysis and treat his acute decompensated heart failure, however, shortly after developed complete heart block requiring placement of Medtronic single chamber PPM on 7/22/22. He was eventually discharged from the hospital, with plans to pursue TAVR on 8/17/22 with Dr. Medrano. Patient was discharged on supplemental oxygen 2L NC from his recent admission.     In the ED:  Vitals: Temp 97.8F | HR 69 | BP 91/55 | RR 16 | SpO2 100% on RA  Labs: H/H 6.2/19.9 , platelets 145, Na 133, troponin 424.7, CK 6.3, BNP 51624  CXR: no acute lung findings  ECG: Vpaced rhythm, 71  Ordered for 1 unit pRBCs   (11 Aug 2022 19:14)      PAST MEDICAL & SURGICAL HISTORY:  CHF (congestive heart failure)  HFpEF      HTN (hypertension)      Atrial fibrillation  s/p PPM      Aortic stenosis  pending TAVR      History of COPD      PAD (peripheral artery disease)  s/p RLE stenting      H/O hernia repair      H/O varicose vein stripping  right leg      History of pacemaker          MEDICATIONS  (STANDING):  apixaban 2.5 milliGRAM(s) Oral two times a day  atorvastatin 10 milliGRAM(s) Oral at bedtime  budesonide  80 MICROgram(s)/formoterol 4.5 MICROgram(s) Inhaler 2 Puff(s) Inhalation two times a day  epoetin ruthann-epbx (RETACRIT) Injectable 76242 Unit(s) IV Push <User Schedule>  folic acid 1 milliGRAM(s) Oral daily  levothyroxine 112 MICROGram(s) Oral daily  midodrine. 5 milliGRAM(s) Oral three times a day  Nephro-marcelino 1 Tablet(s) Oral daily  senna 2 Tablet(s) Oral at bedtime  sevelamer carbonate 1600 milliGRAM(s) Oral three times a day with meals  torsemide 100 milliGRAM(s) Oral <User Schedule>    MEDICATIONS  (PRN):  acetaminophen     Tablet .. 650 milliGRAM(s) Oral every 6 hours PRN Temp greater or equal to 38C (100.4F), Mild Pain (1 - 3)  ALBUTerol    90 MICROgram(s) HFA Inhaler 2 Puff(s) Inhalation every 6 hours PRN Shortness of Breath and/or Wheezing  melatonin 3 milliGRAM(s) Oral at bedtime PRN Insomnia      Allergies    codeine (Unknown)    Intolerances        REVIEW OF SYSTEMS:    All other review of systems is negative unless indicated above    Vital Signs Last 24 Hrs  T(C): 36.4 (15 Aug 2022 04:20), Max: 36.8 (14 Aug 2022 12:20)  T(F): 97.6 (15 Aug 2022 04:20), Max: 98.3 (14 Aug 2022 12:20)  HR: 52 (15 Aug 2022 04:20) (52 - 85)  BP: 102/56 (15 Aug 2022 04:20) (95/48 - 103/62)  BP(mean): --  RR: 18 (15 Aug 2022 04:20) (18 - 18)  SpO2: 93% (15 Aug 2022 04:20) (93% - 95%)    Parameters below as of 15 Aug 2022 04:20  Patient On (Oxygen Delivery Method): nasal cannula  O2 Flow (L/min): 2      I&O's Summary    13 Aug 2022 07:01  -  14 Aug 2022 07:00  --------------------------------------------------------  IN: 0 mL / OUT: 1000 mL / NET: -1000 mL        PHYSICAL EXAM:    Constitutional: NAD, awake and alert, well-developed  Eyes:  EOMI,  Pupils round, No oral cyanosis.  HEENT: No exudate or erythema  Pulmonary: Non-labored, breath sounds are clear bilaterally, No wheezing, rales or rhonchi  Cardiovascular: Regular, S1 and S2, No murmurs, rubs, gallops oir clicks  Gastrointestinal: Bowel Sounds present, soft, nontender.   Ext: No significant LE edema  Neurological: Alert, no gross focal motor deficits  Skin: No rashes.  Psych:  Mood & affect appropriate    LABS: All Labs Reviewed:                        8.1    8.62  )-----------( 129      ( 14 Aug 2022 06:53 )             26.2                         8.7    10.19 )-----------( 138      ( 13 Aug 2022 06:36 )             27.0                         10.3   12.18 )-----------( 130      ( 12 Aug 2022 18:40 )             31.8     14 Aug 2022 06:53    136    |  97     |  47     ----------------------------<  97     4.2     |  31     |  3.30   13 Aug 2022 06:36    131    |  94     |  68     ----------------------------<  95     5.0     |  28     |  4.20     Ca    8.7        14 Aug 2022 06:53  Ca    8.7        13 Aug 2022 06:36            Blood Culture:         RADIOLOGY/EKG:    Attending Attestation:   20 minutes spent on total encounter; more than 50% of the visit was spent counseling and/or coordinating care by the attending physician.     ASSESSMENT AND PLAN [facility] Progress Note [phone] (Dr. Aguilar, Joyce, Marie, Dorota)    CHIEF COMPLAINT: Patient is a 87y old  Male who presents with a chief complaint of Anemia (14 Aug 2022 17:36)      Follow Up Today: The patient denies any chest discomfort or shortness of breath.    HPI:  88yo male with PMH of chronic atrial fibrillation s/p SHU/DCCV 1/2022 (on Eliquis), severe aortic stenosis (planned for TAVR 8/17/22 at [facility]) s/p balloon valvuloplasty complicated by CHB now s/p PPM, HFpEF (EF 59%), Streptococcus faecalis bacteremia (4/2022 - suspected endocarditis & treated with 6 weeks of antibiotics, ESRD on HD (T/Th/Sat), COPD, HTN, PAD with prior RLE revascularization, hypothyroidism, anemia presented to the ED sent in from dialysis for anemia. Patient was being set up for dialysis, spot hemoglobin performed revealed hemoglobin of 5.6 and he was referred to the emergency room for transfusion. Daughter Francoise states that patient was too weak to get him out of his dialysis chair so they called EMS for transport. His dialysis session was completed, but there was no volume removal due to low blood pressure. Patient states that this morning he was feeling weaker than normal, with difficulty getting out of his bed by himself. He has been feeling dizzy for the past two days. No syncope, chest pain or worsening shortness of breath. He denies any grossly bleeding. Of note, patient was recently admitted to [facility] in July for AFib with RVR, hypotension, requiring ICU admission for rate control and pressors. He was transferred to [facility] for urgent TAVR. At [place], patient continued to be hypotensive precluding dialysis. He was also found to have new RBBB. Patient underwent balloon valvuloplasty to help facilitate dialysis and treat his acute decompensated heart failure, however, shortly after developed complete heart block requiring placement of Medtronic single chamber PPM on 7/22/22. He was eventually discharged from the hospital, with plans to pursue TAVR on 8/17/22 with Dr. Medrano. Patient was discharged on supplemental oxygen 2L NC from his recent admission.     In the ED:  Vitals: Temp 97.8F | HR 69 | BP 91/55 | RR 16 | SpO2 100% on RA  Labs: H/H 6.2/19.9 , platelets 145, Na 133, troponin 424.7, CK 6.3, BNP 45446  CXR: no acute lung findings  ECG: Vpaced rhythm, 71  Ordered for 1 unit pRBCs   (11 Aug 2022 19:14)      PAST MEDICAL & SURGICAL HISTORY:  CHF (congestive heart failure)  HFpEF      HTN (hypertension)      Atrial fibrillation  s/p PPM      Aortic stenosis  pending TAVR      History of COPD      PAD (peripheral artery disease)  s/p RLE stenting      H/O hernia repair      H/O varicose vein stripping  right leg      History of pacemaker          MEDICATIONS  (STANDING):  apixaban 2.5 milliGRAM(s) Oral two times a day  atorvastatin 10 milliGRAM(s) Oral at bedtime  budesonide  80 MICROgram(s)/formoterol 4.5 MICROgram(s) Inhaler 2 Puff(s) Inhalation two times a day  epoetin ruthann-epbx (RETACRIT) Injectable 98956 Unit(s) IV Push <User Schedule>  folic acid 1 milliGRAM(s) Oral daily  levothyroxine 112 MICROGram(s) Oral daily  midodrine. 5 milliGRAM(s) Oral three times a day  Nephro-marcelino 1 Tablet(s) Oral daily  senna 2 Tablet(s) Oral at bedtime  sevelamer carbonate 1600 milliGRAM(s) Oral three times a day with meals  torsemide 100 milliGRAM(s) Oral <User Schedule>    MEDICATIONS  (PRN):  acetaminophen     Tablet .. 650 milliGRAM(s) Oral every 6 hours PRN Temp greater or equal to 38C (100.4F), Mild Pain (1 - 3)  ALBUTerol    90 MICROgram(s) HFA Inhaler 2 Puff(s) Inhalation every 6 hours PRN Shortness of Breath and/or Wheezing  melatonin 3 milliGRAM(s) Oral at bedtime PRN Insomnia      Allergies    codeine (Unknown)    Intolerances        REVIEW OF SYSTEMS:    All other review of systems is negative unless indicated above    Vital Signs Last 24 Hrs  T(C): 36.4 (15 Aug 2022 04:20), Max: 36.8 (14 Aug 2022 12:20)  T(F): 97.6 (15 Aug 2022 04:20), Max: 98.3 (14 Aug 2022 12:20)  HR: 52 (15 Aug 2022 04:20) (52 - 85)  BP: 102/56 (15 Aug 2022 04:20) (95/48 - 103/62)  BP(mean): --  RR: 18 (15 Aug 2022 04:20) (18 - 18)  SpO2: 93% (15 Aug 2022 04:20) (93% - 95%)    Parameters below as of 15 Aug 2022 04:20  Patient On (Oxygen Delivery Method): nasal cannula  O2 Flow (L/min): 2      I&O's Summary    13 Aug 2022 07:01  -  14 Aug 2022 07:00  --------------------------------------------------------  IN: 0 mL / OUT: 1000 mL / NET: -1000 mL        PHYSICAL EXAM:    Constitutional: NAD, awake and alert, well-developed  Eyes:  EOMI,  Pupils round, No oral cyanosis.  HEENT: No exudate or erythema  Pulmonary: Non-labored, breath sounds are clear bilaterally, No wheezing, rales or rhonchi  Cardiovascular: Regular, S1 and S2, No murmurs  Gastrointestinal: Bowel Sounds present, soft, nontender.   Ext: No significant LE edema  Neurological: Alert, no gross focal motor deficits  Skin: No rashes.  Psych:  Mood & affect appropriate    LABS: All Labs Reviewed:                        8.1    8.62  )-----------( 129      ( 14 Aug 2022 06:53 )             26.2                         8.7    10.19 )-----------( 138      ( 13 Aug 2022 06:36 )             27.0                         10.3   12.18 )-----------( 130      ( 12 Aug 2022 18:40 )             31.8     14 Aug 2022 06:53    136    |  97     |  47     ----------------------------<  97     4.2     |  31     |  3.30   13 Aug 2022 06:36    131    |  94     |  68     ----------------------------<  95     5.0     |  28     |  4.20     Ca    8.7        14 Aug 2022 06:53  Ca    8.7        13 Aug 2022 06:36            Blood Culture:         RADIOLOGY/EKG:    Attending Attestation:   20 minutes spent on total encounter; more than 50% of the visit was spent counseling and/or coordinating care by the attending physician.     ASSESSMENT AND PLAN

## 2022-08-15 NOTE — DISCHARGE NOTE NURSING/CASE MANAGEMENT/SOCIAL WORK - PATIENT PORTAL LINK FT
You can access the FollowMyHealth Patient Portal offered by [facility] by registering at the following website: [email or web address]. By joining [facility]’s FollowMyHealth portal, you will also be able to view your health information using other applications (apps) compatible with our system.

## 2022-08-15 NOTE — PROGRESS NOTE ADULT - PROVIDER SPECIALTY LIST ADULT
Cardiology
Hospitalist
Cardiology
Nephrology
Nephrology
Hospitalist
Hospitalist

## 2022-08-15 NOTE — PROGRESS NOTE ADULT - ASSESSMENT
ESRD on HD  Hypotension  Anemia  Aortic stenosis, For TAVR    Next HD tomorrow. Monitor h/h trend. Transfuse PRBC's PRN. To continue current meds.   Fluid removal as tolerated by BP. Cardiology follow up. For TAVR.

## 2022-08-15 NOTE — DISCHARGE NOTE NURSING/CASE MANAGEMENT/SOCIAL WORK - NSDCPEFALRISK_GEN_ALL_CORE
For information on Fall & Injury Prevention, visit: [email or web address] OR  [email or web address] OR  https://www.cdc.gov/steadi/patient.html

## 2022-08-15 NOTE — PROGRESS NOTE ADULT - ASSESSMENT
88yo male with PMH of chronic atrial fibrillation s/p SHU/DCCV 1/2022 (on Eliquis), severe aortic stenosis (planned for TAVR 8/17/22 at [facility]) s/p balloon valvuloplasty complicated by CHB now s/p PPM, HFpEF (EF 59%), Streptococcus faecalis bacteremia (4/2022 - suspected endocarditis & treated with 6 weeks of antibiotics, ESRD on HD (T/Th/Sat), COPD, HTN, PAD with prior RLE revascularization, hypothyroidism, anemia presented to the ED sent in from dialysis for anemia, admitted for management of anemia. Hb upon admission was 5.6.  Pt denies Cp or SOB.    -anemia  s/p  PRBC, HGB 8.1 today s/p transfusion.     -elevated trops  400s, likely demand ischemia.    -severe AS  S/p probable endocarditis, s/p valvuloplasty.  For TAVR 8/17  stable hemodynamics so far.     -HFpEF  on HD, torsemide 100 mg on nonHD days    -chronic AF  rate good, s/p PPM  on Eliquis 2.5 mg BID    -hypotension  BP holding on midodrine     No further cardiac workup or treatment in this admission.   Recommends he proceed with TAVR scheduled for 8/17 at [place].     pt sees cardiologist  Dr Ryan Conteh outpt.     Addendum:  Pt still weak, has not ambulated.  Unsure if pt is able to be d/g home.  Might need to transfer to [facility] prior to going to rehab.   Will d/w Dr Medrano in am.  86yo male with PMH of chronic atrial fibrillation s/p SHU/DCCV 1/2022 (on Eliquis), severe aortic stenosis (planned for TAVR 8/17/22 at [facility]) s/p balloon valvuloplasty complicated by CHB now s/p PPM, HFpEF (EF 59%), Streptococcus faecalis bacteremia (4/2022 - suspected endocarditis & treated with 6 weeks of antibiotics, ESRD on HD (T/Th/Sat), COPD, HTN, PAD with prior RLE revascularization, hypothyroidism, anemia presented to the ED sent in from dialysis for anemia, admitted for management of anemia. Hb upon admission was 5.6.  Pt denies Cp or SOB.    -anemia  s/p  PRBC, HGB 8.1 today s/p transfusion.     -elevated trops  400s, likely demand ischemia.    -severe AS  S/p probable endocarditis, s/p valvuloplasty.  For TAVR 8/17  stable hemodynamics so far.   Arranged transfer to [facility] today    -HFpEF  on HD, torsemide 100 mg on nonHD days    -chronic AF  rate good, s/p PPM  on Eliquis 2.5 mg BID    -hypotension  BP holding on midodrine     No further cardiac workup or treatment in this admission.   Recommends he proceed with TAVR scheduled for 8/17 at [place].     pt sees cardiologist  Dr Ryan Conteh outpt.     Addendum:  Pt still weak, has not ambulated.  Unsure if pt is able to be d/g home.  Might need to transfer to [facility] prior to going to rehab.   Will d/w Dr Medrano in am.

## 2022-08-18 NOTE — DISCHARGE NOTE NURSING/CASE MANAGEMENT/SOCIAL WORK - NSFLUVACAGEDISCH_IMM_ALL_CORE
Adult Infliximab Counseling:  I discussed with the patient the risks of infliximab including but not limited to myelosuppression, immunosuppression, autoimmune hepatitis, demyelinating diseases, lymphoma, and serious infections.  The patient understands that monitoring is required including a PPD at baseline and must alert us or the primary physician if symptoms of infection or other concerning signs are noted.

## 2022-09-15 NOTE — PHYSICAL THERAPY INITIAL EVALUATION ADULT - SITTING BALANCE: DYNAMIC
[None] : None [FreeTextEntry1] : With kidney stones, states she passed a stone. She brought stone in. Since passing the stone she feels much better. Denies pain, frequency, urgency, dysuria, hematuria. This is her first stone occurrence.  good balance

## 2022-09-19 PROBLEM — I48.91 UNSPECIFIED ATRIAL FIBRILLATION: Chronic | Status: ACTIVE | Noted: 2022-01-01

## 2022-10-13 PROBLEM — I10 ESSENTIAL (PRIMARY) HYPERTENSION: Chronic | Status: ACTIVE | Noted: 2022-01-01

## 2022-10-13 PROBLEM — I35.0 NONRHEUMATIC AORTIC (VALVE) STENOSIS: Chronic | Status: ACTIVE | Noted: 2022-01-01

## 2022-10-13 PROBLEM — I25.10 ATHEROSCLEROTIC HEART DISEASE OF NATIVE CORONARY ARTERY WITHOUT ANGINA PECTORIS: Chronic | Status: ACTIVE | Noted: 2022-01-01

## 2022-10-13 PROBLEM — I48.91 UNSPECIFIED ATRIAL FIBRILLATION: Chronic | Status: ACTIVE | Noted: 2022-01-01

## 2022-10-13 PROBLEM — E78.5 HYPERLIPIDEMIA, UNSPECIFIED: Chronic | Status: ACTIVE | Noted: 2022-01-01

## 2022-10-13 PROBLEM — N18.6 END STAGE RENAL DISEASE: Chronic | Status: ACTIVE | Noted: 2022-01-01

## 2022-10-13 NOTE — ED PROVIDER NOTE - OBJECTIVE STATEMENT
88 y/o male with a PMHx of Afib o Eliquis, aortic stenosis, CAD, CHF, COPD on home O2, ESRD on dialysis (T/Th/Sat), HTN, HLD, PAD, pacemaker presents to the ED c/o SOB, cough and dizziness. Denies CP. No other complaints at this time. PCP: Dr. Anibal Jernigan. Cardio: Dr. Ryan Conteh. Nephrologist: Dr. Juan Pablo Galindo. 86 y/o male with a PMHx of Afib o Eliquis, aortic stenosis, CAD, CHF, COPD on home O2, ESRD on dialysis (T/Th/Sat), HTN, HLD, PAD, pacemaker presents to the ED c/o SOB, cough and dizziness. Denies CP. Was not able to get HD today and keanu to ED instead. No other complaints at this time. PCP: Dr. Anibal Jernigan. Cardio: Dr. Ryan Conteh. Nephrologist: Dr. Juan Pablo Galindo.

## 2022-10-13 NOTE — ED PROVIDER NOTE - CLINICAL SUMMARY MEDICAL DECISION MAKING FREE TEXT BOX
Pt here with SOB from dialysis. Unable to complete session. Examined volume overload with b/l crackles. Suspect this is related to not getting dialysis today. Pt also has hx of known anemia which may be causing his symptoms. Pt found to be in rapid Afib, though I suspect this is reactive from hypoxia and increased work of breathing. Plan: start on BiPAP, labs, XR. Spoke with Dr. Jung from nephrology. Plan to follow up with him pending workup.

## 2022-10-13 NOTE — ED ADULT NURSE NOTE - CHIEF COMPLAINT QUOTE
Patient BIBA from [facility], unable to start dialysis due to respiratory difficulty. Patient was on 2L NC with EMS saturation 89, placed on NRB, now saturation 100%.

## 2022-10-13 NOTE — PHARMACOTHERAPY INTERVENTION NOTE - COMMENTS
Medication history complete, reviewed medications with patient and confirmed with doctor first med hx. Medication history complete, reviewed medications with patient provided med list and confirmed with doctor first med hx.

## 2022-10-13 NOTE — H&P ADULT - NSHPLABSRESULTS_GEN_ALL_CORE
CBC:            9.6    7.56  )-----------( 127      ( 10-13-22 @ 13:12 )             31.2         Chem:         ( 10-13-22 @ 13:12 )    133<L>  |  94<L>  |  30<H>  ----------------------------<  90  5.5<H>   |  36<H>  |  3.39<H>        Liver Functions: ( 10-13-22 @ 13:12 )  Alb: 2.3 g/dL / Pro: 6.7 gm/dL / ALK PHOS: 243 U/L / ALT: 45 U/L / AST: 70 U/L / GGT: x              Type & Screen:

## 2022-10-13 NOTE — H&P ADULT - HISTORY OF PRESENT ILLNESS
88yo male with PMH of chronic atrial fibrillation s/p SHU/DCCV 1/2022 (on Eliquis), severe aortic stenosis (planned for TAVR 8/17/22 at [facility]) s/p balloon valvuloplasty complicated by CHB now s/p PPM, HFpEF (EF 59%), Streptococcus faecalis bacteremia (4/2022 - suspected endocarditis & treated with 6 weeks of antibiotics, ESRD on HD (T/Th/Sat), COPD, HTN, PAD with prior RLE revascularization, hypothyroidism, anemia presented to the ED from dialysis with sob. Missed HD. CXR c/w with pulm edema placed on bipap. HD in progress in ED.      Rapid afib in ED up to 130s          PAST MEDICAL & SURGICAL HISTORY:  CHF (congestive heart failure)  HFpEF      HTN (hypertension)      Atrial fibrillation  s/p PPM      Aortic stenosis  pending TAVR      History of COPD      PAD (peripheral artery disease)  s/p RLE stenting      ESRD on dialysis      CAD (coronary artery disease)      Afib      HLD (hyperlipidemia)      Mild HTN      Aortic stenosis      H/O hernia repair      H/O varicose vein stripping  right leg      No significant past surgical history      History of pacemaker          FAMILY HISTORY:  No pertinent family history in first degree relatives    FHx: heart disease        MEDICATIONS  (STANDING):    MEDICATIONS  (PRN):      Allergies    codeine (Unknown)    Intolerances    codeine (Other (Mild to Mod))       88yo male with PMH of chronic atrial fibrillation s/p SHU/DCCV 1/2022 (on Eliquis), severe aortic stenosis (planned for TAVR 8/17/22 at [facility]) s/p balloon valvuloplasty complicated by CHB now s/p PPM, HFpEF (EF 59%), Streptococcus faecalis bacteremia (4/2022 - suspected endocarditis & treated with 6 weeks of antibiotics, ESRD on HD (T/Th/Sat), COPD, HTN, PAD with prior RLE revascularization, hypothyroidism, anemia presented to the ED from dialysis with sob. Missed HD. CXR c/w with pulm edema placed on bipap. HD in progress in ED. Bipap taken off now on 4L satting 94%. Pt not showing signs of resp distress or increased work of breathing. Dtr Francoise was kept informed. Pt and dtr Francoise confirmed DNR/I status. MOLST signed via verbal consent with nurse witness. Time spent on advanced directives: 17 minutes.      Rapid afib in ED up to 130s          PAST MEDICAL & SURGICAL HISTORY:  CHF (congestive heart failure)  HFpEF      HTN (hypertension)      Atrial fibrillation  s/p PPM      Aortic stenosis  pending TAVR      History of COPD      PAD (peripheral artery disease)  s/p RLE stenting      ESRD on dialysis      CAD (coronary artery disease)      Afib      HLD (hyperlipidemia)      Mild HTN      Aortic stenosis      H/O hernia repair      H/O varicose vein stripping  right leg      No significant past surgical history      History of pacemaker          FAMILY HISTORY:  No pertinent family history in first degree relatives    FHx: heart disease        MEDICATIONS  (STANDING):    MEDICATIONS  (PRN):      Allergies    codeine (Unknown)    Intolerances    codeine (Other (Mild to Mod))

## 2022-10-13 NOTE — ED PROVIDER NOTE - IV ALTEPLASE EXCL ABS HIDDEN
Called patient to review instructions and go over medications for upcoming stress test. NPO after midnight. No caffeine for 12 hours.  Bring something to eat. Wear comfortbale clothes and gym shoes. Patient verbalized understanding.    
show

## 2022-10-13 NOTE — CHART NOTE - NSCHARTNOTEFT_GEN_A_CORE
88 y/o male with pmhx of ERSD on HD, afib s/p TTE DCCV on eliquis, aortic stenosis planned for TAVR initially in august now s/p balloon valvulplasty complicated by CHB s/p PPM, strep faecalis endocardiits, COPD, PAD who presented with SOB after missed HD session, initially required bipap now on HFNC satting well    code stroke called for aphasia. upon arrival patient is talking and alert and oriented. big BG was 68, gave amp of D50 and became more alert. otherwise nonfocal and at baseline per daughter at bedside.     does not require further imaging at this time. Patient is a 87y old  Male who presents with a chief complaint of sob (13 Oct 2022 15:52)      BRIEF HOSPITAL COURSE: 86 y/o male with pmhx of ERSD on HD, afib s/p TTE DCCV on eliquis, aortic stenosis planned for TAVR initially in august now s/p balloon valvulplasty complicated by CHB s/p PPM, strep faecalis endocardiits, COPD, PAD who presented with SOB after missed HD session, initially required bipap now on HFNC satting well    code stroke called for aphasia. upon arrival patient is talking and alert and oriented. big BG was 68, gave amp of D50 and became more alert. otherwise nonfocal and at baseline per daughter at bedside.     does not require further imaging at this time.      PAST MEDICAL & SURGICAL HISTORY:  CHF (congestive heart failure)  HFpEF      HTN (hypertension)      Atrial fibrillation  s/p PPM      Aortic stenosis  pending TAVR      History of COPD      PAD (peripheral artery disease)  s/p RLE stenting      ESRD on dialysis      CAD (coronary artery disease)      Afib      HLD (hyperlipidemia)      Mild HTN      Aortic stenosis      H/O hernia repair      H/O varicose vein stripping  right leg      No significant past surgical history      History of pacemaker          Review of Systems:  CONSTITUTIONAL: No fever, chills, or fatigue  EYES: No eye pain, visual disturbances, or discharge  ENMT:  No difficulty hearing, tinnitus, vertigo; No sinus or throat pain  NECK: No pain or stiffness  RESPIRATORY: No cough, wheezing, chills or hemoptysis; No shortness of breath  CARDIOVASCULAR: No chest pain, palpitations, dizziness, or leg swelling  GASTROINTESTINAL: No abdominal or epigastric pain. No nausea, vomiting, or hematemesis; No diarrhea or constipation. No melena or hematochezia.  GENITOURINARY: No dysuria, frequency, hematuria, or incontinence  NEUROLOGICAL: No headaches, memory loss, loss of strength, numbness, or tremors  SKIN: No itching, burning, rashes, or lesions   MUSCULOSKELETAL: No joint pain or swelling; No muscle, back, or extremity pain  PSYCHIATRIC: No depression, anxiety, mood swings, or difficulty sleeping      Medications:    diltiazem    Tablet 30 milliGRAM(s) Oral every 6 hours  metoprolol tartrate Injectable 5 milliGRAM(s) IV Push every 6 hours PRN  midodrine. 10 milliGRAM(s) Oral three times a day    ALBUTerol  90 MICROgram(s) HFA Inhaler - Peds 2 Puff(s) Inhalation every 6 hours PRN  budesonide 160 MICROgram(s)/formoterol 4.5 MICROgram(s) Inhaler 2 Puff(s) Inhalation two times a day    acetaminophen     Tablet .. 650 milliGRAM(s) Oral every 6 hours PRN  melatonin 3 milliGRAM(s) Oral at bedtime PRN  ondansetron Injectable 4 milliGRAM(s) IV Push every 8 hours PRN      apixaban 2.5 milliGRAM(s) Oral every 12 hours    aluminum hydroxide/magnesium hydroxide/simethicone Suspension 30 milliLiter(s) Oral every 4 hours PRN  pantoprazole    Tablet 40 milliGRAM(s) Oral before breakfast      atorvastatin Oral Tab/Cap - Peds 10 milliGRAM(s) Oral daily  levothyroxine 112 MICROGram(s) Oral daily    albumin human 25% IVPB 50 milliLiter(s) IV Intermittent every 1 hour  multivitamin 1 Tablet(s) Oral daily      lidocaine 4% Cream 1 Application(s) Topical daily    sevelamer carbonate 800 milliGRAM(s) Oral three times a day with meals          ICU Vital Signs Last 24 Hrs  T(C): 37.2 (13 Oct 2022 20:15), Max: 37.2 (13 Oct 2022 20:15)  T(F): 98.9 (13 Oct 2022 20:15), Max: 98.9 (13 Oct 2022 20:15)  HR: 94 (13 Oct 2022 20:39) (94 - 141)  BP: 109/79 (13 Oct 2022 20:15) (84/61 - 117/76)  BP(mean): 68 (13 Oct 2022 19:27) (51 - 88)  ABP: --  ABP(mean): --  RR: 15 (13 Oct 2022 20:15) (15 - 36)  SpO2: 100% (13 Oct 2022 20:39) (93% - 100%)    O2 Parameters below as of 13 Oct 2022 20:39  Patient On (Oxygen Delivery Method): nasal cannula, high flow,35LPM/80%                I&O's Detail        LABS:                        9.6    7.56  )-----------( 127      ( 13 Oct 2022 13:12 )             31.2     10-13    x   |  x   |  x   ----------------------------<  x   4.6   |  x   |  x     Ca    9.6      13 Oct 2022 13:12    TPro  6.7  /  Alb  2.3<L>  /  TBili  0.6  /  DBili  x   /  AST  70<H>  /  ALT  45  /  AlkPhos  243<H>  10-13          CAPILLARY BLOOD GLUCOSE      POCT Blood Glucose.: 68 mg/dL (13 Oct 2022 22:23)        CULTURES:      Physical Examination:    General: No acute distress.      HEENT: Pupils equal, reactive to light.  Symmetric.    PULM: Clear to auscultation bilaterally, no significant sputum production    NECK: Supple, no lymphadenopathy, trachea midline    CVS: Regular rate and rhythm, no murmurs, rubs, or gallops    ABD: Soft, nondistended, nontender, normoactive bowel sounds, no masses    EXT: No edema, nontender    SKIN: Warm and well perfused, no rashes noted.    NEURO: Alert, oriented, interactive, nonfocal    DEVICES:       ASSESSMENT/PLAN:    86 y/o male with pmhx of ERSD on HD, afib s/p TTE DCCV on eliquis, aortic stenosis planned for TAVR initially in august now s/p balloon valvulplasty complicated by CHB s/p PPM, strep faecalis endocardiits, COPD, PAD who presented with SOB after missed HD session, initially required bipap now on HFNC satting well    code stroke called for aphasia. upon arrival patient is talking and alert and oriented. big BG was 68, gave amp of D50 and became more alert. otherwise nonfocal and at baseline per daughter at bedside.     does not require further imaging at this time.  Recheck accucheck in an hour. keep > 80  - wean HFNC as tolerated. tolerated HD well and is satting well now can wean to nasal cannula

## 2022-10-13 NOTE — ED ADULT NURSE NOTE - OBJECTIVE STATEMENT
Pt presents to ER from [facility] c/o [facility]. Pt was about to receive dialysis and was unable to r/t respiratory difficulty. Pt arrived to ER with 2L NC and then was switched to NRB. Pt is tachypneic, equal b/l chest expansion.

## 2022-10-13 NOTE — H&P ADULT - NSHPPHYSICALEXAM_GEN_ALL_CORE
ICU Vital Signs Last 24 Hrs  T(C): --  T(F): --  HR: 110 (13 Oct 2022 15:19) (110 - 117)  BP: 116/86 (13 Oct 2022 15:19) (112/82 - 116/86)  BP(mean): --  ABP: --  ABP(mean): --  RR: 20 (13 Oct 2022 15:19) (20 - 20)  SpO2: 100% (13 Oct 2022 15:19) (100% - 100%)    O2 Parameters below as of 13 Oct 2022 15:19  Patient On (Oxygen Delivery Method): BiPAP/CPAP            PHYSICAL EXAM:    Constitutional: NAD, awake and alert  HEENT: PERR, EOMI, Normal Hearing, MMM  Neck: Soft and supple, No LAD, No JVD  Respiratory: Breath sounds are clear bilaterally, No wheezing, rales or rhonchi  Cardiovascular: S1 and S2, regular rate and rhythm, no Murmurs, gallops or rubs  Gastrointestinal: Bowel Sounds present, soft, nontender, nondistended, no guarding, no rebound  Extremities: No peripheral edema  Vascular: 2+ peripheral pulses  Neurological: A/O x 3, no focal deficits  Musculoskeletal: 5/5 strength b/l upper and lower extremities  Skin: No rashes

## 2022-10-13 NOTE — ED PROVIDER NOTE - NSICDXPASTMEDICALHX_GEN_ALL_CORE_FT
PAST MEDICAL HISTORY:  Afib     Aortic stenosis pending TAVR    Aortic stenosis     Atrial fibrillation s/p PPM    CAD (coronary artery disease)     CHF (congestive heart failure) HFpEF    ESRD on dialysis     History of COPD     HLD (hyperlipidemia)     HTN (hypertension)     Mild HTN     PAD (peripheral artery disease) s/p RLE stenting

## 2022-10-13 NOTE — ED PROVIDER NOTE - PHYSICAL EXAMINATION
Constitutional: Awake, Alert, In mild distress  HEAD: Normocephalic, atraumatic.   EYES: PERRL, EOM intact, conjunctiva and sclera are clear bilaterally.  ENT: External ears normal. No rhinorrhea, no tracheal deviation   NECK: Supple, non-tender  CARDIOVASCULAR: Tachycardic, irregular   RESPIRATORY: Increased respiratory effort with b/l rales. Hypoxia improved with nonrebreather.   ABDOMEN: Soft; non-tender, non-distended. No rebound or guarding.   EXTREMITIES:  no lower extremity edema, no deformities  SKIN: Warm, dry  NEURO: Sensory and motor functions are grossly intact. Speech is normal. No facial droop.  PSYCH: Appearance and judgement seem appropriate for gender and age. Constitutional: Awake, Alert, In mild distress  HEAD: Normocephalic, atraumatic.   EYES: PERRL, EOM intact, conjunctiva and sclera are clear bilaterally.  ENT: External ears normal. No rhinorrhea, no tracheal deviation   NECK: Supple, non-tender  CARDIOVASCULAR: Tachycardic, irregular   RESPIRATORY: Increased respiratory effort with b/l rales. Hypoxia improved with nonrebreather.   ABDOMEN: Soft; non-tender, non-distended. No rebound or guarding.   EXTREMITIES:  no lower extremity edema, no deformities, fistula with palpable thrill.  SKIN: Warm, dry  NEURO: Sensory and motor functions are grossly intact. Speech is normal. No facial droop.  PSYCH: Appearance and judgement seem appropriate for gender and age.

## 2022-10-13 NOTE — CONSULT NOTE ADULT - ASSESSMENT
88yo male with PMH of chronic atrial fibrillation s/p SHU/DCCV 1/2022 (on Eliquis), severe aortic stenosis (planned for TAVR 8/17/22 at [facility]) s/p balloon valvuloplasty complicated by CHB now s/p PPM, HFpEF (EF 59%), Streptococcus faecalis bacteremia (4/2022 - suspected endocarditis & treated with 6 weeks of antibiotics, ESRD on HD (T/Th/Sat), COPD, HTN, PAD with prior RLE revascularization, hypothyroidism, anemia presented to the ED sent in from [facility] dialysis for cough, SOB, fast HR.  Pt seen in the Emergency Room, Dyspneic worse while conversing, - 139 AFIB  No chest pain fever chills.  Pts labs and CXR pending  Will most likely need dialysis today, most likely CHF,   Will evaluate for PNA/ Covid as well  HepB panel ordered

## 2022-10-13 NOTE — ED ADULT TRIAGE NOTE - CHIEF COMPLAINT QUOTE
Patient BIBA from [facility], unable to start dialysis due to respiratory difficulty. Patient was on 2L NC with EMS saturation 89, placed on NRB, now saturation 100%.

## 2022-10-13 NOTE — ED ADULT NURSE NOTE - NSICDXPASTMEDICALHX_GEN_ALL_CORE_FT
Patient has appointment scheduled for 8/31/21 with Dr. Horvath after call came in yesterday.   Consent to communicate on file.   Left voicemail returning call and asked that patient follow up at appointment to review MRI results. Provided triage number if patient has any further questions.     TONY Mccollum RN       PAST MEDICAL HISTORY:  Afib     Aortic stenosis pending TAVR    Aortic stenosis     Atrial fibrillation s/p PPM    CAD (coronary artery disease)     CHF (congestive heart failure) HFpEF    ESRD on dialysis     History of COPD     HLD (hyperlipidemia)     HTN (hypertension)     Mild HTN     PAD (peripheral artery disease) s/p RLE stenting

## 2022-10-13 NOTE — ED ADULT NURSE NOTE - NSIMPLEMENTINTERV_GEN_ALL_ED
Implemented All Fall with Harm Risk Interventions:  [place] to call system. Call bell, personal items and telephone within reach. Instruct patient to call for assistance. Room bathroom lighting operational. Non-slip footwear when patient is off stretcher. Physically safe environment: no spills, clutter or unnecessary equipment. Stretcher in lowest position, wheels locked, appropriate side rails in place. Provide visual cue, wrist band, yellow gown, etc. Monitor gait and stability. Monitor for mental status changes and reorient to person, place, and time. Review medications for side effects contributing to fall risk. Reinforce activity limits and safety measures with patient and family. Provide visual clues: red socks. normal bilaterally

## 2022-10-13 NOTE — H&P ADULT - ASSESSMENT
#Acute hypoxemic  resp failure 2/2 to fluid overload  #Rapid atrial fibrillation  - admit to [facility]  -cont pulse ox  - titrate down o2 as tolerated  - resume cardizem  - iv lopressor prn for rate control  - ekg with some new TW changes in lateral leads with freq PVC  - echo/cards      DVT px: eliquis  DNR/ICristóbal COX in chart.

## 2022-10-13 NOTE — ED PROVIDER NOTE - NSICDXPASTSURGICALHX_GEN_ALL_CORE_FT
PAST SURGICAL HISTORY:  H/O hernia repair     H/O varicose vein stripping right leg    History of pacemaker     No significant past surgical history

## 2022-10-13 NOTE — ED PROVIDER NOTE - PROGRESS NOTE DETAILS
Patient reassessed, still on BiPAP and breathing more comfortably.  Heart rate in low 100 at this time.  Dr. conroy was going to see patient and is able to get him started on BiPAP.  Chest x-ray concerning for bilateral effusions, likely symptoms from volume overload state.  Patient getting dialysis at this time.  We will plan to admit to stepdown unit under Dr. Rain, however if patient improves after dialysis then he can go to med telemetry. Milad Galloway MD. Patient reassessed, still on BiPAP and breathing more comfortably.  Heart rate in low 100 at this time.  Dr. conroy was going to see patient and is able to get him started on HD. Chest x-ray concerning for bilateral effusions, likely symptoms from volume overload state.  Patient getting dialysis at this time.  We will plan to admit to stepdown unit under Dr. Rain, however if patient improves after dialysis then he can go to med telemetry. Milad Galloway MD.

## 2022-10-14 NOTE — CONSULT NOTE ADULT - ASSESSMENT
86yo male with PMH of chronic atrial fibrillation s/p SHU/DCCV 1/2022 (on Eliquis), severe aortic stenosis (planned for TAVR 8/17/22 at [facility]) s/p balloon valvuloplasty complicated by CHB now s/p PPM, HFpEF (EF 59%), Streptococcus faecalis bacteremia (4/2022 - suspected endocarditis & treated with 6 weeks of antibiotics, ESRD on HD (T/Th/Sat), COPD, HTN, PAD with prior RLE revascularization, hypothyroidism, anemia presented to the ED from dialysis with sob. Noted CHF exacerbation with moderate b/l pleural effusions. Called for effusions      Plan  Hold eliquis  HD as per renal- plan for 10/15  Plan for Rt pigtail placement later tonight/tomorrow  cont care as per primary team  LEXI Vail

## 2022-10-14 NOTE — CONSULT NOTE ADULT - PROBLEM SELECTOR RECOMMENDATION 2
Patient with h/o chronic afib s/p SHU/DCCV 1/2022 presents with afib RVR.  Receiving metoprolol IVP PRN for rate control.  Patient takes cardizem 30mg BID at home.  Would recommend restarting cardizem now for rate control.  Continue midodrine for blood pressure; patient takes eliquis for DVT prophylaxis; Trend H&H Patient with h/o chronic afib s/p SHU/DCCV 1/2022 presents with afib RVR.  Receiving metoprolol IVP PRN for rate control.  Patient takes cardizem 30mg BID at home.  Would recommend restarting cardizem now for rate control.  Continue midodrine for blood pressure; patient takes eliquis for DVT prophylaxis on hold for lowering HH Trend H&H

## 2022-10-14 NOTE — PROGRESS NOTE ADULT - SUBJECTIVE AND OBJECTIVE BOX
NEPHROLOGY INTERVAL HPI/OVERNIGHT EVENTS:  10-14-22 @ 12:34    10/14 events overnight noted, given SPA, on bipap    reviewed outpt records with recent admission to [facility], unable to remove more than 1kg of uf    TAVR in august with post op with PPM for CHB      86yo male with PMH of chronic atrial fibrillation s/p SHU/DCCV 2022 (on Eliquis), severe aortic stenosis (planned for TAVR 22 at [facility]) s/p balloon valvuloplasty complicated by CHB now s/p PPM, HFpEF (EF 59%), Streptococcus faecalis bacteremia (2022 - suspected endocarditis & treated with 6 weeks of antibiotics, ESRD on HD (//Sat), COPD, HTN, PAD with prior RLE revascularization, hypothyroidism, anemia presented to the ED sent in from [facility] dialysis for cough, SOB, fast HR.  Pt seen in the Emergency Room, Dyspneic worse while conversing, - 139 AFIB  No chest pain fever chills.  Pts labs and CXR pending        MEDICATIONS  (STANDING):  albumin human 25% IVPB 50 milliLiter(s) IV Intermittent every 1 hour  apixaban 2.5 milliGRAM(s) Oral every 12 hours  aspirin  chewable 81 milliGRAM(s) Oral daily  atorvastatin Oral Tab/Cap - Peds 10 milliGRAM(s) Oral daily  budesonide 160 MICROgram(s)/formoterol 4.5 MICROgram(s) Inhaler 2 Puff(s) Inhalation two times a day  diltiazem    Tablet 30 milliGRAM(s) Oral every 6 hours  levothyroxine 112 MICROGram(s) Oral daily  lidocaine 4% Cream 1 Application(s) Topical daily  midodrine. 10 milliGRAM(s) Oral <User Schedule>  multivitamin 1 Tablet(s) Oral daily  pantoprazole    Tablet 40 milliGRAM(s) Oral before breakfast  sevelamer carbonate 800 milliGRAM(s) Oral three times a day with meals    MEDICATIONS  (PRN):  acetaminophen     Tablet .. 650 milliGRAM(s) Oral every 6 hours PRN Temp greater or equal to 38C (100.4F), Mild Pain (1 - 3)  ALBUTerol  90 MICROgram(s) HFA Inhaler - Peds 2 Puff(s) Inhalation every 6 hours PRN Bronchospasm  aluminum hydroxide/magnesium hydroxide/simethicone Suspension 30 milliLiter(s) Oral every 4 hours PRN Dyspepsia  melatonin 3 milliGRAM(s) Oral at bedtime PRN Insomnia  metoprolol tartrate Injectable 5 milliGRAM(s) IV Push every 6 hours PRN sustained rapid afib rates > 130bpm  ondansetron Injectable 4 milliGRAM(s) IV Push every 8 hours PRN Nausea and/or Vomiting      Allergies    codeine (Unknown)    Intolerances    codeine (Other (Mild to Mod))      I&O's Detail          Vital Signs Last 24 Hrs  T(C): 36.7 (14 Oct 2022 07:49), Max: 37.2 (13 Oct 2022 20:15)  T(F): 98.1 (14 Oct 2022 07:49), Max: 98.9 (13 Oct 2022 20:15)  HR: 78 (14 Oct 2022 09:45) (78 - 141)  BP: 98/62 (14 Oct 2022 07:49) (77/43 - 117/76)  BP(mean): 66 (14 Oct 2022 06:31) (50 - 88)  RR: 18 (14 Oct 2022 09:51) (15 - 36)  SpO2: 100% (14 Oct 2022 09:51) (93% - 100%)    Parameters below as of 14 Oct 2022 09:51  Patient On (Oxygen Delivery Method): nasal cannula  O2 Flow (L/min): 3    Daily     Daily Weight in k.5 (14 Oct 2022 07:49)    PHYSICAL EXAM:  General: alert. awake NAD  HEENT: MMM  CV: s1s2 rrr  LUNGS: B/L dec BS  EXT: no edema    LABS:                        8.3    6.19  )-----------( 129      ( 14 Oct 2022 03:56 )             27.9     10-14    137  |  97  |  19  ----------------------------<  68<L>  4.2   |  33<H>  |  2.28<H>    Ca    9.5      14 Oct 2022 03:56  Phos  4.0     10-14  Mg     2.3     10-14    TPro  6.0  /  Alb  2.5<L>  /  TBili  0.6  /  DBili  x   /  AST  32  /  ALT  33  /  AlkPhos  205<H>  10-14        Magnesium, Serum: 2.3 mg/dL (10-14 @ 03:56)  Phosphorus Level, Serum: 4.0 mg/dL (10-14 @ 03:56)

## 2022-10-14 NOTE — CONSULT NOTE ADULT - PROBLEM SELECTOR RECOMMENDATION 9
Episodes of shortness of breath possibly due to CHF, pleural effusions, severe AS, anemia, JAVI.  Pro BNP elevated.  CXR revealed bilateral pleural effusions.  CT chest showed mod-large pleural effusions, layering secretions in trachea extending into mainstem bronchi suggesting aspiration    Recommend: echocardiogram to eval LVF, AS and pulmonary pressures; check TSH, speech and swallow eval

## 2022-10-14 NOTE — PATIENT PROFILE ADULT - FUNCTIONAL ASSESSMENT - BASIC MOBILITY 6.
2-calculated by average/Not able to assess (calculate score using [facility] averaging method)

## 2022-10-14 NOTE — CONSULT NOTE ADULT - PROBLEM SELECTOR RECOMMENDATION 4
Yes Severe aortic stenosis, patient was due to have TAVR in 8/2022 however procedure not done due to severe anemia.  Recommend echo to eval LVF and AS.

## 2022-10-14 NOTE — CONSULT NOTE ADULT - PROBLEM SELECTOR RECOMMENDATION 5
EKG reveals afib with RVR, occ PVC's, nonspecific ST changes.  Troponins mildly elevated, possibly demand ischemia iin the setting of JAVI, anemia and renal disease.  Patient without complaints of chest pain.  Recommend medical management at this time EKG reveals afib with RVR, occ PVC's, nonspecific ST changes.  Troponin mildly elevated, possibly demand ischemia in the setting of JAVI, anemia and renal disease.  Patient without complaints of chest pain.  Recommend medical management at this time

## 2022-10-14 NOTE — CHART NOTE - NSCHARTNOTEFT_GEN_A_CORE
RN informed MD that pt has BP 78/42 and suggested that the patient should have higher level of care. MD evaluated pt at bedside. On repeat BP 85/42 MAP 52 (84/42 , 80/49). Pt is awake and alert on BiPAP, following commands, responding appropriately. Denies chest pain, or palpitations. Crackles and wheezing heard on auscultation. MD team informed ICU PA who went and evaluated patient. X-ray ordered, labs, 5% albumin 250ml, and midodrine 10 q 6hrs.

## 2022-10-14 NOTE — CONSULT NOTE ADULT - CONVERSATION DETAILS
d/w pt and his dtr, Francoise  family want to maintain comfort, but want to pursue treatments of reversible decline.  being considered for TAVR.  will continue HD.  DNR/DNI confirmed (had this order at [facility]).  plan is for rehab and then hopefully TAVR.  Ultimate goal is for Mr. Del Toro to return home. pt and his dtr both note that they would not want invasive surgeries (i.e. open heart for valve replacement).

## 2022-10-14 NOTE — PROGRESS NOTE ADULT - ASSESSMENT
86yo male with PMH of chronic atrial fibrillation s/p SHU/DCCV 1/2022 (on Eliquis), severe aortic stenosis (planned for TAVR 8/17/22 at [facility]) s/p balloon valvuloplasty complicated by CHB now s/p PPM, HFpEF (EF 59%), Streptococcus faecalis bacteremia (4/2022 - suspected endocarditis & treated with 6 weeks of antibiotics, ESRD on HD (T/Th/Sat), COPD, HTN, PAD with prior RLE revascularization, hypothyroidism, anemia presented to the ED sent in from [facility] dialysis for cough, SOB, fast HR.  Pt seen in the Emergency Room, Dyspneic worse while conversing, - 139 AFIB  No chest pain fever chills.  Pts labs and CXR pending  Will most likely need dialysis today, most likely CHF,   Will evaluate for PNA/ Covid as well  HepB panel ordered      10/14 MK   - esrd will await ct scan results to determine need for hd     3 kg removed yesterday wtihh d    Albumin given overnight for hypotension   - sob with b/l effusion on cxr      ct surgery eval    non contrast CT scan of chest to further define    will determine need for hd   - s/p tavr and CHB s/p PPM     fu echo ordered     cardiology input   dw dr tavera

## 2022-10-14 NOTE — PROGRESS NOTE ADULT - SUBJECTIVE AND OBJECTIVE BOX
Chief Complaint: Dyspnea    Interval Hx: Patient seen and examined patient. Overnight patient with hypotension for which he received BP support with colloids. Continued on midodrine. Seen by Critical Care Team who advised continued care on Med Tele. Patient remains stable. BPs 100s/50s now. HR 90. Oxygenation has improved, currently on 3-4L/min with O2 sat 95-96% whereas he had previously been on BIPAP and then HFNC. Nephrology and Cardiology are following. Nephrology plans next HD session tomorrow if he remains stable. Cardiology reviewed TTE performed today, critical AS seen despite his recent balloon valvuloplasty. Case discussed further with the physician who performed his procedure at [facility], Dr. Medrano. He and family both deferring TAVR.     ROS: Multi system review is comprehensively negative x 10 systems except as above    Vitals:  T(F): 98.1 (14 Oct 2022 07:49), Max: 98.9 (13 Oct 2022 20:15)  HR: 90 (14 Oct 2022 15:03) (78 - 141)  BP: 102/56 (14 Oct 2022 15:03) (77/43 - 109/79)  RR: 18 (14 Oct 2022 15:03) (15 - 36)  SpO2: 96% (14 Oct 2022 15:03) (93% - 100%) on 4L/min    Exam:    Labs:    Imaging:    Cardiac Testing:    Meds:     Chief Complaint: Dyspnea    Interval Hx: Patient seen and examined patient. Overnight patient with hypotension for which he received BP support with colloids. Continued on midodrine. Seen by Critical Care Team who advised continued care on Med Tele. Patient remains stable. BPs 100s/50s now. HR 90. Oxygenation has improved, currently on 3-4L/min with O2 sat 95-96% whereas he had previously been on BIPAP and then HFNC. Nephrology and Cardiology are following. Nephrology plans next HD session tomorrow if he remains stable. Cardiology reviewed TTE performed today, critical AS seen despite his recent balloon valvuloplasty. Case discussed further with the physician who performed his procedure at [facility], Dr. Medrano. He and family both deferring TAVR.     ROS: Multi system review is comprehensively negative x 10 systems except as above    Vitals:  T(F): 98.1 (14 Oct 2022 07:49), Max: 98.9 (13 Oct 2022 20:15)  HR: 90 (14 Oct 2022 15:03) (78 - 141)  BP: 102/56 (14 Oct 2022 15:03) (77/43 - 109/79)  RR: 18 (14 Oct 2022 15:03) (15 - 36)  SpO2: 96% (14 Oct 2022 15:03) (93% - 100%) on 4L/min    Exam:  Gen: Frail-appearing but in no acute distress  HEENT: NCAT PERRL EOMI clear oropharynx, slightly dry   Neck: Supple, no JVD  Chest: Normal resp effort at rest, lungs with diminished breath sounds in bases B/L  CVS: s1 s2 normal, regular, rate ~90 bpm  Abd: +BS soft NT ND   Ext: No calf tenderness, no erythema  Skin: Warm, dry  Neuro: Awake and alert, answers simple questions, follows commands, no gross deficits  Mood: Calm, pleasant    Labs:                     8.3    6.19 )----------( 129                27.9       137  |  97  |  19  --------------------< 68  4.2   |  33  |  2.28    Ca 9.5  Phos 4.0   Mg 2.3    TPro  6.0  /  Alb  2.5  /  TBili  0.6  /  DBili  x   /  AST  32  /  ALT  33  /  AlkPhos  205    Lactate WNL   Troponin 118, 123, 116    COVID19 PCR negative    Imaging:  CT chest WO 10/14: Moderate-large pleural effusions. Layering secretions in the trachea extending into the mainstem bronchi and opacifying the lower lobe bronchi, suggesting aspiration.    CXR 10/14: Increasing congestion fairly advanced on the latest study.    CXR 10/13: Heart magnified by technique. Right sided pacemaker again noted. Bilateral effusions right greater than left and central CHF more pronounced than on prior.    Cardiac Testing:  TTE 10/14: Moderate MR. AV severely calcified. Valve opening seems to be restricted. Peak and mean transaortic gradients are 69 and 40 mmHg respectively; this finding is consistent with severe AS. WAQAS by continuity equation is .5 cm2. Moderate to severe TR. Severe pulmonary hypertension. LA moderately dilated. LV wall thickness is at the upper limits of normal. Septal flattening is seen; this finding is consistent with right heart pressure / volume overload. LV systolic function appears mildly impaired in the presence of a cardiac arrhythmia. Estimated left ventricular ejection fraction is 45-50 %. RA moderately dilated. A device wire is seen in the RV and RA. RV moderately dilated with decreased contractility.    EKG 10/13: Atrial fibrillation with rapid ventricular response with occasional ventricular-paced complexes and with premature ventricular or aberrantly conducted complexes. Right bundle branch block. Lateral T wave abnormality.     Meds:    MEDICATIONS  (STANDING):  albumin human 25% IVPB 50 milliLiter(s) IV Intermittent every 1 hour  aspirin  chewable 81 milliGRAM(s) Oral daily  atorvastatin Oral Tab/Cap - Peds 10 milliGRAM(s) Oral daily  budesonide 160 MICROgram(s)/formoterol 4.5 MICROgram(s) Inhaler 2 Puff(s) Inhalation two times a day  levothyroxine 112 MICROGram(s) Oral daily  lidocaine 4% Cream 1 Application(s) Topical daily  midodrine. 10 milliGRAM(s) Oral <User Schedule>  multivitamin 1 Tablet(s) Oral daily  pantoprazole    Tablet 40 milliGRAM(s) Oral before breakfast  sevelamer carbonate 800 milliGRAM(s) Oral three times a day with meals    MEDICATIONS  (PRN):  acetaminophen     Tablet .. 650 milliGRAM(s) Oral every 6 hours PRN Temp greater or equal to 38C (100.4F), Mild Pain (1 - 3)  ALBUTerol  90 MICROgram(s) HFA Inhaler - Peds 2 Puff(s) Inhalation every 6 hours PRN Bronchospasm  aluminum hydroxide/magnesium hydroxide/simethicone Suspension 30 milliLiter(s) Oral every 4 hours PRN Dyspepsia  melatonin 3 milliGRAM(s) Oral at bedtime PRN Insomnia  metoprolol tartrate Injectable 5 milliGRAM(s) IV Push every 6 hours PRN sustained rapid afib rates > 130bpm  ondansetron Injectable 4 milliGRAM(s) IV Push every 8 hours PRN Nausea and/or Vomiting

## 2022-10-14 NOTE — CONSULT NOTE ADULT - SUBJECTIVE AND OBJECTIVE BOX
REQUESTING PHYSICIAN: Hospitalist    REASON FOR CONSULT:    CHIEF COMPLAINT:    HPI:  88yo male with PMH of chronic atrial fibrillation s/p SHU/DCCV 1/2022 (on Eliquis), severe aortic stenosis (planned for TAVR 8/17/22 at [facility]) s/p balloon valvuloplasty complicated by CHB now s/p PPM, HFpEF (EF 59%), Streptococcus faecalis bacteremia (4/2022 - suspected endocarditis & treated with 6 weeks of antibiotics, ESRD on HD (T/Th/Sat), COPD, HTN, PAD with prior RLE revascularization, hypothyroidism, anemia presented to the ED from dialysis with sob. Missed HD. CXR c/w with pulm edema placed on bipap. HD in progress in ED. Bipap taken off now on 4L satting 94%. Pt not showing signs of resp distress or increased work of breathing. Dtr Francoise was kept informed. Pt and dtr Francoise confirmed DNR/I status. MOLST signed via verbal consent with nurse witness. Time spent on advanced directives: 17 minutes.      Rapid afib in ED up to 130s    Cardiology consult requested for elevated troponin's, JAVI and abnormal EKG.  History obtained by daughter Francoise who is at bedside.  Patient was admitted to [facility] in August with anemia where he was transfused with 1-2 units PRBC.  Patient was transferred to [facility] where he was supposed to have a TAVR for severe AS.  This was not done due to worsening anemia.  GI eval and endoscopy was performed (results unknown)  He was discharged to [facility] for one week when he developed a wet cough, worsening shortness of breath and edema in his lower extremities.  He denies chest pain, pressure or indigestion.  He has no history of CAD and has never had an MI, CVA or TIA.  EKG done in ER reveals Afib, RVR, PVC's, nonspecific ST changes          PAST MEDICAL & SURGICAL HISTORY:  CHF (congestive heart failure)  HFpEF      HTN (hypertension)      Atrial fibrillation  s/p PPM      Aortic stenosis  pending TAVR      History of COPD      PAD (peripheral artery disease)  s/p RLE stenting      ESRD on dialysis      CAD (coronary artery disease)      Afib      HLD (hyperlipidemia)      Mild HTN      Aortic stenosis      H/O hernia repair      H/O varicose vein stripping  right leg      No significant past surgical history      History of pacemaker          FAMILY HISTORY:  No pertinent family history in first degree relatives    FHx: heart disease        MEDICATIONS:  OUTPATIENT:  Home Medications:  acetaminophen 500 mg oral tablet: 1 tab(s) orally every 8 hours, As Needed (13 Oct 2022 18:11)  Albuterol (Eqv-ProAir HFA) 90 mcg/inh inhalation aerosol: 2 puff(s) inhaled every 4 hours, As Needed (13 Oct 2022 18:11)  dilTIAZem 30 mg oral tablet: 1 tab(s) orally 2 times a day (13 Oct 2022 18:11)  Eliquis 2.5 mg oral tablet: 1 tab(s) orally 2 times a day (13 Oct 2022 18:11)  fluticasone propionate 113 mcg/inh inhalation powder: 1 puff(s) inhaled every 12 hours (13 Oct 2022 18:11)  folic acid 1 mg oral tablet: 1 tab(s) orally once a day (13 Oct 2022 18:11)  levothyroxine 125 mcg (0.125 mg) oral tablet: 1 tab(s) orally once a day (13 Oct 2022 18:11)  lidocaine 4% topical cream: Apply topically to affected area 3 times a day (13 Oct 2022 18:11)  lovastatin 40 mg oral tablet: 1 tab(s) orally once a day (13 Oct 2022 18:11)  midodrine 10 mg oral tablet: 1 tab(s) orally 3 times a day (13 Oct 2022 18:11)  Nephro-Ben oral tablet: 1 tab(s) orally once a day (13 Oct 2022 18:11)  Protonix 40 mg oral delayed release tablet: 1 tab(s) orally once a day (13 Oct 2022 18:11)  Vitamin C 500 mg oral tablet, chewable: 1 tab(s) orally once a day (13 Oct 2022 18:11)        MEDICATIONS  (STANDING):  albumin human 25% IVPB 50 milliLiter(s) IV Intermittent every 1 hour  aspirin  chewable 81 milliGRAM(s) Oral daily  atorvastatin Oral Tab/Cap - Peds 10 milliGRAM(s) Oral daily  budesonide 160 MICROgram(s)/formoterol 4.5 MICROgram(s) Inhaler 2 Puff(s) Inhalation two times a day  levothyroxine 112 MICROGram(s) Oral daily  lidocaine 4% Cream 1 Application(s) Topical daily  midodrine. 10 milliGRAM(s) Oral <User Schedule>  multivitamin 1 Tablet(s) Oral daily  pantoprazole    Tablet 40 milliGRAM(s) Oral before breakfast  sevelamer carbonate 800 milliGRAM(s) Oral three times a day with meals    MEDICATIONS  (PRN):  acetaminophen     Tablet .. 650 milliGRAM(s) Oral every 6 hours PRN Temp greater or equal to 38C (100.4F), Mild Pain (1 - 3)  ALBUTerol  90 MICROgram(s) HFA Inhaler - Peds 2 Puff(s) Inhalation every 6 hours PRN Bronchospasm  aluminum hydroxide/magnesium hydroxide/simethicone Suspension 30 milliLiter(s) Oral every 4 hours PRN Dyspepsia  melatonin 3 milliGRAM(s) Oral at bedtime PRN Insomnia  metoprolol tartrate Injectable 5 milliGRAM(s) IV Push every 6 hours PRN sustained rapid afib rates > 130bpm  ondansetron Injectable 4 milliGRAM(s) IV Push every 8 hours PRN Nausea and/or Vomiting    Allergies    codeine (Unknown)    Intolerances    codeine (Other (Mild to Mod))       (13 Oct 2022 15:52)      REVIEW OF SYSTEMS:  ===============================  ===============================  CONSTITUTIONAL: + weakness, fevers or chills  EYES/ENT: +macular degeneration bilaterally;  No vertigo or throat pain   NECK: No pain or stiffness  RESPIRATORY: +wet NP cough, No wheezing, hemoptysis; +SOB  CARDIOVASCULAR: No chest pain or palpitations  GASTROINTESTINAL: No abdominal or epigastric pain. No nausea, vomiting, or hematemesis;   No diarrhea or constipation. No melena or hematochezia.  GENITOURINARY: No dysuria, frequency or hematuria  NEUROLOGICAL: No numbness or weakness  SKIN: No itching, burning, rashes, or lesions     Vital Signs Last 24 Hrs  T(C): 36.7 (14 Oct 2022 07:49), Max: 37.2 (13 Oct 2022 20:15)  T(F): 98.1 (14 Oct 2022 07:49), Max: 98.9 (13 Oct 2022 20:15)  HR: 90 (14 Oct 2022 15:03) (78 - 141)  BP: 102/56 (14 Oct 2022 15:03) (77/43 - 117/76)  BP(mean): 66 (14 Oct 2022 06:31) (50 - 88)  RR: 18 (14 Oct 2022 15:03) (15 - 36)  SpO2: 96% (14 Oct 2022 15:03) (93% - 100%)    Parameters below as of 14 Oct 2022 15:03  Patient On (Oxygen Delivery Method): nasal cannula  O2 Flow (L/min): 4      I&O's Summary      I&O's Detail      PHYSICAL EXAM:    Constitutional: Appears weak, but arousable, NAD  HEENT: PERR, EOMI,  No oral cyananosis.  Neck:  supple,  No JVD  Respiratory: Breath sounds with bibasilar rales  Cardiovascular: S1 and S2, irregular rhythm, +CHARLA  Gastrointestinal: Bowel Sounds present, soft, nontender.   Extremities: No peripheral edema, + peripheral pulses  Neurological: A/O x 3, no focal deficits  Skin: No rashes.    ===============================  ===============================  LABS:                             8.3    6.19  )-----------( 129      ( 14 Oct 2022 03:56 )             27.9                         9.6    7.56  )-----------( 127      ( 13 Oct 2022 13:12 )             31.2       14 Oct 2022 03:56    137    |  97     |  19     ----------------------------<  68     4.2     |  33     |  2.28   13 Oct 2022 16:15    x      |  x      |  x      ----------------------------<  x      4.6     |  x      |  x      13 Oct 2022 13:12    133    |  94     |  30     ----------------------------<  90     5.5     |  36     |  3.39     Ca    9.5        14 Oct 2022 03:56  Ca    9.6        13 Oct 2022 13:12  Phos  4.0       14 Oct 2022 03:56  Mg     2.3       14 Oct 2022 03:56    TPro  6.0    /  Alb  2.5    /  TBili  0.6    /  DBili  x      /  AST  32     /  ALT  33     /  AlkPhos  205    14 Oct 2022 03:56  TPro  6.7    /  Alb  2.3    /  TBili  0.6    /  DBili  x      /  AST  70     /  ALT  45     /  AlkPhos  243    13 Oct 2022 13:12        THYROID STUDIES:  Thyroid Stimulating Hormone, Serum (07.21.22 @ 06:40)   Thyroid Stimulating Hormone, Serum: 37.40 uIU/mL   ===============================  ===============================  CARDIAC BIOMARKERS:  -BNP VALUES:  10-13 @ 13:12  Pro Bnp [id number]  08-11 @ 16:54  Pro Bnp [id number]  07-21 @ 01:00  Pro Bnp [id number]      -TROPONIN VALUES:  -------  lab item   Troponin I, High Sensitivity Result: 116.42 ng/L *H* (10-14-22 @ 03:56)  Troponin I, High Sensitivity Result: 123.59 ng/L *H* (10-13-22 @ 16:15)  Troponin I, High Sensitivity Result: 118.03 ng/L *H* (10-13-22 @ 13:12)  Troponin I, High Sensitivity Result: 407.4 ng/L *H* (08-11-22 @ 18:55)  Troponin I, High Sensitivity Result: 424.7 ng/L *H* (08-11-22 @ 16:54)  Troponin I, High Sensitivity Result: 895.5 ng/L *H* (07-21-22 @ 14:32)  Troponin I, High Sensitivity Result: 545.3 ng/L *H* (07-21-22 @ 06:40)  Troponin I, High Sensitivity Result: 50.3 ng/L (07-21-22 @ 01:00)  Troponin T, High Sensitivity Result: 120 ng/L *HH* (05-05-22 @ 20:16)      lab panel    ===============================  ===============================  BLOOD CULTURES:     ===============================  ===============================  EKG: Afib, RVR, PVC's, Nonspecific ST changes    TELE: Afib 80's-110 with occ RVR and occ paced beats  ===============================  RADIOLOGY:   Chest X-ray:    < from: Xray Chest 1 View- PORTABLE-Urgent (Xray Chest 1 View- PORTABLE-Urgent .) (10.14.22 @ 03:43) >  IMPRESSION: Increasing congestion fairly advanced on the latest study.    < end of copied text >        < from: CT Chest No Cont (10.14.22 @ 12:04) >  IMPRESSION:    Moderate-large pleural effusions. Layering secretions in the trachea   extending into the mainstem bronchi and opacifying the lower lobe   bronchi, suggesting aspiration.    < end of copied text >    ===============================  ECHO:    ==< from: Transthoracic Echocardiogram (05.11.22 @ 19:01) >  CONCLUSIONS:  Technically very difficult study.  1. Mitral annular calcification, otherwise normal mitral  valve. Mild-moderate mitral regurgitation.  2. Aortic valve leaflet morphology not well visualized.  The valve is heavily calcified and grossly appears  signficantly stenotic.  However, unable to obtain accurate  transaortic gradients.  Unable to accurately estimate the  aortic valve area.  3. Endocardium not well visualized; unable to evaluate left  ventricular systolic function.  4. Unable to accurately evaluate rightventricular size or  systolic function.  5. Estimated right ventricular systolic pressure equals 65  mm Hg, assuming right atrial pressure equals 10 mm Hg,  consistent with severe pulmonary hypertension.    < end of copied text >  =============================  CARDIAC CATH:    ===============================  STRESS TESTING:                                    ===============================
NEPHROLOGY CONSULT  88yo male with PMH of chronic atrial fibrillation s/p SHU/DCCV 1/2022 (on Eliquis), severe aortic stenosis (planned for TAVR 8/17/22 at [facility]) s/p balloon valvuloplasty complicated by CHB now s/p PPM, HFpEF (EF 59%), Streptococcus faecalis bacteremia (4/2022 - suspected endocarditis & treated with 6 weeks of antibiotics, ESRD on HD (T/Th/Sat), COPD, HTN, PAD with prior RLE revascularization, hypothyroidism, anemia presented to the ED sent in from [facility] dialysis for cough, SOB, fast HR.  Pt seen in the Emergency Room, Dyspneic worse while conversing, - 139 AFIB  No chest pain fever chills.  Pts labs and CXR pending        PAST MEDICAL & SURGICAL HISTORY:  CHF (congestive heart failure)  HFpEF      HTN (hypertension)      Atrial fibrillation  s/p PPM      Aortic stenosis  pending TAVR      History of COPD      PAD (peripheral artery disease)  s/p RLE stenting      ESRD on dialysis      CAD (coronary artery disease)      Afib      HLD (hyperlipidemia)      Mild HTN      Aortic stenosis      H/O hernia repair      H/O varicose vein stripping  right leg      No significant past surgical history      History of pacemaker          FAMILY HISTORY:  No pertinent family history in first degree relatives    FHx: heart disease        MEDICATIONS  (STANDING):    MEDICATIONS  (PRN):      Allergies    codeine (Unknown)    Intolerances    codeine (Other (Mild to Mod))      I&O's Summary        REVIEW OF SYSTEMS:    CONSTITUTIONAL:  As per HPI.  CONSTITUTIONAL: No weakness, fevers or chills  EYES/ENT: No visual changes;  No vertigo or throat pain   NECK: No pain or stiffness  CARDIOVASCULAR: No chest pain or palpitations  GASTROINTESTINAL: No abdominal or epigastric pain. No nausea, vomiting, or hematemesis; No diarrhea or constipation. No melena or hematochezia.  GENITOURINARY: No dysuria, frequency or hematuria  NEUROLOGICAL: No numbness or weakness  SKIN: No itching, burning, rashes, or lesions   All other review of systems is negative unless indicated above      Vital Signs Last 24 Hrs  T(C): --  T(F): --  HR: 117 (13 Oct 2022 12:17) (117 - 117)  BP: 112/82 (13 Oct 2022 12:17) (112/82 - 112/82)  BP(mean): --  RR: 20 (13 Oct 2022 12:17) (20 - 20)  SpO2: 100% (13 Oct 2022 12:17) (100% - 100%)    Parameters below as of 13 Oct 2022 12:17  Patient On (Oxygen Delivery Method): mask, nonrebreather  O2 Flow (L/min): 15      Daily Height in cm: 167.64 (13 Oct 2022 12:17)    Daily     I&O's Summary      PHYSICAL EXAM:    General:  Alert, Dyspneic    Neuro:  alert, mild confusion, does not know if he is a resident at [facility]    HEENT:  No JVD, no masses, Eyes anicteric, ,    Cardiovascular:  IRREG, rate 120-130    Lungs:  Bilateral rhonchi    Abdomen:  Normoactive bowel sounds. Soft, flat, non-tender, and non-distended.  positive bowel sounds    Skin:  Warm, dry    Extremities:  warm,  no cyanosis    LABS:                      
Patient is a 87y old  Male who presents with a chief complaint of sob (14 Oct 2022 15:19)    ________________________________  HASEEB KONG is a 87y year old Male with a past medical history of severe stenosis status post balloon valvuloplasty August 2022, with eventual plan for TAVR, chronic atrial fibrillation on anticoagulation, critical aortic stenosis, history of bacteremia, end-stage renal disease, peripheral arterial disease, chronic anemia and pacemaker implantation for complete heart block.  He also has a history of anemia, and GI bleed from earlier this year.    The patient was discharged after his valvuloplasty for eventual TAVR pending improvement in strength.  He now presents to the ER for evaluation of progressively worsening shortness of breath, and has been admitted for acute diastolic heart failure due to critical AS in the setting of end-stage renal disease.  CT chest performed earlier today showed large bilateral pleural effusion.  Apparently shortness of breath is improved with dialysis and BiPAP.  Heart rate slightly elevated.      PREVIOUS CARDIAC WORKUP:    Echocardiogram 8/31/22  --LV global wall motion is normal.  --There is a pacemaker in the right heart.  --There is moderate aortic valve thickening. The aortic valve is moderately calcified. There is   severe aortic stenosis. The aortic valve area, by the continuity equation, is 0.56 cm². There   is trace aortic regurgitation.  --There is mitral annular calcification. There is mild mitral regurgitation.  --There is no pericardial effusion.  --Following balloon aortic valvuloplasty, the peak velocity across the aortic valve is 3.9   m/sec. The post procedure aortic valve area is 0.67 cm2 with a peak/mean gradient across the   valve of 56/35 mm Hg.    Cardiac catheterization: 4/28/2022 right iliofemoral with stent severely calcified, left iliofemoral severely calcified, mild to moderate pulmonary hypertension, distal left main 25%, proximal RCA to mid RCA 80% stenosis  ________________________________  Review of systems: A 10 point review of system has been performed, and is negative except for what has been mentioned in the above history of present illness.     PAST MEDICAL & SURGICAL HISTORY:  CHF (congestive heart failure)  HFpEF      HTN (hypertension)      Atrial fibrillation  s/p PPM      Aortic stenosis  pending TAVR      History of COPD      PAD (peripheral artery disease)  s/p RLE stenting      ESRD on dialysis      CAD (coronary artery disease)      Afib      HLD (hyperlipidemia)      Mild HTN      Aortic stenosis      H/O hernia repair      H/O varicose vein stripping  right leg      No significant past surgical history      History of pacemaker        FAMILY HISTORY:  No pertinent family history in first degree relatives    FHx: heart disease         SOCIAL HISTORY: The patient denies any tobacco abuse, alcohol abuse or illicit drug use.    ALLERGIES:  codeine (Unknown)  codeine (Other (Mild to Mod))    Home Medications:  acetaminophen 500 mg oral tablet: 1 tab(s) orally every 8 hours, As Needed (13 Oct 2022 18:11)  Albuterol (Eqv-ProAir HFA) 90 mcg/inh inhalation aerosol: 2 puff(s) inhaled every 4 hours, As Needed (13 Oct 2022 18:11)  dilTIAZem 30 mg oral tablet: 1 tab(s) orally 2 times a day (13 Oct 2022 18:11)  Eliquis 2.5 mg oral tablet: 1 tab(s) orally 2 times a day (13 Oct 2022 18:11)  fluticasone propionate 113 mcg/inh inhalation powder: 1 puff(s) inhaled every 12 hours (13 Oct 2022 18:11)  folic acid 1 mg oral tablet: 1 tab(s) orally once a day (13 Oct 2022 18:11)  levothyroxine 125 mcg (0.125 mg) oral tablet: 1 tab(s) orally once a day (13 Oct 2022 18:11)  lidocaine 4% topical cream: Apply topically to affected area 3 times a day (13 Oct 2022 18:11)  lovastatin 40 mg oral tablet: 1 tab(s) orally once a day (13 Oct 2022 18:11)  midodrine 10 mg oral tablet: 1 tab(s) orally 3 times a day (13 Oct 2022 18:11)  Nephro-Ben oral tablet: 1 tab(s) orally once a day (13 Oct 2022 18:11)  Protonix 40 mg oral delayed release tablet: 1 tab(s) orally once a day (13 Oct 2022 18:11)  Vitamin C 500 mg oral tablet, chewable: 1 tab(s) orally once a day (13 Oct 2022 18:11)    MEDICATIONS  (STANDING):  albumin human 25% IVPB 50 milliLiter(s) IV Intermittent every 1 hour  aspirin  chewable 81 milliGRAM(s) Oral daily  atorvastatin Oral Tab/Cap - Peds 10 milliGRAM(s) Oral daily  budesonide 160 MICROgram(s)/formoterol 4.5 MICROgram(s) Inhaler 2 Puff(s) Inhalation two times a day  levothyroxine 112 MICROGram(s) Oral daily  lidocaine 4% Cream 1 Application(s) Topical daily  midodrine. 10 milliGRAM(s) Oral <User Schedule>  multivitamin 1 Tablet(s) Oral daily  pantoprazole    Tablet 40 milliGRAM(s) Oral before breakfast  sevelamer carbonate 800 milliGRAM(s) Oral three times a day with meals    MEDICATIONS  (PRN):  acetaminophen     Tablet .. 650 milliGRAM(s) Oral every 6 hours PRN Temp greater or equal to 38C (100.4F), Mild Pain (1 - 3)  ALBUTerol  90 MICROgram(s) HFA Inhaler - Peds 2 Puff(s) Inhalation every 6 hours PRN Bronchospasm  aluminum hydroxide/magnesium hydroxide/simethicone Suspension 30 milliLiter(s) Oral every 4 hours PRN Dyspepsia  melatonin 3 milliGRAM(s) Oral at bedtime PRN Insomnia  metoprolol tartrate Injectable 5 milliGRAM(s) IV Push every 6 hours PRN sustained rapid afib rates > 130bpm  ondansetron Injectable 4 milliGRAM(s) IV Push every 8 hours PRN Nausea and/or Vomiting    Vital Signs Last 24 Hrs  T(C): 36.7 (14 Oct 2022 07:49), Max: 37.2 (13 Oct 2022 20:15)  T(F): 98.1 (14 Oct 2022 07:49), Max: 98.9 (13 Oct 2022 20:15)  HR: 90 (14 Oct 2022 15:03) (78 - 141)  BP: 102/56 (14 Oct 2022 15:03) (77/43 - 109/79)  BP(mean): 66 (14 Oct 2022 06:31) (50 - 87)  RR: 18 (14 Oct 2022 15:03) (15 - 36)  SpO2: 96% (14 Oct 2022 15:03) (93% - 100%)    Parameters below as of 14 Oct 2022 15:03  Patient On (Oxygen Delivery Method): nasal cannula  O2 Flow (L/min): 4    I&O's Summary    ________________________________  GENERAL APPEARANCE:  No acute distress  HEAD: normocephalic, atraumatic  NECK: supple, no jugular venous distention, no carotid bruit    HEART: Regular rate and rhythm, S1, S2 normal, 1/6 murmur    CHEST:  No anterior chest wall tenderness    LUNGS:  Clear to auscultation, without any wheezing, rhonchi or rales    ABDOMEN: soft, nontender, nondistended, with positive bowel sounds appreciated  EXTREMITIES: no edema.   NEURO: Alert and oriented x3  PSYC:  Normal affect  SKIN:  Dry  ________________________________   TELEMETRY: Atrial fibrillation with moderate ventricular response  ECG: Atrial fibrillation at 110 bpm, occasional PVCs;  Right bundle branch block.    LABS:                        8.3    6.19  )-----------( 129      ( 14 Oct 2022 03:56 )             27.9             10-14    137  |  97  |  19  ----------------------------<  68<L>  4.2   |  33<H>  |  2.28<H>    Ca    9.5      14 Oct 2022 03:56  Phos  4.0     10-14  Mg     2.3     10-14    TPro  6.0  /  Alb  2.5<L>  /  TBili  0.6  /  DBili  x   /  AST  32  /  ALT  33  /  AlkPhos  205<H>  10-14      LIVER FUNCTIONS - ( 14 Oct 2022 03:56 )  Alb: 2.5 g/dL / Pro: 6.0 gm/dL / ALK PHOS: 205 U/L / ALT: 33 U/L / AST: 32 U/L / GGT: x             Pro BNP  [id number] 10-13 @ 13:12  D Dimer  -- 10-13 @ 13:12      ________________________________    RADIOLOGY & ADDITIONAL STUDIES: Moderate-large pleural effusions. Layering secretions in the trachea   extending into the mainstem bronchi and opacifying the lower lobe   bronchi, suggesting aspiration.    ________________________________    ASSESSMENT:  Acute on chronic diastolic heart failure due to valvular heart disease and renal failure  Severe aortic stenosis status post balloon valvuloplasty 7/23/2022 complicated by complete heart block  Large bilateral pleural effusions  Coronary artery disease  Complete heart block status post pacemaker implantation  Chronic atrial fibrillation anticoagulation  History of GI bleed anemia  End-stage renal disease  History of suspected endocarditis status post antibiotics with staph faecalis bacteremia      PLAN:  In summary, this is a 87y Male with a complex history as above admitted for shortness of breath due to heart failure in the setting of renal failure.  Case discussed with primary cardiologist and Dr. Medrano.  Given comorbidities, and current functional status, will not transfer.  Recommend CT surgery for thoracentesis.  Hold anticoagulation.  Given low blood pressure and monitor for now.  IV pushes of metoprolol can be used as needed for rate control.  Continue aspirin.  Repeat echocardiogram reviewed.  Overall prognosis guarded.      ____________________________________________  (Dragon Dictation software used). Thank you for allowing me to participate in the care of your patient. Please contact me should any questions arise.    KYLER Shepherd DO, [facility]  Office: [phone]     
HPI: 86yo male with PMH of chronic atrial fibrillation s/p SHU/DCCV 1/2022 (on Eliquis), severe aortic stenosis (planned for TAVR 8/17/22 at [facility]) s/p balloon valvuloplasty complicated by CHB now s/p PPM, HFpEF (EF 59%), Streptococcus faecalis bacteremia (4/2022 - suspected endocarditis & treated with 6 weeks of antibiotics, ESRD on HD (T/Th/Sat), COPD, HTN, PAD with prior RLE revascularization, hypothyroidism, anemia presented to the ED from dialysis with sob. Missed HD. CXR c/w with pulm edema placed on bipap. HD in progress in ED. Bipap taken off now on 4L satting 94%. Pt not showing signs of resp distress or increased work of breathing. Dtr Francoise was kept informed. Pt and dtr Francoise confirmed DNR/I status. MOLST signed via verbal consent with nurse witness. Time spent on advanced directives: 17 minutes.      Rapid afib in ED up to 130s    PAST MEDICAL & SURGICAL HISTORY:  CHF (congestive heart failure)  HFpEF  HTN (hypertension)  Atrial fibrillation  s/p PPM  Aortic stenosis  pending TAVR  History of COPD  PAD (peripheral artery disease)  s/p RLE stenting  ESRD on dialysis  CAD (coronary artery disease)  Afib  HLD (hyperlipidemia)  Mild HTN  Aortic stenosis  H/O hernia repair  H/O varicose vein stripping  right leg  History of pacemaker      FAMILY HISTORY:  FHx: heart disease        Allergies  codeine (Unknown)      Interval History: Patient dialyzed with 3L ultrafiltrate removed, hypotensive more than his usual chronic hypotension         Review of Systems:  Review of Systems is Limited due to patient's neurologic status.    Constitutional:  no fever  Eyes: No double vision, no change in visual acuity, no blurry vision, no occular discharge  Neurologic: + mild periodic dizziness, + lightheadedness   Ears, nose, mouth throat:  No ottorrhea. No change in hearing, No anosmia, No oral lesions, No sore throat  Cardiovascular: No palpitations, no chest pain  Respiratory: No shortness of breath, +cough   Gastrointestinal: No change in bowel habits, no change in appetite  Genitourinary: +reduced urine output, No Dysuria, no Hematuria  Musculoskeletal: No muscle wasting, No new weakness  Psych: denies anxiety   Integumentary: CDI   Allergic / Immune: No active allergies unless otherwise specified in medical chart  All other systems reviewed and are negative      Physical Exam:  Constitutional: NAD  Neuro: Awake, alert, oriented, following commands and conversing appropriately   Cardiovascular:  Regular rate during examination   Eyes: PERRL, EOMI   ENT: No JVD, Trachea Midline.  Respiratory: Symmetric chest rise, non-labored breathing on High flow NC   Gastrointestinal: Soft, nontender, nondistended.  Genitourinary:  No Schultz  Musculoskeletal: No muscle wasting noted,  No pretibial edema appreciated, no appreciable calf tenderness.  Skin:  CDI       Vital Signs Last 24 Hrs  T(C): 36.4 (14 Oct 2022 02:15), Max: 37.2 (13 Oct 2022 20:15)  T(F): 97.5 (14 Oct 2022 02:15), Max: 98.9 (13 Oct 2022 20:15)  HR: 85 (14 Oct 2022 02:15) (85 - 141)  BP: 85/42 (14 Oct 2022 03:00) (78/42 - 117/76)  BP(mean): 52 (14 Oct 2022 03:00) (50 - 88)  RR: 24 (14 Oct 2022 02:15) (15 - 36)  SpO2: 100% (14 Oct 2022 02:31) (93% - 100%)      Labs                        9.6    7.56  )-----------( 127      ( 13 Oct 2022 13:12 )             31.2       10-13    x   |  x   |  x   ----------------------------<  x   4.6   |  x   |  x     Ca    9.6      13 Oct 2022 13:12    TPro  6.7  /  Alb  2.3<L>  /  TBili  0.6  /  DBili  x   /  AST  70<H>  /  ALT  45  /  AlkPhos  243<H>  10-13      LIVER FUNCTIONS - ( 13 Oct 2022 13:12 )  Alb: 2.3 g/dL / Pro: 6.7 gm/dL / ALK PHOS: 243 U/L / ALT: 45 U/L / AST: 70 U/L / GGT: x             < from: Xray Chest 1 View- PORTABLE-Urgent (Xray Chest 1 View- PORTABLE-Urgent .) (08.11.22 @ 16:17) >  ACC: [id number] EXAM:  XR CHEST PORTABLE URGENT 1V                          PROCEDURE DATE:  08/11/2022          INTERPRETATION:  AP semierect chest on August 11, 2022 at 3:26 PM.   Patient is short of breath.    Heart magnified by technique.    Moderate right effusion with loculation again noted.    Slight infiltrate left lower lung field again seen.    Above findings are similar to July 21.    Present film shows a unipolar right-sided pacemaker which is new.    IMPRESSION: Insertion of right-sided pacemaker. Other findings stable.    --- End of Report ---            ORAL FONTAINE MD; Attending Radiologist  This document has been electronically signed. Aug 11 2022  4:39PM    < end of copied text >    CAPILLARY BLOOD GLUCOSE  POCT Blood Glucose.: 68 mg/dL (13 Oct 2022 22:23)        Medications:  MEDICATIONS  (STANDING):  albumin human  5% IVPB 250 milliLiter(s) IV Intermittent once  albumin human 25% IVPB 50 milliLiter(s) IV Intermittent every 1 hour  apixaban 2.5 milliGRAM(s) Oral every 12 hours  aspirin  chewable 81 milliGRAM(s) Oral daily  atorvastatin Oral Tab/Cap - Peds 10 milliGRAM(s) Oral daily  budesonide 160 MICROgram(s)/formoterol 4.5 MICROgram(s) Inhaler 2 Puff(s) Inhalation two times a day  diltiazem    Tablet 30 milliGRAM(s) Oral every 6 hours  levothyroxine 112 MICROGram(s) Oral daily  lidocaine 4% Cream 1 Application(s) Topical daily  midodrine. 10 milliGRAM(s) Oral <User Schedule>  multivitamin 1 Tablet(s) Oral daily  pantoprazole    Tablet 40 milliGRAM(s) Oral before breakfast  sevelamer carbonate 800 milliGRAM(s) Oral three times a day with meals    MEDICATIONS  (PRN):  acetaminophen     Tablet .. 650 milliGRAM(s) Oral every 6 hours PRN Temp greater or equal to 38C (100.4F), Mild Pain (1 - 3)  ALBUTerol  90 MICROgram(s) HFA Inhaler - Peds 2 Puff(s) Inhalation every 6 hours PRN Bronchospasm  aluminum hydroxide/magnesium hydroxide/simethicone Suspension 30 milliLiter(s) Oral every 4 hours PRN Dyspepsia  melatonin 3 milliGRAM(s) Oral at bedtime PRN Insomnia  metoprolol tartrate Injectable 5 milliGRAM(s) IV Push every 6 hours PRN sustained rapid afib rates > 130bpm  ondansetron Injectable 4 milliGRAM(s) IV Push every 8 hours PRN Nausea and/or Vomiting            
Surgeon: Yared     Consult requesting by: Angelia     HISTORY OF PRESENT ILLNESS:  88yo male with PMH of chronic atrial fibrillation s/p SHU/DCCV 1/2022 (on Eliquis), severe aortic stenosis (planned for TAVR 8/17/22 at [facility]) s/p balloon valvuloplasty complicated by CHB now s/p PPM, HFpEF (EF 59%), Streptococcus faecalis bacteremia (4/2022 - suspected endocarditis & treated with 6 weeks of antibiotics, ESRD on HD (T/Th/Sat), COPD, HTN, PAD with prior RLE revascularization, hypothyroidism, anemia presented to the ED from dialysis with sob. Missed HD. CXR c/w with pulm edema placed on bipap. HD in progress in ED. Bipap taken off now on 4L satting 94%. Pt not showing signs of resp distress or increased work of breathing. Dtr Francoise was kept informed. Pt and dtr Francoise confirmed DNR/I status. MOLST signed via verbal consent with nurse witness H&P    pt seen at bedside.  on 5 L NC. Resting in Mild distress. Dtr Francoise at bedside. Pt eliquis stopped this am. Plan for Pigtail later tonight or tomorrow          PAST MEDICAL & SURGICAL HISTORY:  CHF (congestive heart failure)  HFpEF      HTN (hypertension)      Atrial fibrillation  s/p PPM      Aortic stenosis  pending TAVR      History of COPD      PAD (peripheral artery disease)  s/p RLE stenting      ESRD on dialysis      CAD (coronary artery disease)      Afib      HLD (hyperlipidemia)      Mild HTN      Aortic stenosis      H/O hernia repair      H/O varicose vein stripping  right leg      No significant past surgical history      History of pacemaker          MEDICATIONS  (STANDING):  albumin human 25% IVPB 50 milliLiter(s) IV Intermittent every 1 hour  aspirin  chewable 81 milliGRAM(s) Oral daily  atorvastatin Oral Tab/Cap - Peds 10 milliGRAM(s) Oral daily  budesonide 160 MICROgram(s)/formoterol 4.5 MICROgram(s) Inhaler 2 Puff(s) Inhalation two times a day  levothyroxine 112 MICROGram(s) Oral daily  lidocaine 4% Cream 1 Application(s) Topical daily  midodrine. 10 milliGRAM(s) Oral <User Schedule>  multivitamin 1 Tablet(s) Oral daily  pantoprazole    Tablet 40 milliGRAM(s) Oral before breakfast  sevelamer carbonate 800 milliGRAM(s) Oral three times a day with meals    MEDICATIONS  (PRN):  acetaminophen     Tablet .. 650 milliGRAM(s) Oral every 6 hours PRN Temp greater or equal to 38C (100.4F), Mild Pain (1 - 3)  ALBUTerol  90 MICROgram(s) HFA Inhaler - Peds 2 Puff(s) Inhalation every 6 hours PRN Bronchospasm  aluminum hydroxide/magnesium hydroxide/simethicone Suspension 30 milliLiter(s) Oral every 4 hours PRN Dyspepsia  melatonin 3 milliGRAM(s) Oral at bedtime PRN Insomnia  metoprolol tartrate Injectable 5 milliGRAM(s) IV Push every 6 hours PRN sustained rapid afib rates > 130bpm  ondansetron Injectable 4 milliGRAM(s) IV Push every 8 hours PRN Nausea and/or Vomiting    Antiplatelet therapy/AC:  Eliquis                         Last dose/amt: This am    Allergies    codeine (Unknown)    Intolerances    codeine (Other (Mild to Mod))      SOCIAL HISTORY:  Smoker: [ ] Yes  [ x] No        PACK YEARS:                         WHEN QUIT?  ETOH use: [ ] Yes  [x ] No              FREQUENCY / QUANTITY:  Ilicit Drug use:  [ ] Yes  [ x] No  Occupation:  Live with:  Assisted device use: walker     FAMILY HISTORY:  No pertinent family history in first degree relatives    FHx: heart disease        Review of Systems  CONSTITUTIONAL:  Fevers[ ] chills[ ] sweats[ ] fatigue[x ] weight loss[ ] weight gain [ ]                                     NEGATIVE [ ]   NEURO:  parathesias[ ] seizures [ ]  syncope [ ]  confusion [ ]                                                                                NEGATIVE[x ]   EYES: glasses[ ]  blurry vision[ ]  discharge[ ] pain[ ] glaucoma [ ]                                                                          NEGATIVE[x ]   ENMT:  difficulty hearing [ ]  vertigo[ ]  dysphagia[ ] epistaxis[ ] recent dental work [ ]                                    NEGATIVE[x ]   CV:  chest pain[ ] palpitations[ ] AGUDELO [x ] diaphoresis [ ]                                                                                           NEGATIVE[ ]   RESPIRATORY:  wheezing[ ] SOB[ x] cough [ ] sputum[ ] hemoptysis[ ]                                                                  NEGATIVE[ ]   GI:  nausea[ ]  vomiting [ ]  diarrhea[ ] constipation [ ] melena [ ]                                                                      NEGATIVE[ x]   : hematuria[ ]  dysuria[ ] urgency[ ] incontinence[ ]                                                                                            NEGATIVE[x ]   MUSCULOSKELETAL  arthritis[ ]  joint swelling [ ] muscle weakness [ x]                                                                NEGATIVE[ ]   SKIN/BREAST:  rash[ ] itching [ ]  hair loss[ ] masses[ ]                                                                                              NEGATIVE[x ]   PSYCH:  dementia [ ] depression [ ] anxiety[ ]                                                                                                               NEGATIVE[ x]   HEME/LYMPH:  bruises easily[x ] enlarged lymph nodes[ ] tender lymph nodes[ ]                                               NEGATIVE[ ]   ENDOCRINE:  cold intolerance[ ] heat intolerance[ ] polydipsia[ ]                                                                          NEGATIVE[x ]     PHYSICAL EXAM  Vital Signs Last 24 Hrs  T(C): 36.7 (14 Oct 2022 07:49), Max: 37.2 (13 Oct 2022 20:15)  T(F): 98.1 (14 Oct 2022 07:49), Max: 98.9 (13 Oct 2022 20:15)  HR: 90 (14 Oct 2022 15:03) (78 - 141)  BP: 102/56 (14 Oct 2022 15:03) (77/43 - 109/79)  BP(mean): 66 (14 Oct 2022 06:31) (50 - 87)  RR: 18 (14 Oct 2022 15:03) (15 - 36)  SpO2: 96% (14 Oct 2022 15:03) (93% - 100%)    Parameters below as of 14 Oct 2022 15:03  Patient On (Oxygen Delivery Method): nasal cannula  O2 Flow (L/min): 4      CONSTITUTIONAL:                                                                          WNL[ ] cachetic , ALert, sleepy  Neuro: WNL[x ] Normal exam oriented to person/place & time with no focal motor or sensory  deficits. Other                     Eyes: WNL[ x]   Normal exam of conjunctiva & lids, pupils equally reactive. Other     ENT: WNL[ ]    Normal exam of nasal/oral mucosa with absence of cyanosis. Other Dry  Neck: WNL[x ]  Normal exam of jugular veins, trachea & thyroid. Other  Chest: WNL[ ] Normal lung exam with good air movement absence of wheezes, rales, or rhonchi: Other   decreased b/l with crackles                                                                              CV:  Auscultation: normal [ ] S3[ ] S4[ ] Irregular [x ] Rub[ ] Clicks[ ]    Murmurs none:[ ]systolic [x ]  diastolic [ ] holosystolic [ ]  Carotids: No Bruits[ ] Other                 Abdominal Aorta: normal [ ] nonpalpable[ ]Other                                                                                      GI:           WNL[x ] Normal exam of abdomen, liver & spleen with no noted masses or tenderness. Other                                                                                                        Extremities: WNL[ ] Normal no evidence of cyanosis or deformity Edema: none[ ]trace[ ]1+x[ ]2+[ ]3+[ ]4+[ ]                                                            LABS:                        8.3    6.19  )-----------( 129      ( 14 Oct 2022 03:56 )             27.9     10-14    137  |  97  |  19  ----------------------------<  68<L>  4.2   |  33<H>  |  2.28<H>    Ca    9.5      14 Oct 2022 03:56  Phos  4.0     10-14  Mg     2.3     10-14    TPro  6.0  /  Alb  2.5<L>  /  TBili  0.6  /  DBili  x   /  AST  32  /  ALT  33  /  AlkPhos  205<H>  10-14          < from: CT Chest No Cont (10.14.22 @ 12:04) >  PROCEDURE DATE:  10/14/2022          INTERPRETATION:  CLINICAL INFORMATION: Shortness of breath with pleural   effusions    TECHNIQUE:  A volumetric CT acquisition of the chest was obtained from   the thoracic inlet to the upper abdomen, without the administration  of   intravenous contrast. Coronal and sagittal multiplanar reformations were   also submitted.    Comparison: No prior chest CT for comparison    FINDINGS:    Lungs/Airways/Pleura: Secretions layering the trachea extending into the   mainstem bronchi completely opacifying the lower lobe bronchi. There are   moderate-large pleural effusions    Mediastinum/Lymph nodes: No thoracic adenopathy.    Heart and Vessels: The heart isenlarged. Single pacer lead terminates in   the right ventricle. No pericardial effusion. Three-vessel coronary   artery calcifications. Mild aortic valve calcification.    Upper Abdomen: Partially imaged left renal cyst.    Osseous structures and Soft Tissues: No aggressive bone lesions.    IMPRESSION:    Moderate-large pleural effusions. Layering secretions in the trachea   extending into the mainstem bronchi and opacifying the lower lobe   bronchi, suggesting aspiration.    --- End of Report ---    < end of copied text >                                      
HPI: Pt is a 87y old Male with hx of   Per Notes:   86yo male with PMH of chronic atrial fibrillation s/p SHU/DCCV 2022 (on Eliquis), severe aortic stenosis (planned for TAVR 22 at [facility]) s/p balloon valvuloplasty complicated by CHB now s/p PPM, HFpEF (EF 59%), Streptococcus faecalis bacteremia (2022 - suspected endocarditis & treated with 6 weeks of antibiotics, ESRD on HD (//Sat), COPD, HTN, PAD with prior RLE revascularization, hypothyroidism, anemia presented to the ED from dialysis with sob. Missed HD. CXR c/w with pulm edema placed on bipap. HD in progress in ED. Bipap taken off now on 4L satting 94%. Pt not showing signs of resp distress or increased work of breathing. Dtr Francoise was kept informed. Pt and dtr Francoise confirmed DNR/I status. MOLST signed via verbal consent with nurse witness.     seen at bedside this morning, pt is awake and alert.  he is on nasal cannula, speaking in shorter sentences.  does not appear to be in any respiratory distress.  he does not c/o SOB, but does have a cough that is wet yet non-productive.  he denies dizziness/lightheadedness, no chest pain or palpitations.  Dtr is at bedside, who is a RN.  she is well aware of her father's condition and is his primary care giver. reports that he has developed pressure ulcers due to immobility and debility.  as of now, hope is to stabilize respiratory failure so that he can return to [facility] [place] and continue to pursue TAVR.  He would not want any open heart surgery for the valve.  Confirmed with pt/family that a DNR/DNI is appropriate.     PAIN: ( )Yes   (x )No  DYSPNEA: ( ) Yes  (x) No    PAST MEDICAL & SURGICAL HISTORY:  CHF (congestive heart failure) -- HFpEF  HTN (hypertension)  Atrial fibrillation s/p PPM  Aortic stenosis - pending TAVR  History of COPD  PAD (peripheral artery disease) - s/p RLE stenting  ESRD on dialysis  CAD (coronary artery disease)  Afib  HLD (hyperlipidemia)  Mild HTN  Aortic stenosis  H/O hernia repair  H/O varicose vein stripping - right leg  No significant past surgical history  History of pacemaker          SOCIAL HX:    Hx opiate tolerance ( )YES  (x)NO  Baseline ADLs  (Prior to Admission)  ( ) Independent   ( )Dependent     (x) With Assistance    FAMILY HISTORY:  No pertinent family history in first degree relatives  FHx: heart disease        Review of Systems:    - CONSTITUTIONAL: Denies appetite change, + weight loss, Denies fever and chills. Denies weakness and fatigue   - HEENT: Denies changes in vision and hearing.   - RESPIRATORY: Denies SOB at this time, + cough   - CV: Denies palpitations and CP. Denies edema   - GI: Denies abdominal pain, constipation, nausea, vomiting and diarrhea.   - : Denies dysuria and urinary frequency.   - MSK: Denies myalgia and joint pain.   - SKIN: Denies rash and pruritus.   - NEUROLOGICAL: Denies headache and syncope.   - PSYCHIATRIC: Denies confusion, recent changes in mood. Denies anxiety and depression. Denies suicidal ideations    All other systems reviewed and negative      PHYSICAL EXAM:    Vital Signs Last 24 Hrs  T(C): 36.7 (14 Oct 2022 07:49), Max: 37.2 (13 Oct 2022 20:15)  T(F): 98.1 (14 Oct 2022 07:49), Max: 98.9 (13 Oct 2022 20:15)  HR: 78 (14 Oct 2022 09:45) (78 - 141)  BP: 98/62 (14 Oct 2022 07:49) (77/43 - 117/76)  BP(mean): 66 (14 Oct 2022 06:31) (50 - 88)  RR: 18 (14 Oct 2022 09:51) (15 - 36)  SpO2: 100% (14 Oct 2022 09:51) (93% - 100%)    Parameters below as of 14 Oct 2022 09:51  Patient On (Oxygen Delivery Method): nasal cannula  O2 Flow (L/min): 3    Daily     Daily Weight in k.5 (14 Oct 2022 07:49)    - GENERAL: Alert and oriented x 3. No acute distress.   - EYES: EOMI. Anicteric.   - HENT: Moist mucous membranes.   - LUNGS: scattered ronchi, non-productive cough.  speaking in short sentences. No accessory muscle use. on NC  - CARDIOVASCULAR: Regular rate and rhythm. No murmur. No JVD. No edema.   - ABDOMEN: Soft, non-tender and non-distended. No palpable masses. Normal bowel sounds   - EXTREMITIES: No edema. Non-tender.  + Sarcopenia  - SKIN: Warm, no rashes or lesions easily visible.   - NEUROLOGIC: No focal neurological deficits.    - PSYCHIATRIC: Cooperative. Appropriate mood and affect.      LABS:                        8.3    6.19  )-----------( 129      ( 14 Oct 2022 03:56 )             27.9     10-14    137  |  97  |  19  ----------------------------<  68<L>  4.2   |  33<H>  |  2.28<H>    Ca    9.5      14 Oct 2022 03:56  Phos  4.0     10-14  Mg     2.3     10-14    TPro  6.0  /  Alb  2.5<L>  /  TBili  0.6  /  DBili  x   /  AST  32  /  ALT  33  /  AlkPhos  205<H>  10-14      Albumin: Albumin, Serum: 2.5 g/dL (10-14 @ 03:56)      Allergies    codeine (Unknown)    Intolerances    codeine (Other (Mild to Mod))    MEDICATIONS  (STANDING):  albumin human 25% IVPB 50 milliLiter(s) IV Intermittent every 1 hour  apixaban 2.5 milliGRAM(s) Oral every 12 hours  aspirin  chewable 81 milliGRAM(s) Oral daily  atorvastatin Oral Tab/Cap - Peds 10 milliGRAM(s) Oral daily  budesonide 160 MICROgram(s)/formoterol 4.5 MICROgram(s) Inhaler 2 Puff(s) Inhalation two times a day  diltiazem    Tablet 30 milliGRAM(s) Oral every 6 hours  levothyroxine 112 MICROGram(s) Oral daily  lidocaine 4% Cream 1 Application(s) Topical daily  midodrine. 10 milliGRAM(s) Oral <User Schedule>  multivitamin 1 Tablet(s) Oral daily  pantoprazole    Tablet 40 milliGRAM(s) Oral before breakfast  sevelamer carbonate 800 milliGRAM(s) Oral three times a day with meals    MEDICATIONS  (PRN):  acetaminophen     Tablet .. 650 milliGRAM(s) Oral every 6 hours PRN Temp greater or equal to 38C (100.4F), Mild Pain (1 - 3)  ALBUTerol  90 MICROgram(s) HFA Inhaler - Peds 2 Puff(s) Inhalation every 6 hours PRN Bronchospasm  aluminum hydroxide/magnesium hydroxide/simethicone Suspension 30 milliLiter(s) Oral every 4 hours PRN Dyspepsia  melatonin 3 milliGRAM(s) Oral at bedtime PRN Insomnia  metoprolol tartrate Injectable 5 milliGRAM(s) IV Push every 6 hours PRN sustained rapid afib rates > 130bpm  ondansetron Injectable 4 milliGRAM(s) IV Push every 8 hours PRN Nausea and/or Vomiting

## 2022-10-14 NOTE — CONSULT NOTE ADULT - CONSULT REQUESTED DATE/TIME
14-Oct-2022 17:26
14-Oct-2022 12:37
14-Oct-2022 03:49
14-Oct-2022 16:56
13-Oct-2022 12:46
14-Oct-2022 15:20

## 2022-10-14 NOTE — PROGRESS NOTE ADULT - ASSESSMENT
86 yo man with ESRD on HD, COPD, PAD s/p RLE revascularization, HTN, HLD, HFpEF, chronic afib s/p DCCV 1/2022 on Eliquis, severe AS s/p balloon valvuloplasty c/b complete heart block s/p PPM, bacteremia and presumed endovascular infection 4/2022 s/p 6 weeks of antibiotics, presented 10/13 with dyspnea, hypoxia.     Acute hypoxic respiratory failure  Likely multifactorial due to acute pulmonary edema, pleural effusions, in setting of severe AS, acute on chronic congestive heart failure with mildly reduced EF, R heart dysfunction, severe pulmonary HTN, COPD and ESRD on HD. Primary issue is really volume overload and severe AS, see below.  - Continue oxygen supplementation as needed, wean as tolerated, goal SPO2 92% or greater  - Continue to manage underlying issues    Severe aortic stenosis  As noted on TTE. This severe AS is despite recent balloon valvuloplasty. He was sent to [facility] to attempt to get stronger for possible TAVR but now he is only more decompensated. Appreciate input from Cardiology Dr Shepherd who discussed case further with Cardiology Dr. Medrano at [facility]. Patient is not a candidate for TAVR at this time. Family deferring as well. May be appropriate for palliative measures.   - Palliative care consult    Acute on chronic congestive heart failure with mildly reduced EF, R heart dysfunction  Appreciate input from Cardiology. Remains volume overloaded. On oxygen. Unable to remove substantial volume with HD sessions due to hypotension. Hypotension limiting medication optimization for CHF.   - Stopped Cardizem today as per Cardiology    B/L pleural effusions  As noted on CT chest. In setting of CHF, ESRD. CT Surgery consulted.   - For possible thoracentesis and/or thoracostomy tube placement  - Held Eliquis    Hypotension  No signs of sepsis. Appears to be due to his cardiac issues.  - On midodrine for now for BP support  - Albumin as needed with HD    Chronic afib  As per Cardiology, held CCB today. Unable to give digoxin due to ESRD  - Monitor on tele, if rapid rates, would give IV metoprolol tartrate  - Holding Eliquis for possible thoracentesis and/or thoracostomy tube placement      COPD  No signs of an acute exacerbation per se.   - Continue budesonide    ESRD on HD  Appreciate input from Nephrology  - Next HD planned for tomorrow, likely will need albumin for BP support    Renal bone disease  Stable  - Continue sevelamer       DVT px: On Eliquis for afib though held in preparation for possible thoracic procedure  Code status: DNR DNI though amenable to thoracentesis and/or thoracostomy tube placement if indicated  Dispo: To be determined 88 yo man with ESRD on HD, COPD, PAD s/p RLE revascularization, HTN, HLD, HFpEF, chronic afib s/p DCCV 1/2022 on Eliquis, severe AS s/p balloon valvuloplasty c/b complete heart block s/p PPM, bacteremia and presumed endovascular infection 4/2022 s/p 6 weeks of antibiotics, presented 10/13 with dyspnea, hypoxia.     Acute hypoxic respiratory failure  Likely multifactorial due to acute pulmonary edema, pleural effusions, in setting of severe AS, acute on chronic congestive heart failure with mildly reduced EF, R heart dysfunction, severe pulmonary HTN, COPD and ESRD on HD. Primary issue is really volume overload and severe AS, see below.  - Continue oxygen supplementation as needed, wean as tolerated, goal SPO2 92% or greater  - Continue to manage underlying issues    Severe aortic stenosis  As noted on TTE. This severe AS is despite recent balloon valvuloplasty. He was sent to [facility] to attempt to get stronger for possible TAVR but now he is only more decompensated. Appreciate input from Cardiology Dr Shepherd who discussed case further with Cardiology Dr. Medrano at [facility]. Patient is not a candidate for TAVR at this time. Family deferring as well. May be appropriate for palliative measures.   - Palliative care consult    Acute on chronic congestive heart failure with mildly reduced EF, R heart dysfunction  Appreciate input from Cardiology. Remains volume overloaded. On oxygen. Unable to remove substantial volume with HD sessions due to hypotension. Hypotension limiting medication optimization for CHF.   - Stopped Cardizem today as per Cardiology    B/L pleural effusions  As noted on CT chest. In setting of CHF, ESRD. CT Surgery consulted.   - For possible thoracentesis and/or thoracostomy tube placement  - Held Eliquis    Hypotension  No signs of sepsis. Appears to be due to his cardiac issues.  - On midodrine for now for BP support  - Albumin as needed with HD    Mildly elevated troponin  In setting of CHF, afib, decreased renal clearance from ESRD. No angina. Likely myocardial demand ischemia without myocardial infarction.   - Continue medical management    Chronic afib  As per Cardiology, held CCB today. Unable to give digoxin due to ESRD  - Monitor on tele, if rapid rates, would give IV metoprolol tartrate  - Holding Eliquis for possible thoracentesis and/or thoracostomy tube placement      COPD  No signs of an acute exacerbation per se.   - Continue budesonide    ESRD on HD  Appreciate input from Nephrology  - Next HD planned for tomorrow, likely will need albumin for BP support    Renal bone disease  Stable  - Continue sevelamer       DVT px: On Eliquis for afib though held in preparation for possible thoracic procedure  Code status: DNR DNI though amenable to thoracentesis and/or thoracostomy tube placement if indicated  Dispo: To be determined

## 2022-10-14 NOTE — CONSULT NOTE ADULT - ASSESSMENT
Assessment: Critical care was consulted for hypotension. Patient has ESRD on dialysis and he was dialyzed on 10/13 with 3L of ultrafiltrate removed. Patient has baseline chronic hypotension which has worsened during this hospital visit and he was placed on midodrine 10 TID yesterday. Upon examination, patient is awake and alert with no signs of end organ dysfunction with MAPs in the 50s. Likely patient's organ perfusion is use to a relatively low  pressure. It appears the degree of volume removed has likely left him intravascularly dry. The large volume removal was in light of pulmonary edema accompanied by pleural effusion which has landed the patient on high flow nasal cannula at 45% FiO2. Patient is hypotensive, but clinically stable at this time and thus doesn't require IV vasoactive medications at this moment. Less aggressive medical interventions can be employed and patient can be reassessed for response. Thus far patient has responded well to midodrine although it has been transient. Initial dosing was set for TID and thus patient was not receiving any doses overnight.     Plan  5% albumin at 250cc over 2hr ordered to replenish intravascular volume   Reassess clinical status and BP frequently for response   Midodrine changed to 10 q6h and should be held if patient's SBP > 120mmHg   Stat labs ordered including cbc, cmp, mag, phos   Lactate ordered to assess for status of oxidative metabolism and end organ perfusion   CXR ordered stat to evaluate Pulmonary fluid status and appears approximately the same as the previous   Stat echo ordered to assess cardiac status in the setting of extensive cardiac history - Evaluation of EF and valvular disease will be helpful in dictating management with regards to perfusion, pulmonary edema and fluid status   If the patient does not respond to this current plan for medical intervention, he may require further escalation of care   Mental status WNL, respiratory status stable on High flow 45%, perfusion should be reassessed post medical intervention  DNR DNI status appreciated  Please re-consult critical care if medical intervention is not effective or if patient's clinical status worsens  Assessment: Critical care was consulted for hypotension. Patient has ESRD on dialysis and he was dialyzed on 10/13 with 3L of ultrafiltrate removed. Patient has baseline chronic hypotension which has worsened during this hospital visit and he was placed on midodrine 10 TID yesterday. Upon examination, patient is awake and alert with no signs of end organ dysfunction with MAPs in the 50s. Likely patient's organ perfusion is use to a relatively low  pressure. It appears the degree of volume removed has likely left him intravascularly dry. The large volume removal was in light of pulmonary edema accompanied by pleural effusion which has landed the patient on high flow nasal cannula at 45% FiO2. Patient is hypotensive, but clinically stable at this time and thus doesn't require IV vasoactive medications at this moment. Less aggressive medical interventions can be employed and patient can be reassessed for response. Thus far patient has responded well to midodrine although it has been transient. Initial dosing was set for TID and thus patient was not receiving any doses overnight.     Plan  5% albumin at 250cc over 2hr ordered to replenish intravascular volume   Reassess clinical status and BP frequently for response   Midodrine changed to 10 q6h and should be held if patient's SBP > 120mmHg   Stat labs ordered including cbc, cmp, mag, phos   Lactate ordered to assess for status of oxidative metabolism and end organ perfusion   CXR ordered stat to evaluate Pulmonary fluid status and appears approximately the same as the previous   Stat echo ordered to assess cardiac status in the setting of extensive cardiac history - Evaluation of EF and valvular disease will be helpful in dictating management with regards to perfusion, pulmonary edema and fluid status   If the patient does not respond to this current plan for medical intervention, he may require further escalation of care   Mental status WNL, respiratory status stable on High flow 45%, perfusion should be reassessed post medical intervention  DNR DNI status appreciated  Please re-consult critical care if medical intervention is not effective or if patient's clinical status worsens     I have spent a total of 30 mins of nonconsecutive time managing this patient with the above stated medical problems.  This included review of relevant history, clinical examination, review of data and images, discussion of treatment with the multidisciplinary team and any consultants involved in this patient’s care as well as family discussion.     I affirm that this patient had an acute medical issue that requires medical intervention and is at risk for deterioration if condition worsens. I managed/supervised organ support interventions that required frequent assessment. This time does not include bedside procedures that are documented separately.

## 2022-10-14 NOTE — PATIENT PROFILE ADULT - FALL HARM RISK - HARM RISK INTERVENTIONS
Assistance with ambulation/Assistance OOB with selected safe patient handling equipment/Communicate Risk of Fall with Harm to all staff/Discuss with provider need for PT consult/Monitor gait and stability/Reinforce activity limits and safety measures with patient and family/Tailored Fall Risk Interventions/Visual Cue: Yellow wristband and red socks/Bed in lowest position, wheels locked, appropriate side rails in place/Call bell, personal items and telephone in reach/Instruct patient to call for assistance before getting out of bed or chair/Non-slip footwear when patient is out of bed/[place] to call system/Physically safe environment - no spills, clutter or unnecessary equipment/Purposeful Proactive Rounding/Room/bathroom lighting operational, light cord in reach

## 2022-10-14 NOTE — CONSULT NOTE ADULT - ASSESSMENT
Assessment:     87 year old male with complicated cardiac history and ESRD admitted for SOB.,  determined to be fluid overloaded but became hypotensive after aggressive fluid removal on HD    Process of Care  --Reviewed dx/treatment problems and alignment with Goals of Care    Physical Aspects of Care  --Pain - Patient denies at this time. c/w current management  --Bowel Regimen - Denies constipation. Risk for constipation d/t immobility. Suggest daily dulcolax as needed  --Dyspnea - No SOB at this time. supplemental O2 and further diuresis per primary.  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  please contact dietary for PCM eval and PO supplement recommendations. dtr does note that when home or she brings food he does eat better.   ESRD - avoid nephrotxic meds.  avoiding morphine for risk fo metabolite buildup.  further HD per nephrology     Psychological and Psychiatric Aspects of Care:   --Grief/Bereavement: emotional support provided  --Hx of psychiatric dx: none  -Pastoral Care Available PRN     Social Aspects of Care  -SW involved     Cultural Aspects  -Primary Language: English    Goals of Care:  family want to maintain comfort, but want to pursue treatments of reversible decline.  being considered for TAVR.  will continue HD.  DNR/DNI confirmed.     Ethical and Legal Aspects: NA  Capacity- pt has simple capacity    HCP/Surrogate: dtr    Code Status-  MOLST-  Dispo Plan-    Discussed With:    Case coordinated with attending and SW and RN     Time Spent: 90 minutes including the care, coordination and counseling of this patient, 50% of which was spent coordinating and counseling.  Assessment:     87 year old male with complicated cardiac history and ESRD admitted for SOB.,  determined to be fluid overloaded but became hypotensive after aggressive fluid removal on HD    Process of Care  --Reviewed dx/treatment problems and alignment with Goals of Care    Physical Aspects of Care  --Pain - Patient denies at this time. c/w current management  --Bowel Regimen - Denies constipation. Risk for constipation d/t immobility. Suggest daily dulcolax as needed  --Dyspnea - No SOB at this time. supplemental O2 and further diuresis per primary.  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  please contact dietary for PCM eval and PO supplement recommendations. dtr does note that when home or she brings food he does eat better.   --ESRD - avoid nephrotxic meds.  avoiding morphine for risk fo metabolite buildup.  further HD per nephrology     Psychological and Psychiatric Aspects of Care:   --Grief/Bereavement: emotional support provided  --Hx of psychiatric dx: none  -Pastoral Care Available PRN     Social Aspects of Care  -SW involved     Cultural Aspects  -Primary Language: English    Goals of Care:  family want to maintain comfort, but want to pursue treatments of reversible decline.  being considered for TAVR.  will continue HD.  DNR/DNI confirmed.     Ethical and Legal Aspects: NA  Capacity- pt has simple capacity    HCP/Surrogate: dtr Francoisesarai Del Toro - [phone]    Code Status- DNR/DNI  MOLST- in chart - DNR/DNI  Dispo Plan- [facility]

## 2022-10-14 NOTE — CONSULT NOTE ADULT - CONSULT REASON
Elevated troponins  JAVI  Abnormal EKG
goc
b/l Pleural effusions
congestive heart failure  Critical AS
ESRD
Hypotension

## 2022-10-14 NOTE — PROCEDURAL SAFETY CHECKLIST WITH OR WITHOUT SEDATION - [id number]_GEN_ALL_CORE
[facility]  [address] [address] [address]  Phone: [phone]  Fax: [phone]                  Luis Alberto Ahn   3/29/2017 3:40 PM   Office Visit    Description:  Male : 1937   Provider:  Chemo Sotelo MD   Department:  [facility]           Reason for Visit     Back Pain           Diagnoses this Visit        Comments    Iron deficiency anemia due to chronic blood loss    -  Primary     Chronic right-sided low back pain without sciatica         History of unstable angina         History of peptic ulcer disease                To Do List           Future Appointments        Provider Department Dept Phone    5/10/2017 3:20 PM Chemo Sotelo MD [facility] [phone]      Goals (5 Years of Data)     None      Follow-Up and Disposition     Return in about 6 weeks (around 5/10/2017).    Follow-up and Disposition History       These Medications        Disp Refills Start End    duloxetine (CYMBALTA) 20 MG capsule 90 capsule 0 3/29/2017 3/29/2018    Take 1 capsule (20 mg total) by mouth once daily. - Oral    Pharmacy: [facility] - [address] Ph #: [phone]       tramadol (ULTRAM) 50 mg tablet 360 tablet 0 3/29/2017     Take 1 tablet (50 mg total) by mouth every 6 (six) hours. - Oral    Pharmacy: [facility] - [address] Ph #: [phone]         PURCHASE these Medications (No prescription required)        Start End    ferrous sulfate 325 mg (65 mg iron) Tab tablet 3/29/2017     Sig - Route: Take 1 tablet (325 mg total) by mouth daily with breakfast. - Oral    Class: OTC      [facility]     [facility] -  Assistance  Registered nurses in the [facility] provide clinical advisement, health education, appointment booking, and other advisory services.  Call for this free service at  [phone].             Medications           Message regarding Medications     Verify the changes and/or additions to your medication regime listed below are the same as discussed with your clinician today.  If any of these changes or additions are incorrect, please notify your healthcare provider.        START taking these NEW medications        Refills    duloxetine (CYMBALTA) 20 MG capsule 0    Sig: Take 1 capsule (20 mg total) by mouth once daily.    Class: Normal    Route: Oral    ferrous sulfate 325 mg (65 mg iron) Tab tablet 0    Sig: Take 1 tablet (325 mg total) by mouth daily with breakfast.    Class: OTC    Route: Oral      CHANGE how you are taking these medications     Start Taking Instead of    tramadol (ULTRAM) 50 mg tablet tramadol (ULTRAM) 50 mg tablet    Dosage:  Take 1 tablet (50 mg total) by mouth every 6 (six) hours. Dosage:  Take 1 tablet (50 mg total) by mouth every 12 (twelve) hours as needed for Pain. 2 tabs twicw a day as needed for pain    Reason for Change:  Reorder       STOP taking these medications     temazepam (RESTORIL) 7.5 MG Cap Take 1 capsule (7.5 mg total) by mouth nightly as needed.    temazepam (RESTORIL) 15 mg Cap Take 15 mg by mouth nightly as needed.    escitalopram oxalate (LEXAPRO) 10 MG tablet Take 10 mg by mouth once daily.           Verify that the below list of medications is an accurate representation of the medications you are currently taking.  If none reported, the list may be blank. If incorrect, please contact your healthcare provider. Carry this list with you in case of emergency.           Current Medications     ACETAMINOPHEN EXTRA STRENGTH ORAL Take 250 mg by mouth daily as needed.    albuterol (VENTOLIN HFA) 90 mcg/actuation inhaler Inhale 2 puffs into the lungs every 4 (four) hours as needed for Wheezing. Rescue    aspirin (ECOTRIN) 81 MG EC tablet Take 1 tablet (81 mg total) by mouth once daily.    atorvastatin (LIPITOR) 40 MG tablet Take 40 mg by  "mouth nightly.    cyanocobalamin (VITAMIN B-12) 1000 MCG tablet Take 100 mcg by mouth once daily.    fenofibrate (TRICOR) 54 MG tablet Take 54 mg by mouth once daily.    inhalation device (AEROCHAMBER PLUS FLOW-VU) Use as directed for inhalation.    levothyroxine (SYNTHROID) 125 MCG tablet Take 125 mcg by mouth once daily.    lisinopril (PRINIVIL,ZESTRIL) 5 MG tablet Take 5 mg by mouth once daily.    metoprolol succinate (TOPROL-XL) 25 MG 24 hr tablet Take 25 mg by mouth 2 (two) times daily.     multivitamin capsule Take 1 capsule by mouth once daily.      omeprazole (PRILOSEC) 20 MG capsule Take 20 mg by mouth once daily.    tamsulosin (FLOMAX) 0.4 mg Cp24 Take 0.4 mg by mouth once daily.      tramadol (ULTRAM) 50 mg tablet Take 1 tablet (50 mg total) by mouth every 6 (six) hours.    travoprost, benzalkonium, (TRAVATAN) 0.004 % ophthalmic solution Place 1 drop into both eyes nightly.      duloxetine (CYMBALTA) 20 MG capsule Take 1 capsule (20 mg total) by mouth once daily.    ferrous sulfate 325 mg (65 mg iron) Tab tablet Take 1 tablet (325 mg total) by mouth daily with breakfast.           Clinical Reference Information           Your Vitals Were     BP Pulse Temp Resp Height Weight    119/62 (BP Location: Right arm, Patient Position: Sitting, BP Method: Automatic) 66 98.2 °F (36.8 °C) (Oral) 16 5' 8" (1.727 m) 84.7 kg (186 lb 11.7 oz)    SpO2 BMI             93% 28.39 kg/m2         Blood Pressure          Most Recent Value    BP  119/62      Allergies as of 3/29/2017     Morphine    Oxycontin [Oxycodone]      Immunizations Administered on Date of Encounter - 3/29/2017     None      Orders Placed During Today's Visit      Normal Orders This Visit    Ambulatory referral to Gastroenterology       Instructions    Smart temp cold pack.    [email or web address]    If the patient can't see since this is only walks straight standing up he should be using a walker.    Take one half Lexapro daily    Give the dog a Benadryl at " bedtime    Smart temp cold pack for pain       Language Assistance Services     ATTENTION: Language assistance services are available, free of charge. Please call [phone].      ATENCIÓN: Si habbirgit blanchard, tiene a pickett disposición servicios gratuitos de asistencia lingüística. Llame al [phone].     CHÚ Ý: N?u b?n nói Ti?ng Vi?t, có các d?ch v? h? tr? ngôn ng? mi?n phí dành cho b?n. G?i s? [phone].         [facility] complies with applicable Federal civil rights laws and does not discriminate on the basis of race, color, national origin, age, disability, or sex.         done

## 2022-10-15 NOTE — PROGRESS NOTE ADULT - ASSESSMENT
88 yo man with ESRD on HD, COPD, PAD s/p RLE revascularization, HTN, HLD, HFpEF, chronic afib s/p DCCV 1/2022 on Eliquis, severe AS s/p balloon valvuloplasty c/b complete heart block s/p PPM, bacteremia and presumed endovascular infection 4/2022 s/p 6 weeks of antibiotics, presented 10/13 with dyspnea, hypoxia.     Acute hypoxic respiratory failure  Likely multifactorial due to acute pulmonary edema, pleural effusions, in setting of severe AS, acute on chronic congestive heart failure with mildly reduced EF, R heart dysfunction, severe pulmonary HTN, COPD and ESRD on HD. Primary issue really volume overload and severe AS, see below.  - Continue oxygen as needed    Severe aortic stenosis  As noted on TTE. This severe AS is despite recent balloon valvuloplasty. He was sent to [facility] to attempt to get stronger for possible TAVR but now he is only more decompensated. Appreciate input from Cardiology Dr Shepherd who discussed case further with Cardiology Dr. Medrano at [facility]. Patient is not a candidate for TAVR at this time. Family deferring as well.  Appropriate for palliative measures.   - Palliative Care consult    Acute on chronic congestive heart failure with mildly reduced EF, R heart dysfunction  Appreciate input from Cardiology. Remains volume overloaded. On oxygen. Unable to remove substantial volume with HD sessions due to hypotension. Hypotension limiting medication optimization for CHF. Stopped Cardizem as per Cardiology  - Medication limited by hypotension and now limited by patient's further clinical deterioration, possibly unsafe for swallow    B/L pleural effusions  As noted on CT chest. In setting of CHF, ESRD. CT Surgery consulted. S/p thoracostomy tube placement with pigtail catheter late last night. Drained 1.3L at that time. Was then clamped. This AM drained an additional 800mL and was re-clamped.     - Possible plan to have 1L drained tomorrow and then tube can be left open to water seal  - Chest tube will remain during comfort care until further notice to support respiratory status and improve respiratory comfort.    Hypotension  No signs of sepsis. Appears to be due to his cardiac issues.  - On midodrine for now for BP support  - Albumin as needed with HD  - No pressors as per family, no care escalation to ICU as per family, updated MOLST done    Mildly elevated troponin  In setting of CHF, afib, decreased renal clearance from ESRD. No angina. Likely myocardial demand ischemia without myocardial infarction.   - Continue medical management    Chronic afib  As per Cardiology, held CCB today. Unable to give digoxin due to ESRD  - Monitor on tele, if rapid rates, would give IV metoprolol tartrate  - Eliquis      COPD  No signs of an acute exacerbation per se.   - Continue budesonide, if able    ESRD on HD  Appreciate input from Nephrology  - May consider stopping further HD, will need to discuss with Nephrology and family    Renal bone disease  Stable  - Continue sevelamer       DVT px: On Eliquis for afib  Code status: DNR DNI, comfort care only at this point  Dispo: To transition to hospice upon discharge, in discussion with family re home vs facility 86 yo man with ESRD on HD, COPD, PAD s/p RLE revascularization, HTN, HLD, HFpEF, chronic afib s/p DCCV 1/2022 on Eliquis, severe AS s/p balloon valvuloplasty c/b complete heart block s/p PPM, bacteremia and presumed endovascular infection 4/2022 s/p 6 weeks of antibiotics, presented 10/13 with dyspnea, hypoxia.     Acute hypoxic respiratory failure  Likely multifactorial due to acute pulmonary edema, pleural effusions, in setting of severe AS, acute on chronic congestive heart failure with mildly reduced EF, R heart dysfunction, severe pulmonary HTN, COPD and ESRD on HD. Primary issue really volume overload and severe AS, see below.  - Continue oxygen as needed    Severe aortic stenosis  As noted on TTE. This severe AS is despite recent balloon valvuloplasty. He was sent to [facility] to attempt to get stronger for possible TAVR but now he is only more decompensated. Appreciate input from Cardiology Dr Shepherd who discussed case further with Cardiology Dr. Medrano at [facility]. Patient is not a candidate for TAVR at this time. Family deferring as well.  Appropriate for palliative measures.   - Palliative Care consult    Acute on chronic congestive heart failure with mildly reduced EF, R heart dysfunction  Appreciate input from Cardiology. Remains volume overloaded. On oxygen. Unable to remove substantial volume with HD sessions due to hypotension. Hypotension limiting medication optimization for CHF. Stopped Cardizem as per Cardiology  - Medication limited by hypotension and now limited by patient's further clinical deterioration, possibly unsafe for swallow    B/L pleural effusions  As noted on CT chest. In setting of CHF, ESRD. CT Surgery consulted. S/p thoracostomy tube placement with pigtail catheter late last night. Drained 1.3L at that time. Was then clamped. This AM drained an additional 800mL and was re-clamped.     - Possible plan to have 1L drained tomorrow and then tube can be left open to water seal  - Chest tube will remain during comfort care until further notice to support respiratory status and improve respiratory comfort.    Hypotension  No signs of sepsis. Appears to be due to his cardiac issues.  - On midodrine for now for BP support, if patient able to safe swallow  - No pressors as per family, no care escalation to ICU as per family, updated MOLST done    Mildly elevated troponin  In setting of CHF, afib, decreased renal clearance from ESRD. No angina. Likely myocardial demand ischemia without myocardial infarction.   - Continue medical management    Chronic afib  As per Cardiology, held CCB today. Unable to give digoxin due to ESRD  - Ok to DC tele, family in agreement  - Eliquis if able to safely take PO, otherwise can defer      COPD  No signs of an acute exacerbation per se.   - Continue budesonide, if able    ESRD on HD  Appreciate input from Nephrology  - No longer appropriate for dialysis as per Nephrology. Family in agreement.     Renal bone disease  Stable  - Continue sevelamer, if able to safe swallow, otherwise can defer      DVT px: On Eliquis for afib  Code status: DNR DNI, comfort care only at this point  Dispo: To transition to hospice upon discharge, in discussion with family re home vs facility

## 2022-10-15 NOTE — PROGRESS NOTE ADULT - SUBJECTIVE AND OBJECTIVE BOX
Chief Complaint: Dyspnea    Interval Hx: Patient seen and examined patient. This AM, while patient was on HD (though only getting UF, no volume removal), patient developed progressively worsening hypotension despite 1L IVF volume expansion, albumin infusion and 10 mg of midodrine, with BP drop to 60s/40s. RRT called. Patient lethargic, minimally responsive. Notified daughter Francoise who was in agreement with deferring pressors, deferring care escalation to the ICU given patient's prognosis, untreatable severe AS, and knowing his own wishes for himself. Patient now comfort care. Plan to transition to hospice but unclear if that will be at home or a facility.     ROS: Multi system review is comprehensively negative x 10 systems except as above    Vitals:  T(F): 97.8 (15 Oct 2022 09:30), Max: 98.7 (15 Oct 2022 07:26)  HR: 106 (15 Oct 2022 10:12) (81 - 119)  BP: 80/48 (15 Oct 2022 10:32) (55/43 - 104/65)  RR: 16 (15 Oct 2022 10:32) (16 - 18)  SpO2: 95% (15 Oct 2022 07:26) (95% - 100%) on 4L/min via NC    Exam:  Gen: Frail-appearing, drowsy but arousable to voice  HEENT: NCAT PERRL unable to easily asses EOM, clear oropharynx   Neck: Supple, no JVD  Chest: Normal resp effort at rest, lungs with diminished breath sounds in bases B/L  CVS: s1 s2 normal, regular, rate ~100 bpm  Abd: +BS soft NT ND   Ext: No calf tenderness, no erythema  Skin: Warm, dry  Neuro: Drowsy, minimally arousable to voice, not really able to answer questions, no gross focal deficits but exam is fairly limited    Labs:                             9.0    11.41 )-------( 160             30.4     134  |  94  |  35  ---------------------<  97  5.4   |  31  |  3.85    Ca 9.7   Phos 5.9   Mg 2.5    TPro  6.1  /  Alb  2.7  /  TBili  0.8  /  DBili  x   /  AST  292  /  ALT  210  /  AlkPhos  312    Lactate WNL   Troponin 118, 123, 116    COVID19 PCR negative    Imaging:  CT chest WO 10/14: Moderate-large pleural effusions. Layering secretions in the trachea extending into the mainstem bronchi and opacifying the lower lobe bronchi, suggesting aspiration.    CXR 10/14: Increasing congestion fairly advanced on the latest study.    CXR 10/13: Heart magnified by technique. Right sided pacemaker again noted. Bilateral effusions right greater than left and central CHF more pronounced than on prior.    Cardiac Testing:  TTE 10/14: Moderate MR. AV severely calcified. Valve opening seems to be restricted. Peak and mean transaortic gradients are 69 and 40 mmHg respectively; this finding is consistent with severe AS. WAQAS by continuity equation is .5 cm2. Moderate to severe TR. Severe pulmonary hypertension. LA moderately dilated. LV wall thickness is at the upper limits of normal. Septal flattening is seen; this finding is consistent with right heart pressure / volume overload. LV systolic function appears mildly impaired in the presence of a cardiac arrhythmia. Estimated left ventricular ejection fraction is 45-50 %. RA moderately dilated. A device wire is seen in the RV and RA. RV moderately dilated with decreased contractility.    EKG 10/13: Atrial fibrillation with rapid ventricular response with occasional ventricular-paced complexes and with premature ventricular or aberrantly conducted complexes. Right bundle branch block. Lateral T wave abnormality.     Meds:    MEDICATIONS  (STANDING):  albumin human 25% IVPB 50 milliLiter(s) IV Intermittent every 1 hour  apixaban 2.5 milliGRAM(s) Oral every 12 hours  aspirin  chewable 81 milliGRAM(s) Oral daily  atorvastatin Oral Tab/Cap - Peds 10 milliGRAM(s) Oral daily  budesonide 160 MICROgram(s)/formoterol 4.5 MICROgram(s) Inhaler 2 Puff(s) Inhalation two times a day  levothyroxine 112 MICROGram(s) Oral daily  lidocaine 4% Cream 1 Application(s) Topical daily  midodrine. 10 milliGRAM(s) Oral <User Schedule>  multivitamin 1 Tablet(s) Oral daily  pantoprazole    Tablet 40 milliGRAM(s) Oral before breakfast  sevelamer carbonate 800 milliGRAM(s) Oral three times a day with meals    MEDICATIONS  (PRN):  acetaminophen     Tablet .. 650 milliGRAM(s) Oral every 6 hours PRN Temp greater or equal to 38C (100.4F), Mild Pain (1 - 3)  albumin human 25% IVPB 50 milliLiter(s) IV Intermittent every 1 hour PRN SBP < 100  ALBUTerol  90 MICROgram(s) HFA Inhaler - Peds 2 Puff(s) Inhalation every 6 hours PRN Bronchospasm  aluminum hydroxide/magnesium hydroxide/simethicone Suspension 30 milliLiter(s) Oral every 4 hours PRN Dyspepsia  melatonin 3 milliGRAM(s) Oral at bedtime PRN Insomnia  metoprolol tartrate Injectable 5 milliGRAM(s) IV Push every 6 hours PRN sustained rapid afib rates > 130bpm  morphine  - Injectable 2 milliGRAM(s) IV Push every 3 hours PRN moderate or severe pain  ondansetron Injectable 4 milliGRAM(s) IV Push every 8 hours PRN Nausea and/or Vomiting

## 2022-10-15 NOTE — CHART NOTE - NSCHARTNOTEFT_GEN_A_CORE
Rapid response was called due to hypotension with acute change in mean arterial pressure during dialysis. MAPs were in the low 50s with associated tachycardia which appeared compensatory. Patient was able to be aroused and opened eyes. The hospitalist  Dr. Galan called the family to discuss if there would be an escalation of care regarding upgrade to higher level of care for vasoactive medications. Conversation between Dr. Galan and the HCP resulted in a decision to make the patient comfort care. Hospitalist team facilitating MOLST form and comfort care orders.     No mechanical or pharmaceutical intervention was performed during this rapid response.

## 2022-10-15 NOTE — PROGRESS NOTE ADULT - SUBJECTIVE AND OBJECTIVE BOX
Patient is a 87y Male who reports no complaints as new. Seen w hd, bp soft       MEDICATIONS  (STANDING):  albumin human 25% IVPB 50 milliLiter(s) IV Intermittent every 1 hour  apixaban 2.5 milliGRAM(s) Oral every 12 hours  aspirin  chewable 81 milliGRAM(s) Oral daily  atorvastatin Oral Tab/Cap - Peds 10 milliGRAM(s) Oral daily  budesonide 160 MICROgram(s)/formoterol 4.5 MICROgram(s) Inhaler 2 Puff(s) Inhalation two times a day  levothyroxine 112 MICROGram(s) Oral daily  lidocaine 4% Cream 1 Application(s) Topical daily  midodrine. 10 milliGRAM(s) Oral <User Schedule>  multivitamin 1 Tablet(s) Oral daily  pantoprazole    Tablet 40 milliGRAM(s) Oral before breakfast  sevelamer carbonate 800 milliGRAM(s) Oral three times a day with meals    MEDICATIONS  (PRN):  acetaminophen     Tablet .. 650 milliGRAM(s) Oral every 6 hours PRN Temp greater or equal to 38C (100.4F), Mild Pain (1 - 3)  albumin human 25% IVPB 50 milliLiter(s) IV Intermittent every 1 hour PRN SBP < 100  ALBUTerol  90 MICROgram(s) HFA Inhaler - Peds 2 Puff(s) Inhalation every 6 hours PRN Bronchospasm  aluminum hydroxide/magnesium hydroxide/simethicone Suspension 30 milliLiter(s) Oral every 4 hours PRN Dyspepsia  melatonin 3 milliGRAM(s) Oral at bedtime PRN Insomnia  metoprolol tartrate Injectable 5 milliGRAM(s) IV Push every 6 hours PRN sustained rapid afib rates > 130bpm  ondansetron Injectable 4 milliGRAM(s) IV Push every 8 hours PRN Nausea and/or Vomiting        T(C): , Max: 37.1 (10-15-22 @ 07:26)  T(F): , Max: 98.7 (10-15-22 @ 07:26)  HR: 106 (10-15-22 @ 10:12)  BP: 75/47 (10-15-22 @ 10:12)  BP(mean): --  RR: 16 (10-15-22 @ 10:12)  SpO2: 95% (10-15-22 @ 07:26)  Wt(kg): --    10-14 @ 07:01  -  10-15 @ 07:00  --------------------------------------------------------  IN: 0 mL / OUT: 1300 mL / NET: -1300 mL          PHYSICAL EXAM:    Constitutional: frail, cachectic  HEENT: MM  Neck: No LAD, No JVD  Respiratory: dec bs  Cardiovascular: S1 and S2   [place]  Neurological: Alert  Skin: No rashes  L avg        LABS:                        9.0    11.41 )-----------( 160      ( 15 Oct 2022 06:55 )             30.4     15 Oct 2022 06:55    134    |  94     |  35     ----------------------------<  97     5.4     |  31     |  3.85   14 Oct 2022 03:56    137    |  97     |  19     ----------------------------<  68     4.2     |  33     |  2.28   13 Oct 2022 16:15    x      |  x      |  x      ----------------------------<  x      4.6     |  x      |  x      13 Oct 2022 13:12    133    |  94     |  30     ----------------------------<  90     5.5     |  36     |  3.39     Ca    9.7        15 Oct 2022 06:55  Ca    9.5        14 Oct 2022 03:56  Ca    9.6        13 Oct 2022 13:12  Phos  5.9       15 Oct 2022 06:55  Phos  4.0       14 Oct 2022 03:56  Mg     2.5       15 Oct 2022 06:55  Mg     2.3       14 Oct 2022 03:56    TPro  6.1    /  Alb  2.7    /  TBili  0.8    /  DBili  x      /  AST  292    /  ALT  210    /  AlkPhos  312    15 Oct 2022 06:55  TPro  6.0    /  Alb  2.5    /  TBili  0.6    /  DBili  x      /  AST  32     /  ALT  33     /  AlkPhos  205    14 Oct 2022 03:56  TPro  6.7    /  Alb  2.3    /  TBili  0.6    /  DBili  x      /  AST  70     /  ALT  45     /  AlkPhos  243    13 Oct 2022 13:12          Urine Studies:          RADIOLOGY & ADDITIONAL STUDIES:

## 2022-10-15 NOTE — PROGRESS NOTE ADULT - ASSESSMENT
87 with ESRD, AF, TAVR hre with AF and SOB     ESRD     HD now, UF is limited by BP    ct surgery follow up    Mido with HD    - s/p tavr and CHB s/p PPM     Hyperphos  -Per primary teams    d/c with RN staff, HD staff

## 2022-10-15 NOTE — PROGRESS NOTE ADULT - SUBJECTIVE AND OBJECTIVE BOX
Patient is a 87y old  Male who presents with a chief complaint of Acute respiratory failure with hypoxia (14 Oct 2022 17:50)      BRIEF HOSPITAL COURSE: 88 y/o male with pmhx of afib s/p TTE/DCCV january 2022 on eliquis, severe AS (planned for TAVR 8/17/22 at [facility]) s/p balloon valvuloplasty complicated by CHB now s/p PPM, HFpEF (EF 59%), Streptococcus faecalis bacteremia (4/2022 - suspected endocarditis & treated with 6 weeks of antibiotics, ESRD on HD (T/Th/Sat), COPD, HTN, PAD with prior RLE revascularization, hypothyroidism, anemia admitted on 10/13 after he missed an HD session and had acute hypoxic respiratory failure requiring bipap and urgent HD. he is DNR/DNI. admitted to medicine. developed aphasia and AMS code stroke called, found to be hypoglycemic, improved with amp of D50.     thoracic consulted for evaluation of bilateral pleural effusions.     eliquis evening dose held. right pigtail placed without complication.      PAST MEDICAL & SURGICAL HISTORY:  CHF (congestive heart failure)  HFpEF      HTN (hypertension)      Atrial fibrillation  s/p PPM      Aortic stenosis  pending TAVR      History of COPD      PAD (peripheral artery disease)  s/p RLE stenting      ESRD on dialysis      CAD (coronary artery disease)      Afib      HLD (hyperlipidemia)      Mild HTN      Aortic stenosis      H/O hernia repair      H/O varicose vein stripping  right leg      No significant past surgical history      History of pacemaker          Review of Systems:  CONSTITUTIONAL: No fever, chills, or fatigue  EYES: No eye pain, visual disturbances, or discharge  ENMT:  No difficulty hearing, tinnitus, vertigo; No sinus or throat pain  NECK: No pain or stiffness  RESPIRATORY: No cough, wheezing, chills or hemoptysis; +shortness of breath  CARDIOVASCULAR: No chest pain, palpitations, dizziness, or leg swelling  GASTROINTESTINAL: No abdominal or epigastric pain. No nausea, vomiting, or hematemesis; No diarrhea or constipation. No melena or hematochezia.  GENITOURINARY: No dysuria, frequency, hematuria, or incontinence  NEUROLOGICAL: No headaches, memory loss, loss of strength, numbness, or tremors  SKIN: No itching, burning, rashes, or lesions   MUSCULOSKELETAL: No joint pain or swelling; No muscle, back, or extremity pain  PSYCHIATRIC: No depression, anxiety, mood swings, or difficulty sleeping      Medications:    metoprolol tartrate Injectable 5 milliGRAM(s) IV Push every 6 hours PRN  midodrine. 10 milliGRAM(s) Oral <User Schedule>    ALBUTerol  90 MICROgram(s) HFA Inhaler - Peds 2 Puff(s) Inhalation every 6 hours PRN  budesonide 160 MICROgram(s)/formoterol 4.5 MICROgram(s) Inhaler 2 Puff(s) Inhalation two times a day    acetaminophen     Tablet .. 650 milliGRAM(s) Oral every 6 hours PRN  melatonin 3 milliGRAM(s) Oral at bedtime PRN  ondansetron Injectable 4 milliGRAM(s) IV Push every 8 hours PRN      aspirin  chewable 81 milliGRAM(s) Oral daily    aluminum hydroxide/magnesium hydroxide/simethicone Suspension 30 milliLiter(s) Oral every 4 hours PRN  pantoprazole    Tablet 40 milliGRAM(s) Oral before breakfast      atorvastatin Oral Tab/Cap - Peds 10 milliGRAM(s) Oral daily  levothyroxine 112 MICROGram(s) Oral daily    albumin human 25% IVPB 50 milliLiter(s) IV Intermittent every 1 hour  multivitamin 1 Tablet(s) Oral daily      lidocaine 4% Cream 1 Application(s) Topical daily    sevelamer carbonate 800 milliGRAM(s) Oral three times a day with meals          ICU Vital Signs Last 24 Hrs  T(C): 36.7 (14 Oct 2022 19:51), Max: 36.7 (14 Oct 2022 07:49)  T(F): 98.1 (14 Oct 2022 19:51), Max: 98.1 (14 Oct 2022 07:49)  HR: 119 (14 Oct 2022 19:51) (78 - 119)  BP: 104/65 (14 Oct 2022 19:51) (77/43 - 104/65)  BP(mean): 66 (14 Oct 2022 06:31) (50 - 66)  ABP: --  ABP(mean): --  RR: 18 (14 Oct 2022 19:51) (18 - 24)  SpO2: 100% (14 Oct 2022 19:51) (96% - 100%)    O2 Parameters below as of 14 Oct 2022 19:51  Patient On (Oxygen Delivery Method): nasal cannula  O2 Flow (L/min): 4              I&O's Detail        LABS:                        8.3    6.19  )-----------( 129      ( 14 Oct 2022 03:56 )             27.9     10-14    137  |  97  |  19  ----------------------------<  68<L>  4.2   |  33<H>  |  2.28<H>    Ca    9.5      14 Oct 2022 03:56  Phos  4.0     10-14  Mg     2.3     10-14    TPro  6.0  /  Alb  2.5<L>  /  TBili  0.6  /  DBili  x   /  AST  32  /  ALT  33  /  AlkPhos  205<H>  10-14          CAPILLARY BLOOD GLUCOSE      POCT Blood Glucose.: 87 mg/dL (14 Oct 2022 05:32)        CULTURES:      Physical Examination:    General: No acute distress.  cachectic    HEENT: Pupils equal, reactive to light.  Symmetric.    PULM: diminished breath sounds bilaterally, no significant sputum production    NECK: Supple, no lymphadenopathy, trachea midline    CVS: Regular rate and rhythm, no murmurs, rubs, or gallops    ABD: Soft, nondistended, nontender, normoactive bowel sounds, no masses    EXT: No edema, nontender    SKIN: Warm and well perfused, no rashes noted.    NEURO: Alert, oriented, interactive, nonfocal    DEVICES:     RADIOLOGY:       IMPRESSION:    Moderate-large pleural effusions. Layering secretions in the trachea   extending into the mainstem bronchi and opacifying the lower lobe   bronchi, suggesting aspiration.    --- End of Report ---            TETO REYES M.D., Attending Radiologist  This document has been electronically signed. Oct 14 2022  1:08PM

## 2022-10-15 NOTE — CHART NOTE - NSCHARTNOTEFT_GEN_A_CORE
800cc of serous fluid drained today at about 9am. 1L to be drained tomorrow and then tube can be left open to water seal. Chest tube will remain during comfort care until further notice to support respiratory status and improve respiratory comfort.

## 2022-10-15 NOTE — PROGRESS NOTE ADULT - ASSESSMENT
88 y/o male with pmhx of afib s/p TTE/DCCV january 2022 on eliquis, severe AS (planned for TAVR 8/17/22 at [facility]) s/p balloon valvuloplasty complicated by CHB now s/p PPM, HFpEF (EF 59%), Streptococcus faecalis bacteremia (4/2022 - suspected endocarditis & treated with 6 weeks of antibiotics, ESRD on HD (T/Th/Sat), COPD, HTN, PAD with prior RLE revascularization, hypothyroidism, anemia  now with:    1. bilateral pleural effusions    - s/p right pigtail cathter placement. drained 1300 cc on water seal. clamp rest of the night to prevent reexpansion pulm edema. open back up in am.   - can restart eliquis  - wean supplemental o2 as tolerated  - HD today

## 2022-10-16 NOTE — PROGRESS NOTE ADULT - ASSESSMENT
86 yo man with ESRD on HD, COPD, PAD s/p RLE revascularization, HTN, HLD, HFpEF, chronic afib s/p DCCV 1/2022 on Eliquis, severe AS s/p balloon valvuloplasty c/b complete heart block s/p PPM, bacteremia and presumed endovascular infection 4/2022 s/p 6 weeks of antibiotics, presented 10/13 with dyspnea, hypoxia.     Acute hypoxic respiratory failure  Likely multifactorial due to acute pulmonary edema, pleural effusions, in setting of severe AS, acute on chronic congestive heart failure with mildly reduced EF, R heart dysfunction, severe pulmonary HTN, COPD and ESRD on HD. Primary issue really has been volume overload, severe AS, and now profound hypotension, see below.  - Continue oxygen as needed    Hypotension  No signs of sepsis. Appears to be due to his cardiac issues. Patient resting comfortably. Arousable to voice. Able to answer simple questions. No acute pain complaints. He does have a mild degree of subjective dyspnea at rest but does not appear to have any increase in work of breathing. He remains on oxygen supplementation. Profound hypotension has precluded any further dialysis, BP 70s/20s, and per care goals no care escalation to ICU no pressors. Prognosis is grave and comfort care measures are ordered. Plan to transition to hospice but unclear if that will be at home or a facility. Awaiting Palliative Care Team input, Case Management and Social Work input.   - On midodrine for now for BP support, if patient able to safe swallow  - No pressors as per family, no care escalation to ICU as per family, updated [facility] 10/15/22    Severe aortic stenosis  As noted on TTE. This severe AS is despite recent balloon valvuloplasty. He was sent to [facility] to attempt to get stronger for possible TAVR but now he is only more decompensated. Appreciate input from Cardiology Dr Shepherd who discussed case further with Cardiology Dr. Medrano at [facility]. Patient is not a candidate for TAVR at this time. Family deferring as well.  Appropriate for palliative measures.   - Palliative Care consult    Acute on chronic congestive heart failure with mildly reduced EF, R heart dysfunction  Appreciate input from Cardiology. Remains volume overloaded. On oxygen. Unable to remove substantial volume with HD sessions due to hypotension. Hypotension limiting medication optimization for CHF as well.   - Comfort care    B/L pleural effusions  As noted on CT chest. In setting of CHF, ESRD. CT Surgery consulted. S/p thoracostomy tube placement with pigtail catheter late last night. Drained 1.3L at that time. Was then clamped. Yesterday AM, drained an additional 800mL and was re-clamped. Remains clamped.      - Management as per Thoracic Surgery, defer to them re any further drainages  - Chest tube will remain during comfort care until further notice to support respiratory status and improve respiratory comfort    Mildly elevated troponin  In setting of CHF, afib, decreased renal clearance from ESRD. No angina. Likely myocardial demand ischemia without myocardial infarction.   - Comfort care    Chronic atrial fibrillation  Stopped CCB earlier this admission due to hypotension, which was rather mild at that time, and given his mildly reduced EF. Unable to give digoxin due to ESRD  - DC'd tele given comfort care plan, family in agreement  - Eliquis if able to safely take PO, otherwise can defer      COPD  No signs of an acute exacerbation per se.   - Continue budesonide, if able    ESRD on HD  Appreciate input from Nephrology  - No longer appropriate for dialysis as per Nephrology. Family in agreement.     Renal bone disease  Stable  - Continue sevelamer, if able to safe swallow, otherwise can defer      DVT px: On Eliquis for afib  Code status: DNR DNI, comfort care only at this point  Dispo: To transition to hospice upon discharge, in discussion with family re home vs facility

## 2022-10-16 NOTE — PROGRESS NOTE ADULT - SUBJECTIVE AND OBJECTIVE BOX
ANA KONG  MRN-[id number]  Patient is a 87y old  Male who presents with a chief complaint of Acute respiratory failure with hypoxia (16 Oct 2022 16:15)    HPI:  88 y/o male with pmhx of afib s/p TTE/DCCV january 2022 on eliquis, severe AS (planned for TAVR 8/17/22 at [facility]) s/p balloon valvuloplasty complicated by CHB now s/p PPM, HFpEF (EF 59%), Streptococcus faecalis bacteremia (4/2022 - suspected endocarditis & treated with 6 weeks of antibiotics, ESRD on HD (T/Th/Sat), COPD, HTN, PAD with prior RLE revascularization, hypothyroidism, anemia admitted on 10/13 after he missed an HD session and had acute hypoxic respiratory failure requiring bipap and urgent HD. he is DNR/DNI. admitted to medicine. developed aphasia and AMS code stroke called, found to be hypoglycemic, improved with amp of D50.     thoracic consulted for evaluation of bilateral pleural effusions. Right pigtail placed without complication.      Events in last 24 hours:   pt was suppose to have 1L taken off today for comfort. Pt's SBP was in the 70-50's this AM and in evening. Pt does not sound congested a this time or appear in distress ot labored. No drainage of CT was performed today.  Family at bedside agreeable to not draining     REVIEW OF SYSTEMS:    Gen: No fever  EYES/ENT: No visual changes;  No vertigo or throat pain   NECK: No pain   RES:  No shortness of breath or Cough  CARD: No chest pain   GI: No abdominal pain  : No dysuria  NEURO: No weakness  SKIN: No itching, rashes     Physical Exam:  Vital Signs Last 24 Hrs  T(C): 36.4 (16 Oct 2022 07:50), Max: 36.5 (15 Oct 2022 20:13)  T(F): 97.5 (16 Oct 2022 07:50), Max: 97.7 (15 Oct 2022 20:13)  HR: 102 (16 Oct 2022 07:50) (91 - 102)  BP: 76/25 (16 Oct 2022 07:50) (76/25 - 99/61)  BP(mean): --  RR: 19 (16 Oct 2022 07:50) (16 - 19)  SpO2: 97% (16 Oct 2022 07:50) (95% - 100%)    Parameters below as of 15 Oct 2022 22:54  Patient On (Oxygen Delivery Method): mask, Venturi        Gen:  somnolent   CNS: non focal 	  Neck: no JVD  RES : clear , no wheezing, non-labored                    CVS: Regular  rhythm. Normal S1/S2  Abd: Soft, non-distended. Bowel sounds present.  Skin: No rash     Ext:  no edema    ============================I/O===========================   I&O's Detail    ============================ LABS =========================                        9.0    11.41 )-----------( 160      ( 15 Oct 2022 06:55 )             30.4     10-15    134<L>  |  94<L>  |  35<H>  ----------------------------<  97  5.4<H>   |  31  |  3.85<H>    Ca    9.7      15 Oct 2022 06:55  Phos  5.9     10-15  Mg     2.5     10-15    TPro  6.1  /  Alb  2.7<L>  /  TBili  0.8  /  DBili  x   /  AST  292<H>  /  ALT  210<H>  /  AlkPhos  312<H>  10-15    LIVER FUNCTIONS - ( 15 Oct 2022 06:55 )  Alb: 2.7 g/dL / Pro: 6.1 gm/dL / ALK PHOS: 312 U/L / ALT: 210 U/L / AST: 292 U/L / GGT: x                 ======================Micro/Rad/Cardio=================  Culture: Culture - Body Fluid with Gram Stain (10.14.22 @ 11:30)    Gram Stain:   No polymorphonuclear leukocytes seen per low power field  No organisms seen per oil power field  by cytocentrifuge    Specimen Source: Pleural Fl Pleural Fluid    Culture Results:   No growth      CXR: < from: CT Chest No Cont (10.14.22 @ 12:04) >    IMPRESSION:    Moderate-large pleural effusions. Layering secretions in the trachea   extending into the mainstem bronchi and opacifying the lower lobe   bronchi, suggesting aspiration.    --- End of Report ---    < end of copied text >    Echo:Reviewed  ======================================================  PAST MEDICAL & SURGICAL HISTORY:  CHF (congestive heart failure)  HFpEF      HTN (hypertension)      Atrial fibrillation  s/p PPM      Aortic stenosis  pending TAVR      History of COPD      PAD (peripheral artery disease)  s/p RLE stenting      ESRD on dialysis      CAD (coronary artery disease)      Afib      HLD (hyperlipidemia)      Mild HTN      Aortic stenosis      H/O hernia repair      H/O varicose vein stripping  right leg      No significant past surgical history      History of pacemaker          ====================ASSESSMENT ==============  1. R pleural effusion with pigtail on comfort measures     Plan:  -pt was suppose to have 1L taken off today for comfort. Pt's SBP was in the 70-50's this AM and in evening.   -Pt does not sound congested a this time or appear in distress ot labored. No drainage of CT was performed today.    -Family at bedside agreeable to not draining   -Continue comfort measures  ANA KONG  MRN-[id number]  Patient is a 87y old  Male who presents with a chief complaint of Acute respiratory failure with hypoxia (16 Oct 2022 16:15)    HPI:  88 y/o male with pmhx of afib s/p TTE/DCCV january 2022 on eliquis, severe AS (planned for TAVR 8/17/22 at [facility]) s/p balloon valvuloplasty complicated by CHB now s/p PPM, HFpEF (EF 59%), Streptococcus faecalis bacteremia (4/2022 - suspected endocarditis & treated with 6 weeks of antibiotics, ESRD on HD (T/Th/Sat), COPD, HTN, PAD with prior RLE revascularization, hypothyroidism, anemia admitted on 10/13 after he missed an HD session and had acute hypoxic respiratory failure requiring bipap and urgent HD. he is DNR/DNI. admitted to medicine. developed aphasia and AMS code stroke called, found to be hypoglycemic, improved with amp of D50.     thoracic consulted for evaluation of bilateral pleural effusions. Right pigtail placed without complication.      Events in last 24 hours:   pt was suppose to have 1L taken off today for comfort. Pt's SBP was in the 70-50's this AM and in evening. Pt does not sound congested a this time or appear in distress ot labored. No drainage of CT was performed today.  Family at bedside agreeable to not draining     REVIEW OF SYSTEMS:    Gen: No fever  EYES/ENT: No visual changes;  No vertigo or throat pain   NECK: No pain   RES:  No shortness of breath or Cough  CARD: No chest pain   GI: No abdominal pain  : No dysuria  NEURO: No weakness  SKIN: No itching, rashes     Physical Exam:  Vital Signs Last 24 Hrs  T(C): 36.4 (16 Oct 2022 07:50), Max: 36.5 (15 Oct 2022 20:13)  T(F): 97.5 (16 Oct 2022 07:50), Max: 97.7 (15 Oct 2022 20:13)  HR: 102 (16 Oct 2022 07:50) (91 - 102)  BP: 76/25 (16 Oct 2022 07:50) (76/25 - 99/61)  BP(mean): --  RR: 19 (16 Oct 2022 07:50) (16 - 19)  SpO2: 97% (16 Oct 2022 07:50) (95% - 100%)    Parameters below as of 15 Oct 2022 22:54  Patient On (Oxygen Delivery Method): mask, Venturi        Gen:  somnolent   CNS: non focal 	  Neck: no JVD  RES : clear , no wheezing, non-labored                    CVS: Regular  rhythm. Normal S1/S2  Abd: Soft, non-distended. Bowel sounds present.  Skin: No rash     Ext:  no edema    ============================I/O===========================   I&O's Detail    ============================ LABS =========================                        9.0    11.41 )-----------( 160      ( 15 Oct 2022 06:55 )             30.4     10-15    134<L>  |  94<L>  |  35<H>  ----------------------------<  97  5.4<H>   |  31  |  3.85<H>    Ca    9.7      15 Oct 2022 06:55  Phos  5.9     10-15  Mg     2.5     10-15    TPro  6.1  /  Alb  2.7<L>  /  TBili  0.8  /  DBili  x   /  AST  292<H>  /  ALT  210<H>  /  AlkPhos  312<H>  10-15    LIVER FUNCTIONS - ( 15 Oct 2022 06:55 )  Alb: 2.7 g/dL / Pro: 6.1 gm/dL / ALK PHOS: 312 U/L / ALT: 210 U/L / AST: 292 U/L / GGT: x                 ======================Micro/Rad/Cardio=================  Culture: Culture - Body Fluid with Gram Stain (10.14.22 @ 11:30)    Gram Stain:   No polymorphonuclear leukocytes seen per low power field  No organisms seen per oil power field  by cytocentrifuge    Specimen Source: Pleural Fl Pleural Fluid    Culture Results:   No growth      CXR: < from: CT Chest No Cont (10.14.22 @ 12:04) >    IMPRESSION:    Moderate-large pleural effusions. Layering secretions in the trachea   extending into the mainstem bronchi and opacifying the lower lobe   bronchi, suggesting aspiration.    --- End of Report ---    < end of copied text >    Echo:Reviewed  ======================================================  PAST MEDICAL & SURGICAL HISTORY:  CHF (congestive heart failure)  HFpEF      HTN (hypertension)      Atrial fibrillation  s/p PPM      Aortic stenosis  pending TAVR      History of COPD      PAD (peripheral artery disease)  s/p RLE stenting      ESRD on dialysis      CAD (coronary artery disease)      Afib      HLD (hyperlipidemia)      Mild HTN      Aortic stenosis      H/O hernia repair      H/O varicose vein stripping  right leg      No significant past surgical history      History of pacemaker          ====================ASSESSMENT ==============  1. Bilateral effusion with pigtail on comfort measures s/p right pigtail cathter placement    Plan:  -pt was suppose to have 1L taken off today for comfort. Pt's SBP was in the 70-50's this AM and in evening.   -Pt does not sound congested a this time or appear in distress ot labored. No drainage of CT was performed today.    -Family at bedside agreeable to not draining   -Continue comfort measures     Time spent: 35 min

## 2022-10-16 NOTE — PROGRESS NOTE ADULT - SUBJECTIVE AND OBJECTIVE BOX
Chief Complaint: Dyspnea    Interval Hx: Patient seen and examined this AM. Patient resting comfortably. Arousable to voice. Able to answer simple questions. No acute pain complaints. He does have a mild degree of subjective dyspnea at rest but does not appear to have any increase in work of breathing. He remains on oxygen supplementation. Profound hypotension has precluded any further dialysis, BP 70s/20s, and per care goals no care escalation to ICU no pressors. Prognosis is grave and comfort care measures are ordered. Plan to transition to hospice but unclear if that will be at home or a facility. Awaiting Palliative Care Team input, Case Management and Social Work input.      ROS: Multi system review is comprehensively negative x 10 systems except for profound debility, deconditioning, lethargy at times, markedly diminished appetite    Vitals:  T(F): 97.5 (16 Oct 2022 07:50), Max: 97.7 (15 Oct 2022 20:13)  HR: 102 (16 Oct 2022 07:50) (91 - 102)  BP: 76/25 (16 Oct 2022 07:50) (76/25 - 99/61)  RR: 19 (16 Oct 2022 07:50) (16 - 19)  SpO2: 97% (16 Oct 2022 07:50) (95% - 100%) on venti mask    Exam:  Gen: Frail-appearing, drowsy but arousable to voice  HEENT: NCAT PERRL EOMI   Neck: Supple, no JVD  Chest: Normal resp effort at rest, lungs with diminished breath sounds in bases B/L  CVS: s1 s2 normal, regular, rate ~100 bpm  Abd: +BS soft NT ND   Ext: No calf tenderness, no erythema  Skin: Warm, dry  Neuro: Drowsy, arousable to voice, able to answer simple questions, no gross focal deficits but exam is fairly limited    Labs:                             9.0    11.41 )-------( 160             30.4     134  |  94  |  35  ---------------------<  97  5.4   |  31  |  3.85    Ca 9.7   Phos 5.9   Mg 2.5    TPro  6.1  /  Alb  2.7  /  TBili  0.8  /  DBili  x   /  AST  292  /  ALT  210  /  AlkPhos  312    Lactate WNL   Troponin 118, 123, 116    COVID19 PCR negative    Imaging:  CT chest WO 10/14: Moderate-large pleural effusions. Layering secretions in the trachea extending into the mainstem bronchi and opacifying the lower lobe bronchi, suggesting aspiration.    CXR 10/14: Increasing congestion fairly advanced on the latest study.    CXR 10/13: Heart magnified by technique. Right sided pacemaker again noted. Bilateral effusions right greater than left and central CHF more pronounced than on prior.    Cardiac Testing:  TTE 10/14: Moderate MR. AV severely calcified. Valve opening seems to be restricted. Peak and mean transaortic gradients are 69 and 40 mmHg respectively; this finding is consistent with severe AS. WAQAS by continuity equation is .5 cm2. Moderate to severe TR. Severe pulmonary hypertension. LA moderately dilated. LV wall thickness is at the upper limits of normal. Septal flattening is seen; this finding is consistent with right heart pressure / volume overload. LV systolic function appears mildly impaired in the presence of a cardiac arrhythmia. Estimated left ventricular ejection fraction is 45-50 %. RA moderately dilated. A device wire is seen in the RV and RA. RV moderately dilated with decreased contractility.    EKG 10/13: Atrial fibrillation with rapid ventricular response with occasional ventricular-paced complexes and with premature ventricular or aberrantly conducted complexes. Right bundle branch block. Lateral T wave abnormality.     Meds:    MEDICATIONS  (STANDING):  albumin human 25% IVPB 50 milliLiter(s) IV Intermittent every 1 hour  apixaban 2.5 milliGRAM(s) Oral every 12 hours  aspirin  chewable 81 milliGRAM(s) Oral daily  atorvastatin Oral Tab/Cap - Peds 10 milliGRAM(s) Oral daily  budesonide 160 MICROgram(s)/formoterol 4.5 MICROgram(s) Inhaler 2 Puff(s) Inhalation two times a day  levothyroxine 112 MICROGram(s) Oral daily  lidocaine 4% Cream 1 Application(s) Topical daily  midodrine. 10 milliGRAM(s) Oral <User Schedule>  multivitamin 1 Tablet(s) Oral daily  pantoprazole    Tablet 40 milliGRAM(s) Oral before breakfast  sevelamer carbonate 800 milliGRAM(s) Oral three times a day with meals    MEDICATIONS  (PRN):  acetaminophen     Tablet .. 650 milliGRAM(s) Oral every 6 hours PRN Temp greater or equal to 38C (100.4F), Mild Pain (1 - 3)  albumin human 25% IVPB 50 milliLiter(s) IV Intermittent every 1 hour PRN SBP < 100  ALBUTerol  90 MICROgram(s) HFA Inhaler - Peds 2 Puff(s) Inhalation every 6 hours PRN Bronchospasm  aluminum hydroxide/magnesium hydroxide/simethicone Suspension 30 milliLiter(s) Oral every 4 hours PRN Dyspepsia  melatonin 3 milliGRAM(s) Oral at bedtime PRN Insomnia  metoprolol tartrate Injectable 5 milliGRAM(s) IV Push every 6 hours PRN sustained rapid afib rates > 130bpm  morphine  - Injectable 2 milliGRAM(s) IV Push every 3 hours PRN moderate or severe pain  ondansetron Injectable 4 milliGRAM(s) IV Push every 8 hours PRN Nausea and/or Vomiting

## 2022-10-16 NOTE — PROGRESS NOTE ADULT - REASON FOR ADMISSION
Acute respiratory failure with hypoxia
sob
Acute respiratory failure with hypoxia
Acute respiratory failure with hypoxia
sob

## 2022-10-17 NOTE — DISCHARGE NOTE FOR THE EXPIRED PATIENT - HOSPITAL COURSE
Pt admitted for SOB, being treated for CHF s/p chest tube. Hx ESRD on hemodialysis, COPD, PPM, Aortic stenosis requiring TAVR. Poor prognosis. Goals of care meeting held. Family elected for comfort measures.

## 2022-10-17 NOTE — CHART NOTE - NSCHARTNOTESELECT_GEN_ALL_CORE
Event Note
code stroke/Event Note
Event Note
Event Note
Rapid Response/Event Note
Thoracic update/Event Note

## 2022-10-17 NOTE — CHART NOTE - NSCHARTNOTEFT_GEN_A_CORE
RN informed MD that patient . MD evaluated patient at bedside. Pt not arousable with sternal rub. Heartbeat, pulse, lung sounds absent. Pupillary reflex absent. Time of death 1:37 AM. RN informed MD that patient . MD evaluated patient at bedside. Pt not arousable with sternal rub. Heartbeat, pulse, lung sounds absent. Pupillary reflex absent. Time of death 1:37 AM. Pt's daughter, Francoise Del Toro informed.

## 2022-10-20 LAB
CULTURE RESULTS: SIGNIFICANT CHANGE UP
SPECIMEN SOURCE: SIGNIFICANT CHANGE UP

## 2022-10-21 DIAGNOSIS — Z66 DO NOT RESUSCITATE: ICD-10-CM

## 2022-10-21 DIAGNOSIS — Z95.0 PRESENCE OF CARDIAC PACEMAKER: ICD-10-CM

## 2022-10-21 DIAGNOSIS — E83.39 OTHER DISORDERS OF PHOSPHORUS METABOLISM: ICD-10-CM

## 2022-10-21 DIAGNOSIS — Z79.01 LONG TERM (CURRENT) USE OF ANTICOAGULANTS: ICD-10-CM

## 2022-10-21 DIAGNOSIS — Z88.5 ALLERGY STATUS TO NARCOTIC AGENT: ICD-10-CM

## 2022-10-21 DIAGNOSIS — Z99.81 DEPENDENCE ON SUPPLEMENTAL OXYGEN: ICD-10-CM

## 2022-10-21 DIAGNOSIS — I50.33 ACUTE ON CHRONIC DIASTOLIC (CONGESTIVE) HEART FAILURE: ICD-10-CM

## 2022-10-21 DIAGNOSIS — J81.0 ACUTE PULMONARY EDEMA: ICD-10-CM

## 2022-10-21 DIAGNOSIS — E03.9 HYPOTHYROIDISM, UNSPECIFIED: ICD-10-CM

## 2022-10-21 DIAGNOSIS — E78.5 HYPERLIPIDEMIA, UNSPECIFIED: ICD-10-CM

## 2022-10-21 DIAGNOSIS — I73.9 PERIPHERAL VASCULAR DISEASE, UNSPECIFIED: ICD-10-CM

## 2022-10-21 DIAGNOSIS — E87.79 OTHER FLUID OVERLOAD: ICD-10-CM

## 2022-10-21 DIAGNOSIS — J96.01 ACUTE RESPIRATORY FAILURE WITH HYPOXIA: ICD-10-CM

## 2022-10-21 DIAGNOSIS — I25.10 ATHEROSCLEROTIC HEART DISEASE OF NATIVE CORONARY ARTERY WITHOUT ANGINA PECTORIS: ICD-10-CM

## 2022-10-21 DIAGNOSIS — J91.8 PLEURAL EFFUSION IN OTHER CONDITIONS CLASSIFIED ELSEWHERE: ICD-10-CM

## 2022-10-21 DIAGNOSIS — I48.20 CHRONIC ATRIAL FIBRILLATION, UNSPECIFIED: ICD-10-CM

## 2022-10-21 DIAGNOSIS — Z20.822 CONTACT WITH AND (SUSPECTED) EXPOSURE TO COVID-19: ICD-10-CM

## 2022-10-21 DIAGNOSIS — D63.1 ANEMIA IN CHRONIC KIDNEY DISEASE: ICD-10-CM

## 2022-10-21 DIAGNOSIS — I27.20 PULMONARY HYPERTENSION, UNSPECIFIED: ICD-10-CM

## 2022-10-21 DIAGNOSIS — N18.6 END STAGE RENAL DISEASE: ICD-10-CM

## 2022-10-21 DIAGNOSIS — R47.01 APHASIA: ICD-10-CM

## 2022-10-21 DIAGNOSIS — Z51.5 ENCOUNTER FOR PALLIATIVE CARE: ICD-10-CM

## 2022-10-21 DIAGNOSIS — I13.2 HYPERTENSIVE HEART AND CHRONIC KIDNEY DISEASE WITH HEART FAILURE AND WITH STAGE 5 CHRONIC KIDNEY DISEASE, OR END STAGE RENAL DISEASE: ICD-10-CM

## 2022-10-21 DIAGNOSIS — I35.0 NONRHEUMATIC AORTIC (VALVE) STENOSIS: ICD-10-CM

## 2022-10-21 DIAGNOSIS — J44.9 CHRONIC OBSTRUCTIVE PULMONARY DISEASE, UNSPECIFIED: ICD-10-CM

## 2022-10-21 DIAGNOSIS — E16.2 HYPOGLYCEMIA, UNSPECIFIED: ICD-10-CM

## 2022-10-21 DIAGNOSIS — Z99.2 DEPENDENCE ON RENAL DIALYSIS: ICD-10-CM

## 2022-10-21 DIAGNOSIS — I95.89 OTHER HYPOTENSION: ICD-10-CM

## 2022-10-21 DIAGNOSIS — Z74.1 NEED FOR ASSISTANCE WITH PERSONAL CARE: ICD-10-CM

## 2023-03-02 NOTE — ED PROVIDER NOTE - PHYSICAL EXAMINATION
RW/CCGx1/good balance Gen: tachypneic   Head: NC/AT  Neck: trachea midline  cv: irregular, tachycardic  Resp:  tachypneic , lungs clear  abd nontender   Ext: no deformities  Neuro:  A&O appears non focal  Skin:  Warm and dry as visualized  Psych:  Normal affect and mood

## 2023-07-18 NOTE — PROGRESS NOTE ADULT - SUBJECTIVE AND OBJECTIVE BOX
normal gait and station , no tenderness or deformities present
Pt doing well s/p uncomplicated SHU & DCCV 200J x 1, currently in sinus rhythm. Denies complaint.   Prelim SHU per Dr Hart: negative for intracardiac thrombus, severe AS, EF 55%    ECG: sinus rhythm, mildly prolonged AV delay 200msec, RBBB    Exam:   VSS, NAD, awake, alert   Skin: no erythema/edema/blistering at defib pad sites.   Card: S1/S2, RRR, VI/VI CHARLA  Resp: lungs CTA b/l  Abd: S/NT/ND  Ext: no edema, distal pulses intact    Assessment: 86 year old gentleman with history of CHF/HFpEF, COPD, PAD b/l with prior RLE revascularization (PCI) and claudication, hypothyroidism, ESRD on HD (T/TH,Sat)/still makes urine (since 9/2021), aortic stenosis (severe) and persistent atrial fibrillation maintained on Eliquis. He now has atrial fibrillation with rapid rates, and worsening  symptoms of dyspnea. However, his symptoms are multifactorial, given concomitant ESRD on HD, COPD, and severe AS. He may require TAVR for his aortic stenosis, however, this has been felt difficult to assess given his AF and RVR. Pt presented today 1/24/22 for and is now status post SHU negative for MARLENI thrombus and uncomplicated DCCV with restoration of sinus rhythm.     Plan:   Observation on telemetry per post op protocol.    Resume PO intake.   Ambulate w/ assist once fully awake & back to baseline mental status w/ VSS.  Continue Eliquis 2.5mg po BID (age>80 on HD). Importance of strict compliance with anticoagulation regimen reinforced with pt.   Resume other home medications.   Anticipate d/c home once all criteria met, with outpt f/up with Dr Isaac in 1 month.   Oupt f/u Dr Lopez 2-4 weeks, sooner if needed to review treatment plan for severe AS

## 2023-09-27 NOTE — PROVIDER CONTACT NOTE (CRITICAL VALUE NOTIFICATION) - DATE AND TIME:
21-Jul-2022 04:33 21-Jul-2022 04:32 Consent (Scalp)/Introductory Paragraph: The rationale for Mohs was explained to the patient and consent was obtained. The risks, benefits and alternatives to therapy were discussed in detail. Specifically, the risks of changes in hair growth pattern secondary to repair, infection, scarring, bleeding, prolonged wound healing, incomplete removal, allergy to anesthesia, nerve injury and recurrence were addressed. Prior to the procedure, the treatment site was clearly identified and confirmed by the patient. All components of Universal Protocol/PAUSE Rule completed.

## 2024-04-22 NOTE — DISCHARGE NOTE NURSING/CASE MANAGEMENT/SOCIAL WORK - NURSING SECTION COMPLETE
PHYSICAL THERAPY EVALUATION  Type of Visit: Evaluation    See electronic medical record for Abuse and Falls Screening details.    Subjective       Presenting condition or subjective complaint: weakness, legs, balance  Date of onset: 02/29/24    Relevant medical history: Neck injury; Significant weakness; Smoking   Dates & types of surgery:      Prior diagnostic imaging/testing results:       Prior therapy history for the same diagnosis, illness or injury: No      Prior Level of Function  Transfers:   Ambulation:   ADL:   IADL:     Living Environment  Social support: Alone   Type of home: Apartment/condo   Stairs to enter the home:         Ramp:     Stairs inside the home:         Help at home: Medication and/or finances; Home management tasks (cooking, cleaning)  Equipment owned:       Employment:      Hobbies/Interests:      Patient goals for therapy: walk freely of support    Pt does continue to have n/t in his hands but medication is helping. He had a cervical laminoplasty on 2/29/24. He no longer has any restrictions. He reports progressive weakness in his legs over the past week. He is walking with a walker- he can go down the hallway in his apartment. He arrives in clinic with use of a wheelchair today. His friend Narciso arrives with him. A few weeks ago, he was able to take a longer walk with his walker to the store by his apartment. He would not be able to go that distance this past week. He feels more tired and fatigued. He denies any falls or injury- with further questioning has fallen but does not feel this was severe. He has not needed to use his cervical collar.      Pain assessment: Pain denied     Objective   CERVICAL SPINE EVALUATION  PAIN:   INTEGUMENTARY (edema, incisions):   POSTURE: Sitting Posture: Rounded shoulders, Forward head, Thoracic kyphosis increased  GAIT:   Weightbearing Status:   Assistive Device(s):   Gait Deviations:     Pt requires several attempts for w/c to stand with walker  transfer to transition in/out of the restroom. Pt reports increased fatigue and slightly short of breath after use of restroom. Took patients vitals with O2: 100%, 90 bpm, 105/80 mmHg, deferred rest of special tests due to fatigue     5TSTS: 0 (unable to do without UE support)    BALANCE/PROPRIOCEPTION:   WEIGHTBEARING ALIGNMENT:   ROM:     MYOTOMES:   DTR S:   CORD SIGNS:   DERMATOMES:   NEURAL TENSION:   FLEXIBILITY:    SPECIAL TESTS:   PALPATION:   SPINAL SEGMENTAL CONCLUSIONS:       Assessment & Plan   CLINICAL IMPRESSIONS  Medical Diagnosis: Cervical spondylosis with myelopathy    Treatment Diagnosis: Cervical spondylosis with myelopathy, LE weakness, impairment in gait, unsteadiness on feet   Impression/Assessment: Patient is a 63 year old male with LE weakness, balance, gait impairment after a cervical laminoplasty in Feb 2024 with recent increase in weakness.  The following significant findings have been identified: Decreased ROM/flexibility, Decreased strength, Impaired balance, Impaired gait, Impaired muscle performance, Decreased activity tolerance, and Impaired posture. These impairments interfere with their ability to perform self care tasks, recreational activities, household chores, household mobility, and community mobility as compared to previous level of function.     Clinical Decision Making (Complexity):  Clinical Presentation: Stable/Uncomplicated  Clinical Presentation Rationale: based on medical and personal factors listed in PT evaluation  Clinical Decision Making (Complexity): Low complexity    PLAN OF CARE  Treatment Interventions:  Interventions: Gait Training, Manual Therapy, Neuromuscular Re-education, Therapeutic Activity, Therapeutic Exercise, Self-Care/Home Management    Long Term Goals     PT Goal 1  Goal Identifier: Walking  Goal Description: pt will be able to walk for 20 min without an AD to return to PLOF  Target Date: 07/16/24  PT Goal 2  Goal Identifier: 5TSTS  Goal  Description: Pt will be able to complete 5TSTS in <12 seconds to demonstrate a decreased falls risk  Target Date: 07/16/24      Frequency of Treatment: 1-2x/week  Duration of Treatment: 12 weeks    Recommended Referrals to Other Professionals:  possible OT if there is hand weakness  Education Assessment:   Learner/Method: Patient;Pictures/Video    Risks and benefits of evaluation/treatment have been explained.   Patient/Family/caregiver agrees with Plan of Care.     Evaluation Time:     PT Eval, Low Complexity Minutes (02620): 30       Signing Clinician: Genoveva Davalos PT           Patient/Caregiver provided printed discharge information.

## 2024-05-30 NOTE — ED PROVIDER NOTE - NSICDXNOFAMILYHX_GEN_ALL_ED
Patient Quality Outreach    Patient is due for the following:   Diabetes -  Diabetic Follow-Up Visit    Next Steps:   Schedule a office visit for diabetic check    Type of outreach:    Sent Rdio message.    Next Steps:  Reach out within 90 days via Rdio.    Max number of attempts reached: Yes. Will try again in 90 days if patient still on fail list.    Questions for provider review:    None           Gloria Barboza MA        
<-- Click to add NO pertinent Family History

## 2025-02-06 NOTE — ED ADULT NURSE NOTE - CCCP TRG CHIEF CMPLNT
PRINCIPAL DISCHARGE DIAGNOSIS  Diagnosis: Syncope, unspecified syncope type  Assessment and Plan of Treatment: HOME CARE INSTRUCTIONS  Have someone stay with you until you feel stable.  Do not drive, operate machinery, or play sports until your caregiver says it is okay.  Keep all follow-up appointments as directed by your caregiver.   Lie down right away if you start feeling like you might faint. Breathe deeply and steadily. Wait until all the symptoms have passed.Drink enough fluids to keep your urine clear or pale yellow.  If you are taking blood pressure or heart medicine, get up slowly, taking several minutes to sit and then stand. This can reduce dizziness.  SEEK IMMEDIATE MEDICAL CARE IF:  You have a severe headache.  You have unusual pain in the chest, abdomen, or back.  You are bleeding from the mouth or rectum, or you have black or tarry stool.  You have an irregular or very fast heartbeat.  You have pain with breathing.  You have repeated fainting or seizure-like jerking during an episode.  You faint when sitting or lying down.  You have confusion.  You have difficulty walking.  You have severe weakness.  You have vision problems.  If you fainted, call your local emergency services 911  Do not drive yourself to the hospital         [Fully active, able to carry on all pre-disease performance without restriction] : Status 0 - Fully active, able to carry on all pre-disease performance without restriction [Normal] : affect appropriate [Thin] : thin [de-identified] : no dominant mass, nipple retraction or discharge [de-identified] : no gross focal motor deficits r/o gi bleed, covid+